# Patient Record
Sex: FEMALE | Race: WHITE | NOT HISPANIC OR LATINO | Employment: OTHER | URBAN - METROPOLITAN AREA
[De-identification: names, ages, dates, MRNs, and addresses within clinical notes are randomized per-mention and may not be internally consistent; named-entity substitution may affect disease eponyms.]

---

## 2017-01-23 ENCOUNTER — HOSPITAL ENCOUNTER (EMERGENCY)
Facility: HOSPITAL | Age: 67
Discharge: HOME | End: 2017-01-23
Attending: EMERGENCY MEDICINE
Payer: MEDICARE

## 2017-01-23 VITALS
RESPIRATION RATE: 18 BRPM | TEMPERATURE: 99 F | OXYGEN SATURATION: 96 % | SYSTOLIC BLOOD PRESSURE: 156 MMHG | WEIGHT: 134 LBS | HEIGHT: 62 IN | DIASTOLIC BLOOD PRESSURE: 74 MMHG | BODY MASS INDEX: 24.66 KG/M2 | HEART RATE: 71 BPM

## 2017-01-23 DIAGNOSIS — R52 PAIN: ICD-10-CM

## 2017-01-23 DIAGNOSIS — M79.644 THUMB PAIN, RIGHT: Primary | ICD-10-CM

## 2017-01-23 DIAGNOSIS — M19.90 OSTEOARTHRITIS, UNSPECIFIED OSTEOARTHRITIS TYPE, UNSPECIFIED SITE: ICD-10-CM

## 2017-01-23 PROCEDURE — 25000003 PHARM REV CODE 250: Performed by: EMERGENCY MEDICINE

## 2017-01-23 PROCEDURE — 99283 EMERGENCY DEPT VISIT LOW MDM: CPT

## 2017-01-23 RX ORDER — ACETAMINOPHEN 325 MG/1
975 TABLET ORAL
Status: COMPLETED | OUTPATIENT
Start: 2017-01-23 | End: 2017-01-23

## 2017-01-23 RX ORDER — ASPIRIN 81 MG/1
81 TABLET ORAL DAILY
COMMUNITY

## 2017-01-23 RX ORDER — TRAMADOL HYDROCHLORIDE 50 MG/1
50 TABLET ORAL EVERY 6 HOURS PRN
Qty: 11 TABLET | Refills: 0 | Status: SHIPPED | OUTPATIENT
Start: 2017-01-23 | End: 2017-02-02

## 2017-01-23 RX ORDER — ALBUTEROL SULFATE 90 UG/1
2 AEROSOL, METERED RESPIRATORY (INHALATION)
COMMUNITY
Start: 2016-11-28 | End: 2020-08-07 | Stop reason: SDUPTHER

## 2017-01-23 RX ORDER — ESOMEPRAZOLE MAGNESIUM 40 MG/1
40 CAPSULE, DELAYED RELEASE ORAL
COMMUNITY
Start: 2016-11-28 | End: 2019-06-04 | Stop reason: ALTCHOICE

## 2017-01-23 RX ADMIN — ACETAMINOPHEN 975 MG: 325 TABLET ORAL at 01:01

## 2017-01-23 NOTE — ED AVS SNAPSHOT
[facility]  [address] [address] [address]               Salud Echevarria   2017 12:40 PM   ED    Description:  Female : 1950   Department:  [facility]           Your Care was Coordinated By:     Provider Role From To    Rex Butt MD Attending Provider 17 1242 --      Reason for Visit     Hand Injury           Diagnoses this Visit        Comments    Thumb pain, right    -  Primary     Pain         Osteoarthritis, unspecified osteoarthritis type, unspecified site           ED Disposition     None           To Do List           Follow-up Information     Follow up with Huey Gudino MD In 3 days.    Specialty:  Pediatrics    Contact information:    [address] [facility] [address] [phone]          Follow up with [facility].    Specialty:  Emergency Medicine    Why:  If symptoms worsen, Or worsening condition or any other major concern    Contact information:    [address] [address] [address] [phone]       These Medications        Disp Refills Start End    tramadol (ULTRAM) 50 mg tablet 11 tablet 0 2017    Take 1 tablet (50 mg total) by mouth every 6 (six) hours as needed. - Oral      [facility] -  Assistance  Registered nurses in the [facility] provide clinical advisement, health education, appointment booking, and other advisory services.  Call for this free service at [phone].             Medications           Message regarding Medications     Verify the changes and/or additions to your medication regime listed below are the same as discussed with your clinician today.  If any of these changes or additions are incorrect, please notify your healthcare provider.        START taking these NEW medications        Refills    tramadol (ULTRAM) 50 mg tablet 0    Sig: Take 1 tablet (50 mg  "total) by mouth every 6 (six) hours as needed.    Class: Print    Route: Oral      These medications were administered today        Dose Freq    acetaminophen tablet 975 mg 975 mg ED 1 Time    Sig: Take 3 tablets (975 mg total) by mouth ED 1 Time.    Class: Normal    Route: Oral      STOP taking these medications     oxycodone-acetaminophen 5-325 mg (PERCOCET) 5-325 mg per tablet Take 1 tablet by mouth every 4 (four) hours as needed for Pain.           Verify that the below list of medications is an accurate representation of the medications you are currently taking.  If none reported, the list may be blank. If incorrect, please contact your healthcare provider. Carry this list with you in case of emergency.           Current Medications     albuterol 90 mcg/actuation inhaler Inhale 2 puffs into the lungs.    alprazolam (XANAX XR) 1 MG Tb24 Take 0.5 mg by mouth as needed.    aspirin (ECOTRIN) 81 MG EC tablet Take 81 mg by mouth once daily.    esomeprazole (NEXIUM) 40 MG capsule Take 40 mg by mouth.    pravastatin (PRAVACHOL) 40 MG tablet Take 40 mg by mouth once daily.    tramadol (ULTRAM) 50 mg tablet Take 1 tablet (50 mg total) by mouth every 6 (six) hours as needed.           Clinical Reference Information           Your Vitals Were     BP Pulse Temp Resp Height Weight    165/74 (BP Location: Left arm, Patient Position: Sitting) 66 98.5 °F (36.9 °C) (Oral) 18 5' 2" (1.575 m) 60.8 kg (134 lb)    SpO2 BMI             97% 24.51 kg/m2         Allergies as of 1/23/2017        Reactions    Pcn [Penicillins] Shortness Of Breath    Iodine And Iodide Containing Products     Naproxen     Statins-hmg-coa Reductase Inhibitors     Leg cramps    Diazepam Anxiety    Made pt overly anxious instead of relaxing      Immunizations Administered on Date of Encounter - 1/23/2017     None      ED Micro, Lab, POCT     None      ED Imaging Orders     Start Ordered       Status Ordering Provider    01/23/17 1223 01/23/17 1223  X-Ray " Finger 2 or More Views  1 time imaging      Final result         Discharge Instructions         Osteoarthritis  Osteoarthritis (also called degenerative joint disease) happens when the cartilage in a joint becomes damaged and worn. This may be due to age, wear and tear, overuse of the joint, or other problems. Osteoarthritis can affect any joint. But it is most common in hands, knees, spine, hips, and feet. Symptoms include joint stiffness, pain, and swelling.  Home care  · When a joint is more sore than usual, rest it for a day or two.  · Heat can help relieve stiffness. Take a hot bath or apply a heating pad for up to 30 minutes at a time. If symptoms are worse in the morning, using heat just after awakening can help relax the muscle and soothe the joints.   · Ice helps relieve pain and swelling. It is often used after activity. Use a cold pack wrapped in a thin cloth on the joint for 10-15 minutes at a time.   · Alternating hot and cold can also help relieve pain. Try this for 20 minutes at a time, several times per day.  · Exercise helps prevent the muscles and ligaments around the joint from becoming weak. It also helps maintain function in the joint.  Be as active as you can. Talk to your doctor about what activity program is best for you.  · Excess weight puts a lot of extra strain on weight-bearing joints of the lower back, hips, knees, feet and ankles. If you are overweight, talk to your doctor about a safe and effective weight loss program.  · Use anti-inflammatory medications as prescribed for pain. This incudes acetaminophen or NSAIDs such as ibuprofen or naproxen. If needed, topical or injected medications may be recommended. Talk to your doctor if these options are not enough to manage your pain.  · Talk with your doctor about devices that might help improve your function and reduce pain.  Follow-up care  Follow up with your doctor as advised by our staff.  When to seek medical attention  Call your  doctor right away if any of the following occur:  · Redness or swelling of a painful joint  · Discharge or pus from a painful joint  · Fever of 100.4ºF (38ºC) or higher, or as directed by your healthcare provider  · Worsening joint pain  · Decreased ability to move the joint or bear weight on the joint  © 2000-2016 [employer]. [address] [place] [address]. All rights reserved. This information is not intended as a substitute for professional medical care. Always follow your healthcare professional's instructions.          Osteoarthritis Medicine    Pain from osteoarthritis can interfere with your life in many ways. It can make it hard to be active and take good care of yourself. Untreated pain may make sleep difficult. It may also contribute to depression and anxiety.  Controlling pain involves lifestyle changes like weight management and exercise. Natural and alternative treatments for pain relief include the use of hot and cold, massage, acupuncture, relaxation, and counseling. Other medicines are available to help relieve pain.  Over-the-counter medicines  Some arthritis medicines can be bought without a prescription:  · Acetaminophen is effective for moderate pain and does not cause stomach upset. It doesnt relieve swelling, though. You must talk with your healthcare provider before taking it if you have liver or kidney problems.   · Nonsteroidal anti-inflammatory medicines (NSAIDs), such as aspirin, ibuprofen, or naproxen, help relieve pain and swelling. Use of NSAIDs can cause stomach and kidney problems and raise blood pressure. Note: Do not take NSAIDs if you take medicines that thin your blood, such as warfarin. Talk to your healthcare provider first if you have kidney or liver disease.   Prescription medicines  Some arthritis medicines need a prescription:  · Prescription NSAIDs are stronger than over-the-counter NSAIDs. They reduce pain and swelling. Use of NSAIDs may cause  serious stomach problems and easy bruising. In rare cases they may lead to kidney or liver problems. These include nonselective NSAIDs, such as naproxen, diclofenac, and indomethacin. Also, selective NSAIDs, such as meloxicam and celecoxib. Selective NSAIDs are thought to have less of a risk of gastrointestinal side effects when compared to nonselective NSAIDs.   · Other medicines, such as duloxetine, tramadol, and narcotic pain relievers, may be prescribed for certain patients based on specific clinical factors.   Topical medicines  Topical medicines are those applied directly to the skin over the affected joint. They may be lotions, cream, sprays, ointments, or gels. They can be used along with some oral medicines:  · NSAID creams may reduce swelling and relieve pain.  · Capsaicin (cream) is made from an ingredient found in chili peppers. It works by stopping production of a substance that helps send pain signals to the brain. It may cause a burning or stinging feeling when you first use it.  · Other topical medicines provide pain relief by numbing the area to which they are applied.  Intra-articular medicines  Some patients benefit from joint injections with:  · Corticosteroids used for most joints  · Hyaluronic acid preparation used for knees   If one medicine doesn't work for you, another may help. If you have any questions or concerns about your current medicines or other medicines choices, talk with your healthcare provider.  © 7379-6579 The [employer]. [address] [place] [address], [place] [address]. All rights reserved. This information is not intended as a substitute for professional medical care. Always follow your healthcare professional's instructions.            Understanding Osteoarthritis  There are about 100 different types of arthritis. In general, arthritis means problems with the joints. A joint is a place in the body where two bones meet. Arthritis may also affect other body tissue near the  joints including muscles, tendons, and ligaments. And, in some forms of arthritis, the whole body is involved.  Osteoarthritis (OA) is sometimes called degenerative joint disease or wear-and-tear arthritis. It's the most common type of arthritis. In OA, the cartilage wears away. Cartilage covers the ends of bones and acts as a cushion. If enough cartilage wears away, bone rubs against bone. The joint changes in OA cause pain, stiffness, and difficulty movement.    How joints work  The joint help the bones move easily. Cartilage is smooth tissue that cushions the ends of bones, letting them slide against each other. The synovial membrane surrounds the joint. It makes a fluid that lubricates the joint.          When a joint wears out  Through long use or injury, or because of a family tendency, the cartilage can become rough and damaged. It starts to wear unevenly. The ends of the bones then rub together, causing stiffness, pain, and sometimes swelling. Bony spurs may grow, enlarging the joint. The muscles around the joint may weaken  © 2736-6188 [employer]. [address] [place] [address]. All rights reserved. This information is not intended as a substitute for professional medical care. Always follow your healthcare professional's instructions.          [facility] Sign-Up     Activating your [facility] account is as easy as 1-2-3!     1) Visit [email or web address], select Sign Up Now, enter this activation code and your date of birth, then select Next.  [email or web address]  Expires: 3/9/2017  1:41 PM      2) Create a username and password to use when you visit [facility] in the future and select a security question in case you lose your password and select Next.    3) Enter your e-mail address and click Sign Up!    Additional Information  If you have questions, please e-mail [email or web address] or call [phone] to talk to our [facility] staff. Remember, [facility] is NOT to be used for urgent  needs. For medical emergencies, dial 911.         Smoking Cessation     If you would like to quit smoking:   You may be eligible for free services if you are a [place] resident and started smoking cigarettes before September 1, 1988.  Call the Smoking Cessation Trust (SCT) toll free at (557) 221-9719 or (599) 368-5756.   Call 1-800-QUIT-NOW if you do not meet the above criteria.             [facility] complies with applicable Federal civil rights laws and does not discriminate on the basis of race, color, national origin, age, disability, or sex.        Language Assistance Services     ATTENTION: Language assistance services are available, free of charge. Please call [phone].      ATENCIÓN: Si habla kekeañol, tiene a woody disposición servicios gratuitos de asistencia lingüística. Llame al [phone].     CHÚ Ý: N?u b?n nói Ti?ng Vi?t, có các d?ch v? h? tr? ngôn ng? mi?n phí dành cho b?n. G?i s? [phone].

## 2017-01-23 NOTE — DISCHARGE INSTRUCTIONS
Osteoarthritis  Osteoarthritis (also called degenerative joint disease) happens when the cartilage in a joint becomes damaged and worn. This may be due to age, wear and tear, overuse of the joint, or other problems. Osteoarthritis can affect any joint. But it is most common in hands, knees, spine, hips, and feet. Symptoms include joint stiffness, pain, and swelling.  Home care  · When a joint is more sore than usual, rest it for a day or two.  · Heat can help relieve stiffness. Take a hot bath or apply a heating pad for up to 30 minutes at a time. If symptoms are worse in the morning, using heat just after awakening can help relax the muscle and soothe the joints.   · Ice helps relieve pain and swelling. It is often used after activity. Use a cold pack wrapped in a thin cloth on the joint for 10-15 minutes at a time.   · Alternating hot and cold can also help relieve pain. Try this for 20 minutes at a time, several times per day.  · Exercise helps prevent the muscles and ligaments around the joint from becoming weak. It also helps maintain function in the joint.  Be as active as you can. Talk to your doctor about what activity program is best for you.  · Excess weight puts a lot of extra strain on weight-bearing joints of the lower back, hips, knees, feet and ankles. If you are overweight, talk to your doctor about a safe and effective weight loss program.  · Use anti-inflammatory medications as prescribed for pain. This incudes acetaminophen or NSAIDs such as ibuprofen or naproxen. If needed, topical or injected medications may be recommended. Talk to your doctor if these options are not enough to manage your pain.  · Talk with your doctor about devices that might help improve your function and reduce pain.  Follow-up care  Follow up with your doctor as advised by our staff.  When to seek medical attention  Call your doctor right away if any of the following occur:  · Redness or swelling of a painful  joint  · Discharge or pus from a painful joint  · Fever of 100.4ºF (38ºC) or higher, or as directed by your healthcare provider  · Worsening joint pain  · Decreased ability to move the joint or bear weight on the joint  © 9466-7150 [employer]. [address] [place] [address]. All rights reserved. This information is not intended as a substitute for professional medical care. Always follow your healthcare professional's instructions.          Osteoarthritis Medicine    Pain from osteoarthritis can interfere with your life in many ways. It can make it hard to be active and take good care of yourself. Untreated pain may make sleep difficult. It may also contribute to depression and anxiety.  Controlling pain involves lifestyle changes like weight management and exercise. Natural and alternative treatments for pain relief include the use of hot and cold, massage, acupuncture, relaxation, and counseling. Other medicines are available to help relieve pain.  Over-the-counter medicines  Some arthritis medicines can be bought without a prescription:  · Acetaminophen is effective for moderate pain and does not cause stomach upset. It doesnt relieve swelling, though. You must talk with your healthcare provider before taking it if you have liver or kidney problems.   · Nonsteroidal anti-inflammatory medicines (NSAIDs), such as aspirin, ibuprofen, or naproxen, help relieve pain and swelling. Use of NSAIDs can cause stomach and kidney problems and raise blood pressure. Note: Do not take NSAIDs if you take medicines that thin your blood, such as warfarin. Talk to your healthcare provider first if you have kidney or liver disease.   Prescription medicines  Some arthritis medicines need a prescription:  · Prescription NSAIDs are stronger than over-the-counter NSAIDs. They reduce pain and swelling. Use of NSAIDs may cause serious stomach problems and easy bruising. In rare cases they may lead to kidney or  liver problems. These include nonselective NSAIDs, such as naproxen, diclofenac, and indomethacin. Also, selective NSAIDs, such as meloxicam and celecoxib. Selective NSAIDs are thought to have less of a risk of gastrointestinal side effects when compared to nonselective NSAIDs.   · Other medicines, such as duloxetine, tramadol, and narcotic pain relievers, may be prescribed for certain patients based on specific clinical factors.   Topical medicines  Topical medicines are those applied directly to the skin over the affected joint. They may be lotions, cream, sprays, ointments, or gels. They can be used along with some oral medicines:  · NSAID creams may reduce swelling and relieve pain.  · Capsaicin (cream) is made from an ingredient found in chili peppers. It works by stopping production of a substance that helps send pain signals to the brain. It may cause a burning or stinging feeling when you first use it.  · Other topical medicines provide pain relief by numbing the area to which they are applied.  Intra-articular medicines  Some patients benefit from joint injections with:  · Corticosteroids used for most joints  · Hyaluronic acid preparation used for knees   If one medicine doesn't work for you, another may help. If you have any questions or concerns about your current medicines or other medicines choices, talk with your healthcare provider.  © 6408-9561 [employer]. [address] [place] [address], [place] [address]. All rights reserved. This information is not intended as a substitute for professional medical care. Always follow your healthcare professional's instructions.            Understanding Osteoarthritis  There are about 100 different types of arthritis. In general, arthritis means problems with the joints. A joint is a place in the body where two bones meet. Arthritis may also affect other body tissue near the joints including muscles, tendons, and ligaments. And, in some forms of arthritis,  the whole body is involved.  Osteoarthritis (OA) is sometimes called degenerative joint disease or wear-and-tear arthritis. It's the most common type of arthritis. In OA, the cartilage wears away. Cartilage covers the ends of bones and acts as a cushion. If enough cartilage wears away, bone rubs against bone. The joint changes in OA cause pain, stiffness, and difficulty movement.    How joints work  The joint help the bones move easily. Cartilage is smooth tissue that cushions the ends of bones, letting them slide against each other. The synovial membrane surrounds the joint. It makes a fluid that lubricates the joint.          When a joint wears out  Through long use or injury, or because of a family tendency, the cartilage can become rough and damaged. It starts to wear unevenly. The ends of the bones then rub together, causing stiffness, pain, and sometimes swelling. Bony spurs may grow, enlarging the joint. The muscles around the joint may weaken  © 2184-8290 [employer]. [address] [place] [address], [place] [address]. All rights reserved. This information is not intended as a substitute for professional medical care. Always follow your healthcare professional's instructions.

## 2017-01-23 NOTE — ED PROVIDER NOTES
Encounter Date: 1/23/2017       History     Chief Complaint   Patient presents with    Hand Injury     right thumb. sent from Urgent care bc Xray is down. pt states does not know how injury happened      1/23/2017, 1:31 PM.    History Provided by: patient       Salud Echevarria is a 67 y.o. female presenting to the ED for complaints of right thumb pain.  The patient states that yesterday her right thumb started hurting.  The patient denies any obvious trauma and does not remember overtly hurting her thumb.  The patient noted some swelling and deformity when compared to the left thumb and rates the pain as moderate. The patient denies any fever chills nausea or vomiting.  There are no other major concerns or complaints noted at this time.        PCP: Huey Gudino MD         Medical History:   Past Medical History   Diagnosis Date    Hyperlipidemia     Spinal stenosis        Surgical History:   Past Surgical History   Procedure Laterality Date    Back surgery      Tonsillectomy      Hysterectomy      Lymph node biopsy         Family History:   Family History   Problem Relation Age of Onset    Heart disease Mother        Social History:   Social History     Social History Main Topics    Smoking status: Current Every Day Smoker     Packs/day: 0.50     Types: Cigarettes    Smokeless tobacco: Not on file    Alcohol use No    Drug use: No    Sexual activity: Not on file       Review of patient's allergies indicates:   Allergen Reactions    Pcn [penicillins] Shortness Of Breath    Iodine and iodide containing products     Naproxen     Statins-hmg-coa reductase inhibitors      Leg cramps    Diazepam Anxiety     Made pt overly anxious instead of relaxing     HPI  Past Medical History   Diagnosis Date    Hyperlipidemia     Spinal stenosis      No past medical history pertinent negatives.  Past Surgical History   Procedure Laterality Date    Back surgery      Tonsillectomy      Hysterectomy       Lymph node biopsy       Family History   Problem Relation Age of Onset    Heart disease Mother      Social History   Substance Use Topics    Smoking status: Current Every Day Smoker     Packs/day: 0.50     Types: Cigarettes    Smokeless tobacco: None    Alcohol use No     Review of Systems   Constitutional: Negative for fever.   HENT: Negative for sore throat.    Respiratory: Negative for shortness of breath.    Cardiovascular: Negative for chest pain.   Gastrointestinal: Negative for nausea.   Genitourinary: Negative for dysuria.   Musculoskeletal: Negative for back pain.        Right thumb pain   Skin: Negative for rash.   Neurological: Negative for weakness.   Hematological: Does not bruise/bleed easily.   All other systems reviewed and are negative.      Physical Exam   Initial Vitals   BP Pulse Resp Temp SpO2   01/23/17 1215 01/23/17 1215 01/23/17 1215 01/23/17 1215 01/23/17 1215   165/74 66 18 98.5 °F (36.9 °C) 97 %     Physical Exam  Nursing Notes and Vital Signs Reviewed.  Constitutional:  Well developed, well nourished.  She is awake & alert.  She is in no acute distress  Head:  Atraumatic.  Normocephalic.    Eyes:  PERRL.  EOMI.  Conjunctivae are not pale. No scleral icterus.  ENT:  Mucous membranes are moist and intact.  Oropharynx is clear and symmetric.    Neck:  Supple. Full ROM.  No lymphadenopathy.  Cardiovascular:  Regular rate.  Regular rhythm. S1 and S2 heart sounds present.  No murmurs, rubs, or gallops.  Distal pulses are 2+ and symmetric.  Pulmonary/Chest:  No evidence of respiratory distress.  Clear to auscultation bilaterally.  No wheezing, rales or rhonchi.  Abdominal:  Soft and non-distended.  There is no tenderness.  No rebound, guarding, or rigidity.  Good bowel sounds.  No organomegaly.    Genitourinary: No CVA tenderness.  Musculoskeletal:  Moves all four extremities. No obvious deformities.  No edema. No calf tenderness.  Noted tenderness to palpation to the right thumb IP joint  "with some slight swelling and decreased range of motion secondary to pain  Skin:  Skin is warm and dry. No rashes.      Neurological:  Alert, awake, and appropriate.  Normal speech.  No acute focal neurological deficits are appreciated.  Psychiatric:  Good eye contact.  Appropriate in content/context. Normal affect.    ED Course   Procedures  Labs Reviewed - No data to display     ED ONGOING VITALS:  Vitals:    01/23/17 1215 01/23/17 1355   BP: (!) 165/74 (!) 156/74   Pulse: 66 71   Resp: 18 18   Temp: 98.5 °F (36.9 °C) 98.5 °F (36.9 °C)   TempSrc: Oral Oral   SpO2: 97% 96%   Weight: 60.8 kg (134 lb)    Height: 5' 2" (1.575 m)          ABNORMAL LAB VALUES:  Labs Reviewed - No data to display      ALL LAB VALUES:  No labs drawn at this time.      RADIOLOGY STUDIES:  Imaging Results         X-Ray Finger 2 or More Views (Final result) Result time:  01/23/17 13:16:12    Final result by ELLEN Rodgers Sr., MD (01/23/17 13:16:12)    Impression:      1.  There are minimal osteoarthritic changes in the interphalangeal joint of the right thumb.  2.  There is no acute fracture visualized.      Electronically signed by: ELLEN RODGERS MD  Date:     01/23/17  Time:    13:16     Narrative:    3 view x-ray of the right thumb    Clinical History:     Pain, unspecified    Findings:     There is no acute fracture visualized. There is no dislocation.  There are minimal osteoarthritic changes in the interphalangeal joint of the right thumb.                The above vital signs and test results have been reviewed by the emergency provider.       ED EVENTS:  1:36 PM - Re-evaluation: The patient is resting comfortably and is in no acute distress. She states that her symptoms have improved after treatment within ER. Discussed test results, shared treatment plan, specific conditions for return, and importance of follow up with patient and family.  She understands and agrees with the plan as discussed. Answered  her questions at this time. " She has remained hemodynamically stable throughout the ED course and is appropriate for discharge home.  The patient be discharged home with tramadol and advised to follow-up with her primary doctor in 2-3 days or return to the ED for any worsening symptoms or any other major concern.          Pre-hypertension/Hypertension: The pt has been informed that they may have pre-hypertension or hypertension based on a blood pressure reading in the ED. I recommend that the pt call the PCP listed on their discharge instructions or a physician of their choice this week to arrange f/u for further evaluation of possible pre-hypertension or hypertension.        Medical Decision Making:   Clinical Tests:   Radiological Study: Ordered and Reviewed                   ED Course     Clinical Impression:       ICD-10-CM ICD-9-CM   1. Thumb pain, right M79.644 729.5   2. Pain R52 780.96   3. Osteoarthritis, unspecified osteoarthritis type, unspecified site M19.90 715.90         Disposition:   Disposition: Discharged  Condition: Stable       Rex Butt MD  01/24/17 0635

## 2018-07-24 LAB
CHOLESTEROL, TOTAL: 162
HDL/CHOLESTEROL RATIO: 1.9
HDLC SERPL-MCNC: 78 MG/DL
HDLC SERPL-MCNC: 84 MG/DL
TRIGL SERPL-MCNC: 70 MG/DL

## 2019-06-03 ENCOUNTER — TELEPHONE (OUTPATIENT)
Dept: INTERNAL MEDICINE | Facility: CLINIC | Age: 69
End: 2019-06-03

## 2019-06-03 NOTE — TELEPHONE ENCOUNTER
Patient had call to see it he will be able to review her her records from previous doctor. Patient stated she was told he would be able to see them. Patient also verify her appointment time

## 2019-06-03 NOTE — TELEPHONE ENCOUNTER
----- Message from Mariposa Lopez sent at 6/3/2019  8:42 AM CDT -----  Contact: pt  States she has an appt tomorrow at 10:20 with Dr Brownlee and she just wants to make sure he can see her Medical Records from Dr Gudino's Office. Please call pt at [phone]. Thank you

## 2019-06-04 ENCOUNTER — OFFICE VISIT (OUTPATIENT)
Dept: INTERNAL MEDICINE | Facility: CLINIC | Age: 69
End: 2019-06-04
Payer: MEDICARE

## 2019-06-04 ENCOUNTER — LAB VISIT (OUTPATIENT)
Dept: LAB | Facility: HOSPITAL | Age: 69
End: 2019-06-04
Attending: FAMILY MEDICINE
Payer: MEDICARE

## 2019-06-04 ENCOUNTER — PATIENT OUTREACH (OUTPATIENT)
Dept: ADMINISTRATIVE | Facility: HOSPITAL | Age: 69
End: 2019-06-04

## 2019-06-04 VITALS
WEIGHT: 140.19 LBS | DIASTOLIC BLOOD PRESSURE: 70 MMHG | BODY MASS INDEX: 25.8 KG/M2 | TEMPERATURE: 97 F | HEIGHT: 62 IN | HEART RATE: 68 BPM | RESPIRATION RATE: 12 BRPM | OXYGEN SATURATION: 95 % | SYSTOLIC BLOOD PRESSURE: 132 MMHG

## 2019-06-04 DIAGNOSIS — E78.5 HYPERLIPIDEMIA, UNSPECIFIED HYPERLIPIDEMIA TYPE: ICD-10-CM

## 2019-06-04 DIAGNOSIS — Z12.11 COLON CANCER SCREENING: ICD-10-CM

## 2019-06-04 DIAGNOSIS — I70.0 AORTIC ATHEROSCLEROSIS: Primary | ICD-10-CM

## 2019-06-04 DIAGNOSIS — F17.200 TOBACCO DEPENDENCE: ICD-10-CM

## 2019-06-04 DIAGNOSIS — Z11.59 ENCOUNTER FOR HEPATITIS C SCREENING TEST FOR LOW RISK PATIENT: ICD-10-CM

## 2019-06-04 DIAGNOSIS — R31.9 HEMATURIA, UNSPECIFIED TYPE: ICD-10-CM

## 2019-06-04 PROBLEM — I25.10 CORONARY ARTERY DISEASE INVOLVING NATIVE CORONARY ARTERY OF NATIVE HEART WITHOUT ANGINA PECTORIS: Status: ACTIVE | Noted: 2017-03-14

## 2019-06-04 PROBLEM — M48.00 SPINAL STENOSIS: Status: ACTIVE | Noted: 2019-06-04

## 2019-06-04 PROBLEM — K21.00 GASTROESOPHAGEAL REFLUX DISEASE WITH ESOPHAGITIS: Status: ACTIVE | Noted: 2018-01-29

## 2019-06-04 LAB
ALBUMIN SERPL BCP-MCNC: 4 G/DL (ref 3.5–5.2)
ALP SERPL-CCNC: 63 U/L (ref 55–135)
ALT SERPL W/O P-5'-P-CCNC: 13 U/L (ref 10–44)
ANION GAP SERPL CALC-SCNC: 8 MMOL/L (ref 8–16)
AST SERPL-CCNC: 14 U/L (ref 10–40)
BACTERIA #/AREA URNS AUTO: ABNORMAL /HPF
BASOPHILS # BLD AUTO: 0.08 K/UL (ref 0–0.2)
BASOPHILS NFR BLD: 1 % (ref 0–1.9)
BILIRUB SERPL-MCNC: 0.6 MG/DL (ref 0.1–1)
BILIRUB UR QL STRIP: NEGATIVE
BUN SERPL-MCNC: 9 MG/DL (ref 8–23)
CALCIUM SERPL-MCNC: 9.4 MG/DL (ref 8.7–10.5)
CHLORIDE SERPL-SCNC: 103 MMOL/L (ref 95–110)
CLARITY UR REFRACT.AUTO: CLEAR
CO2 SERPL-SCNC: 29 MMOL/L (ref 23–29)
COLOR UR AUTO: YELLOW
CREAT SERPL-MCNC: 0.7 MG/DL (ref 0.5–1.4)
DIFFERENTIAL METHOD: NORMAL
EOSINOPHIL # BLD AUTO: 0.2 K/UL (ref 0–0.5)
EOSINOPHIL NFR BLD: 3 % (ref 0–8)
ERYTHROCYTE [DISTWIDTH] IN BLOOD BY AUTOMATED COUNT: 14.4 % (ref 11.5–14.5)
EST. GFR  (AFRICAN AMERICAN): >60 ML/MIN/1.73 M^2
EST. GFR  (NON AFRICAN AMERICAN): >60 ML/MIN/1.73 M^2
GLUCOSE SERPL-MCNC: 96 MG/DL (ref 70–110)
GLUCOSE UR QL STRIP: NEGATIVE
HCT VFR BLD AUTO: 41.9 % (ref 37–48.5)
HGB BLD-MCNC: 13.9 G/DL (ref 12–16)
HGB UR QL STRIP: ABNORMAL
KETONES UR QL STRIP: NEGATIVE
LEUKOCYTE ESTERASE UR QL STRIP: NEGATIVE
LYMPHOCYTES # BLD AUTO: 2.3 K/UL (ref 1–4.8)
LYMPHOCYTES NFR BLD: 28.7 % (ref 18–48)
MCH RBC QN AUTO: 30 PG (ref 27–31)
MCHC RBC AUTO-ENTMCNC: 33.2 G/DL (ref 32–36)
MCV RBC AUTO: 91 FL (ref 82–98)
MICROSCOPIC COMMENT: ABNORMAL
MONOCYTES # BLD AUTO: 0.7 K/UL (ref 0.3–1)
MONOCYTES NFR BLD: 8.3 % (ref 4–15)
NEUTROPHILS # BLD AUTO: 4.7 K/UL (ref 1.8–7.7)
NEUTROPHILS NFR BLD: 58.9 % (ref 38–73)
NITRITE UR QL STRIP: NEGATIVE
PH UR STRIP: 6 [PH] (ref 5–8)
PLATELET # BLD AUTO: 265 K/UL (ref 150–350)
PMV BLD AUTO: 9.5 FL (ref 9.2–12.9)
POTASSIUM SERPL-SCNC: 4.2 MMOL/L (ref 3.5–5.1)
PROT SERPL-MCNC: 6.8 G/DL (ref 6–8.4)
PROT UR QL STRIP: NEGATIVE
RBC # BLD AUTO: 4.63 M/UL (ref 4–5.4)
RBC #/AREA URNS AUTO: 5 /HPF (ref 0–4)
SODIUM SERPL-SCNC: 140 MMOL/L (ref 136–145)
SP GR UR STRIP: 1.01 (ref 1–1.03)
SQUAMOUS #/AREA URNS AUTO: 1 /HPF
URN SPEC COLLECT METH UR: ABNORMAL
UROBILINOGEN UR STRIP-ACNC: NEGATIVE EU/DL
WBC # BLD AUTO: 8.04 K/UL (ref 3.9–12.7)

## 2019-06-04 PROCEDURE — 85025 COMPLETE CBC W/AUTO DIFF WBC: CPT | Mod: PO

## 2019-06-04 PROCEDURE — 81000 URINALYSIS NONAUTO W/SCOPE: CPT | Mod: PO

## 2019-06-04 PROCEDURE — 80061 LIPID PANEL: CPT

## 2019-06-04 PROCEDURE — 99499 RISK ADDL DX/OHS AUDIT: ICD-10-PCS | Mod: S$GLB,,, | Performed by: FAMILY MEDICINE

## 2019-06-04 PROCEDURE — 99499 UNLISTED E&M SERVICE: CPT | Mod: S$GLB,,, | Performed by: FAMILY MEDICINE

## 2019-06-04 PROCEDURE — 1101F PR PT FALLS ASSESS DOC 0-1 FALLS W/OUT INJ PAST YR: ICD-10-PCS | Mod: CPTII,S$GLB,, | Performed by: FAMILY MEDICINE

## 2019-06-04 PROCEDURE — 99204 PR OFFICE/OUTPT VISIT, NEW, LEVL IV, 45-59 MIN: ICD-10-PCS | Mod: S$GLB,,, | Performed by: FAMILY MEDICINE

## 2019-06-04 PROCEDURE — 1101F PT FALLS ASSESS-DOCD LE1/YR: CPT | Mod: CPTII,S$GLB,, | Performed by: FAMILY MEDICINE

## 2019-06-04 PROCEDURE — 36415 COLL VENOUS BLD VENIPUNCTURE: CPT | Mod: PO

## 2019-06-04 PROCEDURE — 80053 COMPREHEN METABOLIC PANEL: CPT | Mod: PO

## 2019-06-04 PROCEDURE — 86803 HEPATITIS C AB TEST: CPT

## 2019-06-04 PROCEDURE — 99999 PR PBB SHADOW E&M-EST. PATIENT-LVL III: ICD-10-PCS | Mod: PBBFAC,,, | Performed by: FAMILY MEDICINE

## 2019-06-04 PROCEDURE — 99999 PR PBB SHADOW E&M-EST. PATIENT-LVL III: CPT | Mod: PBBFAC,,, | Performed by: FAMILY MEDICINE

## 2019-06-04 PROCEDURE — 99204 OFFICE O/P NEW MOD 45 MIN: CPT | Mod: S$GLB,,, | Performed by: FAMILY MEDICINE

## 2019-06-04 RX ORDER — DIPHENHYDRAMINE HCL 25 MG
4 CAPSULE ORAL
Qty: 100 EACH | Refills: 1 | Status: SHIPPED | OUTPATIENT
Start: 2019-06-04 | End: 2020-04-27

## 2019-06-04 RX ORDER — OXYCODONE AND ACETAMINOPHEN 10; 325 MG/1; MG/1
1 TABLET ORAL 2 TIMES DAILY PRN
COMMUNITY
Start: 2019-01-07 | End: 2019-08-13

## 2019-06-04 RX ORDER — ALPRAZOLAM 1 MG/1
TABLET ORAL
COMMUNITY
Start: 2018-11-12 | End: 2019-06-04

## 2019-06-04 NOTE — PROGRESS NOTES
Subjective:       Patient ID: Salud Echevarria is a 69 y.o. female.    Chief Complaint: Establish Care and Rash    HPI    Here today to establish care.  She has a past medical history of coronary artery disease, dyslipidemia, tobacco use, aortic atherosclerosis, osteopenia, spinal stenosis, and GERD.    No significant complaints at this time.  She is due for colon cancer screening as well as routine lab work.  She is interested in smoking cessation and has tried different things in the past that did not work well.  She is willing to try using nicotine gum.  She smokes about half a pack per day and has done so for many years.    Family History   Problem Relation Age of Onset    Heart disease Mother        Current Outpatient Medications:     aspirin (ECOTRIN) 81 MG EC tablet, Take 81 mg by mouth once daily., Disp: , Rfl:     oxyCODONE-acetaminophen (PERCOCET)  mg per tablet, Take 1 tablet by mouth 2 (two) times daily as needed., Disp: , Rfl:     pravastatin (PRAVACHOL) 40 MG tablet, Take 40 mg by mouth once daily., Disp: , Rfl:     albuterol 90 mcg/actuation inhaler, Inhale 2 puffs into the lungs., Disp: , Rfl:     nicotine polacrilex (NICORETTE) 4 MG Gum, Take 1 each (4 mg total) by mouth as needed., Disp: 100 each, Rfl: 1    Review of Systems   Constitutional: Negative for chills and fever.   HENT: Negative for congestion and sore throat.    Eyes: Negative for visual disturbance.   Respiratory: Negative for cough and shortness of breath.    Cardiovascular: Negative for chest pain.   Gastrointestinal: Negative for abdominal pain, constipation and diarrhea.   Endocrine: Negative for polydipsia and polyuria.   Genitourinary: Negative for difficulty urinating and menstrual problem.   Musculoskeletal: Positive for back pain.   Skin: Negative for rash.   Neurological: Negative for dizziness.   Hematological: Does not bruise/bleed easily.       Objective:   /70 (BP Location: Left arm, Patient Position:  "Sitting, BP Method: Medium (Automatic))   Pulse 68   Temp 97.3 °F (36.3 °C) (Tympanic)   Resp 12   Ht 5' 2" (1.575 m)   Wt 63.6 kg (140 lb 3.4 oz)   SpO2 95%   BMI 25.65 kg/m²      Physical Exam   Constitutional: She is oriented to person, place, and time. She appears well-developed and well-nourished. No distress.   HENT:   Head: Normocephalic and atraumatic.   Nose: Nose normal.   Eyes: Pupils are equal, round, and reactive to light. Conjunctivae and EOM are normal. Right eye exhibits no discharge. Left eye exhibits no discharge.   Neck: No thyromegaly present.   Cardiovascular: Normal rate and regular rhythm.   No murmur heard.  Pulmonary/Chest: Effort normal and breath sounds normal. No respiratory distress.   Abdominal: Soft. She exhibits no distension.   Musculoskeletal: She exhibits no edema.   Neurological: She is alert and oriented to person, place, and time.   Skin: No rash noted. She is not diaphoretic.   Psychiatric: She has a normal mood and affect. Her behavior is normal.       Assessment & Plan     Problem List Items Addressed This Visit        Cardiac/Vascular    Aortic atherosclerosis - Primary    Current Assessment & Plan       Focus on blood pressure control which is good now.  Also continue on statin medication.         Hyperlipidemia    Relevant Orders    CBC auto differential (Completed)    Comprehensive metabolic panel (Completed)    Lipid panel (Completed)       Renal/    Hematuria    Current Assessment & Plan      Reports chronic history of hematuria that has defied workup as far as a reason.  Getting urine screen again today.         Relevant Orders    URINALYSIS (Completed)       ID    Encounter for hepatitis C screening test for low risk patient    Current Assessment & Plan       Due for hepatitis C screening.         Relevant Orders    Hepatitis C antibody (Completed)       GI    Colon cancer screening    Current Assessment & Plan      No history of colon cancer personally nor " in her family.  She would rather do fit kit         Relevant Orders    Fecal Immunochemical Test (iFOBT)       Other    Tobacco dependence    Current Assessment & Plan       Counseled for 3 min on the importance of cessation and we developed a strategy for quitting.  She wants to try on nicotine gum initially as a method to this quit.  She has been smoking half pack of cigarettes per day and is motivated to quit                 Follow up in about 6 months (around 12/4/2019).    Disclaimer:  This note may have been prepared using voice recognition software, it may have not been extensively proofed, as such there could be errors within the text such as sound alike errors.

## 2019-06-05 ENCOUNTER — TELEPHONE (OUTPATIENT)
Dept: INTERNAL MEDICINE | Facility: CLINIC | Age: 69
End: 2019-06-05

## 2019-06-05 LAB
CHOLEST SERPL-MCNC: 155 MG/DL (ref 120–199)
CHOLEST/HDLC SERPL: 1.8 {RATIO} (ref 2–5)
HCV AB SERPL QL IA: NEGATIVE
HDLC SERPL-MCNC: 87 MG/DL (ref 40–75)
HDLC SERPL: 56.1 % (ref 20–50)
LDLC SERPL CALC-MCNC: 51.4 MG/DL (ref 63–159)
NONHDLC SERPL-MCNC: 68 MG/DL
TRIGL SERPL-MCNC: 83 MG/DL (ref 30–150)

## 2019-06-05 NOTE — TELEPHONE ENCOUNTER
----- Message from Mariposa Lopez sent at 6/5/2019 11:51 AM CDT -----  Contact: pt  Type:  Patient Returning Call    Who Called:pt  Who Left Message for Patient:?  Does the patient know what this is regarding?:test results  Would the patient rather a call back or a response via [facility]? Call back  Best Call Back Number:[phone]  Additional Information: .    Thank you

## 2019-06-05 NOTE — TELEPHONE ENCOUNTER
----- Message from Carmen Fischer sent at 6/5/2019 10:36 AM CDT -----  Contact: pt  Type:  Test Results    Who Called: Patient  Name of Test (Lab/Mammo/Etc): Lab Results  Date of Test: 6/4  Ordering Provider:Dr Brownlee  Where the test was performed: [facility]  Would the patient rather a call back or a response via [facility]?call back  Best Call Back Number:[phone]  Additional Information:  na

## 2019-06-05 NOTE — TELEPHONE ENCOUNTER
Patient call for result, informing hep c was being process. Labs has not been reviewed, once they are reviewed will call her with result.  Please review

## 2019-06-10 PROBLEM — Z11.59 ENCOUNTER FOR HEPATITIS C SCREENING TEST FOR LOW RISK PATIENT: Status: ACTIVE | Noted: 2019-06-10

## 2019-06-10 PROBLEM — Z12.11 COLON CANCER SCREENING: Status: ACTIVE | Noted: 2019-06-10

## 2019-06-10 PROBLEM — R31.9 HEMATURIA: Status: ACTIVE | Noted: 2019-06-10

## 2019-06-10 NOTE — ASSESSMENT & PLAN NOTE
Reports chronic history of hematuria that has defied workup as far as a reason.  Getting urine screen again today.

## 2019-06-10 NOTE — ASSESSMENT & PLAN NOTE
Counseled for 3 min on the importance of cessation and we developed a strategy for quitting.  She wants to try on nicotine gum initially as a method to this quit.  She has been smoking half pack of cigarettes per day and is motivated to quit

## 2019-06-11 ENCOUNTER — APPOINTMENT (OUTPATIENT)
Dept: LAB | Facility: HOSPITAL | Age: 69
End: 2019-06-11
Attending: FAMILY MEDICINE
Payer: MEDICARE

## 2019-06-13 ENCOUNTER — TELEPHONE (OUTPATIENT)
Dept: INTERNAL MEDICINE | Facility: CLINIC | Age: 69
End: 2019-06-13

## 2019-06-13 NOTE — TELEPHONE ENCOUNTER
----- Message from Jeanna Michaels sent at 6/13/2019 10:46 AM CDT -----  Contact: Patient  Type:  Test Results    Who Called: Patient  Name of Test (Lab/Mammo/Etc): lab  Date of Test: 6/11/19  Ordering Provider: Dr Brownlee  [facility]  Would the patient rather a call back or a response via [facility]? call  Best Call Back Number: [phone]  Additional Information:

## 2019-06-14 ENCOUNTER — PATIENT OUTREACH (OUTPATIENT)
Dept: ADMINISTRATIVE | Facility: HOSPITAL | Age: 69
End: 2019-06-14

## 2019-07-12 ENCOUNTER — PATIENT OUTREACH (OUTPATIENT)
Dept: ADMINISTRATIVE | Facility: HOSPITAL | Age: 69
End: 2019-07-12

## 2019-07-12 DIAGNOSIS — Z12.31 ENCOUNTER FOR SCREENING MAMMOGRAM FOR MALIGNANT NEOPLASM OF BREAST: Primary | ICD-10-CM

## 2019-07-29 RX ORDER — PRAVASTATIN SODIUM 40 MG/1
40 TABLET ORAL DAILY
Qty: 90 TABLET | Refills: 0 | OUTPATIENT
Start: 2019-07-29

## 2019-07-29 NOTE — TELEPHONE ENCOUNTER
Pt states she was taking Lovastatin which caused leg pain so she was switched to Pravastatin which causes some leg pain but not as bad.

## 2019-07-29 NOTE — TELEPHONE ENCOUNTER
----- Message from Amando Lin MD sent at 7/29/2019  3:39 PM CDT -----  Says she has a statin allergy. Is this correct.

## 2019-07-29 NOTE — TELEPHONE ENCOUNTER
----- Message from Jeanna Michaels sent at 7/29/2019  3:16 PM CDT -----  Contact: Patient  Type:  RX Refill Request    Who Called: Patient  Refill or New Rx:refill  RX Name and Strength:Pravastatin,40mg  How is the patient currently taking it? (ex. 1XDay):once daily  Is this a 30 day or 90 day RX:90  Preferred Pharmacy with phone number:[facility]  Local or Mail Order:Local  Ordering Provider:Dr Brownlee  Would the patient rather a call back or a response via [facility]? call  Best Call Back Number:[phone]  Additional Information: Patient states she has taken the last one.    .

## 2019-07-30 RX ORDER — PRAVASTATIN SODIUM 40 MG/1
40 TABLET ORAL DAILY
Qty: 30 TABLET | Refills: 0 | Status: SHIPPED | OUTPATIENT
Start: 2019-07-30 | End: 2019-08-13 | Stop reason: SDUPTHER

## 2019-08-07 ENCOUNTER — HOSPITAL ENCOUNTER (OUTPATIENT)
Dept: RADIOLOGY | Facility: HOSPITAL | Age: 69
Discharge: HOME | End: 2019-08-07
Attending: FAMILY MEDICINE
Payer: MEDICARE

## 2019-08-07 ENCOUNTER — OFFICE VISIT (OUTPATIENT)
Dept: INTERNAL MEDICINE | Facility: CLINIC | Age: 69
End: 2019-08-07
Payer: MEDICARE

## 2019-08-07 VITALS
TEMPERATURE: 97 F | WEIGHT: 140.88 LBS | HEIGHT: 62 IN | RESPIRATION RATE: 18 BRPM | HEART RATE: 63 BPM | OXYGEN SATURATION: 98 % | DIASTOLIC BLOOD PRESSURE: 63 MMHG | SYSTOLIC BLOOD PRESSURE: 132 MMHG | BODY MASS INDEX: 25.92 KG/M2

## 2019-08-07 DIAGNOSIS — M54.32 SCIATICA OF LEFT SIDE: ICD-10-CM

## 2019-08-07 DIAGNOSIS — M25.552 LEFT HIP PAIN: Primary | ICD-10-CM

## 2019-08-07 DIAGNOSIS — H81.10 BENIGN PAROXYSMAL POSITIONAL VERTIGO, UNSPECIFIED LATERALITY: ICD-10-CM

## 2019-08-07 DIAGNOSIS — M25.552 LEFT HIP PAIN: ICD-10-CM

## 2019-08-07 PROCEDURE — 1101F PT FALLS ASSESS-DOCD LE1/YR: CPT | Mod: CPTII,S$GLB,, | Performed by: FAMILY MEDICINE

## 2019-08-07 PROCEDURE — 99999 PR PBB SHADOW E&M-EST. PATIENT-LVL III: ICD-10-PCS | Mod: PBBFAC,,, | Performed by: FAMILY MEDICINE

## 2019-08-07 PROCEDURE — 96372 THER/PROPH/DIAG INJ SC/IM: CPT | Mod: S$GLB,,, | Performed by: FAMILY MEDICINE

## 2019-08-07 PROCEDURE — 99999 PR PBB SHADOW E&M-EST. PATIENT-LVL III: CPT | Mod: PBBFAC,,, | Performed by: FAMILY MEDICINE

## 2019-08-07 PROCEDURE — 99214 OFFICE O/P EST MOD 30 MIN: CPT | Mod: 25,S$GLB,, | Performed by: FAMILY MEDICINE

## 2019-08-07 PROCEDURE — 96372 PR INJECTION,THERAP/PROPH/DIAG2ST, IM OR SUBCUT: ICD-10-PCS | Mod: S$GLB,,, | Performed by: FAMILY MEDICINE

## 2019-08-07 PROCEDURE — 1101F PR PT FALLS ASSESS DOC 0-1 FALLS W/OUT INJ PAST YR: ICD-10-PCS | Mod: CPTII,S$GLB,, | Performed by: FAMILY MEDICINE

## 2019-08-07 PROCEDURE — 99214 PR OFFICE/OUTPT VISIT, EST, LEVL IV, 30-39 MIN: ICD-10-PCS | Mod: 25,S$GLB,, | Performed by: FAMILY MEDICINE

## 2019-08-07 RX ORDER — BETAMETHASONE SODIUM PHOSPHATE AND BETAMETHASONE ACETATE 3; 3 MG/ML; MG/ML
6 INJECTION, SUSPENSION INTRA-ARTICULAR; INTRALESIONAL; INTRAMUSCULAR; SOFT TISSUE
Status: COMPLETED | OUTPATIENT
Start: 2019-08-07 | End: 2019-08-07

## 2019-08-07 RX ORDER — MECLIZINE HYDROCHLORIDE 25 MG/1
25 TABLET ORAL 3 TIMES DAILY PRN
Qty: 30 TABLET | Refills: 0 | Status: SHIPPED | OUTPATIENT
Start: 2019-08-07 | End: 2020-04-13 | Stop reason: SDUPTHER

## 2019-08-07 RX ADMIN — BETAMETHASONE SODIUM PHOSPHATE AND BETAMETHASONE ACETATE 6 MG: 3; 3 INJECTION, SUSPENSION INTRA-ARTICULAR; INTRALESIONAL; INTRAMUSCULAR; SOFT TISSUE at 11:08

## 2019-08-07 NOTE — PROGRESS NOTES
"Subjective:       Patient ID: Salud Echevarria is a 69 y.o. female.    Chief Complaint: Hip Pain and Foot Swelling    Also has some "swimming" in her head.    Hip Pain    The incident occurred more than 1 week ago. There was no injury mechanism. Pain location: left hip. The quality of the pain is described as aching and shooting. The pain is severe. Associated symptoms include numbness and tingling. Pertinent negatives include no loss of motion, loss of sensation or muscle weakness. She reports no foreign bodies present. The symptoms are aggravated by movement. Treatments tried: ibuprofen.     Review of Systems   Respiratory: Negative for shortness of breath.    Cardiovascular: Negative for chest pain.   Gastrointestinal: Negative for abdominal pain.   Musculoskeletal: Positive for arthralgias.   Neurological: Positive for tingling and numbness.       Objective:      Physical Exam   Constitutional: She appears well-developed and well-nourished. No distress.   HENT:   Head: Normocephalic and atraumatic.   Pulmonary/Chest: Effort normal and breath sounds normal. No respiratory distress. She has no wheezes.   Musculoskeletal: Normal range of motion. She exhibits edema. She exhibits no tenderness or deformity.   SLR + to LLE   Skin: Skin is warm and dry. No rash noted. She is not diaphoretic. No erythema.   Nursing note and vitals reviewed.      Assessment:       1. Left hip pain    2. Sciatica of left side    3. Benign paroxysmal positional vertigo, unspecified laterality        Plan:     Problem List Items Addressed This Visit        ENT    BPPV (benign paroxysmal positional vertigo)    Relevant Medications    meclizine (ANTIVERT) 25 mg tablet       Orthopedic    Left hip pain - Primary    Relevant Medications    betamethasone acetate-betamethasone sodium phosphate injection 6 mg (Start on 8/7/2019 11:45 AM)    Other Relevant Orders    X-Ray Hip 2 View Left    Sciatica of left side    Relevant Medications    " betamethasone acetate-betamethasone sodium phosphate injection 6 mg (Start on 8/7/2019 11:45 AM)

## 2019-08-13 ENCOUNTER — HOSPITAL ENCOUNTER (OUTPATIENT)
Dept: RADIOLOGY | Facility: HOSPITAL | Age: 69
Discharge: HOME | End: 2019-08-13
Attending: FAMILY MEDICINE
Payer: MEDICARE

## 2019-08-13 ENCOUNTER — OFFICE VISIT (OUTPATIENT)
Dept: INTERNAL MEDICINE | Facility: CLINIC | Age: 69
End: 2019-08-13
Payer: MEDICARE

## 2019-08-13 VITALS
WEIGHT: 138.69 LBS | SYSTOLIC BLOOD PRESSURE: 111 MMHG | DIASTOLIC BLOOD PRESSURE: 56 MMHG | OXYGEN SATURATION: 97 % | HEIGHT: 62 IN | TEMPERATURE: 98 F | RESPIRATION RATE: 16 BRPM | BODY MASS INDEX: 25.52 KG/M2 | HEART RATE: 71 BPM

## 2019-08-13 DIAGNOSIS — M48.07 SPINAL STENOSIS OF LUMBOSACRAL REGION: Primary | ICD-10-CM

## 2019-08-13 DIAGNOSIS — R53.83 FATIGUE, UNSPECIFIED TYPE: ICD-10-CM

## 2019-08-13 DIAGNOSIS — M25.552 LEFT HIP PAIN: ICD-10-CM

## 2019-08-13 PROCEDURE — 99999 PR PBB SHADOW E&M-EST. PATIENT-LVL III: CPT | Mod: PBBFAC,,, | Performed by: FAMILY MEDICINE

## 2019-08-13 PROCEDURE — 99999 PR PBB SHADOW E&M-EST. PATIENT-LVL III: ICD-10-PCS | Mod: PBBFAC,,, | Performed by: FAMILY MEDICINE

## 2019-08-13 PROCEDURE — 99214 PR OFFICE/OUTPT VISIT, EST, LEVL IV, 30-39 MIN: ICD-10-PCS | Mod: S$GLB,,, | Performed by: FAMILY MEDICINE

## 2019-08-13 PROCEDURE — 73502 X-RAY EXAM HIP UNI 2-3 VIEWS: CPT | Mod: TC,PO,LT

## 2019-08-13 PROCEDURE — 73502 X-RAY EXAM HIP UNI 2-3 VIEWS: CPT | Mod: 26,LT,, | Performed by: RADIOLOGY

## 2019-08-13 PROCEDURE — 99214 OFFICE O/P EST MOD 30 MIN: CPT | Mod: S$GLB,,, | Performed by: FAMILY MEDICINE

## 2019-08-13 PROCEDURE — 1101F PT FALLS ASSESS-DOCD LE1/YR: CPT | Mod: CPTII,S$GLB,, | Performed by: FAMILY MEDICINE

## 2019-08-13 PROCEDURE — 1101F PR PT FALLS ASSESS DOC 0-1 FALLS W/OUT INJ PAST YR: ICD-10-PCS | Mod: CPTII,S$GLB,, | Performed by: FAMILY MEDICINE

## 2019-08-13 PROCEDURE — 73502 XR HIP 2 VIEW LEFT: ICD-10-PCS | Mod: 26,LT,, | Performed by: RADIOLOGY

## 2019-08-13 RX ORDER — PRAVASTATIN SODIUM 40 MG/1
40 TABLET ORAL DAILY
Qty: 90 TABLET | Refills: 1 | Status: SHIPPED | OUTPATIENT
Start: 2019-08-13 | End: 2020-03-16

## 2019-08-13 RX ORDER — OXYCODONE AND ACETAMINOPHEN 10; 325 MG/1; MG/1
1 TABLET ORAL 2 TIMES DAILY PRN
Qty: 10 TABLET | Refills: 0 | Status: SHIPPED | OUTPATIENT
Start: 2019-08-13 | End: 2020-04-27

## 2019-08-13 NOTE — PROGRESS NOTES
Subjective:       Patient ID: Salud Echevarria is a 69 y.o. female.    Chief Complaint: Follow-up (1 wk xray done today)    HPI  Came in today to follow-up on left hip pain and lower back pain that she has been having for the past couple weeks.  Also has been feeling very fatigued and says that she has had intermittent ankle swelling although is not having much today.  She has not been started on any new medications.  No symptoms of chest pain or shortness of breath.  Does not feel depressed.  Nothing else is new.  Reviewed the x-ray with her and there were no acute findings and no significant arthritis noted.    Family History   Problem Relation Age of Onset    Heart disease Mother        Current Outpatient Medications:     aspirin (ECOTRIN) 81 MG EC tablet, Take 81 mg by mouth once daily., Disp: , Rfl:     meclizine (ANTIVERT) 25 mg tablet, Take 1 tablet (25 mg total) by mouth 3 (three) times daily as needed., Disp: 30 tablet, Rfl: 0    pravastatin (PRAVACHOL) 40 MG tablet, Take 1 tablet (40 mg total) by mouth once daily., Disp: 90 tablet, Rfl: 1    albuterol 90 mcg/actuation inhaler, Inhale 2 puffs into the lungs., Disp: , Rfl:     nicotine polacrilex (NICORETTE) 4 MG Gum, Take 1 each (4 mg total) by mouth as needed., Disp: 100 each, Rfl: 1    oxyCODONE-acetaminophen (PERCOCET)  mg per tablet, Take 1 tablet by mouth 2 (two) times daily as needed. For acute pain, Disp: 10 tablet, Rfl: 0    Review of Systems   Constitutional: Positive for fatigue. Negative for chills and fever.   Respiratory: Negative for cough and shortness of breath.    Cardiovascular: Positive for leg swelling (with pain at night). Negative for chest pain.   Gastrointestinal: Negative for abdominal pain.   Musculoskeletal: Positive for back pain.   Skin: Negative for rash.   Neurological: Negative for dizziness.       Objective:   BP (!) 111/56 (BP Location: Left arm, Patient Position: Sitting, BP Method: Medium (Automatic))   Pulse  "71   Temp 97.5 °F (36.4 °C) (Tympanic)   Resp 16   Ht 5' 2" (1.575 m)   Wt 62.9 kg (138 lb 10.7 oz)   SpO2 97%   BMI 25.36 kg/m²      Physical Exam   Constitutional: She is oriented to person, place, and time. She appears well-developed and well-nourished.   HENT:   Head: Normocephalic and atraumatic.   Eyes: Conjunctivae are normal.   Cardiovascular: Normal rate.   Pulmonary/Chest: Effort normal. No respiratory distress.   Musculoskeletal: She exhibits no edema.   Neurological: She is alert and oriented to person, place, and time. Coordination normal.   Skin: Skin is warm and dry. No rash noted.   Psychiatric: She has a normal mood and affect. Her behavior is normal.   Vitals reviewed.      Assessment & Plan     Problem List Items Addressed This Visit        Neuro    Spinal stenosis - Primary    Current Assessment & Plan       Known history of spinal stenosis which place her at risk for issues with lower back pain.         Relevant Medications    oxyCODONE-acetaminophen (PERCOCET)  mg per tablet       Orthopedic    Left hip pain    Current Assessment & Plan       No concerning findings on x-ray.  Prescribed 10 pills of Percocet which she will only use for severe pain.    Patient requested this medication. She has  Taken in the past.  Has been unable to tolerate nonsteroidal anti-inflammatory medications and does not feel relief with Tylenol.  I reviewed the  and she has not been getting pain medication from anywhere else.    Most recent prescription was earlier this year. Is not on any benzodiazepines.  Low risk for dependence.            Other    Fatigue    Current Assessment & Plan     Fatigue associated with some leg swelling and overall nonspecific findings in the context of recent lab studies that were all normal.  Recommended conservative care with continued monitoring of symptoms.  If she is not having any improvement over the next couple weeks she should arrange for follow up for further " testing.                 No follow-ups on file.    Disclaimer:  This note may have been prepared using voice recognition software, it may have not been extensively proofed, as such there could be errors within the text such as sound alike errors.

## 2019-08-13 NOTE — ASSESSMENT & PLAN NOTE
Fatigue associated with some leg swelling and overall nonspecific findings in the context of recent lab studies that were all normal.  Recommended conservative care with continued monitoring of symptoms.  If she is not having any improvement over the next couple weeks she should arrange for follow up for further testing.

## 2019-08-13 NOTE — ASSESSMENT & PLAN NOTE
No concerning findings on x-ray.  Prescribed 10 pills of Percocet which she will only use for severe pain.    Patient requested this medication. She has  Taken in the past.  Has been unable to tolerate nonsteroidal anti-inflammatory medications and does not feel relief with Tylenol.  I reviewed the  and she has not been getting pain medication from anywhere else.    Most recent prescription was earlier this year. Is not on any benzodiazepines.  Low risk for dependence.

## 2019-08-16 NOTE — PROGRESS NOTES
Please call pt with abnormal results. Pt does not need appt at this time, unless they have questions or wish to further discuss.  Pt has degenerative changes,  Can refer to pain management for injections if they would like. pls send me order to sign if so

## 2019-08-20 ENCOUNTER — TELEPHONE (OUTPATIENT)
Dept: INTERNAL MEDICINE | Facility: CLINIC | Age: 69
End: 2019-08-20

## 2019-08-20 DIAGNOSIS — M48.07 SPINAL STENOSIS OF LUMBOSACRAL REGION: Primary | ICD-10-CM

## 2019-08-20 NOTE — TELEPHONE ENCOUNTER
Spoke with pt about results. pt voiced understanding.  Pt stated that she will go to pain management.

## 2019-08-20 NOTE — TELEPHONE ENCOUNTER
----- Message from Destiny Johnson sent at 8/20/2019 12:44 PM CDT -----  Contact: Patient   Type:  Patient Returning Call    Who Called: Salud  Who Left Message for Patient: Not sure   Does the patient know what this is regarding?: X-ray results  Would the patient rather a call back or a response via [facility]? Call back   Best Call Back Number: Please call her at [phone]  Additional Information: n/a

## 2019-09-11 ENCOUNTER — TELEPHONE (OUTPATIENT)
Dept: PAIN MEDICINE | Facility: CLINIC | Age: 69
End: 2019-09-11

## 2019-09-11 NOTE — TELEPHONE ENCOUNTER
Called patient to confirm appointment with Dr. Wang on 09/12/2019. Patient did not answer. Left message.

## 2019-10-23 ENCOUNTER — OFFICE VISIT (OUTPATIENT)
Dept: INTERNAL MEDICINE | Facility: CLINIC | Age: 69
End: 2019-10-23
Payer: MEDICARE

## 2019-10-23 VITALS
HEIGHT: 62 IN | TEMPERATURE: 96 F | BODY MASS INDEX: 26.17 KG/M2 | DIASTOLIC BLOOD PRESSURE: 56 MMHG | HEART RATE: 60 BPM | OXYGEN SATURATION: 95 % | WEIGHT: 142.19 LBS | SYSTOLIC BLOOD PRESSURE: 117 MMHG

## 2019-10-23 DIAGNOSIS — K45.8 OTHER SPECIFIED ABDOMINAL HERNIA WITHOUT OBSTRUCTION OR GANGRENE: ICD-10-CM

## 2019-10-23 DIAGNOSIS — M79.674 PAIN OF TOE OF RIGHT FOOT: Primary | ICD-10-CM

## 2019-10-23 DIAGNOSIS — Z12.31 ENCOUNTER FOR SCREENING MAMMOGRAM FOR MALIGNANT NEOPLASM OF BREAST: ICD-10-CM

## 2019-10-23 PROCEDURE — 90662 FLU VACCINE - HIGH DOSE (65+) PRESERVATIVE FREE IM: ICD-10-PCS | Mod: S$GLB,,, | Performed by: FAMILY MEDICINE

## 2019-10-23 PROCEDURE — 99214 PR OFFICE/OUTPT VISIT, EST, LEVL IV, 30-39 MIN: ICD-10-PCS | Mod: 25,S$GLB,, | Performed by: FAMILY MEDICINE

## 2019-10-23 PROCEDURE — 90662 IIV NO PRSV INCREASED AG IM: CPT | Mod: S$GLB,,, | Performed by: FAMILY MEDICINE

## 2019-10-23 PROCEDURE — 1101F PR PT FALLS ASSESS DOC 0-1 FALLS W/OUT INJ PAST YR: ICD-10-PCS | Mod: CPTII,S$GLB,, | Performed by: FAMILY MEDICINE

## 2019-10-23 PROCEDURE — 99999 PR PBB SHADOW E&M-EST. PATIENT-LVL IV: CPT | Mod: PBBFAC,,, | Performed by: FAMILY MEDICINE

## 2019-10-23 PROCEDURE — G0008 FLU VACCINE - HIGH DOSE (65+) PRESERVATIVE FREE IM: ICD-10-PCS | Mod: S$GLB,,, | Performed by: FAMILY MEDICINE

## 2019-10-23 PROCEDURE — 99214 OFFICE O/P EST MOD 30 MIN: CPT | Mod: 25,S$GLB,, | Performed by: FAMILY MEDICINE

## 2019-10-23 PROCEDURE — G0008 ADMIN INFLUENZA VIRUS VAC: HCPCS | Mod: S$GLB,,, | Performed by: FAMILY MEDICINE

## 2019-10-23 PROCEDURE — 99999 PR PBB SHADOW E&M-EST. PATIENT-LVL IV: ICD-10-PCS | Mod: PBBFAC,,, | Performed by: FAMILY MEDICINE

## 2019-10-23 PROCEDURE — 1101F PT FALLS ASSESS-DOCD LE1/YR: CPT | Mod: CPTII,S$GLB,, | Performed by: FAMILY MEDICINE

## 2019-10-23 RX ORDER — MULTIVITAMIN
1 TABLET ORAL DAILY
COMMUNITY
End: 2021-05-03

## 2019-10-23 NOTE — ASSESSMENT & PLAN NOTE
Due to her concern, I recommended that she have an ultrasound done to see details of the hernia and then consult with General surgery to discuss risks and benefits of watchful waiting versus intervention

## 2019-10-23 NOTE — PROGRESS NOTES
Subjective:       Patient ID: Salud Echevarria is a 69 y.o. female.    Chief Complaint: Hernia F/U and F/U Vaccine    HPI  Came in today to follow-up on abdominal hernia that she has been having  For a while.  She is able to self reduce it.  Occasional causes some discomfort but never has any pain associated with it.  No bowel issues.  Worried because her daughter had a hernia issue after having gallbladder surgery and had to have emergency surgery.  She wants to avoid something like this.      Ft pain:  She was  Recently seen at urgent care and diagnosed with gout of her left MTP.  She was put on indomethacin as well as prednisone and symptoms have nearly completely improved.  Has never had uric acid level.  Family History   Problem Relation Age of Onset    Heart disease Mother        Current Outpatient Medications:     aspirin (ECOTRIN) 81 MG EC tablet, Take 81 mg by mouth once daily., Disp: , Rfl:     multivitamin (ONE DAILY MULTIVITAMIN) per tablet, Take 1 tablet by mouth once daily., Disp: , Rfl:     pravastatin (PRAVACHOL) 40 MG tablet, Take 1 tablet (40 mg total) by mouth once daily., Disp: 90 tablet, Rfl: 1    albuterol 90 mcg/actuation inhaler, Inhale 2 puffs into the lungs., Disp: , Rfl:     meclizine (ANTIVERT) 25 mg tablet, Take 1 tablet (25 mg total) by mouth 3 (three) times daily as needed. (Patient not taking: Reported on 10/23/2019), Disp: 30 tablet, Rfl: 0    nicotine polacrilex (NICORETTE) 4 MG Gum, Take 1 each (4 mg total) by mouth as needed., Disp: 100 each, Rfl: 1    oxyCODONE-acetaminophen (PERCOCET)  mg per tablet, Take 1 tablet by mouth 2 (two) times daily as needed. For acute pain (Patient not taking: Reported on 10/23/2019), Disp: 10 tablet, Rfl: 0    Review of Systems   Constitutional: Negative for chills and fever.   Respiratory: Negative for cough and shortness of breath.    Cardiovascular: Negative for chest pain.   Gastrointestinal: Negative for abdominal pain.  "  Musculoskeletal: Positive for arthralgias.   Skin: Negative for rash.   Neurological: Negative for dizziness.       Objective:   BP (!) 117/56 (BP Location: Left arm, Patient Position: Sitting, BP Method: Large (Automatic))   Pulse 60   Temp 96.1 °F (35.6 °C) (Tympanic)   Ht 5' 2" (1.575 m)   Wt 64.5 kg (142 lb 3.2 oz)   SpO2 95%   BMI 26.01 kg/m²      Physical Exam   Constitutional: She is oriented to person, place, and time. She appears well-developed and well-nourished.   HENT:   Head: Normocephalic and atraumatic.   Eyes: Conjunctivae are normal.   Cardiovascular: Normal rate.   Pulmonary/Chest: Effort normal. No respiratory distress.   Musculoskeletal: She exhibits no edema.        Feet:    Neurological: She is alert and oriented to person, place, and time. Coordination normal.   Skin: Skin is warm and dry. No rash noted.   Psychiatric: She has a normal mood and affect. Her behavior is normal.   Vitals reviewed.      Assessment & Plan     Problem List Items Addressed This Visit        GI    Other specified abdominal hernia without obstruction or gangrene    Current Assessment & Plan     Due to her concern, I recommended that she have an ultrasound done to see details of the hernia and then consult with General surgery to discuss risks and benefits of watchful waiting versus intervention         Relevant Orders    US Abdomen Limited_Hernia    Ambulatory Referral to General Surgery       Orthopedic    Pain of toe of right foot - Primary    Current Assessment & Plan       Symptoms not completely consistent with gout.  Will get uric acid level to help us clarify this and see if she would benefit from allopurinol therapy.         Relevant Orders    URIC ACID      Other Visit Diagnoses     Encounter for screening mammogram for malignant neoplasm of breast                No follow-ups on file.    Disclaimer:  This note may have been prepared using voice recognition software, it may have not been extensively " proofed, as such there could be errors within the text such as sound alike errors.

## 2019-10-23 NOTE — ASSESSMENT & PLAN NOTE
Symptoms not completely consistent with gout.  Will get uric acid level to help us clarify this and see if she would benefit from allopurinol therapy.

## 2019-10-28 ENCOUNTER — TELEPHONE (OUTPATIENT)
Dept: RADIOLOGY | Facility: HOSPITAL | Age: 69
End: 2019-10-28

## 2019-10-29 ENCOUNTER — HOSPITAL ENCOUNTER (OUTPATIENT)
Dept: RADIOLOGY | Facility: HOSPITAL | Age: 69
Discharge: HOME | End: 2019-10-29
Attending: FAMILY MEDICINE
Payer: MEDICARE

## 2019-10-29 VITALS — WEIGHT: 142.19 LBS | BODY MASS INDEX: 26.17 KG/M2 | HEIGHT: 62 IN

## 2019-10-29 DIAGNOSIS — Z12.31 ENCOUNTER FOR SCREENING MAMMOGRAM FOR MALIGNANT NEOPLASM OF BREAST: ICD-10-CM

## 2019-10-29 DIAGNOSIS — K45.8 OTHER SPECIFIED ABDOMINAL HERNIA WITHOUT OBSTRUCTION OR GANGRENE: ICD-10-CM

## 2019-10-29 PROCEDURE — 77063 MAMMO DIGITAL SCREENING BILAT WITH TOMOSYNTHESIS_CAD: ICD-10-PCS | Mod: 26,,, | Performed by: RADIOLOGY

## 2019-10-29 PROCEDURE — 77067 SCR MAMMO BI INCL CAD: CPT | Mod: TC,PO

## 2019-10-29 PROCEDURE — 77067 SCR MAMMO BI INCL CAD: CPT | Mod: 26,,, | Performed by: RADIOLOGY

## 2019-10-29 PROCEDURE — 77063 BREAST TOMOSYNTHESIS BI: CPT | Mod: 26,,, | Performed by: RADIOLOGY

## 2019-10-29 PROCEDURE — 76705 ECHO EXAM OF ABDOMEN: CPT | Mod: TC,PO

## 2019-10-29 PROCEDURE — 76705 US ABDOMEN LIMITED_HERNIA: ICD-10-PCS | Mod: 26,,, | Performed by: RADIOLOGY

## 2019-10-29 PROCEDURE — 76705 ECHO EXAM OF ABDOMEN: CPT | Mod: 26,,, | Performed by: RADIOLOGY

## 2019-10-29 PROCEDURE — 77067 MAMMO DIGITAL SCREENING BILAT WITH TOMOSYNTHESIS_CAD: ICD-10-PCS | Mod: 26,,, | Performed by: RADIOLOGY

## 2019-12-24 ENCOUNTER — OFFICE VISIT (OUTPATIENT)
Dept: SURGERY | Facility: CLINIC | Age: 69
End: 2019-12-24
Payer: MEDICARE

## 2019-12-24 ENCOUNTER — LAB VISIT (OUTPATIENT)
Dept: LAB | Facility: HOSPITAL | Age: 69
End: 2019-12-24
Attending: SURGERY
Payer: MEDICARE

## 2019-12-24 VITALS
HEART RATE: 62 BPM | DIASTOLIC BLOOD PRESSURE: 60 MMHG | WEIGHT: 145.06 LBS | HEIGHT: 62 IN | BODY MASS INDEX: 26.69 KG/M2 | TEMPERATURE: 97 F | SYSTOLIC BLOOD PRESSURE: 126 MMHG

## 2019-12-24 DIAGNOSIS — K43.9 VENTRAL HERNIA WITHOUT OBSTRUCTION OR GANGRENE: ICD-10-CM

## 2019-12-24 DIAGNOSIS — K43.9 VENTRAL HERNIA WITHOUT OBSTRUCTION OR GANGRENE: Primary | ICD-10-CM

## 2019-12-24 LAB
ANION GAP SERPL CALC-SCNC: 9 MMOL/L (ref 8–16)
BUN SERPL-MCNC: 10 MG/DL (ref 8–23)
CALCIUM SERPL-MCNC: 8.8 MG/DL (ref 8.7–10.5)
CHLORIDE SERPL-SCNC: 105 MMOL/L (ref 95–110)
CO2 SERPL-SCNC: 30 MMOL/L (ref 23–29)
CREAT SERPL-MCNC: 0.7 MG/DL (ref 0.5–1.4)
EST. GFR  (AFRICAN AMERICAN): >60 ML/MIN/1.73 M^2
EST. GFR  (NON AFRICAN AMERICAN): >60 ML/MIN/1.73 M^2
GLUCOSE SERPL-MCNC: 103 MG/DL (ref 70–110)
POTASSIUM SERPL-SCNC: 4 MMOL/L (ref 3.5–5.1)
SODIUM SERPL-SCNC: 144 MMOL/L (ref 136–145)

## 2019-12-24 PROCEDURE — 1159F MED LIST DOCD IN RCRD: CPT | Mod: S$GLB,,, | Performed by: SURGERY

## 2019-12-24 PROCEDURE — 80048 BASIC METABOLIC PNL TOTAL CA: CPT | Mod: PO

## 2019-12-24 PROCEDURE — 36415 COLL VENOUS BLD VENIPUNCTURE: CPT | Mod: PO

## 2019-12-24 PROCEDURE — 1126F PR PAIN SEVERITY QUANTIFIED, NO PAIN PRESENT: ICD-10-PCS | Mod: S$GLB,,, | Performed by: SURGERY

## 2019-12-24 PROCEDURE — 1101F PT FALLS ASSESS-DOCD LE1/YR: CPT | Mod: CPTII,S$GLB,, | Performed by: SURGERY

## 2019-12-24 PROCEDURE — 99999 PR PBB SHADOW E&M-EST. PATIENT-LVL III: ICD-10-PCS | Mod: PBBFAC,,, | Performed by: SURGERY

## 2019-12-24 PROCEDURE — 1126F AMNT PAIN NOTED NONE PRSNT: CPT | Mod: S$GLB,,, | Performed by: SURGERY

## 2019-12-24 PROCEDURE — 1159F PR MEDICATION LIST DOCUMENTED IN MEDICAL RECORD: ICD-10-PCS | Mod: S$GLB,,, | Performed by: SURGERY

## 2019-12-24 PROCEDURE — 99202 PR OFFICE/OUTPT VISIT, NEW, LEVL II, 15-29 MIN: ICD-10-PCS | Mod: S$GLB,,, | Performed by: SURGERY

## 2019-12-24 PROCEDURE — 99999 PR PBB SHADOW E&M-EST. PATIENT-LVL III: CPT | Mod: PBBFAC,,, | Performed by: SURGERY

## 2019-12-24 PROCEDURE — 1101F PR PT FALLS ASSESS DOC 0-1 FALLS W/OUT INJ PAST YR: ICD-10-PCS | Mod: CPTII,S$GLB,, | Performed by: SURGERY

## 2019-12-24 PROCEDURE — 99202 OFFICE O/P NEW SF 15 MIN: CPT | Mod: S$GLB,,, | Performed by: SURGERY

## 2019-12-24 NOTE — LETTER
December 24, 2019      Flaco Brownlee DO  [address]  [place] [address]           [facility]  [address]  [place] [address]  Phone: [phone]          Patient: Salud Echevarria   MR Number: [id number]   YOB: 1950   Date of Visit: 12/24/2019       Dear Dr. Flaco Brownlee:    Thank you for referring Salud Echevarria to me for evaluation. Attached you will find relevant portions of my assessment and plan of care.    If you have questions, please do not hesitate to call me. I look forward to following Salud Echevarria along with you.    Sincerely,    Brandyn Panchal MD    Enclosure  CC:  No Recipients    If you would like to receive this communication electronically, please contact [email or web address] or [phone] to request more information on [employer] access.    For providers and/or their staff who would like to refer a patient to [facility], please contact us through our one-stop-shop provider referral line, [facility], at [phone].    If you feel you have received this communication in error or would no longer like to receive these types of communications, please e-mail [email or web address]

## 2019-12-24 NOTE — PROGRESS NOTES
Salud is 69-year-old white female patient who is being seen at the request of her primary care provider for the evaluation of ventral hernia.  Clinical examination confirms that the patient has a small ventral hernia near the epigastric area slightly to the left.  The fascial defect is small with palpable protrusion.  The ultrasound was confirmed the presence of ventral hernia.  Will proceed with a full evaluation with CT of the abdomen with oral contrast.  Upon confirmation and delineation of other hernias that may be present, the patient and I will discuss the surgical options.  Patient is a chronic heavy smoker, and is recommended to try for smoking cessation school.  Without smoking cessation, she will not be a surgical candidate unless the patient has an emergency situation.  Total time approximately 20 minute was spent with this patient, and more than 50% of that was spent for treatment planning and counseling.    Brandyn Panchal

## 2019-12-31 ENCOUNTER — HOSPITAL ENCOUNTER (OUTPATIENT)
Dept: RADIOLOGY | Facility: HOSPITAL | Age: 69
Discharge: HOME | End: 2019-12-31
Attending: SURGERY
Payer: MEDICARE

## 2019-12-31 DIAGNOSIS — K43.9 VENTRAL HERNIA WITHOUT OBSTRUCTION OR GANGRENE: ICD-10-CM

## 2019-12-31 PROCEDURE — 74176 CT ABD & PELVIS W/O CONTRAST: CPT | Mod: 26,,, | Performed by: RADIOLOGY

## 2019-12-31 PROCEDURE — 25500020 PHARM REV CODE 255: Mod: PO | Performed by: SURGERY

## 2019-12-31 PROCEDURE — 74176 CT ABD & PELVIS W/O CONTRAST: CPT | Mod: TC,PO

## 2019-12-31 PROCEDURE — 74176 CT ABDOMEN PELVIS WITHOUT CONTRAST: ICD-10-PCS | Mod: 26,,, | Performed by: RADIOLOGY

## 2019-12-31 RX ADMIN — DIATRIZOATE MEGLUMINE AND DIATRIZOATE SODIUM 120 ML: 660; 100 LIQUID ORAL; RECTAL at 10:12

## 2020-01-14 ENCOUNTER — OFFICE VISIT (OUTPATIENT)
Dept: SURGERY | Facility: CLINIC | Age: 70
End: 2020-01-14
Payer: MEDICARE

## 2020-01-14 VITALS
TEMPERATURE: 99 F | BODY MASS INDEX: 26.41 KG/M2 | DIASTOLIC BLOOD PRESSURE: 63 MMHG | WEIGHT: 144.38 LBS | HEART RATE: 63 BPM | SYSTOLIC BLOOD PRESSURE: 129 MMHG

## 2020-01-14 DIAGNOSIS — M48.07 SPINAL STENOSIS OF LUMBOSACRAL REGION: ICD-10-CM

## 2020-01-14 DIAGNOSIS — K43.9 VENTRAL HERNIA WITHOUT OBSTRUCTION OR GANGRENE: Primary | ICD-10-CM

## 2020-01-14 PROCEDURE — 99213 PR OFFICE/OUTPT VISIT, EST, LEVL III, 20-29 MIN: ICD-10-PCS | Mod: S$GLB,,, | Performed by: SURGERY

## 2020-01-14 PROCEDURE — 99999 PR PBB SHADOW E&M-EST. PATIENT-LVL III: CPT | Mod: PBBFAC,,, | Performed by: SURGERY

## 2020-01-14 PROCEDURE — 99999 PR PBB SHADOW E&M-EST. PATIENT-LVL III: ICD-10-PCS | Mod: PBBFAC,,, | Performed by: SURGERY

## 2020-01-14 PROCEDURE — 99213 OFFICE O/P EST LOW 20 MIN: CPT | Mod: PBBFAC,PO | Performed by: SURGERY

## 2020-01-14 PROCEDURE — 99213 OFFICE O/P EST LOW 20 MIN: CPT | Mod: S$GLB,,, | Performed by: SURGERY

## 2020-01-14 RX ORDER — ALPRAZOLAM 1 MG/1
0.5 TABLET, EXTENDED RELEASE ORAL
OUTPATIENT
Start: 2020-01-14

## 2020-01-14 RX ORDER — OXYCODONE AND ACETAMINOPHEN 10; 325 MG/1; MG/1
1 TABLET ORAL 2 TIMES DAILY PRN
Qty: 10 TABLET | Refills: 0 | OUTPATIENT
Start: 2020-01-14

## 2020-01-14 NOTE — TELEPHONE ENCOUNTER
Telephone to pt and informed Dr. Brownlee declined RF request for Xanax and Percocet. Was about to offer pt an office visit for refill, however she already hung up the phone.

## 2020-01-14 NOTE — PROGRESS NOTES
Josefina is 70-year-old white female patient who is seen previously for the evaluation of ventral hernia.  She did have a CT scan of the abdomen and pelvis to complete the evaluation.  The CT scan confirmed presence of ventral hernia above the umbilicus with 2 cm fascial defect.  Patient has return to my office today to discuss surgical repair.  Due to her work issues, she would like to postpone the surgical repair until the crawfish season is over.  I have recommended the patient to wear a binder during this time period and to avoid heavy lifting as much as possible.  She will see me in for staying of August of 2020.  Total time approximately 15 minute was spent with this patient, and more than 50% of that was spent for treatment planning and counseling.    Brandyn Panchal

## 2020-03-02 ENCOUNTER — HOSPITAL ENCOUNTER (OUTPATIENT)
Dept: RADIOLOGY | Facility: HOSPITAL | Age: 70
Discharge: HOME | End: 2020-03-02
Attending: FAMILY MEDICINE
Payer: MEDICARE

## 2020-03-02 ENCOUNTER — OFFICE VISIT (OUTPATIENT)
Dept: INTERNAL MEDICINE | Facility: CLINIC | Age: 70
End: 2020-03-02
Payer: MEDICARE

## 2020-03-02 VITALS
BODY MASS INDEX: 27.06 KG/M2 | SYSTOLIC BLOOD PRESSURE: 129 MMHG | TEMPERATURE: 97 F | HEART RATE: 64 BPM | OXYGEN SATURATION: 98 % | DIASTOLIC BLOOD PRESSURE: 58 MMHG | WEIGHT: 147.06 LBS | HEIGHT: 62 IN

## 2020-03-02 DIAGNOSIS — S09.90XA TRAUMATIC INJURY OF HEAD, INITIAL ENCOUNTER: ICD-10-CM

## 2020-03-02 DIAGNOSIS — M48.07 SPINAL STENOSIS OF LUMBOSACRAL REGION: ICD-10-CM

## 2020-03-02 DIAGNOSIS — S09.90XA TRAUMATIC INJURY OF HEAD, INITIAL ENCOUNTER: Primary | ICD-10-CM

## 2020-03-02 DIAGNOSIS — I70.0 AORTIC ATHEROSCLEROSIS: ICD-10-CM

## 2020-03-02 PROCEDURE — 1101F PT FALLS ASSESS-DOCD LE1/YR: CPT | Mod: CPTII,S$GLB,, | Performed by: FAMILY MEDICINE

## 2020-03-02 PROCEDURE — 99214 PR OFFICE/OUTPT VISIT, EST, LEVL IV, 30-39 MIN: ICD-10-PCS | Mod: S$GLB,,, | Performed by: FAMILY MEDICINE

## 2020-03-02 PROCEDURE — 70450 CT HEAD/BRAIN W/O DYE: CPT | Mod: TC,PO

## 2020-03-02 PROCEDURE — 99214 OFFICE O/P EST MOD 30 MIN: CPT | Mod: S$GLB,,, | Performed by: FAMILY MEDICINE

## 2020-03-02 PROCEDURE — 70450 CT HEAD WITHOUT CONTRAST: ICD-10-PCS | Mod: 26,,, | Performed by: RADIOLOGY

## 2020-03-02 PROCEDURE — 99499 UNLISTED E&M SERVICE: CPT | Mod: S$GLB,,, | Performed by: FAMILY MEDICINE

## 2020-03-02 PROCEDURE — 1159F PR MEDICATION LIST DOCUMENTED IN MEDICAL RECORD: ICD-10-PCS | Mod: S$GLB,,, | Performed by: FAMILY MEDICINE

## 2020-03-02 PROCEDURE — 99999 PR PBB SHADOW E&M-EST. PATIENT-LVL IV: ICD-10-PCS | Mod: PBBFAC,,, | Performed by: FAMILY MEDICINE

## 2020-03-02 PROCEDURE — 99999 PR PBB SHADOW E&M-EST. PATIENT-LVL IV: CPT | Mod: PBBFAC,,, | Performed by: FAMILY MEDICINE

## 2020-03-02 PROCEDURE — 1125F PR PAIN SEVERITY QUANTIFIED, PAIN PRESENT: ICD-10-PCS | Mod: S$GLB,,, | Performed by: FAMILY MEDICINE

## 2020-03-02 PROCEDURE — 1101F PR PT FALLS ASSESS DOC 0-1 FALLS W/OUT INJ PAST YR: ICD-10-PCS | Mod: CPTII,S$GLB,, | Performed by: FAMILY MEDICINE

## 2020-03-02 PROCEDURE — 70450 CT HEAD/BRAIN W/O DYE: CPT | Mod: 26,,, | Performed by: RADIOLOGY

## 2020-03-02 PROCEDURE — 1125F AMNT PAIN NOTED PAIN PRSNT: CPT | Mod: S$GLB,,, | Performed by: FAMILY MEDICINE

## 2020-03-02 PROCEDURE — 1159F MED LIST DOCD IN RCRD: CPT | Mod: S$GLB,,, | Performed by: FAMILY MEDICINE

## 2020-03-02 PROCEDURE — 99499 RISK ADDL DX/OHS AUDIT: ICD-10-PCS | Mod: S$GLB,,, | Performed by: FAMILY MEDICINE

## 2020-03-02 RX ORDER — OXYCODONE AND ACETAMINOPHEN 10; 325 MG/1; MG/1
1 TABLET ORAL 2 TIMES DAILY PRN
Qty: 10 TABLET | Refills: 0 | Status: CANCELLED | OUTPATIENT
Start: 2020-03-02

## 2020-03-02 RX ORDER — CYCLOBENZAPRINE HCL 10 MG
10 TABLET ORAL 2 TIMES DAILY PRN
Qty: 20 TABLET | Refills: 0 | Status: SHIPPED | OUTPATIENT
Start: 2020-03-02 | End: 2020-03-12

## 2020-03-02 RX ORDER — OXYCODONE AND ACETAMINOPHEN 10; 325 MG/1; MG/1
1 TABLET ORAL 2 TIMES DAILY PRN
Qty: 10 TABLET | Refills: 0 | OUTPATIENT
Start: 2020-03-02

## 2020-03-02 NOTE — PROGRESS NOTES
Patient ID: Salud Echevarria is a 70 y.o. female.    Chief Complaint: head,neck and shoulder pain (3 1/2 weeks ago )    HPI patient is 70-year-old female with complaints of head and neck pain that began 3 and half weeks ago after car door hit the side of her head.  She denies any loss of consciousness after sustained trauma but notes that she nearly fell to her knees with impact. No laceration to head. Notes right lower head and neck pain that radiates to the top of her head described as shooting and tightening.  Rates pain 10/10 in severity. Unlike any other prior headache. Treatment modalities tried at home include hot shower, analgesic ointment and daily ibuprofen since incident however only minimal relief.  Aggravating factors include laying down and turning head.      Family History   Problem Relation Age of Onset    Heart disease Mother        Current Outpatient Medications:     aspirin (ECOTRIN) 81 MG EC tablet, Take 81 mg by mouth once daily., Disp: , Rfl:     meclizine (ANTIVERT) 25 mg tablet, Take 1 tablet (25 mg total) by mouth 3 (three) times daily as needed., Disp: 30 tablet, Rfl: 0    multivitamin (ONE DAILY MULTIVITAMIN) per tablet, Take 1 tablet by mouth once daily., Disp: , Rfl:     nicotine polacrilex (NICORETTE) 4 MG Gum, Take 1 each (4 mg total) by mouth as needed., Disp: 100 each, Rfl: 1    oxyCODONE-acetaminophen (PERCOCET)  mg per tablet, Take 1 tablet by mouth 2 (two) times daily as needed. For acute pain, Disp: 10 tablet, Rfl: 0    pravastatin (PRAVACHOL) 40 MG tablet, Take 1 tablet (40 mg total) by mouth once daily., Disp: 90 tablet, Rfl: 1    albuterol 90 mcg/actuation inhaler, Inhale 2 puffs into the lungs., Disp: , Rfl:     cyclobenzaprine (FLEXERIL) 10 MG tablet, Take 1 tablet (10 mg total) by mouth 2 (two) times daily as needed for Muscle spasms., Disp: 20 tablet, Rfl: 0    Review of Systems   Constitutional: Negative for chills and fever.   Musculoskeletal: Positive for  "neck pain. Negative for back pain and gait problem.   Neurological: Positive for headaches. Negative for dizziness, seizures, weakness, light-headedness and numbness.       Objective:   BP (!) 129/58 (BP Location: Left arm, Patient Position: Sitting, BP Method: X-Large (Automatic))   Pulse 64   Temp 96.6 °F (35.9 °C) (Tympanic)   Ht 5' 2" (1.575 m)   Wt 66.7 kg (147 lb 0.8 oz)   SpO2 98%   BMI 26.90 kg/m²      Physical Exam   Constitutional: She is oriented to person, place, and time.   Musculoskeletal: She exhibits tenderness (right capitis ttp, no midline cervical spine ttp or deformity). She exhibits no edema.   Mild pain with right lateral neck flexion   Neurological: She is alert and oriented to person, place, and time. No cranial nerve deficit or sensory deficit. She exhibits normal muscle tone. Coordination normal.   No CN's deficit       Assessment & Plan     Problem List Items Addressed This Visit        Neuro    Spinal stenosis    Current Assessment & Plan     -current sx different            Cardiac/Vascular    Aortic atherosclerosis    Overview     -on asa and statin           Other Visit Diagnoses     Traumatic injury of head, initial encounter    -  Primary    -neck muscule strain maybe contributing factor however pt reports daily headache unlike prior since impact of car door despite home conservative measures.       Relevant Medications    cyclobenzaprine (FLEXERIL) 10 MG tablet    Other Relevant Orders    CT Head Without Contrast (Completed)            Disclaimer:  This note may have been prepared using voice recognition software, without extensive proofreading. As such, there could be errors within the text such as sound alike errors.  "

## 2020-03-16 RX ORDER — PRAVASTATIN SODIUM 40 MG/1
TABLET ORAL
Qty: 90 TABLET | Refills: 4 | Status: SHIPPED | OUTPATIENT
Start: 2020-03-16 | End: 2021-04-12

## 2020-04-01 NOTE — TELEPHONE ENCOUNTER
Pt call returned, pt informed labs has not been resulted and will be contacted when completed. Pt voiced understanding   Clear bilaterally, pupils equal, round and reactive to light.

## 2020-04-13 DIAGNOSIS — H81.10 BENIGN PAROXYSMAL POSITIONAL VERTIGO, UNSPECIFIED LATERALITY: ICD-10-CM

## 2020-04-13 RX ORDER — MECLIZINE HYDROCHLORIDE 25 MG/1
25 TABLET ORAL 3 TIMES DAILY PRN
Qty: 30 TABLET | Refills: 0 | Status: SHIPPED | OUTPATIENT
Start: 2020-04-13 | End: 2020-04-23 | Stop reason: SDUPTHER

## 2020-04-13 NOTE — TELEPHONE ENCOUNTER
----- Message from Destiny Johnson sent at 4/13/2020  9:16 AM CDT -----  Contact: Patient   Type:  Needs Medical Advice    Who Called: Salud  Symptoms (please be specific): Vertigo  How long has patient had these symptoms: Yesterday morning 4/12/2020  Pharmacy name and phone #:   [facility] - [place], [place] - [address]  [address]  [place] [address]  Phone: [phone] Fax: [phone]  Would the patient rather a call back or a response via [facility]? Call back  Best Call Back Number: Please call her at [phone]  Additional Information: n/a

## 2020-04-15 ENCOUNTER — TELEPHONE (OUTPATIENT)
Dept: INTERNAL MEDICINE | Facility: CLINIC | Age: 70
End: 2020-04-15

## 2020-04-15 NOTE — TELEPHONE ENCOUNTER
Unable to schedule pt. Pt has to be active on [employer] to do phone visit as well. Pt code sent and will reach out to pt to schedule appt.

## 2020-04-15 NOTE — TELEPHONE ENCOUNTER
----- Message from James Briceno sent at 4/15/2020  4:43 PM CDT -----  Contact: pt  Pt is calling to confirm that she correctly set up her [employer] account. Please advice nurse tata [phone] (home)

## 2020-04-15 NOTE — TELEPHONE ENCOUNTER
----- Message from Jeanna Michaels sent at 4/15/2020 10:02 AM CDT -----  Contact: Patient  Patient is returning a call, please call them back at [phone]. Thank you

## 2020-04-15 NOTE — TELEPHONE ENCOUNTER
Call and spoke with patient who stated the medicine is not working. Patient states medicine is making her just sleeping.    Please advise

## 2020-04-15 NOTE — TELEPHONE ENCOUNTER
Pt states she does not know how to do video visit and would prefer phone call. Is this something you can do for her?

## 2020-04-15 NOTE — TELEPHONE ENCOUNTER
----- Message from Beltran White sent at 4/15/2020  8:13 AM CDT -----  Contact: pt   Pt still having an issue with vertigo and medication pt given is not helping. pls return call.     [phone]          .     [facility]/pharmacy #[id number] - [place] - [address]  [place] [address]  Phone: [phone] Fax: [phone]

## 2020-04-17 ENCOUNTER — TELEPHONE (OUTPATIENT)
Dept: INTERNAL MEDICINE | Facility: CLINIC | Age: 70
End: 2020-04-17

## 2020-04-17 NOTE — TELEPHONE ENCOUNTER
So she needs to continue repeating instructions for the left side. Should continue to improve. If worsening needs to be seen in person

## 2020-04-17 NOTE — TELEPHONE ENCOUNTER
----- Message from Cierra De La Garza sent at 4/17/2020  4:14 PM CDT -----  Contact: pt   .Type:  Patient Returning Call    Who Called: pt  Who Left Message for Patient:   Does the patient know what this is regarding?:   Would the patient rather a call back or a response via [facility]?  Call  Back   Best Call Back Number: [phone]  Additional Information:

## 2020-04-17 NOTE — TELEPHONE ENCOUNTER
"Informed patient per Dr. Welsh's "So she needs to continue repeating instructions for the left side. Should continue to improve. If worsening needs to be seen in person". Patient verbalized understanding. FC  "

## 2020-04-17 NOTE — TELEPHONE ENCOUNTER
----- Message from Destiny Johnson sent at 4/17/2020  3:40 PM CDT -----  Contact: Patient   Salud would like a call back at [phone], Regards to her recent visit.    Thanks  Td

## 2020-04-17 NOTE — TELEPHONE ENCOUNTER
Patient called to inform Dr. Brownlee changes from yesterday.  Patient stated that her symptoms are not as bad as yesterday.  You completed exercised at directed. Pt stated that she got dizzy when doing left side. Pt took bp today and it is 102/51 pulse 82. Pt is also still getting a headches

## 2020-04-23 ENCOUNTER — TELEPHONE (OUTPATIENT)
Dept: INTERNAL MEDICINE | Facility: CLINIC | Age: 70
End: 2020-04-23

## 2020-04-23 DIAGNOSIS — H81.10 BENIGN PAROXYSMAL POSITIONAL VERTIGO, UNSPECIFIED LATERALITY: ICD-10-CM

## 2020-04-23 RX ORDER — MECLIZINE HYDROCHLORIDE 25 MG/1
25 TABLET ORAL 3 TIMES DAILY PRN
Qty: 30 TABLET | Refills: 0 | Status: SHIPPED | OUTPATIENT
Start: 2020-04-23 | End: 2020-07-29 | Stop reason: SDUPTHER

## 2020-04-23 NOTE — TELEPHONE ENCOUNTER
"Patient states has been doing exercises and taking medication and felt great/normal x 2 days "did yard work, did exercises"    Today she is feeling really dizzy again with movement, no falls, no other symptoms. Concerned and out of medication today. Please advise.   "

## 2020-04-23 NOTE — TELEPHONE ENCOUNTER
Refill sent. If she continues to have symptoms she will have to come to clinic for in person visit

## 2020-04-23 NOTE — TELEPHONE ENCOUNTER
----- Message from Shital Miller sent at 4/23/2020  8:32 AM CDT -----  Contact: pt   Would like to consult with nurse regarding something personal, will not give any additional information. Please give a call back at [phone].              Thanks,  Shital SHAVER

## 2020-04-27 ENCOUNTER — OFFICE VISIT (OUTPATIENT)
Dept: INTERNAL MEDICINE | Facility: CLINIC | Age: 70
End: 2020-04-27
Payer: MEDICARE

## 2020-04-27 VITALS
TEMPERATURE: 97 F | BODY MASS INDEX: 27.92 KG/M2 | HEIGHT: 62 IN | DIASTOLIC BLOOD PRESSURE: 70 MMHG | HEART RATE: 62 BPM | RESPIRATION RATE: 20 BRPM | OXYGEN SATURATION: 97 % | SYSTOLIC BLOOD PRESSURE: 144 MMHG | WEIGHT: 151.69 LBS

## 2020-04-27 DIAGNOSIS — H81.12 BENIGN PAROXYSMAL POSITIONAL VERTIGO OF LEFT EAR: ICD-10-CM

## 2020-04-27 PROCEDURE — 1101F PR PT FALLS ASSESS DOC 0-1 FALLS W/OUT INJ PAST YR: ICD-10-PCS | Mod: CPTII,S$GLB,, | Performed by: FAMILY MEDICINE

## 2020-04-27 PROCEDURE — 99999 PR PBB SHADOW E&M-EST. PATIENT-LVL IV: ICD-10-PCS | Mod: PBBFAC,,, | Performed by: FAMILY MEDICINE

## 2020-04-27 PROCEDURE — 1159F PR MEDICATION LIST DOCUMENTED IN MEDICAL RECORD: ICD-10-PCS | Mod: S$GLB,,, | Performed by: FAMILY MEDICINE

## 2020-04-27 PROCEDURE — 1126F AMNT PAIN NOTED NONE PRSNT: CPT | Mod: S$GLB,,, | Performed by: FAMILY MEDICINE

## 2020-04-27 PROCEDURE — 1101F PT FALLS ASSESS-DOCD LE1/YR: CPT | Mod: CPTII,S$GLB,, | Performed by: FAMILY MEDICINE

## 2020-04-27 PROCEDURE — 97140 PR MANUAL THER TECH,1+REGIONS,EA 15 MIN: ICD-10-PCS | Mod: ,,, | Performed by: FAMILY MEDICINE

## 2020-04-27 PROCEDURE — 1126F PR PAIN SEVERITY QUANTIFIED, NO PAIN PRESENT: ICD-10-PCS | Mod: S$GLB,,, | Performed by: FAMILY MEDICINE

## 2020-04-27 PROCEDURE — 99213 PR OFFICE/OUTPT VISIT, EST, LEVL III, 20-29 MIN: ICD-10-PCS | Mod: S$GLB,,, | Performed by: FAMILY MEDICINE

## 2020-04-27 PROCEDURE — 99999 PR PBB SHADOW E&M-EST. PATIENT-LVL IV: CPT | Mod: PBBFAC,,, | Performed by: FAMILY MEDICINE

## 2020-04-27 PROCEDURE — 99213 OFFICE O/P EST LOW 20 MIN: CPT | Mod: S$GLB,,, | Performed by: FAMILY MEDICINE

## 2020-04-27 PROCEDURE — 97140 MANUAL THERAPY 1/> REGIONS: CPT | Mod: ,,, | Performed by: FAMILY MEDICINE

## 2020-04-27 PROCEDURE — 1159F MED LIST DOCD IN RCRD: CPT | Mod: S$GLB,,, | Performed by: FAMILY MEDICINE

## 2020-04-27 RX ORDER — METHYLPREDNISOLONE 4 MG/1
TABLET ORAL
Qty: 1 PACKAGE | Refills: 0 | Status: SHIPPED | OUTPATIENT
Start: 2020-04-27 | End: 2020-07-29 | Stop reason: CLARIF

## 2020-04-27 NOTE — PATIENT INSTRUCTIONS
Benign Paroxysmal Positional Vertigo     Your health care provider may move your head in certain ways to treat your BPPV.     Benign paroxysmal positional vertigo (BPPV) is a problem with the inner ear. The inner ear contains the vestibular system. This system is what helps you keep your balance. BPPV causes a feeling of spinning. It is a common problem of the vestibular system.  Understanding the vestibular system  The vestibular system of the ear is made up of very tiny parts. They include the utricle, saccule, and semicircular canals. The utricle is a tiny organ that contains calcium crystals. In some people, the crystals can move into the semicircular canals. When this happens, the system no longer works as it should. This causes BPPV. Benign means it is not life-threatening. Paroxysmal means it happens suddenly. Positional means that it happens when you move your head. Vertigo is a feeling of spinning.  What causes BPPV?  Causes include injury to your head or neck. Other problems with the vestibular system may cause BPPV. In many people, the cause of BPPV is not known.  Symptoms of BPPV  You many have repeated feelings of spinning (vertigo). The vertigo usually lasts less than 1 minute. Some movements, suchas rolling over in bed, can bring on vertigo.  Diagnosing BPPV  Your primary health care provider may diagnose and treat your BPPV. Or you may see an ear, nose, and throat doctor (otolaryngologist). In some cases, you may see a nervous system doctor (neurologist).  The health care provider will ask about your symptoms and your medical history. He or she will examine you. You may have hearing and balance tests. As part of the exam, your health care provider may have you move your head and body in certain ways. If you have BPPV, the movements can bring on vertigo. Your provider will also look for abnormal movements of your eyes. You may have other tests to check your vestibular or nervous systems.  Treatment  for BPPV  Your health care provider may try to move the calcium crystals. This is done by having you move your head and neck in certain ways. This treatment is safe and often works well. You may also be told to do these movements at home. You may still have vertigo for a few weeks. Your health care provider will recheck your symptoms, usually in about a month. Special physical therapy may also be part of treatment. In rare cases surgery may be needed for BPPV that does not go away.     When to call the health care provider  Call your health care provider right away if you have any of these:  · Symptoms that do not go away with treatment  · Symptoms that get worse  · New symptoms   Date Last Reviewed: 3/19/2015  © 7853-7192 [email or web address]. [address] [place] [address], [place] [address]. All rights reserved. This information is not intended as a substitute for professional medical care. Always follow your healthcare professional's instructions.

## 2020-04-27 NOTE — ASSESSMENT & PLAN NOTE
Symptoms have been persistent.  Performed Epley maneuver twice a day perhaps mild improvement her symptoms after the 2nd time.  I instructed her to keep doing this at home and also doing a trial Medrol Dosepak.  Ear lavage also performed today as there was some wax in her left ear canal.

## 2020-04-27 NOTE — PROGRESS NOTES
"Subjective:       Patient ID: Salud Echevarria is a 70 y.o. female.    Chief Complaint: Dizziness    HPI  Patient came to the clinic today in-person due to persistent issues with vertigo like symptoms.  Has noticed whenever she lays on her back it is worse him seems to bother her more in the morning time.  She has still been trying to take meclizine but has not found that do helpful.  In the past she has had relief with meclizine.  Reports that last week she had a couple days where she felt pretty normal but then her symptoms returned.  I had emailed her instructions on doing the home Epley maneuver but she feels that it has not made much of a difference.    Family History   Problem Relation Age of Onset    Heart disease Mother        Current Outpatient Medications:     aspirin (ECOTRIN) 81 MG EC tablet, Take 81 mg by mouth once daily., Disp: , Rfl:     meclizine (ANTIVERT) 25 mg tablet, Take 1 tablet (25 mg total) by mouth 3 (three) times daily as needed., Disp: 30 tablet, Rfl: 0    pravastatin (PRAVACHOL) 40 MG tablet, Take 1 tablet by mouth once daily, Disp: 90 tablet, Rfl: 4    albuterol 90 mcg/actuation inhaler, Inhale 2 puffs into the lungs., Disp: , Rfl:     methylPREDNISolone (MEDROL DOSEPACK) 4 mg tablet, use as directed, Disp: 1 Package, Rfl: 0    multivitamin (ONE DAILY MULTIVITAMIN) per tablet, Take 1 tablet by mouth once daily., Disp: , Rfl:     Review of Systems   Constitutional: Negative for chills and fever.   Respiratory: Negative for cough and shortness of breath.    Cardiovascular: Negative for chest pain.   Gastrointestinal: Negative for abdominal pain.   Skin: Negative for rash.   Neurological: Positive for dizziness.       Objective:   BP (!) 144/70 (BP Location: Right arm, Patient Position: Sitting, BP Method: Large (Automatic))   Pulse 62   Temp 97 °F (36.1 °C) (Tympanic)   Resp 20   Ht 5' 2" (1.575 m)   Wt 68.8 kg (151 lb 10.8 oz)   SpO2 97%   BMI 27.74 kg/m²      Physical Exam "   Constitutional: She is oriented to person, place, and time. She appears well-developed and well-nourished. No distress.   HENT:   Head: Normocephalic and atraumatic.   With the Cruzito-Hallpike maneuver she was symptomatic when turn her head to the left.   Eyes: Conjunctivae are normal.   Cardiovascular: Normal rate.   Pulmonary/Chest: Effort normal. No respiratory distress.   Musculoskeletal: She exhibits no edema.   Neurological: She is alert and oriented to person, place, and time. Coordination normal.   Skin: Skin is warm and dry. No rash noted.   Psychiatric: She has a normal mood and affect. Her behavior is normal.   Vitals reviewed.      Procedure:  Epley maneuver performed to help improve her vertigo affecting the left side.  She tolerated the procedure well and had mild improvement in symptoms after 2 rounds treatment.    Assessment & Plan     Problem List Items Addressed This Visit        ENT    BPPV (benign paroxysmal positional vertigo)    Current Assessment & Plan     Symptoms have been persistent.  Performed Epley maneuver twice over 10 minutes with perhaps mild improvement her symptoms after the 2nd time.  I instructed her to keep doing this at home and also doing a trial Medrol Dosepak.  Ear lavage also performed today as there was some wax in her left ear canal.                 Follow up if symptoms worsen or fail to improve.    Disclaimer:  This note may have been prepared using voice recognition software, it may have not been extensively proofed, as such there could be errors within the text such as sound alike errors.

## 2020-05-04 ENCOUNTER — TELEPHONE (OUTPATIENT)
Dept: INTERNAL MEDICINE | Facility: CLINIC | Age: 70
End: 2020-05-04

## 2020-05-04 ENCOUNTER — OFFICE VISIT (OUTPATIENT)
Dept: INTERNAL MEDICINE | Facility: CLINIC | Age: 70
End: 2020-05-04
Payer: MEDICARE

## 2020-05-04 DIAGNOSIS — H81.10 BENIGN PAROXYSMAL POSITIONAL VERTIGO, UNSPECIFIED LATERALITY: Primary | ICD-10-CM

## 2020-05-04 DIAGNOSIS — R09.82 POST-NASAL DRIP: ICD-10-CM

## 2020-05-04 DIAGNOSIS — F17.200 TOBACCO DEPENDENCE: ICD-10-CM

## 2020-05-04 PROCEDURE — 1101F PR PT FALLS ASSESS DOC 0-1 FALLS W/OUT INJ PAST YR: ICD-10-PCS | Mod: CPTII,95,, | Performed by: FAMILY MEDICINE

## 2020-05-04 PROCEDURE — 99213 PR OFFICE/OUTPT VISIT, EST, LEVL III, 20-29 MIN: ICD-10-PCS | Mod: 95,,, | Performed by: FAMILY MEDICINE

## 2020-05-04 PROCEDURE — 1101F PT FALLS ASSESS-DOCD LE1/YR: CPT | Mod: CPTII,95,, | Performed by: FAMILY MEDICINE

## 2020-05-04 PROCEDURE — 99213 OFFICE O/P EST LOW 20 MIN: CPT | Mod: 95,,, | Performed by: FAMILY MEDICINE

## 2020-05-04 PROCEDURE — 1159F PR MEDICATION LIST DOCUMENTED IN MEDICAL RECORD: ICD-10-PCS | Mod: 95,,, | Performed by: FAMILY MEDICINE

## 2020-05-04 PROCEDURE — 1159F MED LIST DOCD IN RCRD: CPT | Mod: 95,,, | Performed by: FAMILY MEDICINE

## 2020-05-04 NOTE — TELEPHONE ENCOUNTER
Patient call returned, she was informed of her new appointment time and verbally understood the information.

## 2020-05-04 NOTE — TELEPHONE ENCOUNTER
Patient call returned. She states that she wanted to be see for her dizziness, headache and fever. Patient was scheduled for an audio visit with Dr. Beatty on today and verbally understood the appt information given.

## 2020-05-04 NOTE — ASSESSMENT & PLAN NOTE
-recommended that she continue to sit on side of bed prior to getting up abruptly as she has had improvement in her sx. Because patient's bp runs lower end of normal at baseline, getting up abruptly could decrease bp even lower and be contributing factor to her dizzines

## 2020-05-04 NOTE — TELEPHONE ENCOUNTER
----- Message from Janene Eden sent at 5/4/2020  8:53 AM CDT -----  Contact: pt   Will like to be seen today for a follow up for dizziness, headaches and fever, she can be reached at [phone] Thanks

## 2020-05-04 NOTE — PROGRESS NOTES
Patient ID: Salud Echevarria is a 70 y.o. female.    Chief Complaint: Dizziness    The patient location is: [place]  The chief complaint leading to consultation is: Dizziness  Visit type: audio only  Total time spent with patient: 10 minutes  Each patient to whom he or she provides medical services by telemedicine is:  (1) informed of the relationship between the physician and patient and the respective role of any other health care provider with respect to management of the patient; and (2) notified that he or she may decline to receive medical services by telemedicine and may withdraw from such care at any time.    HPI Patient is 70-year-old female evaluated via audio for follow-up of dizziness.  Reports dizziness mainly in the morning when 1st awakening.  If she sits on the side of the bed before getting up abruptly she feels a lot better.  She is able to carry on her daily tasks.  Vertigo was suspected during prior eval as patient has had vertigo in the past. Says recently prescribed  meclizine, prednisone and Epley maneuvers provided only minimal relief.  Only new complaint is intermittent headache(frontal region and neck) relieved with ibuprofen. No assoc vaisual disturbance. Does feel some dripping in her throat when she 1st awakens in the morning relieved with clearing her throat a couple of times. She denies nasal congestion and mucus.  Had temp of reports temperature 99.7 last night and 2 days ago at night and noticed that she was sweating a little more at night that usual but says she feels fine overall.  Denies any loss of taste or smell.  Shortness of breath is at baseline as she does have history of tobacco use and started smoking again recently, 1 pack last 2 days.  Denies any cough. No changes in her BP from baseline (BP normally runs on lower end of normal).    Family History   Problem Relation Age of Onset    Heart disease Mother        Current Outpatient Medications:     albuterol 90  mcg/actuation inhaler, Inhale 2 puffs into the lungs., Disp: , Rfl:     aspirin (ECOTRIN) 81 MG EC tablet, Take 81 mg by mouth once daily., Disp: , Rfl:     meclizine (ANTIVERT) 25 mg tablet, Take 1 tablet (25 mg total) by mouth 3 (three) times daily as needed., Disp: 30 tablet, Rfl: 0    methylPREDNISolone (MEDROL DOSEPACK) 4 mg tablet, use as directed, Disp: 1 Package, Rfl: 0    multivitamin (ONE DAILY MULTIVITAMIN) per tablet, Take 1 tablet by mouth once daily., Disp: , Rfl:     pravastatin (PRAVACHOL) 40 MG tablet, Take 1 tablet by mouth once daily, Disp: 90 tablet, Rfl: 4    Review of Systems   Constitutional: Negative for chills and fever.   HENT: Positive for sinus pressure. Negative for congestion and rhinorrhea.    Eyes: Negative for visual disturbance.   Respiratory: Negative for cough.    Cardiovascular: Negative for chest pain.   Gastrointestinal: Negative for abdominal pain.   Neurological: Negative for weakness and numbness.       Objective:   There were no vitals taken for this visit.     Physical Exam   Constitutional: She is oriented to person, place, and time.   Pulmonary/Chest:   Speaking in full sentences   Neurological: She is alert and oriented to person, place, and time.   Psychiatric: She has a normal mood and affect. Her behavior is normal. Judgment and thought content normal.       Assessment & Plan     Problem List Items Addressed This Visit        ENT    BPPV (benign paroxysmal positional vertigo) - Primary    Current Assessment & Plan     -recommended that she continue to sit on side of bed prior to getting up abruptly as she has had improvement in her sx. Because patient's bp runs lower end of normal at baseline, getting up abruptly could decrease bp even lower and be contributing factor to her dizzines         Post-nasal drip    Current Assessment & Plan     -discussed nasal spray however she has used in the past and did not like            Other    Tobacco dependence    Current  Assessment & Plan     -started smoking again recently as stress coping mechanism  -says she will quit when ready             Did discuss covid testing due to patient's increase in temp at night. However she declined for now. Told her to let us know if she has any other future concerns      Follow up if symptoms worsen or fail to improve.      Disclaimer:  This note may have been prepared using voice recognition software, without extensive proofreading. As such, there could be errors within the text such as sound alike errors.

## 2020-07-29 ENCOUNTER — OFFICE VISIT (OUTPATIENT)
Dept: INTERNAL MEDICINE | Facility: CLINIC | Age: 70
End: 2020-07-29
Payer: MEDICARE

## 2020-07-29 ENCOUNTER — HOSPITAL ENCOUNTER (EMERGENCY)
Facility: HOSPITAL | Age: 70
Discharge: HOME | End: 2020-07-29
Attending: EMERGENCY MEDICINE
Payer: MEDICARE

## 2020-07-29 VITALS
HEART RATE: 70 BPM | OXYGEN SATURATION: 98 % | BODY MASS INDEX: 27.67 KG/M2 | DIASTOLIC BLOOD PRESSURE: 82 MMHG | WEIGHT: 150.38 LBS | SYSTOLIC BLOOD PRESSURE: 120 MMHG | TEMPERATURE: 98 F | HEIGHT: 62 IN

## 2020-07-29 VITALS
RESPIRATION RATE: 20 BRPM | OXYGEN SATURATION: 100 % | WEIGHT: 150 LBS | TEMPERATURE: 98 F | HEART RATE: 60 BPM | DIASTOLIC BLOOD PRESSURE: 61 MMHG | SYSTOLIC BLOOD PRESSURE: 117 MMHG | BODY MASS INDEX: 27.44 KG/M2

## 2020-07-29 DIAGNOSIS — R42 DIZZINESS: Primary | ICD-10-CM

## 2020-07-29 DIAGNOSIS — H81.10 BENIGN PAROXYSMAL POSITIONAL VERTIGO, UNSPECIFIED LATERALITY: ICD-10-CM

## 2020-07-29 LAB
ALBUMIN SERPL BCP-MCNC: 3.9 G/DL (ref 3.5–5.2)
ALP SERPL-CCNC: 47 U/L (ref 55–135)
ALT SERPL W/O P-5'-P-CCNC: 12 U/L (ref 10–44)
ANION GAP SERPL CALC-SCNC: 9 MMOL/L (ref 8–16)
AST SERPL-CCNC: 13 U/L (ref 10–40)
BASOPHILS # BLD AUTO: 0.09 K/UL (ref 0–0.2)
BASOPHILS NFR BLD: 1.1 % (ref 0–1.9)
BILIRUB SERPL-MCNC: 0.4 MG/DL (ref 0.1–1)
BUN SERPL-MCNC: 7 MG/DL (ref 8–23)
CALCIUM SERPL-MCNC: 8.9 MG/DL (ref 8.7–10.5)
CHLORIDE SERPL-SCNC: 103 MMOL/L (ref 95–110)
CO2 SERPL-SCNC: 29 MMOL/L (ref 23–29)
CREAT SERPL-MCNC: 0.7 MG/DL (ref 0.5–1.4)
DIFFERENTIAL METHOD: NORMAL
EOSINOPHIL # BLD AUTO: 0.3 K/UL (ref 0–0.5)
EOSINOPHIL NFR BLD: 3.3 % (ref 0–8)
ERYTHROCYTE [DISTWIDTH] IN BLOOD BY AUTOMATED COUNT: 13.2 % (ref 11.5–14.5)
EST. GFR  (AFRICAN AMERICAN): >60 ML/MIN/1.73 M^2
EST. GFR  (NON AFRICAN AMERICAN): >60 ML/MIN/1.73 M^2
GLUCOSE SERPL-MCNC: 99 MG/DL (ref 70–110)
HCT VFR BLD AUTO: 40 % (ref 37–48.5)
HGB BLD-MCNC: 13.3 G/DL (ref 12–16)
IMM GRANULOCYTES # BLD AUTO: 0.02 K/UL (ref 0–0.04)
IMM GRANULOCYTES NFR BLD AUTO: 0.2 % (ref 0–0.5)
LYMPHOCYTES # BLD AUTO: 3.3 K/UL (ref 1–4.8)
LYMPHOCYTES NFR BLD: 40.1 % (ref 18–48)
MCH RBC QN AUTO: 30.6 PG (ref 27–31)
MCHC RBC AUTO-ENTMCNC: 33.3 G/DL (ref 32–36)
MCV RBC AUTO: 92 FL (ref 82–98)
MONOCYTES # BLD AUTO: 0.7 K/UL (ref 0.3–1)
MONOCYTES NFR BLD: 8.1 % (ref 4–15)
NEUTROPHILS # BLD AUTO: 3.8 K/UL (ref 1.8–7.7)
NEUTROPHILS NFR BLD: 47.2 % (ref 38–73)
NRBC BLD-RTO: 0 /100 WBC
PLATELET # BLD AUTO: 246 K/UL (ref 150–350)
PMV BLD AUTO: 9.4 FL (ref 9.2–12.9)
POTASSIUM SERPL-SCNC: 3.7 MMOL/L (ref 3.5–5.1)
PROT SERPL-MCNC: 6.5 G/DL (ref 6–8.4)
RBC # BLD AUTO: 4.34 M/UL (ref 4–5.4)
SODIUM SERPL-SCNC: 141 MMOL/L (ref 136–145)
TROPONIN I SERPL DL<=0.01 NG/ML-MCNC: 0.01 NG/ML (ref 0–0.03)
WBC # BLD AUTO: 8.1 K/UL (ref 3.9–12.7)

## 2020-07-29 PROCEDURE — 25000003 PHARM REV CODE 250: Mod: ER | Performed by: EMERGENCY MEDICINE

## 2020-07-29 PROCEDURE — 3008F PR BODY MASS INDEX (BMI) DOCUMENTED: ICD-10-PCS | Mod: CPTII,S$GLB,, | Performed by: FAMILY MEDICINE

## 2020-07-29 PROCEDURE — 99999 PR PBB SHADOW E&M-EST. PATIENT-LVL III: CPT | Mod: PBBFAC,,, | Performed by: FAMILY MEDICINE

## 2020-07-29 PROCEDURE — 3008F BODY MASS INDEX DOCD: CPT | Mod: CPTII,S$GLB,, | Performed by: FAMILY MEDICINE

## 2020-07-29 PROCEDURE — 1101F PR PT FALLS ASSESS DOC 0-1 FALLS W/OUT INJ PAST YR: ICD-10-PCS | Mod: CPTII,S$GLB,, | Performed by: FAMILY MEDICINE

## 2020-07-29 PROCEDURE — 1159F PR MEDICATION LIST DOCUMENTED IN MEDICAL RECORD: ICD-10-PCS | Mod: S$GLB,,, | Performed by: FAMILY MEDICINE

## 2020-07-29 PROCEDURE — 99284 EMERGENCY DEPT VISIT MOD MDM: CPT | Mod: 25,ER

## 2020-07-29 PROCEDURE — 1159F MED LIST DOCD IN RCRD: CPT | Mod: S$GLB,,, | Performed by: FAMILY MEDICINE

## 2020-07-29 PROCEDURE — 99214 OFFICE O/P EST MOD 30 MIN: CPT | Mod: S$GLB,,, | Performed by: FAMILY MEDICINE

## 2020-07-29 PROCEDURE — 80053 COMPREHEN METABOLIC PANEL: CPT | Mod: ER

## 2020-07-29 PROCEDURE — 84484 ASSAY OF TROPONIN QUANT: CPT | Mod: ER

## 2020-07-29 PROCEDURE — 1126F PR PAIN SEVERITY QUANTIFIED, NO PAIN PRESENT: ICD-10-PCS | Mod: S$GLB,,, | Performed by: FAMILY MEDICINE

## 2020-07-29 PROCEDURE — 99999 PR PBB SHADOW E&M-EST. PATIENT-LVL III: ICD-10-PCS | Mod: PBBFAC,,, | Performed by: FAMILY MEDICINE

## 2020-07-29 PROCEDURE — 1126F AMNT PAIN NOTED NONE PRSNT: CPT | Mod: S$GLB,,, | Performed by: FAMILY MEDICINE

## 2020-07-29 PROCEDURE — 86803 HEPATITIS C AB TEST: CPT

## 2020-07-29 PROCEDURE — 1101F PT FALLS ASSESS-DOCD LE1/YR: CPT | Mod: CPTII,S$GLB,, | Performed by: FAMILY MEDICINE

## 2020-07-29 PROCEDURE — 85025 COMPLETE CBC W/AUTO DIFF WBC: CPT | Mod: ER

## 2020-07-29 PROCEDURE — 99214 PR OFFICE/OUTPT VISIT, EST, LEVL IV, 30-39 MIN: ICD-10-PCS | Mod: S$GLB,,, | Performed by: FAMILY MEDICINE

## 2020-07-29 RX ORDER — MECLIZINE HYDROCHLORIDE 25 MG/1
25 TABLET ORAL
Status: COMPLETED | OUTPATIENT
Start: 2020-07-29 | End: 2020-07-29

## 2020-07-29 RX ORDER — MECLIZINE HYDROCHLORIDE 25 MG/1
25 TABLET ORAL 3 TIMES DAILY PRN
Qty: 30 TABLET | Refills: 0 | Status: SHIPPED | OUTPATIENT
Start: 2020-07-29 | End: 2021-02-03 | Stop reason: SDUPTHER

## 2020-07-29 RX ADMIN — MECLIZINE HYDROCHLORIDE 25 MG: 25 TABLET ORAL at 05:07

## 2020-07-29 NOTE — ED PROVIDER NOTES
Encounter Date: 2020       History     Chief Complaint   Patient presents with    Dizziness     dizziness onset 1230, denies pain      Chief complaint:  Dizziness    History of present illness:  70-year-old female states that she was doing very well until right at 12:30 p.m. when she developed sudden onset of dizziness.  She has had a history of vertigo in the past and was prescribed meclizine.  She therefore took meclizine and received no relief.  She said she spoke t her son who said he thought her speech may have been a little bit slurred but  patient states she does not feel it and it is not appreciated at my triage exam.  She denies any weakness of extremities, facial drooping, tinnitus, earache or ear pain.  Denies any visual difficulty.  No complaints of fever chills.  No chest pain.  States that she is able to ambulate without difficulty.  Her dizziness persists at this time.  Severity is moderate.  No complaints of headache.  Patient smokes.        Review of patient's allergies indicates:   Allergen Reactions    Penicillins Shortness Of Breath     Shortness of breath^severe skin rash    Cephalexin     Iodine and iodide containing products     Naproxen     Statins-hmg-coa reductase inhibitors      Leg cramps    Diazepam Anxiety     Made pt overly anxious instead of relaxing    Fig Rash     Past Medical History:   Diagnosis Date    Hyperlipidemia     Spinal stenosis      Past Surgical History:   Procedure Laterality Date    BACK SURGERY      BREAST BIOPSY      HYSTERECTOMY      LYMPH NODE BIOPSY      TONSILLECTOMY       Family History   Problem Relation Age of Onset    Heart disease Mother      Social History     Tobacco Use    Smoking status: Former Smoker     Packs/day: 0.50     Years: 30.00     Pack years: 15.00     Types: Cigarettes     Quit date: 2019     Years since quittin.5    Smokeless tobacco: Former User   Substance Use Topics    Alcohol use: Yes    Drug use: No      Review of Systems   Constitutional: Negative.  Negative for activity change, fatigue and fever.   HENT: Negative for congestion, ear discharge, ear pain, facial swelling, hearing loss, rhinorrhea and voice change.    Eyes: Negative for pain and visual disturbance.   Respiratory: Negative.  Negative for shortness of breath.    Cardiovascular: Negative.  Negative for palpitations.   Gastrointestinal: Negative for nausea and vomiting.   Genitourinary: Negative for difficulty urinating, dysuria and flank pain.   Musculoskeletal: Negative for myalgias, neck pain and neck stiffness.   Skin: Negative.    Neurological: Positive for dizziness. Negative for tremors, seizures, syncope, facial asymmetry, speech difficulty, weakness, light-headedness, numbness and headaches.   Hematological: Negative.    Psychiatric/Behavioral: Negative.    All other systems reviewed and are negative.      Physical Exam     Initial Vitals [07/29/20 1651]   BP Pulse Resp Temp SpO2   (!) 149/69 65 18 97.9 °F (36.6 °C) 98 %      MAP       --         Physical Exam    Nursing note and vitals reviewed.  Constitutional: She appears well-developed and well-nourished. No distress.   HENT:   Head: Normocephalic and atraumatic.   Right Ear: External ear normal.   Left Ear: External ear normal.   Nose: Nose normal.   Mouth/Throat: Oropharynx is clear and moist. No oropharyngeal exudate.   Eyes: Conjunctivae and EOM are normal. Pupils are equal, round, and reactive to light. No scleral icterus.   No vertical or horizontal nystagmus   Neck: Normal range of motion. Neck supple.   Cardiovascular: Normal rate, regular rhythm and intact distal pulses.   Pulmonary/Chest: No respiratory distress.   Abdominal: Soft. Bowel sounds are normal. She exhibits no distension. There is no abdominal tenderness.   Musculoskeletal: Normal range of motion.   Neurological: She is alert and oriented to person, place, and time. GCS score is 15. GCS eye subscore is 4. GCS verbal  subscore is 5. GCS motor subscore is 6.   Skin: Skin is warm and dry. Capillary refill takes less than 2 seconds.   Psychiatric: She has a normal mood and affect. Her behavior is normal. Judgment and thought content normal.         ED Course   Procedures  Labs Reviewed   COMPREHENSIVE METABOLIC PANEL - Abnormal; Notable for the following components:       Result Value    BUN, Bld 7 (*)     Alkaline Phosphatase 47 (*)     All other components within normal limits   CBC W/ AUTO DIFFERENTIAL   TROPONIN I   HEPATITIS C ANTIBODY     EKG Readings: (Independently Interpreted)   RATE OF 64 BEATS PER MINUTE.  NORMAL SINUS RHYTHM.  NORMAL AXIS.  P.R., QRS AND QTC WITHIN NORMAL LIMITS.  NO STEMI.       Imaging Results          CT Head Without Contrast (Final result)  Result time 07/29/20 17:43:01    Final result by Buster Raymond Jr., MD (07/29/20 17:43:01)                 Impression:      1. No acute intracranial findings.  All CT scans at this facility are performed  using dose modulation techniques as appropriate to performed exam including the following:  automated exposure control; adjustment of mA and/or kV according to the patients size (this includes techniques or standardized protocols for targeted exams where dose is matched to indication/reason for exam: i.e. extremities or head);  iterative reconstruction technique.      Electronically signed by: Buster Raymond Jr., MD  Date:    07/29/2020  Time:    17:43             Narrative:    EXAMINATION:  CT HEAD WITHOUT CONTRAST    CLINICAL HISTORY:  Dizziness, persistent/recurrent, cardiac or vascular cause suspected;    TECHNIQUE:  CT scan was obtained of the head without administration of contrast.    COMPARISON:  None    FINDINGS:  Ventricles and basal cisterns are normal.  No hemorrhage, mass effect or midline shift.  No cerebral or cerebellar parenchymal abnormality.  Paranasal sinuses are clear.  Mastoid air cells are clear.  Calvarium is intact.                                  Medical Decision Making:   Initial Assessment:   No response to Meclizine when in past it worked instantly.  Clinical Tests:   Lab Tests: Ordered  Radiological Study: Ordered  ED Management:  Care transitioned to Dr. Arriaza at 1800 for test results and gauge response to 2nd dose today of Meclizine.                   ED Course as of Jul 29 1832 Wed Jul 29, 2020 1827 Patient is feeling better. She understands that todays episode could represent a stroke, or other etiology not seen on a CT scan. She would not like to go for an MRI tonight, and would prefer to be seen in the AM. Her symptoms are improved at this time, and she is able to ambulate to the bathroom without assistance, and is able to tolerate PO without any issue. Her NIH score is 0. Her ABCD2 score is 4. She is not a TPA candidate today. She understands that she is at moderate risk for another stroke, and accepts this risks. She is of capacity to make decisions, and would like to follow up in the AM. She is very scarred of MRI machines, and does not want an MRI tonight after through discussion. Patient hemodynamically stable for discharge, and will return if she has any worsening symptoms.     [BA]   1831 6:31 PM Reassessment: I reassessed the pt.  The pt is resting comfortably and is NAD.  Pt states their sx have improved. I Discussed test results, shared treatment plan, specific conditions for return, and the need for f/u. I  Answered their questions at this time.  Pt understands and agrees to the plan.  The pt has remained hemodynamically stable through ED course and is stable for discharge.       [BA]      ED Course User Index  [BA] Flaco Arriaza MD                Clinical Impression:       ICD-10-CM ICD-9-CM   1. Dizziness  R42 780.4   2. Benign paroxysmal positional vertigo, unspecified laterality  H81.10 386.11             ED Disposition Condition    Discharge Stable        ED Prescriptions     Medication Sig Dispense Start Date End  Date Auth. Provider    meclizine (ANTIVERT) 25 mg tablet Take 1 tablet (25 mg total) by mouth 3 (three) times daily as needed for Dizziness. 30 tablet 7/29/2020 8/8/2020 Flaco Arriaza MD        Follow-up Information     Follow up With Specialties Details Why Contact Info    Flaco Brownlee,  Family Medicine Schedule an appointment as soon as possible for a visit in 1 day For re-evaluation and further treatment [id number] [address]  [place] [address]  [phone]      [facility] Emergency Medicine Go today If symptoms worsen, For re-evaluation and further treatment, As needed [address] [facility] [facility]  [place] [id number] [phone]                                     Flaco Arriaza MD  07/29/20 7156

## 2020-07-29 NOTE — ASSESSMENT & PLAN NOTE
-pt with abrupt onset of dizziness, double vision, loss of balance. family concerned about change in speech. has cva risk factors such as 50 yr tobacco use, hld. sent to ed for further eval. ddx include cva, vertigo etc

## 2020-07-29 NOTE — PROGRESS NOTES
" Patient ID: Salud Echevarria is a 70 y.o. female.    Chief Complaint: Dizziness    HPI Patient is 70-year-old female who presents with acute onset of dizziness and feeling "drunk" which began a few hrs ago.  Says she was standing up cooking and suddenly she had difficulty focusing and felt off balance.  Also developed double vision.  She called her son on the phone after onset of symptoms and he did not recognize her. He told her that her speech was slurred speech .  Had issues with vertigo months ago so she took a meclizine to see if this would help and symptoms were unchanged.  Patient has 50 year smoking history and also has hyperlipidemia.  Denies chest pain, SOB.     Family History   Problem Relation Age of Onset    Heart disease Mother        Current Outpatient Medications:     aspirin (ECOTRIN) 81 MG EC tablet, Take 81 mg by mouth once daily., Disp: , Rfl:     multivitamin (ONE DAILY MULTIVITAMIN) per tablet, Take 1 tablet by mouth once daily., Disp: , Rfl:     pravastatin (PRAVACHOL) 40 MG tablet, Take 1 tablet by mouth once daily, Disp: 90 tablet, Rfl: 4    albuterol 90 mcg/actuation inhaler, Inhale 2 puffs into the lungs., Disp: , Rfl:     meclizine (ANTIVERT) 25 mg tablet, Take 1 tablet (25 mg total) by mouth 3 (three) times daily as needed. (Patient not taking: Reported on 7/29/2020), Disp: 30 tablet, Rfl: 0    methylPREDNISolone (MEDROL DOSEPACK) 4 mg tablet, use as directed (Patient not taking: Reported on 7/29/2020), Disp: 1 Package, Rfl: 0    Review of Systems   Constitutional: Negative for activity change, appetite change, chills and fever.   HENT: Negative for congestion and sore throat.    Eyes: Positive for visual disturbance.   Respiratory: Negative for cough, shortness of breath and wheezing.    Cardiovascular: Negative for chest pain.   Gastrointestinal: Negative for abdominal pain, diarrhea, nausea and vomiting.   Endocrine: Negative for polydipsia and polyuria.   Musculoskeletal: " "Negative for arthralgias.   Skin: Negative for rash.   Neurological: Positive for dizziness, weakness and headaches.   Psychiatric/Behavioral: Negative for sleep disturbance. The patient is not nervous/anxious.        Objective:   /82 (BP Location: Left arm, Patient Position: Sitting, BP Method: Large (Manual))   Pulse 70   Temp 97.6 °F (36.4 °C)   Ht 5' 2" (1.575 m)   Wt 68.2 kg (150 lb 5.7 oz)   SpO2 98%   BMI 27.50 kg/m²      Physical Exam  Constitutional:       Appearance: She is well-developed.   HENT:      Head: Normocephalic and atraumatic.   Eyes:      Conjunctiva/sclera: Conjunctivae normal.   Neck:      Musculoskeletal: Normal range of motion.   Cardiovascular:      Rate and Rhythm: Normal rate and regular rhythm.      Heart sounds: Normal heart sounds.   Pulmonary:      Effort: Pulmonary effort is normal.      Breath sounds: Normal breath sounds.   Abdominal:      Palpations: Abdomen is soft.   Musculoskeletal: Normal range of motion.      Comments: 4/5 bilateral lower extrem strength   Skin:     General: Skin is warm.   Neurological:      Mental Status: She is alert and oriented to person, place, and time.      Comments: When asked to walk across exam room, holding onto objects to keep her balance. No noticeable facial droop or cranial nerve deficit.    Psychiatric:         Behavior: Behavior normal.         Assessment & Plan     Problem List Items Addressed This Visit        Other    Dizziness - Primary    Current Assessment & Plan     -pt with abrupt onset of dizziness, double vision, loss of balance. family concerned about change in speech. has cva risk factors such as 50 yr tobacco use, hld. sent to ed for further eval. ddx include cva, vertigo etc                   Disclaimer:  This note may have been prepared using voice recognition software, without extensive proofreading. As such, there could be errors within the text such as sound alike errors.    "

## 2020-07-30 ENCOUNTER — TELEPHONE (OUTPATIENT)
Dept: INTERNAL MEDICINE | Facility: CLINIC | Age: 70
End: 2020-07-30

## 2020-07-30 DIAGNOSIS — R42 DIZZINESS AND GIDDINESS: ICD-10-CM

## 2020-07-30 LAB — HCV AB SERPL QL IA: NEGATIVE

## 2020-07-30 RX ORDER — ALPRAZOLAM 0.25 MG/1
0.25 TABLET ORAL ONCE
Qty: 1 TABLET | Refills: 0 | Status: SHIPPED | OUTPATIENT
Start: 2020-07-30 | End: 2021-02-03

## 2020-07-30 NOTE — TELEPHONE ENCOUNTER
Ok will you be able to prescribe anything for her anxiety of the mri machine? I know thatll be the first thing she will ask.

## 2020-07-30 NOTE — TELEPHONE ENCOUNTER
Call to patient and scheduled her MRI [facility] location 7/31/20 at 11 am, advised to arrive 30 mins prior test and (1) xanax has been sent to pharmacy for her to take 10-20 mins before test. Advised her she has to have a  she cannot drive while taking xanax. She verbalized understanding. Fc

## 2020-07-30 NOTE — TELEPHONE ENCOUNTER
----- Message from Shavon Reece sent at 7/30/2020  9:09 AM CDT -----  Pt would like to follow-up with nurse regarding ER visit. Please call back at [phone].   Oriana Payne

## 2020-07-30 NOTE — TELEPHONE ENCOUNTER
"Discussed with pt that she went to the ER yesterday her ct scan and labs were normal. She states they wanted to send her to [facility] for an MRI scan however she did not want to go to [place] and have to stay over night. Patient requesting MD reviews ER summary and see if Dr. Beatty can order an MRI for patient to do here in [place]. If so she states she will need a prescription for xanax since she is terrified to get into the MRI machine.     She states she took the Meclizine 25 mg she feels drowsy from it and still has a "drunk" feeling of wooziness. However she is walking a lot better today.     Will discuss with Doctor. Fc  "

## 2020-07-31 ENCOUNTER — HOSPITAL ENCOUNTER (OUTPATIENT)
Dept: RADIOLOGY | Facility: HOSPITAL | Age: 70
Discharge: HOME | End: 2020-07-31
Attending: FAMILY MEDICINE
Payer: MEDICARE

## 2020-07-31 ENCOUNTER — TELEPHONE (OUTPATIENT)
Dept: INTERNAL MEDICINE | Facility: CLINIC | Age: 70
End: 2020-07-31

## 2020-07-31 DIAGNOSIS — R42 DIZZINESS AND GIDDINESS: ICD-10-CM

## 2020-07-31 PROCEDURE — 70553 MRI BRAIN W WO CONTRAST: ICD-10-PCS | Mod: 26,,, | Performed by: RADIOLOGY

## 2020-07-31 PROCEDURE — 70553 MRI BRAIN STEM W/O & W/DYE: CPT | Mod: 26,,, | Performed by: RADIOLOGY

## 2020-07-31 PROCEDURE — A9585 GADOBUTROL INJECTION: HCPCS | Mod: PO | Performed by: FAMILY MEDICINE

## 2020-07-31 PROCEDURE — 70553 MRI BRAIN STEM W/O & W/DYE: CPT | Mod: TC,PO

## 2020-07-31 PROCEDURE — 25500020 PHARM REV CODE 255: Mod: PO | Performed by: FAMILY MEDICINE

## 2020-07-31 RX ORDER — GADOBUTROL 604.72 MG/ML
6 INJECTION INTRAVENOUS
Status: COMPLETED | OUTPATIENT
Start: 2020-07-31 | End: 2020-07-31

## 2020-07-31 RX ADMIN — GADOBUTROL 6 ML: 604.72 INJECTION INTRAVENOUS at 11:07

## 2020-07-31 NOTE — TELEPHONE ENCOUNTER
----- Message from Татьяна Feliz sent at 7/31/2020  4:59 PM CDT -----  Regarding: test results  Type:  Test Results    Who Called: Patient  Name of Test (Lab/Mammo/Etc): Mri  Date of Test: 7/31  Ordering Provider: above provider  Where the test was performed:[facility] MRI  Would the patient rather a call back or a response via [facility]? callback  Best Call Back Number: [phone]  Additional Information:

## 2020-07-31 NOTE — TELEPHONE ENCOUNTER
Patient informed of her results (MRI) she states that she wants to know what can be done regarding her results: chronic microvascular ischemic disease.

## 2020-08-07 ENCOUNTER — OFFICE VISIT (OUTPATIENT)
Dept: INTERNAL MEDICINE | Facility: CLINIC | Age: 70
End: 2020-08-07
Payer: MEDICARE

## 2020-08-07 ENCOUNTER — LAB VISIT (OUTPATIENT)
Dept: LAB | Facility: HOSPITAL | Age: 70
End: 2020-08-07
Attending: FAMILY MEDICINE
Payer: MEDICARE

## 2020-08-07 VITALS
DIASTOLIC BLOOD PRESSURE: 74 MMHG | SYSTOLIC BLOOD PRESSURE: 112 MMHG | HEIGHT: 62 IN | OXYGEN SATURATION: 97 % | WEIGHT: 147.06 LBS | HEART RATE: 65 BPM | TEMPERATURE: 99 F | BODY MASS INDEX: 27.06 KG/M2

## 2020-08-07 DIAGNOSIS — E78.5 HYPERLIPIDEMIA, UNSPECIFIED HYPERLIPIDEMIA TYPE: ICD-10-CM

## 2020-08-07 DIAGNOSIS — J45.20 MILD INTERMITTENT ASTHMA WITHOUT COMPLICATION: ICD-10-CM

## 2020-08-07 DIAGNOSIS — R39.15 URINARY URGENCY: ICD-10-CM

## 2020-08-07 DIAGNOSIS — R31.9 HEMATURIA, UNSPECIFIED TYPE: Primary | ICD-10-CM

## 2020-08-07 DIAGNOSIS — F17.200 TOBACCO DEPENDENCE: Primary | ICD-10-CM

## 2020-08-07 LAB
BACTERIA #/AREA URNS AUTO: NORMAL /HPF
BILIRUB SERPL-MCNC: ABNORMAL MG/DL
BILIRUB UR QL STRIP: NEGATIVE
BLOOD URINE, POC: 250
CLARITY UR REFRACT.AUTO: CLEAR
COLOR UR AUTO: YELLOW
COLOR, POC UA: YELLOW
GLUCOSE UR QL STRIP: NEGATIVE
GLUCOSE UR QL STRIP: NORMAL
HGB UR QL STRIP: ABNORMAL
KETONES UR QL STRIP: ABNORMAL
KETONES UR QL STRIP: NEGATIVE
LEUKOCYTE ESTERASE UR QL STRIP: NEGATIVE
LEUKOCYTE ESTERASE URINE, POC: ABNORMAL
MICROSCOPIC COMMENT: NORMAL
NITRITE UR QL STRIP: NEGATIVE
NITRITE, POC UA: ABNORMAL
PH UR STRIP: 6 [PH] (ref 5–8)
PH, POC UA: 5
PROT UR QL STRIP: NEGATIVE
PROTEIN, POC: ABNORMAL
RBC #/AREA URNS AUTO: 3 /HPF (ref 0–4)
SP GR UR STRIP: 1.02 (ref 1–1.03)
SPECIFIC GRAVITY, POC UA: 1.02
SQUAMOUS #/AREA URNS AUTO: 1 /HPF
URN SPEC COLLECT METH UR: ABNORMAL
UROBILINOGEN UR STRIP-ACNC: NEGATIVE EU/DL
UROBILINOGEN, POC UA: NORMAL
WBC #/AREA URNS AUTO: 0 /HPF (ref 0–5)

## 2020-08-07 PROCEDURE — 1159F MED LIST DOCD IN RCRD: CPT | Mod: S$GLB,,, | Performed by: FAMILY MEDICINE

## 2020-08-07 PROCEDURE — 99214 PR OFFICE/OUTPT VISIT, EST, LEVL IV, 30-39 MIN: ICD-10-PCS | Mod: 25,S$GLB,, | Performed by: FAMILY MEDICINE

## 2020-08-07 PROCEDURE — 99214 OFFICE O/P EST MOD 30 MIN: CPT | Mod: 25,S$GLB,, | Performed by: FAMILY MEDICINE

## 2020-08-07 PROCEDURE — 1101F PT FALLS ASSESS-DOCD LE1/YR: CPT | Mod: CPTII,S$GLB,, | Performed by: FAMILY MEDICINE

## 2020-08-07 PROCEDURE — 81000 URINALYSIS NONAUTO W/SCOPE: CPT | Mod: PO

## 2020-08-07 PROCEDURE — 99999 PR PBB SHADOW E&M-EST. PATIENT-LVL IV: CPT | Mod: PBBFAC,,, | Performed by: FAMILY MEDICINE

## 2020-08-07 PROCEDURE — 81001 URINALYSIS AUTO W/SCOPE: CPT | Mod: S$GLB,,, | Performed by: FAMILY MEDICINE

## 2020-08-07 PROCEDURE — 3008F BODY MASS INDEX DOCD: CPT | Mod: CPTII,S$GLB,, | Performed by: FAMILY MEDICINE

## 2020-08-07 PROCEDURE — 1126F PR PAIN SEVERITY QUANTIFIED, NO PAIN PRESENT: ICD-10-PCS | Mod: S$GLB,,, | Performed by: FAMILY MEDICINE

## 2020-08-07 PROCEDURE — 1126F AMNT PAIN NOTED NONE PRSNT: CPT | Mod: S$GLB,,, | Performed by: FAMILY MEDICINE

## 2020-08-07 PROCEDURE — 36415 COLL VENOUS BLD VENIPUNCTURE: CPT | Mod: PO

## 2020-08-07 PROCEDURE — 81001 POCT URINALYSIS, DIPSTICK OR TABLET REAGENT, AUTOMATED, WITH MICROSCOP: ICD-10-PCS | Mod: S$GLB,,, | Performed by: FAMILY MEDICINE

## 2020-08-07 PROCEDURE — 99999 PR PBB SHADOW E&M-EST. PATIENT-LVL IV: ICD-10-PCS | Mod: PBBFAC,,, | Performed by: FAMILY MEDICINE

## 2020-08-07 PROCEDURE — 1159F PR MEDICATION LIST DOCUMENTED IN MEDICAL RECORD: ICD-10-PCS | Mod: S$GLB,,, | Performed by: FAMILY MEDICINE

## 2020-08-07 PROCEDURE — 1101F PR PT FALLS ASSESS DOC 0-1 FALLS W/OUT INJ PAST YR: ICD-10-PCS | Mod: CPTII,S$GLB,, | Performed by: FAMILY MEDICINE

## 2020-08-07 PROCEDURE — 80061 LIPID PANEL: CPT

## 2020-08-07 PROCEDURE — 3008F PR BODY MASS INDEX (BMI) DOCUMENTED: ICD-10-PCS | Mod: CPTII,S$GLB,, | Performed by: FAMILY MEDICINE

## 2020-08-07 RX ORDER — ALBUTEROL SULFATE 90 UG/1
2 AEROSOL, METERED RESPIRATORY (INHALATION) EVERY 4 HOURS PRN
Qty: 18 G | Refills: 0 | Status: SHIPPED | OUTPATIENT
Start: 2020-08-07 | End: 2022-01-18

## 2020-08-07 RX ORDER — ALBUTEROL SULFATE 90 UG/1
2 AEROSOL, METERED RESPIRATORY (INHALATION)
Status: CANCELLED | OUTPATIENT
Start: 2020-08-07 | End: 2024-04-16

## 2020-08-07 NOTE — PROGRESS NOTES
Patient ID: Salud Echevarria is a 70 y.o. female.    Chief Complaint: Discuss MRI    HPI Patient is a 70-year-old female who presents to follow-up MRI brain results.  MRI showed no acute intracranial findings, did show findings suggestive of chronic microvascular ischemic disease.  Patient has approximately 50 pack year smoking history.  Cardiologist started her on statin years ago because of her increased risk of CAD secondary to tobacco use.  Does not have high blood pressure or diabetes.    Would like to quit smoking.  Says she quit on her own about 4 months ago using lozenges and gum however she began smoking again which COVID pandemic began.    Also reports that she is urinating more often especially at night for last 2-3 weeks.  No dysuria. Does report urgency and admits to drinking fluids prior to bedtime. Per chart check I did note persistent hematuria on U/A over the last few years.  Patient says she has been seen by urologist. Per care everywhere urology notefrom 2015 stated patient had cystoscope which was normal.        Family History   Problem Relation Age of Onset    Heart disease Mother        Current Outpatient Medications:     albuterol (PROVENTIL/VENTOLIN HFA) 90 mcg/actuation inhaler, Inhale 2 puffs into the lungs every 4 (four) hours as needed for Wheezing., Disp: 18 g, Rfl: 0    aspirin (ECOTRIN) 81 MG EC tablet, Take 81 mg by mouth once daily., Disp: , Rfl:     multivitamin (ONE DAILY MULTIVITAMIN) per tablet, Take 1 tablet by mouth once daily., Disp: , Rfl:     pravastatin (PRAVACHOL) 40 MG tablet, Take 1 tablet by mouth once daily, Disp: 90 tablet, Rfl: 4    ALPRAZolam (XANAX) 0.25 MG tablet, Take 1 tablet (0.25 mg total) by mouth once. Take 10-20 minutes prior to imaging for 1 dose, Disp: 1 tablet, Rfl: 0    meclizine (ANTIVERT) 25 mg tablet, Take 1 tablet (25 mg total) by mouth 3 (three) times daily as needed for Dizziness., Disp: 30 tablet, Rfl: 0    Review of Systems  "  Constitutional: Negative for chills and fever.   HENT: Negative for congestion and sore throat.    Eyes: Negative for visual disturbance.   Respiratory: Negative for cough, shortness of breath and wheezing.    Cardiovascular: Negative for chest pain.   Gastrointestinal: Negative for abdominal pain, diarrhea and nausea.   Endocrine: Negative for polydipsia and polyuria.   Genitourinary: Positive for urgency. Negative for dysuria and frequency.   Musculoskeletal: Negative for arthralgias.   Skin: Negative for rash.   Neurological: Negative for dizziness and headaches.   Psychiatric/Behavioral: Negative for sleep disturbance. The patient is not nervous/anxious.        Objective:   /74 (BP Location: Left arm, Patient Position: Sitting, BP Method: Large (Manual))   Pulse 65   Temp 98.6 °F (37 °C)   Ht 5' 2" (1.575 m)   Wt 66.7 kg (147 lb 0.8 oz)   SpO2 97%   BMI 26.90 kg/m²      Physical Exam  Constitutional:       Appearance: She is well-developed.   HENT:      Head: Normocephalic and atraumatic.   Eyes:      Conjunctiva/sclera: Conjunctivae normal.   Neck:      Musculoskeletal: Normal range of motion.   Cardiovascular:      Rate and Rhythm: Normal rate and regular rhythm.      Heart sounds: Normal heart sounds.   Pulmonary:      Effort: Pulmonary effort is normal.      Breath sounds: Normal breath sounds.   Abdominal:      Palpations: Abdomen is soft.   Musculoskeletal: Normal range of motion.   Skin:     General: Skin is warm.   Neurological:      Mental Status: She is alert and oriented to person, place, and time.   Psychiatric:         Behavior: Behavior normal.         Assessment & Plan     Problem List Items Addressed This Visit        Pulmonary    Mild intermittent asthma without complication    Current Assessment & Plan     -infrequent use of inhaler, mainly with weather changes  -never required maintenance inhaler         Relevant Medications    albuterol (PROVENTIL/VENTOLIN HFA) 90 mcg/actuation " inhaler       Cardiac/Vascular    Hyperlipidemia    Current Assessment & Plan     -continue statin         Relevant Orders    Lipid Panel       Other    Tobacco dependence - Primary    Current Assessment & Plan     -discussed smoking cessation to decrease stroke and MI risks         Relevant Orders    Ambulatory referral/consult to Smoking Cessation Program      Other Visit Diagnoses     Urinary urgency        Relevant Orders    POCT URINE DIPSTICK WITH MICROSCOPE, AUTOMATED (Completed)    Urinalysis, Reflex to Urine Culture Urine, Clean Catch            Follow up if symptoms worsen or fail to improve.      Disclaimer:  This note may have been prepared using voice recognition software, without extensive proofreading. As such, there could be errors within the text such as sound alike errors.

## 2020-08-07 NOTE — PATIENT INSTRUCTIONS
Lifestyle Changes to Control Cholesterol  You can control your cholesterol through diet, exercise, weight management, quitting smoking, stress management, and taking your medicines right. These things can also lower your risk for cardiovascular disease.    Eating healthy  Your healthcare provider will give you information on diet changes you may need to make. Your provider may recommend that you see a registered dietitian for help with diet changes. Changes may include:  · Cutting back on the amount of fat and cholesterol in your meals  · Eating less salt (sodium). This is especially important if you have high blood pressure.  · Eating more fresh vegetables and fruits  · Eating lean proteins such as fish, poultry, beans, and peas  · Eating less red meat and processed meats  · Using low-fat dairy products  · Using vegetable and nut oils in limited amounts  · Limiting how many sweets and processed foods like chips, cookies, and baked goods that you eat   · Limiting how many sugar-sweetened beverages you drink  · Limiting how often you eat out  Getting exercise  Regular exercise is a good way to help your body control cholesterol. Regular exercise can help in many ways. It can:  · Raise your good cholesterol  · Help lower your bad cholesterol  · Let blood flow better through your body  · Give more oxygen to your muscles and tissues  · Help you manage your weight  · Help your heart pump better  · Lower your blood pressure  Your healthcare provider may recommend that you get more physical activity if you haven't been active. Your provider may recommend that you get moderate to vigorous physical activity for at least 40 minutes each day. You should do this for at least 3 to 4 days each week. A few examples of moderate to vigorous activity are:  · Walking at a brisk pace. This is about 3 to 4 miles per hour.  · Jogging or running  · Swimming or water aerobics  · Hiking  · Dancing  · Martial arts  · Tennis  · Riding a  bicycle or stationary bike  · Dancing  Managing your weight  If you are overweight or obese, your healthcare provider will work with you to help you lose weight and lower your BMI (body mass index). Making diet changes and getting more physical activity can help. Changing your diet will help you lose weight more easily than adding exercise.  Quitting smoking  Smoking and other tobacco use can raise cholesterol and make it harder to control. Quitting is tough. But millions of people have given up tobacco for good. You can quit, too! Think about some of the reasons below to quit smoking. Do any of them make you think twice about your smoking habit?  Stop smoking because it:  · Keeps your cholesterol high, even if you make all the other changes youre supposed to  · Damages your body. It especially harms your heart, lungs, skin, and blood vessels.  · Makes you more likely to have a heart attack (acute myocardial infarction), stroke, or cancer  · Stains your teeth  · Makes your skin, clothes, and breath smell bad  · Costs a lot of money  Controlling stress   Learn ways to control stress. This will help you deal with stress in your home and work life. Controlling stress can greatly lower your risk of getting cardiovascular disease.  Making the most of medicines  Healthy eating and exercise are a good start to keeping your cholesterol down. But you may need some extra help from medicine. If your doctor prescribes medicine, be sure to take it exactly as directed. Remember:  · Tell your healthcare provider about all other medicines you take. This includes vitamins and herbs.  · Tell your healthcare provider if you have any side effects after starting to take a medicine. Examples of side effects to watch for include muscle aches, weakness, blurred vision, rust-colored urine, yellowing of eyes or skin (jaundice), and headache.  · Dont skip a dose or stop taking your medicine because you feel better or because  your cholesterol numbers go down. Never stop taking your medicine unless your healthcare provider has told you its OK.  · Ask your healthcare provider if you have any questions about your medicines.  High risk groups  Some people may need to take medicines called statins to control their cholesterol. This is in addition to eating a healthy diet and getting regular exercise.  Statins can help you stay healthy. They can also help prevent a heart attack or stroke. You may need to take a statin if you are in one of these groups:  · Adults who have had a heart attack or stroke. Or adults who have had peripheral vascular disease, a ministroke (transient ischemic attack), or stable or unstable angina. This group also includes people who have had a procedure to restore blood flow through a blocked artery. These procedures include percutaneous coronary intervention, angioplasty, stent, and open-heart bypass surgery.  · Adults who have diabetes. Or adults who are at higher risk of having a heart attack or stroke and have an LDL cholesterol level of 70 to 189 mg/dL  · Adults who are 21 years old or older and have an LDL cholesterol level of 190 mg/dL or higher.  If you are in a high-risk group, talk with your healthcare provider about your treatment goals. Make sure you understand why these goals are important, based on your own health history and your family history of heart disease or high cholesterol.  Make a plan to have regular cholesterol checks. You may need to fast before getting this test. Also ask your provider about any side effects your medicines may cause. Let your provider know about any side effects you have. You may need to take more than one medicine to reach the cholesterol goals that you and your provider decide on.  Date Last Reviewed: 10/1/2016  © 4869-7231 [employer]. [address] [place] [address], [place] [address]. All rights reserved. This information is not intended as a substitute for  professional medical care. Always follow your healthcare professional's instructions.

## 2020-08-08 LAB
CHOLEST SERPL-MCNC: 169 MG/DL (ref 120–199)
CHOLEST/HDLC SERPL: 2.3 {RATIO} (ref 2–5)
HDLC SERPL-MCNC: 75 MG/DL (ref 40–75)
HDLC SERPL: 44.4 % (ref 20–50)
LDLC SERPL CALC-MCNC: 80.2 MG/DL (ref 63–159)
NONHDLC SERPL-MCNC: 94 MG/DL
TRIGL SERPL-MCNC: 69 MG/DL (ref 30–150)

## 2020-08-14 ENCOUNTER — OFFICE VISIT (OUTPATIENT)
Dept: SURGERY | Facility: CLINIC | Age: 70
End: 2020-08-14
Payer: MEDICARE

## 2020-08-14 ENCOUNTER — PATIENT OUTREACH (OUTPATIENT)
Dept: ADMINISTRATIVE | Facility: OTHER | Age: 70
End: 2020-08-14

## 2020-08-14 VITALS
BODY MASS INDEX: 26.86 KG/M2 | SYSTOLIC BLOOD PRESSURE: 133 MMHG | WEIGHT: 145.94 LBS | DIASTOLIC BLOOD PRESSURE: 73 MMHG | TEMPERATURE: 99 F | HEART RATE: 67 BPM | HEIGHT: 62 IN

## 2020-08-14 DIAGNOSIS — K43.9 VENTRAL HERNIA WITHOUT OBSTRUCTION OR GANGRENE: Primary | ICD-10-CM

## 2020-08-14 DIAGNOSIS — Z12.11 ENCOUNTER FOR FIT (FECAL IMMUNOCHEMICAL TEST) SCREENING: Primary | ICD-10-CM

## 2020-08-14 PROCEDURE — 3008F BODY MASS INDEX DOCD: CPT | Mod: CPTII,S$GLB,, | Performed by: SURGERY

## 2020-08-14 PROCEDURE — 99999 PR PBB SHADOW E&M-EST. PATIENT-LVL V: CPT | Mod: PBBFAC,,, | Performed by: SURGERY

## 2020-08-14 PROCEDURE — 99213 PR OFFICE/OUTPT VISIT, EST, LEVL III, 20-29 MIN: ICD-10-PCS | Mod: S$GLB,,, | Performed by: SURGERY

## 2020-08-14 PROCEDURE — 1126F PR PAIN SEVERITY QUANTIFIED, NO PAIN PRESENT: ICD-10-PCS | Mod: S$GLB,,, | Performed by: SURGERY

## 2020-08-14 PROCEDURE — 99999 PR PBB SHADOW E&M-EST. PATIENT-LVL V: ICD-10-PCS | Mod: PBBFAC,,, | Performed by: SURGERY

## 2020-08-14 PROCEDURE — 1126F AMNT PAIN NOTED NONE PRSNT: CPT | Mod: S$GLB,,, | Performed by: SURGERY

## 2020-08-14 PROCEDURE — 1101F PR PT FALLS ASSESS DOC 0-1 FALLS W/OUT INJ PAST YR: ICD-10-PCS | Mod: CPTII,S$GLB,, | Performed by: SURGERY

## 2020-08-14 PROCEDURE — 1101F PT FALLS ASSESS-DOCD LE1/YR: CPT | Mod: CPTII,S$GLB,, | Performed by: SURGERY

## 2020-08-14 PROCEDURE — 1159F MED LIST DOCD IN RCRD: CPT | Mod: S$GLB,,, | Performed by: SURGERY

## 2020-08-14 PROCEDURE — 3008F PR BODY MASS INDEX (BMI) DOCUMENTED: ICD-10-PCS | Mod: CPTII,S$GLB,, | Performed by: SURGERY

## 2020-08-14 PROCEDURE — 1159F PR MEDICATION LIST DOCUMENTED IN MEDICAL RECORD: ICD-10-PCS | Mod: S$GLB,,, | Performed by: SURGERY

## 2020-08-14 PROCEDURE — 99213 OFFICE O/P EST LOW 20 MIN: CPT | Mod: S$GLB,,, | Performed by: SURGERY

## 2020-08-14 RX ORDER — SODIUM CHLORIDE 9 MG/ML
INJECTION, SOLUTION INTRAVENOUS CONTINUOUS
Status: CANCELLED | OUTPATIENT
Start: 2020-08-14

## 2020-08-14 NOTE — PROGRESS NOTES
Salud  is 70-year-old white female patient who is known to me from previous encounter for the evaluation of known ventral hernia.  She had developed protrusion without any significant history above the belly button.  She had evaluation which included CT scan of the abdomen and pelvis and showed ventral hernia.  This was 6 months ago.  She had some work related issues, and wanted to postpone the surgical repair.  She has return to my office today to discuss the surgery.  The evaluation confirms presence of ventral hernia.  Because she recently had a g baby, she wants to travel to go to [place] to see her grandchild and wants to have surgery in October.  We have scheduled the surgery for October 12, 2020 at 7:00 a.m..  [facility].  She will also follow up with me for preoperative physical exam on October 9, 2020. Total time approximately half an hour was spent with this patient, and more than 50% of that was spent for treatment planning and counseling.    Brandyn Panchal

## 2020-09-15 ENCOUNTER — CLINICAL SUPPORT (OUTPATIENT)
Dept: SMOKING CESSATION | Facility: CLINIC | Age: 70
End: 2020-09-15
Payer: COMMERCIAL

## 2020-09-15 DIAGNOSIS — F17.200 NICOTINE DEPENDENCE: Primary | ICD-10-CM

## 2020-09-15 PROCEDURE — 99404 PREV MED CNSL INDIV APPRX 60: CPT | Mod: S$GLB,,,

## 2020-09-15 PROCEDURE — 99404 PR PREVENT COUNSEL,INDIV,60 MIN: ICD-10-PCS | Mod: S$GLB,,,

## 2020-09-15 RX ORDER — DM/P-EPHED/ACETAMINOPH/DOXYLAM 30-7.5/3
2 LIQUID (ML) ORAL
Qty: 189 LOZENGE | Refills: 0 | Status: SHIPPED | OUTPATIENT
Start: 2020-09-15 | End: 2021-02-03

## 2020-09-15 RX ORDER — IBUPROFEN 200 MG
1 TABLET ORAL DAILY
Qty: 14 PATCH | Refills: 0 | Status: SHIPPED | OUTPATIENT
Start: 2020-09-15 | End: 2020-09-30 | Stop reason: DRUGHIGH

## 2020-09-15 NOTE — Clinical Note
Patient intake for Quit 1 episode. Patient smoking ½ pack of cigarettes per day. She stated that she was quit 7 weeks before COVID pandemic, by going cold turkey. Patient will be participating in bi-weekly tobacco cessation meetings and will begin the prescribed tobacco cessation medication regimen of 21 mg Patch QD and 2 mg lozenges as needed.  FTND score of 1 indicates a low level of nicotine dependence. MÓNICA- D score of 14 is perceived as no degree of mental distress / possible depression at this time.

## 2020-09-18 ENCOUNTER — OFFICE VISIT (OUTPATIENT)
Dept: UROLOGY | Facility: CLINIC | Age: 70
End: 2020-09-18
Payer: MEDICARE

## 2020-09-18 ENCOUNTER — HOSPITAL ENCOUNTER (OUTPATIENT)
Dept: RADIOLOGY | Facility: HOSPITAL | Age: 70
Discharge: HOME | End: 2020-09-18
Attending: UROLOGY
Payer: MEDICARE

## 2020-09-18 VITALS
WEIGHT: 144.5 LBS | TEMPERATURE: 98 F | DIASTOLIC BLOOD PRESSURE: 64 MMHG | SYSTOLIC BLOOD PRESSURE: 134 MMHG | BODY MASS INDEX: 26.43 KG/M2

## 2020-09-18 DIAGNOSIS — R31.21 ASYMPTOMATIC MICROSCOPIC HEMATURIA: ICD-10-CM

## 2020-09-18 DIAGNOSIS — N39.41 URGE INCONTINENCE: ICD-10-CM

## 2020-09-18 DIAGNOSIS — R31.21 ASYMPTOMATIC MICROSCOPIC HEMATURIA: Primary | ICD-10-CM

## 2020-09-18 LAB
BILIRUB SERPL-MCNC: ABNORMAL MG/DL
BLOOD URINE, POC: 50
CLARITY, POC UA: CLEAR
COLOR, POC UA: YELLOW
GLUCOSE UR QL STRIP: ABNORMAL
KETONES UR QL STRIP: ABNORMAL
LEUKOCYTE ESTERASE URINE, POC: ABNORMAL
NITRITE, POC UA: ABNORMAL
PH, POC UA: 6
PROTEIN, POC: ABNORMAL
SPECIFIC GRAVITY, POC UA: 1.01
UROBILINOGEN, POC UA: ABNORMAL

## 2020-09-18 PROCEDURE — 3008F BODY MASS INDEX DOCD: CPT | Mod: CPTII,S$GLB,, | Performed by: UROLOGY

## 2020-09-18 PROCEDURE — 76770 US EXAM ABDO BACK WALL COMP: CPT | Mod: 26,,, | Performed by: RADIOLOGY

## 2020-09-18 PROCEDURE — 1159F PR MEDICATION LIST DOCUMENTED IN MEDICAL RECORD: ICD-10-PCS | Mod: S$GLB,,, | Performed by: UROLOGY

## 2020-09-18 PROCEDURE — 76770 US RETROPERITONEAL COMPLETE: ICD-10-PCS | Mod: 26,,, | Performed by: RADIOLOGY

## 2020-09-18 PROCEDURE — 81002 URINALYSIS NONAUTO W/O SCOPE: CPT | Mod: S$GLB,,, | Performed by: UROLOGY

## 2020-09-18 PROCEDURE — 99999 PR PBB SHADOW E&M-EST. PATIENT-LVL IV: ICD-10-PCS | Mod: PBBFAC,,, | Performed by: UROLOGY

## 2020-09-18 PROCEDURE — 99203 PR OFFICE/OUTPT VISIT, NEW, LEVL III, 30-44 MIN: ICD-10-PCS | Mod: 25,S$GLB,, | Performed by: UROLOGY

## 2020-09-18 PROCEDURE — 1126F AMNT PAIN NOTED NONE PRSNT: CPT | Mod: S$GLB,,, | Performed by: UROLOGY

## 2020-09-18 PROCEDURE — 3008F PR BODY MASS INDEX (BMI) DOCUMENTED: ICD-10-PCS | Mod: CPTII,S$GLB,, | Performed by: UROLOGY

## 2020-09-18 PROCEDURE — 1126F PR PAIN SEVERITY QUANTIFIED, NO PAIN PRESENT: ICD-10-PCS | Mod: S$GLB,,, | Performed by: UROLOGY

## 2020-09-18 PROCEDURE — 99999 PR PBB SHADOW E&M-EST. PATIENT-LVL IV: CPT | Mod: PBBFAC,,, | Performed by: UROLOGY

## 2020-09-18 PROCEDURE — 76770 US EXAM ABDO BACK WALL COMP: CPT | Mod: TC,PO

## 2020-09-18 PROCEDURE — 99203 OFFICE O/P NEW LOW 30 MIN: CPT | Mod: 25,S$GLB,, | Performed by: UROLOGY

## 2020-09-18 PROCEDURE — 1101F PR PT FALLS ASSESS DOC 0-1 FALLS W/OUT INJ PAST YR: ICD-10-PCS | Mod: CPTII,S$GLB,, | Performed by: UROLOGY

## 2020-09-18 PROCEDURE — 81002 POCT URINE DIPSTICK WITHOUT MICROSCOPE: ICD-10-PCS | Mod: S$GLB,,, | Performed by: UROLOGY

## 2020-09-18 PROCEDURE — 1101F PT FALLS ASSESS-DOCD LE1/YR: CPT | Mod: CPTII,S$GLB,, | Performed by: UROLOGY

## 2020-09-18 PROCEDURE — 1159F MED LIST DOCD IN RCRD: CPT | Mod: S$GLB,,, | Performed by: UROLOGY

## 2020-09-18 NOTE — PROGRESS NOTES
Chief Complaint:   Encounter Diagnoses   Name Primary?    Asymptomatic microscopic hematuria Yes    Urge incontinence        HPI:  70-year-old female who comes in with microscopic hematuria.  She states that her urine can be dark, but no gross hematuria.  She has definite microscopic hematuria today, she is still an active smoker, but she is trying to quit.  About 5 years ago an outside urologist did a workup on her, and found no evidence of abnormalities which could be creating hematuria.  She does have occasional suprapubic pain, no dysuria, no evidence UTIs.  She has passed 1 stone years ago, no other urological history, no gynecological history, she has had a total abdominal hysterectomy.  No constipation, she does have occasional urge incontinence which she wears safety pads for.  She goes about every 2 hr during the evening, with increased frequency and urgency during the daytime.  She has tried no medications for this she had, nor does she want to.  Her mother had stones, no other family history of urological cancers or stones.    Allergies:  Penicillins, Cephalexin, Iodine and iodide containing products, Naproxen, Statins-hmg-coa reductase inhibitors, and Diazepam    Medications:  has a current medication list which includes the following prescription(s): albuterol, aspirin, multivitamin, nicotine, nicotine polacrilex, pravastatin, alprazolam, and meclizine.    Review of Systems:  General: No fever, chills, fatigability, or weight loss.  Skin: No rashes, itching, or changes in color or texture of skin.  Chest: Denies HU, cyanosis, wheezing, cough, and sputum production.  Abdomen: Appetite fine. No weight loss. Denies diarrhea, abdominal pain, hematemesis, or blood in stool.  Musculoskeletal: No joint stiffness or swelling. Denies back pain.  : As above.  All other review of systems negative.    PMH:   has a past medical history of Hyperlipidemia and Spinal stenosis.    PSH:   has a past surgical history  that includes Back surgery; Tonsillectomy; Hysterectomy; Lymph node biopsy; and Breast biopsy.    FamHx: family history includes Heart disease in her mother.    SocHx:  reports that she has been smoking cigarettes. She has a 15.00 pack-year smoking history. She has quit using smokeless tobacco. She reports current alcohol use. She reports that she does not use drugs.      Physical Exam:  Vitals:    09/18/20 0848   BP: 134/64   Temp: 97.9 °F (36.6 °C)     General: A&Ox3, no apparent distress, no deformities  Neck: No masses, normal ROM  Lungs: normal inspiration, no use of accessory muscles  Heart: normal pulse, no arrhythmias  Abdomen: Soft, NT, ND, no masses, no hernias, no hepatosplenomegaly  Skin: The skin is warm and dry. No jaundice.  Ext: No c/c/e.  : No pelvic floor prolapse.  Normal introitus, no urethral abnomralities. No Perineal abnormalities.    Labs/Studies:   Urinalysis 50 blood 9/20  CT noncontrasted no stones 12/19    Impression/Plan:     1.  Microscopic hematuria- despite the fact that she has had a negative workup in the past, this was years ago.  Therefore will repeat, CT noncontrast was negative in December, she is allergic to contrast.  I will have the patient obtain a renal ultrasound to rule out her upper tracts, bring her back for cystoscopy and cytology.    2.  Urgency- at this point the patient wants to continue without medical therapy, continue to monitor.

## 2020-09-18 NOTE — LETTER
September 18, 2020      Shelley Beatty MD  [id number] [address] [address]           [facility]  [address] [place] [id number]  Phone: [phone]  Fax: [phone]          Patient: Salud Echevarria   MR Number: [id number]   YOB: 1950   Date of Visit: 9/18/2020       Dear Dr. Shelley Beatty:    Thank you for referring Salud Echevarria to me for evaluation. Attached you will find relevant portions of my assessment and plan of care.    If you have questions, please do not hesitate to call me. I look forward to following Salud Echevarria along with you.    Sincerely,    Flaco Greer MD    Enclosure  CC:  No Recipients    If you would like to receive this communication electronically, please contact [email or web address] or [phone] to request more information on [employer] access.    For providers and/or their staff who would like to refer a patient to [facility], please contact us through our one-stop-shop provider referral line, [facility] , at [phone].    If you feel you have received this communication in error or would no longer like to receive these types of communications, please e-mail [email or web address]

## 2020-09-30 ENCOUNTER — CLINICAL SUPPORT (OUTPATIENT)
Dept: SMOKING CESSATION | Facility: CLINIC | Age: 70
End: 2020-09-30
Payer: COMMERCIAL

## 2020-09-30 DIAGNOSIS — F17.200 NICOTINE DEPENDENCE: Primary | ICD-10-CM

## 2020-09-30 PROCEDURE — 99402 PR PREVENT COUNSEL,INDIV,30 MIN: ICD-10-PCS | Mod: S$GLB,,,

## 2020-09-30 PROCEDURE — 99402 PREV MED CNSL INDIV APPRX 30: CPT | Mod: S$GLB,,,

## 2020-09-30 PROCEDURE — 99999 PR PBB SHADOW E&M-EST. PATIENT-LVL I: ICD-10-PCS | Mod: PBBFAC,,,

## 2020-09-30 PROCEDURE — 99999 PR PBB SHADOW E&M-EST. PATIENT-LVL I: CPT | Mod: PBBFAC,,,

## 2020-09-30 RX ORDER — IBUPROFEN 200 MG
1 TABLET ORAL DAILY
Qty: 14 PATCH | Refills: 0 | Status: SHIPPED | OUTPATIENT
Start: 2020-09-30 | End: 2021-02-03

## 2020-09-30 NOTE — Clinical Note
Review of TCRS (Tobacco Cessation Rating Scale) reviewed strategies, cues, and triggers. Introduced the negative impact of tobacco on health, the health advantages of discontinuing the use of tobacco, timeline improved health changes after a quit, withdrawal issues to expect from nicotine and habit, and ways to achieve the goal of a quit.  She is smoking less than ½ pack of cigarettes per day. Discussed Nicotine /habit, review additional techniques to help eliminate. She will continue with rate reduction. The patient remains on the prescribed tobacco cessation medication regimen 2 mg lozenges without any negative side effects at this time. Will decrease to 14mg Nicotine patch she feels the 21 mg are to strong.  The patient will continue with therapy sessions and medication monitoring by CTTS. Prescribed medication management will be by physician. The patient denies any abnormal behavioral or mental changes at this time.

## 2020-09-30 NOTE — PROGRESS NOTES
Individual Follow-Up Form    9/30/2020    Quit Date: TBD    Clinical Status of Patient: Outpatient    Length of Service: 30 minutes    Continuing Medication: yes  Patches    Other Medications: lozenges     Target Symptoms: Withdrawal and medication side effects. The following were  rated moderate (3) to severe (4) on TCRS:  · Moderate (3): none  · Severe (4): none    Comments: Review of TCRS (Tobacco Cessation Rating Scale) reviewed strategies, cues, and triggers. Introduced the negative impact of tobacco on health, the health advantages of discontinuing the use of tobacco, timeline improved health changes after a quit, withdrawal issues to expect from nicotine and habit, and ways to achieve the goal of a quit.  She is smoking less than ½ pack of cigarettes per day. Discussed Nicotine /habit, review additional techniques to help eliminate. She will continue with rate reduction. The patient remains on the prescribed tobacco cessation medication regimen 2 mg lozenges without any negative side effects at this time. Will decrease to 14mg Nicotine patch she feels the 21 mg are to strong. The patient will continue with therapy sessions and medication monitoring by CTTS. Prescribed medication management will be by physician. The patient denies any abnormal behavioral or mental changes at this time.     Diagnosis: F17.200    Next Visit: 2 weeks

## 2020-10-01 ENCOUNTER — IMMUNIZATION (OUTPATIENT)
Dept: INTERNAL MEDICINE | Facility: CLINIC | Age: 70
End: 2020-10-01
Payer: MEDICARE

## 2020-10-01 ENCOUNTER — HOSPITAL ENCOUNTER (OUTPATIENT)
Dept: RADIOLOGY | Facility: HOSPITAL | Age: 70
Discharge: HOME | End: 2020-10-01
Attending: SURGERY
Payer: MEDICARE

## 2020-10-01 ENCOUNTER — HOSPITAL ENCOUNTER (OUTPATIENT)
Dept: CARDIOLOGY | Facility: HOSPITAL | Age: 70
Discharge: HOME | End: 2020-10-01
Attending: SURGERY
Payer: MEDICARE

## 2020-10-01 DIAGNOSIS — K43.9 VENTRAL HERNIA WITHOUT OBSTRUCTION OR GANGRENE: ICD-10-CM

## 2020-10-01 PROCEDURE — 71046 X-RAY EXAM CHEST 2 VIEWS: CPT | Mod: 26,,, | Performed by: RADIOLOGY

## 2020-10-01 PROCEDURE — G0008 FLU VACCINE - QUADRIVALENT - ADJUVANTED: ICD-10-PCS | Mod: S$GLB,,, | Performed by: FAMILY MEDICINE

## 2020-10-01 PROCEDURE — 93010 ELECTROCARDIOGRAM REPORT: CPT | Mod: ,,, | Performed by: INTERNAL MEDICINE

## 2020-10-01 PROCEDURE — 99999 PR PBB SHADOW E&M-EST. PATIENT-LVL II: ICD-10-PCS | Mod: PBBFAC,,,

## 2020-10-01 PROCEDURE — 90694 VACC AIIV4 NO PRSRV 0.5ML IM: CPT | Mod: S$GLB,,, | Performed by: FAMILY MEDICINE

## 2020-10-01 PROCEDURE — 93005 ELECTROCARDIOGRAM TRACING: CPT | Mod: PO

## 2020-10-01 PROCEDURE — 93010 EKG 12-LEAD: ICD-10-PCS | Mod: ,,, | Performed by: INTERNAL MEDICINE

## 2020-10-01 PROCEDURE — G0008 ADMIN INFLUENZA VIRUS VAC: HCPCS | Mod: S$GLB,,, | Performed by: FAMILY MEDICINE

## 2020-10-01 PROCEDURE — 99999 PR PBB SHADOW E&M-EST. PATIENT-LVL II: CPT | Mod: PBBFAC,,,

## 2020-10-01 PROCEDURE — 71046 X-RAY EXAM CHEST 2 VIEWS: CPT | Mod: TC,PO

## 2020-10-01 PROCEDURE — 71046 XR CHEST PA AND LATERAL: ICD-10-PCS | Mod: 26,,, | Performed by: RADIOLOGY

## 2020-10-01 PROCEDURE — 90694 FLU VACCINE - QUADRIVALENT - ADJUVANTED: ICD-10-PCS | Mod: S$GLB,,, | Performed by: FAMILY MEDICINE

## 2020-10-05 ENCOUNTER — PATIENT MESSAGE (OUTPATIENT)
Dept: ADMINISTRATIVE | Facility: HOSPITAL | Age: 70
End: 2020-10-05

## 2020-10-06 ENCOUNTER — OFFICE VISIT (OUTPATIENT)
Dept: UROLOGY | Facility: CLINIC | Age: 70
End: 2020-10-06
Payer: MEDICARE

## 2020-10-06 VITALS — HEIGHT: 62 IN | WEIGHT: 144.38 LBS | BODY MASS INDEX: 26.57 KG/M2

## 2020-10-06 DIAGNOSIS — N39.41 URGE INCONTINENCE: ICD-10-CM

## 2020-10-06 DIAGNOSIS — R31.21 ASYMPTOMATIC MICROSCOPIC HEMATURIA: Primary | ICD-10-CM

## 2020-10-06 PROCEDURE — 52000 CYSTOURETHROSCOPY: CPT | Mod: S$GLB,,, | Performed by: UROLOGY

## 2020-10-06 PROCEDURE — 52000 PR CYSTOURETHROSCOPY: ICD-10-PCS | Mod: S$GLB,,, | Performed by: UROLOGY

## 2020-10-06 PROCEDURE — 99499 NO LOS: ICD-10-PCS | Mod: S$GLB,,, | Performed by: UROLOGY

## 2020-10-06 PROCEDURE — 88112 CYTOPATH CELL ENHANCE TECH: CPT | Performed by: PATHOLOGY

## 2020-10-06 PROCEDURE — 99499 UNLISTED E&M SERVICE: CPT | Mod: S$GLB,,, | Performed by: UROLOGY

## 2020-10-06 PROCEDURE — 88112 CYTOPATH CELL ENHANCE TECH: CPT | Mod: 26,,, | Performed by: PATHOLOGY

## 2020-10-06 PROCEDURE — 88112 PR  CYTOPATH, CELL ENHANCE TECH: ICD-10-PCS | Mod: 26,,, | Performed by: PATHOLOGY

## 2020-10-06 PROCEDURE — 99999 PR PBB SHADOW E&M-EST. PATIENT-LVL III: ICD-10-PCS | Mod: PBBFAC,,, | Performed by: UROLOGY

## 2020-10-06 PROCEDURE — 99999 PR PBB SHADOW E&M-EST. PATIENT-LVL III: CPT | Mod: PBBFAC,,, | Performed by: UROLOGY

## 2020-10-06 NOTE — PROGRESS NOTES
Chief Complaint:   Encounter Diagnoses   Name Primary?    Asymptomatic microscopic hematuria Yes    Urge incontinence        HPI:    10/06/2020- patient is here today for cystoscopy.  70-year-old female who comes in with microscopic hematuria.  She states that her urine can be dark, but no gross hematuria.  She has definite microscopic hematuria today, she is still an active smoker, but she is trying to quit.  About 5 years ago an outside urologist did a workup on her, and found no evidence of abnormalities which could be creating hematuria.  She does have occasional suprapubic pain, no dysuria, no evidence UTIs.  She has passed 1 stone years ago, no other urological history, no gynecological history, she has had a total abdominal hysterectomy.  No constipation, she does have occasional urge incontinence which she wears safety pads for.  She goes about every 2 hr during the evening, with increased frequency and urgency during the daytime.  She has tried no medications for this she had, nor does she want to.  Her mother had stones, no other family history of urological cancers or stones.    Allergies:  Penicillins, Cephalexin, Iodine and iodide containing products, Naproxen, Statins-hmg-coa reductase inhibitors, and Diazepam    Medications:  has a current medication list which includes the following prescription(s): albuterol, alprazolam, aspirin, meclizine, multivitamin, nicotine, nicotine polacrilex, and pravastatin.    Review of Systems:  General: No fever, chills, fatigability, or weight loss.  Skin: No rashes, itching, or changes in color or texture of skin.  Chest: Denies HU, cyanosis, wheezing, cough, and sputum production.  Abdomen: Appetite fine. No weight loss. Denies diarrhea, abdominal pain, hematemesis, or blood in stool.  Musculoskeletal: No joint stiffness or swelling. Denies back pain.  : As above.  All other review of systems negative.    PMH:   has a past medical history of Hyperlipidemia and  Spinal stenosis.    PSH:   has a past surgical history that includes Back surgery; Tonsillectomy; Hysterectomy; Lymph node biopsy; and Breast biopsy.    FamHx: family history includes Heart disease in her mother.    SocHx:  reports that she has been smoking cigarettes. She has a 15.00 pack-year smoking history. She has quit using smokeless tobacco. She reports current alcohol use. She reports that she does not use drugs.      Physical Exam:  There were no vitals filed for this visit.  General: A&Ox3, no apparent distress, no deformities  Neck: No masses, normal ROM  Lungs: normal inspiration, no use of accessory muscles  Heart: normal pulse, no arrhythmias  Abdomen: Soft, NT, ND, no masses, no hernias, no hepatosplenomegaly  Skin: The skin is warm and dry. No jaundice.  Ext: No c/c/e.  : 10/20- No pelvic floor prolapse.  Normal introitus, no urethral abnomralities. No Perineal abnormalities.    Labs/Studies:   Urinalysis 50 blood 9/20  Cystoscopy negative 10/20  CT noncontrasted no stones 12/19  Renal ultrasound negative 9/20    Procedure: Diagnostic Cystoscopy    Procedure in Detail: After proper consents were obtained, the patient was prepped and draped in normal sterile fashion for diagnostic cystoscopy. The flexible cystoscope was then introduced into the urethra, and advanced into the bladder under direct vision. The urethral mucosa appeared normal, and no strictures were noted. The sphincter appeared to be normal.  The bladder neck was normal. Inspection of the interior of the bladder was then carried out. The trigone was unremarkable, with no mucosal lesions. The ureteral orifices were normal in position and configuration. Systematic inspection of the mucosa of the bladder was then carried out, rotating the cystoscope so that all areas of the left and right lateral walls, the dome of the bladder, and the posterior wall were all visualized. The cystoscope was then advanced further into the bladder, and  maximum deflection of the scope was performed so that the bladder neck could be inspected. No mucosal lesions were noted there. The cystoscope was then removed, and the procedure terminated.     Findings: normal cystoscopy      Impression/Plan:     1.  Microscopic hematuria- CT noncontrast renal ultrasound are normal.  Cystoscopy today is unremarkable.  If the cytology returns as normal, structurally she is clear.  Will see her back in 6 months with a cytology as per surveillance protocol.    2.  Urgency- at this point the patient wants to continue without medical therapy, continue to monitor.

## 2020-10-08 LAB — FINAL PATHOLOGIC DIAGNOSIS: NORMAL

## 2020-10-09 ENCOUNTER — OFFICE VISIT (OUTPATIENT)
Dept: SURGERY | Facility: CLINIC | Age: 70
End: 2020-10-09
Payer: MEDICARE

## 2020-10-09 VITALS
TEMPERATURE: 98 F | WEIGHT: 145.31 LBS | BODY MASS INDEX: 26.74 KG/M2 | DIASTOLIC BLOOD PRESSURE: 67 MMHG | HEART RATE: 66 BPM | SYSTOLIC BLOOD PRESSURE: 119 MMHG | HEIGHT: 62 IN

## 2020-10-09 VITALS
HEART RATE: 66 BPM | SYSTOLIC BLOOD PRESSURE: 119 MMHG | DIASTOLIC BLOOD PRESSURE: 67 MMHG | TEMPERATURE: 98 F | BODY MASS INDEX: 26.74 KG/M2 | WEIGHT: 145.31 LBS | HEIGHT: 62 IN

## 2020-10-09 DIAGNOSIS — K43.9 VENTRAL HERNIA WITHOUT OBSTRUCTION OR GANGRENE: Primary | ICD-10-CM

## 2020-10-09 DIAGNOSIS — Z12.11 COLON CANCER SCREENING: Primary | ICD-10-CM

## 2020-10-09 PROCEDURE — 3008F BODY MASS INDEX DOCD: CPT | Mod: CPTII,S$GLB,, | Performed by: SURGERY

## 2020-10-09 PROCEDURE — 1126F PR PAIN SEVERITY QUANTIFIED, NO PAIN PRESENT: ICD-10-PCS | Mod: S$GLB,,, | Performed by: SURGERY

## 2020-10-09 PROCEDURE — 1159F PR MEDICATION LIST DOCUMENTED IN MEDICAL RECORD: ICD-10-PCS | Mod: S$GLB,,, | Performed by: SURGERY

## 2020-10-09 PROCEDURE — 1126F AMNT PAIN NOTED NONE PRSNT: CPT | Mod: S$GLB,,, | Performed by: SURGERY

## 2020-10-09 PROCEDURE — 99212 PR OFFICE/OUTPT VISIT, EST, LEVL II, 10-19 MIN: ICD-10-PCS | Mod: S$GLB,,, | Performed by: SURGERY

## 2020-10-09 PROCEDURE — 1101F PR PT FALLS ASSESS DOC 0-1 FALLS W/OUT INJ PAST YR: ICD-10-PCS | Mod: CPTII,S$GLB,, | Performed by: SURGERY

## 2020-10-09 PROCEDURE — 3008F PR BODY MASS INDEX (BMI) DOCUMENTED: ICD-10-PCS | Mod: CPTII,S$GLB,, | Performed by: SURGERY

## 2020-10-09 PROCEDURE — 99999 PR PBB SHADOW E&M-EST. PATIENT-LVL III: CPT | Mod: PBBFAC,,, | Performed by: SURGERY

## 2020-10-09 PROCEDURE — 1101F PT FALLS ASSESS-DOCD LE1/YR: CPT | Mod: CPTII,S$GLB,, | Performed by: SURGERY

## 2020-10-09 PROCEDURE — 99212 OFFICE O/P EST SF 10 MIN: CPT | Mod: S$GLB,,, | Performed by: SURGERY

## 2020-10-09 PROCEDURE — 99999 PR PBB SHADOW E&M-EST. PATIENT-LVL III: ICD-10-PCS | Mod: PBBFAC,,, | Performed by: SURGERY

## 2020-10-09 PROCEDURE — 1159F MED LIST DOCD IN RCRD: CPT | Mod: S$GLB,,, | Performed by: SURGERY

## 2020-10-09 NOTE — PROGRESS NOTES
Salud is 70-year-old white female patient who is known to me from previous encounters.  She was initially seen for the evaluation of known protrusion of her abdomen, consistent with ventral hernia.  She was initially seen in in 2019.  She presents today with this schedule surgery on Monday for preoperative physical exam.  The exam confirms that the patient has ventral hernia which will be repaired.  The EKG however shows some abnormal changes since the last 1 done in July of 2020.  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  We will have her see the for cardiology consultation.  She is status post cardiac catheterization years ago at the [facility] but had not had any stent put in.  She is currently not on any anticoagulation or anti platelet medication.  She just takes baby aspirin a day.  Until we have cardiac clearance, the surgery will be postponed.  The patient verbalized the understanding.Total time of 20 min was spent with this patient, and more than 50% of that was spent for treatment planning and counseling including the following:        Preparing to see the patient (eg, review of tests)   Obtaining and/or reviewing separately obtained history   Performing a medically appropriate examination and/or evaluation   Counseling and educating the patient/family/caregiver   Ordering medications, tests, or procedures   Referring and communicating with other health care professionals (when not separately reported)   Documenting clinical information in the electronic or other health record   Independently interpreting results (not separately reported) and communicating results to the patient/family/caregiver   Care coordination (not separately reported)    Brandyn Panchal

## 2020-10-13 ENCOUNTER — CLINICAL SUPPORT (OUTPATIENT)
Dept: SMOKING CESSATION | Facility: CLINIC | Age: 70
End: 2020-10-13
Payer: COMMERCIAL

## 2020-10-13 DIAGNOSIS — F17.200 NICOTINE DEPENDENCE: Primary | ICD-10-CM

## 2020-10-13 PROCEDURE — 99999 PR PBB SHADOW E&M-EST. PATIENT-LVL I: CPT | Mod: PBBFAC,,,

## 2020-10-13 PROCEDURE — 99402 PR PREVENT COUNSEL,INDIV,30 MIN: ICD-10-PCS | Mod: S$GLB,,,

## 2020-10-13 PROCEDURE — 99402 PREV MED CNSL INDIV APPRX 30: CPT | Mod: S$GLB,,,

## 2020-10-13 PROCEDURE — 99999 PR PBB SHADOW E&M-EST. PATIENT-LVL I: ICD-10-PCS | Mod: PBBFAC,,,

## 2020-10-13 NOTE — Clinical Note
Completion of TCRS (Tobacco Cessation Rating Scale) reviewed strategies, controlling environment,cues,triggers,new goals set.Introduced high risk situations with preparation interventions,caffeine similarities with withdrawal issues of habit and nicotine, Alcohol,Understanding urges,cravings,stress and relaxation. Open discussion with intervention discussion.Patient was suppose to have hernia surgery yesterday but it was cancelled due to irregular EKG. Patient is to see cardiologist tomorrow. Patient stated that she has not started patches, but using lozenges 5-6 times a day. She is smoking between 4-5 cigarettes per day. Suggested not to use patches until she see cardiologist and get results. Discussed habit modification techniques and impress upon the patient the importance of habit modification in order to get and stay tobacco free. The patient will continue with  therapy sessions and medication monitoring by CTTS. Prescribed medication management will be by physician.

## 2020-10-13 NOTE — PROGRESS NOTES
Individual Follow-Up Form    10/13/2020    Quit Date: TBD    Clinical Status of Patient: Outpatient    Length of Service: 30 minutes    Continuing Medication: yes  Nicotine Lozenges    Other Medications: Patches     Target Symptoms: Withdrawal and medication side effects. The following were  rated moderate (3) to severe (4) on TCRS:  · Moderate (3): none  · Severe (4): none    Comments: Spoke to patient in great length over the phone for session todaycompletion of TCRS (Tobacco Cessation Rating Scale) reviewed strategies, controlling environment, cues, triggers, new goals set. Introduced high risk situations with preparation interventions, caffeine similarities with withdrawal issues of habit and nicotine, Alcohol, Understanding urges, cravings, stress and relaxation. Open discussion with intervention discussion.Patient was suppose to have hernia surgery yesterday but it was cancelled due to irregular EKG. Patient is to see cardiologist tomorrow. Patient stated that she has not started patches, but using lozenges 5-6 times a day. She is smoking between 4-5 cigarettes per day. Suggested not to use patches until she see cardiologist and get results. Discussed habit modification techniques and impress upon the patient the importance of habit modification in order to get and stay tobacco free. The patient will continue with  therapy sessions and medication monitoring by CTTS. Prescribed medication management will be by physician. The patient denies any abnormal behavioral or mental changes at this time.       Diagnosis: F17.200    Next Visit: 2 weeks

## 2020-10-14 ENCOUNTER — OFFICE VISIT (OUTPATIENT)
Dept: CARDIOLOGY | Facility: CLINIC | Age: 70
End: 2020-10-14
Payer: MEDICARE

## 2020-10-14 ENCOUNTER — HOSPITAL ENCOUNTER (OUTPATIENT)
Dept: CARDIOLOGY | Facility: HOSPITAL | Age: 70
Discharge: HOME | End: 2020-10-14
Attending: INTERNAL MEDICINE
Payer: MEDICARE

## 2020-10-14 VITALS
BODY MASS INDEX: 26.25 KG/M2 | OXYGEN SATURATION: 97 % | WEIGHT: 143.5 LBS | HEART RATE: 68 BPM | SYSTOLIC BLOOD PRESSURE: 128 MMHG | DIASTOLIC BLOOD PRESSURE: 60 MMHG

## 2020-10-14 DIAGNOSIS — Z76.89 ESTABLISHING CARE WITH NEW DOCTOR, ENCOUNTER FOR: ICD-10-CM

## 2020-10-14 DIAGNOSIS — Z76.89 ESTABLISHING CARE WITH NEW DOCTOR, ENCOUNTER FOR: Primary | ICD-10-CM

## 2020-10-14 DIAGNOSIS — I10 HYPERTENSION, UNSPECIFIED TYPE: ICD-10-CM

## 2020-10-14 DIAGNOSIS — K43.9 VENTRAL HERNIA WITHOUT OBSTRUCTION OR GANGRENE: ICD-10-CM

## 2020-10-14 DIAGNOSIS — E78.5 HYPERLIPIDEMIA, UNSPECIFIED HYPERLIPIDEMIA TYPE: ICD-10-CM

## 2020-10-14 DIAGNOSIS — I25.10 CORONARY ARTERY DISEASE WITHOUT ANGINA PECTORIS, UNSPECIFIED VESSEL OR LESION TYPE, UNSPECIFIED WHETHER NATIVE OR TRANSPLANTED HEART: ICD-10-CM

## 2020-10-14 DIAGNOSIS — Z01.818 ENCOUNTER FOR PERIOPERATIVE CONSULTATION: Primary | ICD-10-CM

## 2020-10-14 DIAGNOSIS — R00.2 PALPITATIONS: ICD-10-CM

## 2020-10-14 PROCEDURE — 99999 PR PBB SHADOW E&M-EST. PATIENT-LVL III: ICD-10-PCS | Mod: PBBFAC,,, | Performed by: INTERNAL MEDICINE

## 2020-10-14 PROCEDURE — 99999 PR PBB SHADOW E&M-EST. PATIENT-LVL III: CPT | Mod: PBBFAC,,, | Performed by: INTERNAL MEDICINE

## 2020-10-14 PROCEDURE — 99205 PR OFFICE/OUTPT VISIT, NEW, LEVL V, 60-74 MIN: ICD-10-PCS | Mod: S$GLB,,, | Performed by: INTERNAL MEDICINE

## 2020-10-14 PROCEDURE — 1126F PR PAIN SEVERITY QUANTIFIED, NO PAIN PRESENT: ICD-10-PCS | Mod: S$GLB,,, | Performed by: INTERNAL MEDICINE

## 2020-10-14 PROCEDURE — 1159F MED LIST DOCD IN RCRD: CPT | Mod: S$GLB,,, | Performed by: INTERNAL MEDICINE

## 2020-10-14 PROCEDURE — 99205 OFFICE O/P NEW HI 60 MIN: CPT | Mod: S$GLB,,, | Performed by: INTERNAL MEDICINE

## 2020-10-14 PROCEDURE — 3008F BODY MASS INDEX DOCD: CPT | Mod: CPTII,S$GLB,, | Performed by: INTERNAL MEDICINE

## 2020-10-14 PROCEDURE — 3008F PR BODY MASS INDEX (BMI) DOCUMENTED: ICD-10-PCS | Mod: CPTII,S$GLB,, | Performed by: INTERNAL MEDICINE

## 2020-10-14 PROCEDURE — 93010 ELECTROCARDIOGRAM REPORT: CPT | Mod: ,,, | Performed by: INTERNAL MEDICINE

## 2020-10-14 PROCEDURE — 1126F AMNT PAIN NOTED NONE PRSNT: CPT | Mod: S$GLB,,, | Performed by: INTERNAL MEDICINE

## 2020-10-14 PROCEDURE — 1159F PR MEDICATION LIST DOCUMENTED IN MEDICAL RECORD: ICD-10-PCS | Mod: S$GLB,,, | Performed by: INTERNAL MEDICINE

## 2020-10-14 PROCEDURE — 93010 EKG 12-LEAD: ICD-10-PCS | Mod: ,,, | Performed by: INTERNAL MEDICINE

## 2020-10-14 PROCEDURE — 1101F PR PT FALLS ASSESS DOC 0-1 FALLS W/OUT INJ PAST YR: ICD-10-PCS | Mod: CPTII,S$GLB,, | Performed by: INTERNAL MEDICINE

## 2020-10-14 PROCEDURE — 1101F PT FALLS ASSESS-DOCD LE1/YR: CPT | Mod: CPTII,S$GLB,, | Performed by: INTERNAL MEDICINE

## 2020-10-14 PROCEDURE — 93005 ELECTROCARDIOGRAM TRACING: CPT | Mod: PO

## 2020-10-14 NOTE — PROGRESS NOTES
Subjective:   Patient ID:  Salud Echevarria is a 70 y.o. female who presents for evaluation of Heartburn, Palpitations, and Shortness of Breath      69 yo female with pmhx of asthma, HTN, hernia, and non obstructive CAD as per the patient. Patient of Dr Hannah in the past  She presents today for periop evaluation, prior to hernia surgery.   She states that she is fully active, goes up two flights of staris without any chest pain, walks fast without chest pain and denies any dyspnea on exertion.   She does have asthma since childhood and she wheezes at tme and this is resolved with the inhaler  She reports some 2 seconds episodes of palpitations with the inhaler , no syncope, no presyncope, no lightheadedness,     No ckd, no dm, no cva,no arrhythmias       Past Medical History:   Diagnosis Date    Asthma     Hyperlipidemia     Spinal stenosis        Past Surgical History:   Procedure Laterality Date    BACK SURGERY      BREAST BIOPSY      CARDIAC CATHETERIZATION  2018    HYSTERECTOMY      LYMPH NODE BIOPSY      TONSILLECTOMY         Social History     Tobacco Use    Smoking status: Current Every Day Smoker     Packs/day: 0.50     Years: 30.00     Pack years: 15.00     Types: Cigarettes    Smokeless tobacco: Former User    Tobacco comment: none past MN before surgery   Substance Use Topics    Alcohol use: Yes     Comment: none 72 hrs prior to surgery    Drug use: No       Family History   Problem Relation Age of Onset    Heart disease Mother        Review of Systems   Constitution: Negative for fever and malaise/fatigue.   HENT: Negative for sore throat.    Eyes: Negative for blurred vision.   Cardiovascular: Negative for chest pain, claudication, cyanosis, dyspnea on exertion, irregular heartbeat, leg swelling, near-syncope, orthopnea, palpitations, paroxysmal nocturnal dyspnea and syncope.   Respiratory: Positive for shortness of breath and wheezing. Negative for cough and hemoptysis.     Hematologic/Lymphatic: Negative for bleeding problem.   Skin: Negative for poor wound healing and rash.   Musculoskeletal: Negative for back pain and falls.   Gastrointestinal: Negative for abdominal pain.   Genitourinary: Negative for nocturia.   Neurological: Negative for dizziness, focal weakness, headaches, light-headedness and loss of balance.   Psychiatric/Behavioral: Negative for altered mental status and substance abuse.       Current Outpatient Medications on File Prior to Visit   Medication Sig    albuterol (PROVENTIL/VENTOLIN HFA) 90 mcg/actuation inhaler Inhale 2 puffs into the lungs every 4 (four) hours as needed for Wheezing.    aspirin (ECOTRIN) 81 MG EC tablet Take 81 mg by mouth once daily.    multivitamin (ONE DAILY MULTIVITAMIN) per tablet Take 1 tablet by mouth once daily.    pravastatin (PRAVACHOL) 40 MG tablet Take 1 tablet by mouth once daily    ALPRAZolam (XANAX) 0.25 MG tablet Take 1 tablet (0.25 mg total) by mouth once. Take 10-20 minutes prior to imaging for 1 dose    meclizine (ANTIVERT) 25 mg tablet Take 1 tablet (25 mg total) by mouth 3 (three) times daily as needed for Dizziness.    nicotine (NICODERM CQ) 14 mg/24 hr Place 1 patch onto the skin once daily. See pharmacy note at bottom (Patient not taking: Reported on 10/9/2020)    nicotine polacrilex 2 MG Lozg Take 1 lozenge (2 mg total) by mouth as needed. See note (Patient not taking: Reported on 10/14/2020)     No current facility-administered medications on file prior to visit.        Objective:   Objective:  Wt Readings from Last 3 Encounters:   10/14/20 65.1 kg (143 lb 8.3 oz)   10/09/20 65.9 kg (145 lb 4.5 oz)   10/09/20 65.9 kg (145 lb 4.5 oz)     Temp Readings from Last 3 Encounters:   10/09/20 98.4 °F (36.9 °C) (Oral)   10/09/20 98.4 °F (36.9 °C) (Oral)   09/18/20 97.9 °F (36.6 °C) (Temporal)     BP Readings from Last 3 Encounters:   10/14/20 128/60   10/09/20 119/67   10/09/20 119/67     Pulse Readings from Last 3  Encounters:   10/14/20 68   10/09/20 66   10/09/20 66       Physical Exam   Constitutional: She is oriented to person, place, and time. She appears well-developed and well-nourished.   HENT:   Head: Normocephalic and atraumatic.   Eyes: Conjunctivae are normal. No scleral icterus.   Neck: Normal range of motion. Neck supple.   Cardiovascular: Normal rate, regular rhythm, normal heart sounds and intact distal pulses.   No murmur heard.  Pulmonary/Chest: No respiratory distress. She has no wheezes. She has no rales. She exhibits no tenderness.   Abdominal: Soft. Bowel sounds are normal. She exhibits no distension. There is no guarding.   Musculoskeletal: Normal range of motion.   Neurological: She is alert and oriented to person, place, and time.   Skin: Skin is warm.   Psychiatric: She has a normal mood and affect.   Vitals reviewed.      Lab Results   Component Value Date    CHOL 169 08/07/2020    CHOL 155 06/04/2019     Lab Results   Component Value Date    HDL 75 08/07/2020    HDL 87 (H) 06/04/2019    HDL 84 07/24/2018    HDL 78 07/24/2018     Lab Results   Component Value Date    LDLCALC 80.2 08/07/2020    LDLCALC 51.4 (L) 06/04/2019     Lab Results   Component Value Date    TRIG 69 08/07/2020    TRIG 83 06/04/2019    TRIG 70 07/24/2018     Lab Results   Component Value Date    CHOLHDL 44.4 08/07/2020    CHOLHDL 56.1 (H) 06/04/2019    CHOLHDL 1.9 07/24/2018       Chemistry        Component Value Date/Time     10/01/2020 0958    K 3.6 10/01/2020 0958     10/01/2020 0958    CO2 29 10/01/2020 0958    BUN 13 10/01/2020 0958    CREATININE 0.7 10/01/2020 0958    GLU 90 10/01/2020 0958        Component Value Date/Time    CALCIUM 8.9 10/01/2020 0958    ALKPHOS 55 10/01/2020 0958    AST 15 10/01/2020 0958    ALT 16 10/01/2020 0958    BILITOT 0.3 10/01/2020 0958    ESTGFRAFRICA >60.0 10/01/2020 0958    EGFRNONAA >60.0 10/01/2020 0958          No results found for: TSH  No results found for: INR, PROTIME  Lab  Results   Component Value Date    WBC 7.72 10/01/2020    HGB 14.1 10/01/2020    HCT 42.5 10/01/2020    MCV 93 10/01/2020     10/01/2020     BNP  @LABRCNTIP(BNP,BNPTRIAGEBLO)@  CrCl cannot be calculated (Patient's most recent lab result is older than the maximum 7 days allowed.).     Imaging:  ======  No results found for this or any previous visit.  No results found for this or any previous visit.  No results found for this or any previous visit.  Results for orders placed during the hospital encounter of 10/01/20   X-Ray Chest PA And Lateral    Narrative EXAMINATION:  XR CHEST PA AND LATERAL    CLINICAL HISTORY:  preop; Ventral hernia without obstruction or gangrene    TECHNIQUE:  PA and lateral views of the chest were performed.    COMPARISON:  None    FINDINGS:  Cardiac silhouette and mediastinal contours are normal.  Lungs are clear.  Osseous structures demonstrate degenerative findings of the shoulders and spine.      Impression No acute cardiopulmonary process.      Electronically signed by: Cayden Baxter MD  Date:    10/01/2020  Time:    10:05     No results found for this or any previous visit.  No procedure found.    Diagnostic Results:  ECG: Reviewed    The 10-year ASCVD risk score (Missy DC Jr., et al., 2013) is: 15.1%    Values used to calculate the score:      Age: 70 years      Sex: Female      Is Non- : No      Diabetic: No      Tobacco smoker: Yes      Systolic Blood Pressure: 128 mmHg      Is BP treated: No      HDL Cholesterol: 75 mg/dL      Total Cholesterol: 169 mg/dL    Assessment and Plan:   Encounter for perioperative consultation    Ventral hernia without obstruction or gangrene  -     Ambulatory referral/consult to Cardiology    Hypertension, unspecified type    Hyperlipidemia, unspecified hyperlipidemia type    Coronary artery disease without angina pectoris, unspecified vessel or lesion type, unspecified whether native or transplanted  heart  Comments:  states she had an angiogram with Dr Hannah based off abnl ekg only, asymptomatic, was told she had non significant blockage 2016 Nuclear stress test in 2018  Will request records from Dr Hannah office  Palpitations  Comments:  when she uses her inhalor  Orders:  -     TSH; Future; Expected date: 10/14/2020      METS>7   Never had chest pain  She has a bike at home and she bikes daily    Walks more than 2 blocks without any chest pain with a fast pace    Patient is at low cardiac risk for an intermediate risk surgery (hernia repair)      Follow up in 6 months

## 2020-10-14 NOTE — LETTER
October 14, 2020      Brandyn Panchal MD  [address] [facility] [place] [address]           [facility]  [address]  [facility] [place] [address]  Phone: [phone]          Patient: Salud Echevarria   MR Number: [id number]   YOB: 1950   Date of Visit: 10/14/2020       Dear Dr. Brandyn Panchal:    Thank you for referring Salud Echevarria to me for evaluation. Attached you will find relevant portions of my assessment and plan of care.    If you have questions, please do not hesitate to call me. I look forward to following Salud Echevarria along with you.    Sincerely,    Ernestina Alicea MD    Enclosure  CC:  No Recipients    If you would like to receive this communication electronically, please contact [email or web address] or [phone] to request more information on [employer] Link access.    For providers and/or their staff who would like to refer a patient to [facility], please contact us through our one-stop-shop provider referral line, Rebeca Mas, at [phone].    If you feel you have received this communication in error or would no longer like to receive these types of communications, please e-mail [email or web address]

## 2020-10-30 ENCOUNTER — PATIENT OUTREACH (OUTPATIENT)
Dept: ADMINISTRATIVE | Facility: HOSPITAL | Age: 70
End: 2020-10-30

## 2020-11-06 ENCOUNTER — TELEPHONE (OUTPATIENT)
Dept: SURGERY | Facility: CLINIC | Age: 70
End: 2020-11-06

## 2020-11-06 ENCOUNTER — LAB VISIT (OUTPATIENT)
Dept: OTOLARYNGOLOGY | Facility: CLINIC | Age: 70
End: 2020-11-06
Payer: MEDICARE

## 2020-11-06 DIAGNOSIS — K43.9 VENTRAL HERNIA WITHOUT OBSTRUCTION OR GANGRENE: ICD-10-CM

## 2020-11-06 PROCEDURE — U0003 INFECTIOUS AGENT DETECTION BY NUCLEIC ACID (DNA OR RNA); SEVERE ACUTE RESPIRATORY SYNDROME CORONAVIRUS 2 (SARS-COV-2) (CORONAVIRUS DISEASE [COVID-19]), AMPLIFIED PROBE TECHNIQUE, MAKING USE OF HIGH THROUGHPUT TECHNOLOGIES AS DESCRIBED BY CMS-2020-01-R: HCPCS

## 2020-11-06 NOTE — TELEPHONE ENCOUNTER
----- Message from Shelley Ta sent at 11/6/2020  2:11 PM CST -----  Contact: Salud  Pt is scheduled for surgery on Monday. It needs to be rescheduled due to nuclear test not being clear. Cardiologist wants to speak with Shirin & Dr. Panchal. Please call Dr. Hannah or his nurse Cullen at [phone]. Thanks jh

## 2020-11-06 NOTE — TELEPHONE ENCOUNTER
Returned call pt states,she is scheduled for a heart cath on 11/9/2020 and will call back to arslan reynoso. Spoke with Lorena/ROMARIO-OR informed her to place case in depo per Dr Panchal. Encouraged pt to contact our office with any questions/concerns. Patient voiced an understanding

## 2020-11-06 NOTE — TELEPHONE ENCOUNTER
----- Message from Ricardo Mackay sent at 11/6/2020 12:36 PM CST -----  .Type:  Needs Medical Advice    Who Called: Dr Hannah nurse cosha   Symptoms (please be specific)  How long has patient had these symptoms  Pharmacy name and phone #:   Would the patient rather a call back or a response via [facility]? Call    Best Call Back Number: [phone]  Additional Information: Caller is requesting a call back from the nurse in regards to the pt having chest pains and Dr Hannah needing Dr ontiveros to move her surgery date back so that he can do a heart cath on her on 11/09/2020 please

## 2020-11-06 NOTE — TELEPHONE ENCOUNTER
----- Message from Robyn Alvarado sent at 11/6/2020  2:12 PM CST -----  Regarding:  Cardiology- Cosha  Would like a call from nurse. Please call back at [phone].       Thank You,   Robyn Alvarado

## 2020-11-06 NOTE — TELEPHONE ENCOUNTER
----- Message from Eliza Padron MA sent at 11/6/2020  1:18 PM CST -----    ----- Message -----  From: Ricardo Mackay  Sent: 11/6/2020  12:36 PM CST  To: [facility] Staff    .Type:  Needs Medical Advice    Who Called: Dr Hannah nurse cosha   Symptoms (please be specific)  How long has patient had these symptoms  Pharmacy name and phone #:   Would the patient rather a call back or a response via [facility]? Call    Best Call Back Number: [phone]  Additional Information: Caller is requesting a call back from the nurse in regards to the pt having chest pains and Dr Hannah needing Dr ontiveros to move her surgery date back so that he can do a heart cath on her on 11/09/2020 please

## 2020-11-07 LAB — SARS-COV-2 RNA RESP QL NAA+PROBE: NOT DETECTED

## 2020-11-13 ENCOUNTER — TELEPHONE (OUTPATIENT)
Dept: SURGERY | Facility: CLINIC | Age: 70
End: 2020-11-13

## 2020-11-13 NOTE — TELEPHONE ENCOUNTER
----- Message from Denise Barr sent at 11/13/2020  8:39 AM CST -----  Regarding: sx  Good morning,      Pt would like to know if her sx has been scheduled and can be reached at [phone]      Thanks,  Denise Barr

## 2020-11-13 NOTE — TELEPHONE ENCOUNTER
Returned call patient is ready to arslan sx. Informed pt Dr Panchal is not in clinic today and a message will be sent to him. Pt  Voiced an understanding

## 2020-11-16 ENCOUNTER — TELEPHONE (OUTPATIENT)
Dept: SURGERY | Facility: CLINIC | Age: 70
End: 2020-11-16

## 2020-11-16 NOTE — TELEPHONE ENCOUNTER
----- Message from Mariposa Lopez sent at 11/16/2020 12:00 PM CST -----  States she needs to speak to Shirin regarding scheduling her surgery date. Please call pt [phone]. Thank you     
Returned call pt states, she is ready to arslan sxinformed pt Dr Panchal is not in clinic today will be back on 11/17/20 and upon his return this matter will be address. Pt verbalized an understanding  
CONSTITUTIONAL: WD,WN. NAD.    SKIN: Normal color and turgor. No rash.    HEAD: NC/AT.  EYES: Corrected VA: right 20/20, left 20/15.  Conjunctiva clear. EOMI. PERRL.    ENT: Airway patent, OP without erythema, tonsillar swelling or exudate; uvula midline without swelling. Nasal mucosa clear, no rhinorrhea.   RESPIRATORY:  Breathing non-labored. No retractions or accessory muscle use.  Lungs CTA bilat.  CARDIOVASCULAR:  RRR, S1S2. No M/R/G.    No cervical bruits.  Strong, symmetric radial pulses bilaterally.   GI:  Abdomen soft, nontender.    MSK: Neck supple with painless ROM.  No lower extremity edema or calf tenderness.  No joint swelling or ROM limitation.  NEURO: Alert and oriented; CN II-XII intact. Speech clear. 5/5 strength in all extremities.  Normal balance and gait. F-N normal.

## 2020-11-17 DIAGNOSIS — K43.2 INCISIONAL HERNIA WITHOUT OBSTRUCTION OR GANGRENE: ICD-10-CM

## 2020-11-17 DIAGNOSIS — K43.2 INCISIONAL HERNIA OF ANTERIOR ABDOMINAL WALL WITHOUT OBSTRUCTION OR GANGRENE: Primary | ICD-10-CM

## 2020-11-23 ENCOUNTER — LAB VISIT (OUTPATIENT)
Dept: LAB | Facility: HOSPITAL | Age: 70
End: 2020-11-23
Attending: SURGERY
Payer: MEDICARE

## 2020-11-23 DIAGNOSIS — K43.2 INCISIONAL HERNIA OF ANTERIOR ABDOMINAL WALL WITHOUT OBSTRUCTION OR GANGRENE: ICD-10-CM

## 2020-11-23 LAB
ALBUMIN SERPL BCP-MCNC: 3.7 G/DL (ref 3.5–5.2)
ALP SERPL-CCNC: 50 U/L (ref 55–135)
ALT SERPL W/O P-5'-P-CCNC: 13 U/L (ref 10–44)
ANION GAP SERPL CALC-SCNC: 9 MMOL/L (ref 8–16)
AST SERPL-CCNC: 15 U/L (ref 10–40)
BASOPHILS # BLD AUTO: 0.12 K/UL (ref 0–0.2)
BASOPHILS NFR BLD: 1.6 % (ref 0–1.9)
BILIRUB SERPL-MCNC: 0.5 MG/DL (ref 0.1–1)
BUN SERPL-MCNC: 10 MG/DL (ref 8–23)
CALCIUM SERPL-MCNC: 8.7 MG/DL (ref 8.7–10.5)
CHLORIDE SERPL-SCNC: 106 MMOL/L (ref 95–110)
CO2 SERPL-SCNC: 27 MMOL/L (ref 23–29)
CREAT SERPL-MCNC: 0.7 MG/DL (ref 0.5–1.4)
DIFFERENTIAL METHOD: NORMAL
EOSINOPHIL # BLD AUTO: 0.3 K/UL (ref 0–0.5)
EOSINOPHIL NFR BLD: 4.2 % (ref 0–8)
ERYTHROCYTE [DISTWIDTH] IN BLOOD BY AUTOMATED COUNT: 13.6 % (ref 11.5–14.5)
EST. GFR  (AFRICAN AMERICAN): >60 ML/MIN/1.73 M^2
EST. GFR  (NON AFRICAN AMERICAN): >60 ML/MIN/1.73 M^2
GLUCOSE SERPL-MCNC: 87 MG/DL (ref 70–110)
HCT VFR BLD AUTO: 39.8 % (ref 37–48.5)
HGB BLD-MCNC: 13.2 G/DL (ref 12–16)
IMM GRANULOCYTES # BLD AUTO: 0.02 K/UL (ref 0–0.04)
IMM GRANULOCYTES NFR BLD AUTO: 0.3 % (ref 0–0.5)
LYMPHOCYTES # BLD AUTO: 2.6 K/UL (ref 1–4.8)
LYMPHOCYTES NFR BLD: 34.8 % (ref 18–48)
MCH RBC QN AUTO: 30.8 PG (ref 27–31)
MCHC RBC AUTO-ENTMCNC: 33.2 G/DL (ref 32–36)
MCV RBC AUTO: 93 FL (ref 82–98)
MONOCYTES # BLD AUTO: 0.8 K/UL (ref 0.3–1)
MONOCYTES NFR BLD: 10.3 % (ref 4–15)
NEUTROPHILS # BLD AUTO: 3.6 K/UL (ref 1.8–7.7)
NEUTROPHILS NFR BLD: 48.8 % (ref 38–73)
NRBC BLD-RTO: 0 /100 WBC
PLATELET # BLD AUTO: 254 K/UL (ref 150–350)
PMV BLD AUTO: 9.4 FL (ref 9.2–12.9)
POTASSIUM SERPL-SCNC: 4.1 MMOL/L (ref 3.5–5.1)
PROT SERPL-MCNC: 6.5 G/DL (ref 6–8.4)
RBC # BLD AUTO: 4.29 M/UL (ref 4–5.4)
SODIUM SERPL-SCNC: 142 MMOL/L (ref 136–145)
WBC # BLD AUTO: 7.35 K/UL (ref 3.9–12.7)

## 2020-11-23 PROCEDURE — 80053 COMPREHEN METABOLIC PANEL: CPT | Mod: PO

## 2020-11-23 PROCEDURE — 36415 COLL VENOUS BLD VENIPUNCTURE: CPT | Mod: PO

## 2020-11-23 PROCEDURE — 85025 COMPLETE CBC W/AUTO DIFF WBC: CPT | Mod: PO

## 2020-12-04 ENCOUNTER — OFFICE VISIT (OUTPATIENT)
Dept: SURGERY | Facility: CLINIC | Age: 70
End: 2020-12-04
Payer: MEDICARE

## 2020-12-04 VITALS
BODY MASS INDEX: 26.9 KG/M2 | HEART RATE: 60 BPM | DIASTOLIC BLOOD PRESSURE: 67 MMHG | TEMPERATURE: 98 F | WEIGHT: 147.06 LBS | SYSTOLIC BLOOD PRESSURE: 129 MMHG

## 2020-12-04 DIAGNOSIS — K43.9 VENTRAL HERNIA WITHOUT OBSTRUCTION OR GANGRENE: Primary | ICD-10-CM

## 2020-12-04 PROCEDURE — 3008F BODY MASS INDEX DOCD: CPT | Mod: CPTII,S$GLB,, | Performed by: SURGERY

## 2020-12-04 PROCEDURE — 1101F PT FALLS ASSESS-DOCD LE1/YR: CPT | Mod: CPTII,S$GLB,, | Performed by: SURGERY

## 2020-12-04 PROCEDURE — 99999 PR PBB SHADOW E&M-EST. PATIENT-LVL III: CPT | Mod: PBBFAC,,, | Performed by: SURGERY

## 2020-12-04 PROCEDURE — 99999 PR PBB SHADOW E&M-EST. PATIENT-LVL III: ICD-10-PCS | Mod: PBBFAC,,, | Performed by: SURGERY

## 2020-12-04 PROCEDURE — 1126F PR PAIN SEVERITY QUANTIFIED, NO PAIN PRESENT: ICD-10-PCS | Mod: S$GLB,,, | Performed by: SURGERY

## 2020-12-04 PROCEDURE — 99212 OFFICE O/P EST SF 10 MIN: CPT | Mod: S$GLB,,, | Performed by: SURGERY

## 2020-12-04 PROCEDURE — 1126F AMNT PAIN NOTED NONE PRSNT: CPT | Mod: S$GLB,,, | Performed by: SURGERY

## 2020-12-04 PROCEDURE — 1159F MED LIST DOCD IN RCRD: CPT | Mod: S$GLB,,, | Performed by: SURGERY

## 2020-12-04 PROCEDURE — 1101F PR PT FALLS ASSESS DOC 0-1 FALLS W/OUT INJ PAST YR: ICD-10-PCS | Mod: CPTII,S$GLB,, | Performed by: SURGERY

## 2020-12-04 PROCEDURE — 3288F FALL RISK ASSESSMENT DOCD: CPT | Mod: CPTII,S$GLB,, | Performed by: SURGERY

## 2020-12-04 PROCEDURE — 99212 PR OFFICE/OUTPT VISIT, EST, LEVL II, 10-19 MIN: ICD-10-PCS | Mod: S$GLB,,, | Performed by: SURGERY

## 2020-12-04 PROCEDURE — 3008F PR BODY MASS INDEX (BMI) DOCUMENTED: ICD-10-PCS | Mod: CPTII,S$GLB,, | Performed by: SURGERY

## 2020-12-04 PROCEDURE — 1159F PR MEDICATION LIST DOCUMENTED IN MEDICAL RECORD: ICD-10-PCS | Mod: S$GLB,,, | Performed by: SURGERY

## 2020-12-04 PROCEDURE — 3288F PR FALLS RISK ASSESSMENT DOCUMENTED: ICD-10-PCS | Mod: CPTII,S$GLB,, | Performed by: SURGERY

## 2020-12-04 NOTE — PROGRESS NOTES
Salud is 70-year-old white female patient who is well known to me from previous encounter.  She came in for preop check of as well as to signed consent today for the schedule surgery next week.  She will undergo robotic assisted laparoscopic ventral hernia repair.  All her questions were addressed.  Her cardiology clearance was also reviewed.  She did not have any stent put in.  Total time of 10 min was spent with this patient, and more than 50% of that was spent for treatment planning and counseling including the following:        Preparing to see the patient (eg, review of tests)   Obtaining and/or reviewing separately obtained history   Performing a medically appropriate examination and/or evaluation   Counseling and educating the patient/family/caregiver   Ordering medications, tests, or procedures   Referring and communicating with other health care professionals (when not separately reported)   Documenting clinical information in the electronic or other health record   Independently interpreting results (not separately reported) and communicating results to the patient/family/caregiver   Care coordination (not separately reported)    Brandyn Panchal

## 2020-12-07 ENCOUNTER — LAB VISIT (OUTPATIENT)
Dept: OTOLARYNGOLOGY | Facility: CLINIC | Age: 70
End: 2020-12-07
Payer: MEDICARE

## 2020-12-07 DIAGNOSIS — K43.2 INCISIONAL HERNIA WITHOUT OBSTRUCTION OR GANGRENE: ICD-10-CM

## 2020-12-07 PROCEDURE — U0003 INFECTIOUS AGENT DETECTION BY NUCLEIC ACID (DNA OR RNA); SEVERE ACUTE RESPIRATORY SYNDROME CORONAVIRUS 2 (SARS-COV-2) (CORONAVIRUS DISEASE [COVID-19]), AMPLIFIED PROBE TECHNIQUE, MAKING USE OF HIGH THROUGHPUT TECHNOLOGIES AS DESCRIBED BY CMS-2020-01-R: HCPCS

## 2020-12-08 LAB — SARS-COV-2 RNA RESP QL NAA+PROBE: NOT DETECTED

## 2020-12-10 ENCOUNTER — HOSPITAL ENCOUNTER (OUTPATIENT)
Facility: HOSPITAL | Age: 70
Discharge: HOME | End: 2020-12-10
Attending: SURGERY | Admitting: SURGERY
Payer: MEDICARE

## 2020-12-10 ENCOUNTER — ANESTHESIA (OUTPATIENT)
Dept: SURGERY | Facility: HOSPITAL | Age: 70
End: 2020-12-10
Payer: MEDICARE

## 2020-12-10 ENCOUNTER — ANESTHESIA EVENT (OUTPATIENT)
Dept: SURGERY | Facility: HOSPITAL | Age: 70
End: 2020-12-10
Payer: MEDICARE

## 2020-12-10 DIAGNOSIS — K43.9 VENTRAL HERNIA WITHOUT OBSTRUCTION OR GANGRENE: ICD-10-CM

## 2020-12-10 PROCEDURE — C1781 MESH (IMPLANTABLE): HCPCS | Performed by: SURGERY

## 2020-12-10 PROCEDURE — 63600175 PHARM REV CODE 636 W HCPCS: Performed by: ANESTHESIOLOGY

## 2020-12-10 PROCEDURE — 27201423 OPTIME MED/SURG SUP & DEVICES STERILE SUPPLY: Performed by: SURGERY

## 2020-12-10 PROCEDURE — 71000039 HC RECOVERY, EACH ADD'L HOUR: Performed by: SURGERY

## 2020-12-10 PROCEDURE — 25000003 PHARM REV CODE 250: Performed by: NURSE ANESTHETIST, CERTIFIED REGISTERED

## 2020-12-10 PROCEDURE — 36000710: Performed by: SURGERY

## 2020-12-10 PROCEDURE — 25000003 PHARM REV CODE 250: Performed by: SURGERY

## 2020-12-10 PROCEDURE — 71000015 HC POSTOP RECOV 1ST HR: Performed by: SURGERY

## 2020-12-10 PROCEDURE — 25000003 PHARM REV CODE 250: Performed by: ANESTHESIOLOGY

## 2020-12-10 PROCEDURE — 37000008 HC ANESTHESIA 1ST 15 MINUTES: Performed by: SURGERY

## 2020-12-10 PROCEDURE — 63600175 PHARM REV CODE 636 W HCPCS: Performed by: NURSE ANESTHETIST, CERTIFIED REGISTERED

## 2020-12-10 PROCEDURE — 49652 PR LAP VENTRAL/UMBILICAL HERNIA; REDUCIBLE: ICD-10-PCS | Mod: ,,, | Performed by: SURGERY

## 2020-12-10 PROCEDURE — 71000033 HC RECOVERY, INTIAL HOUR: Performed by: SURGERY

## 2020-12-10 PROCEDURE — 37000009 HC ANESTHESIA EA ADD 15 MINS: Performed by: SURGERY

## 2020-12-10 PROCEDURE — 49652 PR LAP VENTRAL/UMBILICAL HERNIA; REDUCIBLE: CPT | Mod: ,,, | Performed by: SURGERY

## 2020-12-10 PROCEDURE — 36000711: Performed by: SURGERY

## 2020-12-10 DEVICE — MESH HERNIA ST 4.5IN CIRCULAR: Type: IMPLANTABLE DEVICE | Site: ABDOMEN | Status: FUNCTIONAL

## 2020-12-10 RX ORDER — PHENYLEPHRINE HYDROCHLORIDE 10 MG/ML
INJECTION INTRAVENOUS
Status: DISCONTINUED | OUTPATIENT
Start: 2020-12-10 | End: 2020-12-10

## 2020-12-10 RX ORDER — OXYCODONE AND ACETAMINOPHEN 5; 325 MG/1; MG/1
1 TABLET ORAL EVERY 8 HOURS PRN
Qty: 20 TABLET | Refills: 0 | Status: SHIPPED | OUTPATIENT
Start: 2020-12-10 | End: 2020-12-14 | Stop reason: SDUPTHER

## 2020-12-10 RX ORDER — OXYCODONE HYDROCHLORIDE 5 MG/1
5 TABLET ORAL ONCE AS NEEDED
Status: COMPLETED | OUTPATIENT
Start: 2020-12-10 | End: 2020-12-10

## 2020-12-10 RX ORDER — NEOSTIGMINE METHYLSULFATE 1 MG/ML
INJECTION, SOLUTION INTRAVENOUS
Status: DISCONTINUED | OUTPATIENT
Start: 2020-12-10 | End: 2020-12-10

## 2020-12-10 RX ORDER — SODIUM CHLORIDE 9 MG/ML
INJECTION, SOLUTION INTRAVENOUS CONTINUOUS
Status: DISCONTINUED | OUTPATIENT
Start: 2020-12-10 | End: 2020-12-11 | Stop reason: HOSPADM

## 2020-12-10 RX ORDER — DEXAMETHASONE SODIUM PHOSPHATE 4 MG/ML
INJECTION, SOLUTION INTRA-ARTICULAR; INTRALESIONAL; INTRAMUSCULAR; INTRAVENOUS; SOFT TISSUE
Status: DISCONTINUED | OUTPATIENT
Start: 2020-12-10 | End: 2020-12-10

## 2020-12-10 RX ORDER — SUCCINYLCHOLINE CHLORIDE 20 MG/ML
INJECTION INTRAMUSCULAR; INTRAVENOUS
Status: DISCONTINUED | OUTPATIENT
Start: 2020-12-10 | End: 2020-12-10

## 2020-12-10 RX ORDER — LIDOCAINE HYDROCHLORIDE 20 MG/ML
INJECTION INTRAVENOUS
Status: DISCONTINUED | OUTPATIENT
Start: 2020-12-10 | End: 2020-12-10

## 2020-12-10 RX ORDER — SODIUM CHLORIDE, SODIUM LACTATE, POTASSIUM CHLORIDE, CALCIUM CHLORIDE 600; 310; 30; 20 MG/100ML; MG/100ML; MG/100ML; MG/100ML
INJECTION, SOLUTION INTRAVENOUS CONTINUOUS PRN
Status: DISCONTINUED | OUTPATIENT
Start: 2020-12-10 | End: 2020-12-10

## 2020-12-10 RX ORDER — HYDROMORPHONE HYDROCHLORIDE 2 MG/ML
0.2 INJECTION, SOLUTION INTRAMUSCULAR; INTRAVENOUS; SUBCUTANEOUS EVERY 5 MIN PRN
Status: DISCONTINUED | OUTPATIENT
Start: 2020-12-10 | End: 2020-12-11 | Stop reason: HOSPADM

## 2020-12-10 RX ORDER — FENTANYL CITRATE 50 UG/ML
INJECTION, SOLUTION INTRAMUSCULAR; INTRAVENOUS
Status: DISCONTINUED | OUTPATIENT
Start: 2020-12-10 | End: 2020-12-10

## 2020-12-10 RX ORDER — PROPOFOL 10 MG/ML
VIAL (ML) INTRAVENOUS
Status: DISCONTINUED | OUTPATIENT
Start: 2020-12-10 | End: 2020-12-10

## 2020-12-10 RX ORDER — CLINDAMYCIN PHOSPHATE 900 MG/50ML
900 INJECTION, SOLUTION INTRAVENOUS
Status: COMPLETED | OUTPATIENT
Start: 2020-12-10 | End: 2020-12-10

## 2020-12-10 RX ORDER — ROCURONIUM BROMIDE 10 MG/ML
INJECTION, SOLUTION INTRAVENOUS
Status: DISCONTINUED | OUTPATIENT
Start: 2020-12-10 | End: 2020-12-10

## 2020-12-10 RX ORDER — SODIUM CHLORIDE 0.9 % (FLUSH) 0.9 %
3 SYRINGE (ML) INJECTION EVERY 8 HOURS
Status: DISCONTINUED | OUTPATIENT
Start: 2020-12-10 | End: 2020-12-11 | Stop reason: HOSPADM

## 2020-12-10 RX ORDER — ONDANSETRON 2 MG/ML
INJECTION INTRAMUSCULAR; INTRAVENOUS
Status: DISCONTINUED | OUTPATIENT
Start: 2020-12-10 | End: 2020-12-10

## 2020-12-10 RX ORDER — MEPERIDINE HYDROCHLORIDE 25 MG/ML
12.5 INJECTION INTRAMUSCULAR; INTRAVENOUS; SUBCUTANEOUS ONCE AS NEEDED
Status: DISCONTINUED | OUTPATIENT
Start: 2020-12-10 | End: 2020-12-11 | Stop reason: HOSPADM

## 2020-12-10 RX ORDER — DIPHENHYDRAMINE HYDROCHLORIDE 50 MG/ML
25 INJECTION INTRAMUSCULAR; INTRAVENOUS EVERY 6 HOURS PRN
Status: DISCONTINUED | OUTPATIENT
Start: 2020-12-10 | End: 2020-12-11 | Stop reason: HOSPADM

## 2020-12-10 RX ORDER — METOCLOPRAMIDE HYDROCHLORIDE 5 MG/ML
10 INJECTION INTRAMUSCULAR; INTRAVENOUS EVERY 10 MIN PRN
Status: COMPLETED | OUTPATIENT
Start: 2020-12-10 | End: 2020-12-10

## 2020-12-10 RX ORDER — BUPIVACAINE HYDROCHLORIDE 2.5 MG/ML
INJECTION, SOLUTION EPIDURAL; INFILTRATION; INTRACAUDAL
Status: DISCONTINUED | OUTPATIENT
Start: 2020-12-10 | End: 2020-12-10 | Stop reason: HOSPADM

## 2020-12-10 RX ORDER — ACETAMINOPHEN 10 MG/ML
1000 INJECTION, SOLUTION INTRAVENOUS ONCE
Status: COMPLETED | OUTPATIENT
Start: 2020-12-10 | End: 2020-12-10

## 2020-12-10 RX ADMIN — PHENYLEPHRINE HYDROCHLORIDE 200 MCG: 10 INJECTION INTRAVENOUS at 11:12

## 2020-12-10 RX ADMIN — FENTANYL CITRATE 100 MCG: 50 INJECTION, SOLUTION INTRAMUSCULAR; INTRAVENOUS at 11:12

## 2020-12-10 RX ADMIN — ONDANSETRON 4 MG: 2 INJECTION, SOLUTION INTRAMUSCULAR; INTRAVENOUS at 12:12

## 2020-12-10 RX ADMIN — ROCURONIUM BROMIDE 10 MG: 10 INJECTION, SOLUTION INTRAVENOUS at 12:12

## 2020-12-10 RX ADMIN — HYDROMORPHONE HYDROCHLORIDE 0.2 MG: 2 INJECTION INTRAMUSCULAR; INTRAVENOUS; SUBCUTANEOUS at 01:12

## 2020-12-10 RX ADMIN — HYDROMORPHONE HYDROCHLORIDE 0.2 MG: 2 INJECTION INTRAMUSCULAR; INTRAVENOUS; SUBCUTANEOUS at 02:12

## 2020-12-10 RX ADMIN — ACETAMINOPHEN 1000 MG: 10 INJECTION, SOLUTION INTRAVENOUS at 01:12

## 2020-12-10 RX ADMIN — DEXAMETHASONE SODIUM PHOSPHATE 8 MG: 4 INJECTION, SOLUTION INTRAMUSCULAR; INTRAVENOUS at 12:12

## 2020-12-10 RX ADMIN — LIDOCAINE HYDROCHLORIDE 100 MG: 20 INJECTION, SOLUTION INTRAVENOUS at 11:12

## 2020-12-10 RX ADMIN — SODIUM CHLORIDE, SODIUM LACTATE, POTASSIUM CHLORIDE, AND CALCIUM CHLORIDE: .6; .31; .03; .02 INJECTION, SOLUTION INTRAVENOUS at 11:12

## 2020-12-10 RX ADMIN — ROCURONIUM BROMIDE 5 MG: 10 INJECTION, SOLUTION INTRAVENOUS at 11:12

## 2020-12-10 RX ADMIN — CLINDAMYCIN PHOSPHATE 900 MG: 18 INJECTION, SOLUTION INTRAVENOUS at 11:12

## 2020-12-10 RX ADMIN — OXYCODONE HYDROCHLORIDE 5 MG: 5 TABLET ORAL at 01:12

## 2020-12-10 RX ADMIN — SODIUM CHLORIDE, SODIUM LACTATE, POTASSIUM CHLORIDE, AND CALCIUM CHLORIDE: .6; .31; .03; .02 INJECTION, SOLUTION INTRAVENOUS at 10:12

## 2020-12-10 RX ADMIN — PHENYLEPHRINE HYDROCHLORIDE 200 MCG: 10 INJECTION INTRAVENOUS at 12:12

## 2020-12-10 RX ADMIN — GLYCOPYRROLATE 0.8 MG: 0.2 INJECTION, SOLUTION INTRAMUSCULAR; INTRAVENOUS at 12:12

## 2020-12-10 RX ADMIN — SUCCINYLCHOLINE CHLORIDE 140 MG: 20 INJECTION, SOLUTION INTRAMUSCULAR; INTRAVENOUS at 11:12

## 2020-12-10 RX ADMIN — ROCURONIUM BROMIDE 35 MG: 10 INJECTION, SOLUTION INTRAVENOUS at 11:12

## 2020-12-10 RX ADMIN — METOCLOPRAMIDE 10 MG: 5 INJECTION, SOLUTION INTRAMUSCULAR; INTRAVENOUS at 02:12

## 2020-12-10 RX ADMIN — PROPOFOL 100 MG: 10 INJECTION, EMULSION INTRAVENOUS at 11:12

## 2020-12-10 RX ADMIN — NEOSTIGMINE METHYLSULFATE 5 MG: 1 INJECTION INTRAVENOUS at 12:12

## 2020-12-10 NOTE — TRANSFER OF CARE
"Anesthesia Transfer of Care Note    Patient: Salud Echevarria    Procedure(s) Performed: Procedure(s) (LRB):  XI ROBOTIC REPAIR, HERNIA, VENTRAL (N/A)    Patient location: PACU    Anesthesia Type: general    Transport from OR: Transported from OR on room air with adequate spontaneous ventilation    Post pain: adequate analgesia    Post assessment: no apparent anesthetic complications and tolerated procedure well    Post vital signs: stable    Level of consciousness: awake    Nausea/Vomiting: no nausea/vomiting    Complications: none    Transfer of care protocol was followed      Last vitals:   Visit Vitals  /66   Pulse 73   Temp 36.7 °C (98.1 °F) (Temporal)   Resp 18   Ht 5' 2" (1.575 m)   Wt 65.2 kg (143 lb 11.8 oz)   SpO2 98%   Breastfeeding No   BMI 26.29 kg/m²     "

## 2020-12-10 NOTE — ANESTHESIA POSTPROCEDURE EVALUATION
Anesthesia Post Evaluation    Patient: Salud Echevarria    Procedure(s) Performed: Procedure(s) (LRB):  XI ROBOTIC REPAIR, HERNIA, VENTRAL (N/A)    Final Anesthesia Type: general    Patient location during evaluation: PACU  Patient participation: Yes- Able to Participate  Level of consciousness: awake  Post-procedure vital signs: reviewed and stable  Pain management: adequate  Airway patency: patent    PONV status at discharge: No PONV  Anesthetic complications: no      Cardiovascular status: blood pressure returned to baseline and hemodynamically stable  Respiratory status: unassisted and spontaneous ventilation  Hydration status: euvolemic  Follow-up not needed.          Vitals Value Taken Time   /66 12/10/20 1018   Temp 36.7 °C (98.1 °F) 12/10/20 1016   Pulse 73 12/10/20 1016   Resp 18 12/10/20 1016   SpO2 98 % 12/10/20 1016         No case tracking events are documented in the log.      Pain/Inocencio Score: No data recorded

## 2020-12-10 NOTE — ANESTHESIA PREPROCEDURE EVALUATION
12/10/2020  Salud Echevarria is a 70 y.o., female.  Patient Active Problem List   Diagnosis    Aortic atherosclerosis    Back pain    Coronary artery disease involving native coronary artery of native heart without angina pectoris    Gastroesophageal reflux disease with esophagitis    Hyperlipidemia    Osteopenia    Pulmonary nodule, right    Spinal stenosis    Tobacco dependence    Colon cancer screening    Hematuria    Encounter for hepatitis C screening test for low risk patient    Left hip pain    Sciatica of left side    BPPV (benign paroxysmal positional vertigo)    Fatigue    Pain of toe of right foot    Other specified abdominal hernia without obstruction or gangrene    Post-nasal drip    Dizziness    Mild intermittent asthma without complication    Urge incontinence     No current facility-administered medications on file prior to encounter.      Current Outpatient Medications on File Prior to Encounter   Medication Sig Dispense Refill    albuterol (PROVENTIL/VENTOLIN HFA) 90 mcg/actuation inhaler Inhale 2 puffs into the lungs every 4 (four) hours as needed for Wheezing. 18 g 0    aspirin (ECOTRIN) 81 MG EC tablet Take 81 mg by mouth once daily.      pravastatin (PRAVACHOL) 40 MG tablet Take 1 tablet by mouth once daily 90 tablet 4    ALPRAZolam (XANAX) 0.25 MG tablet Take 1 tablet (0.25 mg total) by mouth once. Take 10-20 minutes prior to imaging for 1 dose 1 tablet 0    meclizine (ANTIVERT) 25 mg tablet Take 1 tablet (25 mg total) by mouth 3 (three) times daily as needed for Dizziness. 30 tablet 0    multivitamin (ONE DAILY MULTIVITAMIN) per tablet Take 1 tablet by mouth once daily.       Past Surgical History:   Procedure Laterality Date    BACK SURGERY      BREAST BIOPSY      CARDIAC CATHETERIZATION  2018, 11/2020    HYSTERECTOMY      LYMPH NODE BIOPSY       TONSILLECTOMY         Anesthesia Evaluation    I have reviewed the Patient Summary Reports.    I have reviewed the Nursing Notes.    I have reviewed the Medications.     Review of Systems  Anesthesia Hx:  No problems with previous Anesthesia  History of prior surgery of interest to airway management or planning: Previous anesthesia: General  Denies Personal Hx of Anesthesia complications.   Social:  Smoker    Hematology/Oncology:  Hematology Normal   Oncology Normal     EENT/Dental:EENT/Dental Normal   Cardiovascular:   Hypertension CAD   ECG has been reviewed.    Pulmonary:   Asthma    Renal/:  Renal/ Normal     Hepatic/GI:  Hepatic/GI Normal    Musculoskeletal:  Musculoskeletal Normal    Neurological:  Neurology Normal    Endocrine:  Endocrine Normal    Dermatological:  Skin Normal    Psych:  Psychiatric Normal           Physical Exam  General:  Well nourished    Airway/Jaw/Neck:  Airway Findings: Mouth Opening: Normal Tongue: Normal  Mallampati: III      Dental:  Dental Findings: (At Top) In tact, Edentulous, Upper Dentures   Chest/Lungs:  Chest/Lungs Clear    Heart/Vascular:  Heart Findings: Normal Heart murmur: negative       Mental Status:  Mental Status Findings:  Cooperative, Alert and Oriented         Chemistry        Component Value Date/Time     11/23/2020 1000    K 4.1 11/23/2020 1000     11/23/2020 1000    CO2 27 11/23/2020 1000    BUN 10 11/23/2020 1000    CREATININE 0.7 11/23/2020 1000    GLU 87 11/23/2020 1000        Component Value Date/Time    CALCIUM 8.7 11/23/2020 1000    ALKPHOS 50 (L) 11/23/2020 1000    AST 15 11/23/2020 1000    ALT 13 11/23/2020 1000    BILITOT 0.5 11/23/2020 1000    ESTGFRAFRICA >60.0 11/23/2020 1000    EGFRNONAA >60.0 11/23/2020 1000        Lab Results   Component Value Date    WBC 7.35 11/23/2020    HGB 13.2 11/23/2020    HCT 39.8 11/23/2020    MCV 93 11/23/2020     11/23/2020           Anesthesia Plan  Type of Anesthesia, risks & benefits  discussed:  Anesthesia Type:  general  Patient's Preference:   Intra-op Monitoring Plan: standard ASA monitors  Intra-op Monitoring Plan Comments:   Post Op Pain Control Plan: multimodal analgesia  Post Op Pain Control Plan Comments:   Induction:   IV  Beta Blocker:  Patient is not currently on a Beta-Blocker (No further documentation required).       Informed Consent: Patient understands risks and agrees with Anesthesia plan.  Questions answered. Anesthesia consent signed with patient.  ASA Score: 2     Day of Surgery Review of History & Physical: I have interviewed and examined the patient. I have reviewed the patient's H&P dated:    H&P update referred to the surgeon.         Ready For Surgery From Anesthesia Perspective.

## 2020-12-10 NOTE — ANESTHESIA PROCEDURE NOTES
Intubation  Performed by: Matty Motley CRNA  Authorized by: Chelita Gabriel MD     Intubation:     Induction:  Intravenous    Intubated:  Postinduction    Mask Ventilation:  Easy with oral airway    Attempts:  1    Attempted By:  CRNA    Method of Intubation:  Direct    Blade:  Beatty 2    Laryngeal View Grade: Grade I - full view of chords      Difficult Airway Encountered?: No      Complications:  None    Airway Device:  Oral endotracheal tube    Airway Device Size:  7.5    Style/Cuff Inflation:  Cuffed    Inflation Amount (mL):  5    Tube secured:  21    Secured at:  The lips    Placement Verified By:  Capnometry    Complicating Factors:  None    Findings Post-Intubation:  BS equal bilateral and atraumatic/condition of teeth unchanged

## 2020-12-12 NOTE — PROGRESS NOTES
Patient called regarding postop constipation. Recommended stool softener. Other questions answered.

## 2020-12-14 ENCOUNTER — TELEPHONE (OUTPATIENT)
Dept: SURGERY | Facility: CLINIC | Age: 70
End: 2020-12-14

## 2020-12-14 ENCOUNTER — HOSPITAL ENCOUNTER (EMERGENCY)
Facility: HOSPITAL | Age: 70
Discharge: HOME | End: 2020-12-14
Attending: EMERGENCY MEDICINE
Payer: MEDICARE

## 2020-12-14 VITALS
OXYGEN SATURATION: 97 % | WEIGHT: 144.5 LBS | HEART RATE: 105 BPM | SYSTOLIC BLOOD PRESSURE: 123 MMHG | DIASTOLIC BLOOD PRESSURE: 90 MMHG | BODY MASS INDEX: 26.59 KG/M2 | RESPIRATION RATE: 20 BRPM | TEMPERATURE: 98 F | HEIGHT: 62 IN

## 2020-12-14 DIAGNOSIS — G89.18 POST-OP PAIN: Primary | ICD-10-CM

## 2020-12-14 DIAGNOSIS — R10.9 ABDOMINAL PAIN: ICD-10-CM

## 2020-12-14 LAB
ALBUMIN SERPL BCP-MCNC: 3.5 G/DL (ref 3.5–5.2)
ALP SERPL-CCNC: 59 U/L (ref 55–135)
ALT SERPL W/O P-5'-P-CCNC: 12 U/L (ref 10–44)
ANION GAP SERPL CALC-SCNC: 12 MMOL/L (ref 8–16)
AST SERPL-CCNC: 12 U/L (ref 10–40)
BACTERIA #/AREA URNS AUTO: ABNORMAL /HPF
BASOPHILS # BLD AUTO: 0.04 K/UL (ref 0–0.2)
BASOPHILS NFR BLD: 0.4 % (ref 0–1.9)
BILIRUB SERPL-MCNC: 0.4 MG/DL (ref 0.1–1)
BILIRUB UR QL STRIP: ABNORMAL
BUN SERPL-MCNC: 7 MG/DL (ref 8–23)
CALCIUM SERPL-MCNC: 9.3 MG/DL (ref 8.7–10.5)
CHLORIDE SERPL-SCNC: 97 MMOL/L (ref 95–110)
CLARITY UR REFRACT.AUTO: CLEAR
CO2 SERPL-SCNC: 28 MMOL/L (ref 23–29)
COLOR UR AUTO: YELLOW
CREAT SERPL-MCNC: 0.7 MG/DL (ref 0.5–1.4)
DIFFERENTIAL METHOD: ABNORMAL
EOSINOPHIL # BLD AUTO: 0.1 K/UL (ref 0–0.5)
EOSINOPHIL NFR BLD: 1.4 % (ref 0–8)
ERYTHROCYTE [DISTWIDTH] IN BLOOD BY AUTOMATED COUNT: 13.2 % (ref 11.5–14.5)
EST. GFR  (AFRICAN AMERICAN): >60 ML/MIN/1.73 M^2
EST. GFR  (NON AFRICAN AMERICAN): >60 ML/MIN/1.73 M^2
GLUCOSE SERPL-MCNC: 114 MG/DL (ref 70–110)
GLUCOSE UR QL STRIP: NEGATIVE
HCT VFR BLD AUTO: 40.1 % (ref 37–48.5)
HGB BLD-MCNC: 13.4 G/DL (ref 12–16)
HGB UR QL STRIP: ABNORMAL
IMM GRANULOCYTES # BLD AUTO: 0.03 K/UL (ref 0–0.04)
IMM GRANULOCYTES NFR BLD AUTO: 0.3 % (ref 0–0.5)
KETONES UR QL STRIP: ABNORMAL
LEUKOCYTE ESTERASE UR QL STRIP: NEGATIVE
LIPASE SERPL-CCNC: 6 U/L (ref 4–60)
LYMPHOCYTES # BLD AUTO: 1.6 K/UL (ref 1–4.8)
LYMPHOCYTES NFR BLD: 17.1 % (ref 18–48)
MCH RBC QN AUTO: 30.7 PG (ref 27–31)
MCHC RBC AUTO-ENTMCNC: 33.4 G/DL (ref 32–36)
MCV RBC AUTO: 92 FL (ref 82–98)
MICROSCOPIC COMMENT: ABNORMAL
MONOCYTES # BLD AUTO: 0.9 K/UL (ref 0.3–1)
MONOCYTES NFR BLD: 9.6 % (ref 4–15)
NEUTROPHILS # BLD AUTO: 6.6 K/UL (ref 1.8–7.7)
NEUTROPHILS NFR BLD: 71.2 % (ref 38–73)
NITRITE UR QL STRIP: NEGATIVE
NRBC BLD-RTO: 0 /100 WBC
PH UR STRIP: 6 [PH] (ref 5–8)
PLATELET # BLD AUTO: 284 K/UL (ref 150–350)
PMV BLD AUTO: 9.9 FL (ref 9.2–12.9)
POTASSIUM SERPL-SCNC: 3.3 MMOL/L (ref 3.5–5.1)
PROT SERPL-MCNC: 7 G/DL (ref 6–8.4)
PROT UR QL STRIP: NEGATIVE
RBC # BLD AUTO: 4.37 M/UL (ref 4–5.4)
RBC #/AREA URNS AUTO: 6 /HPF (ref 0–4)
SODIUM SERPL-SCNC: 137 MMOL/L (ref 136–145)
SP GR UR STRIP: 1.02 (ref 1–1.03)
SQUAMOUS #/AREA URNS AUTO: 4 /HPF
URN SPEC COLLECT METH UR: ABNORMAL
UROBILINOGEN UR STRIP-ACNC: NEGATIVE EU/DL
WBC # BLD AUTO: 9.23 K/UL (ref 3.9–12.7)
WBC #/AREA URNS AUTO: 2 /HPF (ref 0–5)

## 2020-12-14 PROCEDURE — 80053 COMPREHEN METABOLIC PANEL: CPT | Mod: ER

## 2020-12-14 PROCEDURE — 96375 TX/PRO/DX INJ NEW DRUG ADDON: CPT | Mod: ER

## 2020-12-14 PROCEDURE — 81000 URINALYSIS NONAUTO W/SCOPE: CPT | Mod: ER

## 2020-12-14 PROCEDURE — 63600175 PHARM REV CODE 636 W HCPCS: Mod: ER | Performed by: EMERGENCY MEDICINE

## 2020-12-14 PROCEDURE — 25000003 PHARM REV CODE 250: Mod: ER | Performed by: EMERGENCY MEDICINE

## 2020-12-14 PROCEDURE — 83690 ASSAY OF LIPASE: CPT | Mod: ER

## 2020-12-14 PROCEDURE — 96361 HYDRATE IV INFUSION ADD-ON: CPT | Mod: ER

## 2020-12-14 PROCEDURE — 85025 COMPLETE CBC W/AUTO DIFF WBC: CPT | Mod: ER

## 2020-12-14 PROCEDURE — 99291 CRITICAL CARE FIRST HOUR: CPT | Mod: 25,ER

## 2020-12-14 PROCEDURE — 96374 THER/PROPH/DIAG INJ IV PUSH: CPT | Mod: ER

## 2020-12-14 RX ORDER — MORPHINE SULFATE 4 MG/ML
2 INJECTION, SOLUTION INTRAMUSCULAR; INTRAVENOUS
Status: COMPLETED | OUTPATIENT
Start: 2020-12-14 | End: 2020-12-14

## 2020-12-14 RX ORDER — ONDANSETRON 4 MG/1
4 TABLET, ORALLY DISINTEGRATING ORAL EVERY 6 HOURS PRN
Qty: 20 TABLET | Refills: 0 | Status: SHIPPED | OUTPATIENT
Start: 2020-12-14 | End: 2022-01-18

## 2020-12-14 RX ORDER — ONDANSETRON 2 MG/ML
4 INJECTION INTRAMUSCULAR; INTRAVENOUS
Status: COMPLETED | OUTPATIENT
Start: 2020-12-14 | End: 2020-12-14

## 2020-12-14 RX ORDER — OXYCODONE AND ACETAMINOPHEN 5; 325 MG/1; MG/1
1 TABLET ORAL EVERY 8 HOURS PRN
Qty: 20 TABLET | Refills: 0 | Status: SHIPPED | OUTPATIENT
Start: 2020-12-14 | End: 2021-01-15 | Stop reason: SDUPTHER

## 2020-12-14 RX ADMIN — ONDANSETRON 4 MG: 2 INJECTION INTRAMUSCULAR; INTRAVENOUS at 06:12

## 2020-12-14 RX ADMIN — SODIUM CHLORIDE 1000 ML: 0.9 INJECTION, SOLUTION INTRAVENOUS at 06:12

## 2020-12-14 RX ADMIN — POTASSIUM BICARBONATE 25 MEQ: 977.5 TABLET, EFFERVESCENT ORAL at 08:12

## 2020-12-14 RX ADMIN — MORPHINE SULFATE 2 MG: 4 INJECTION INTRAVENOUS at 06:12

## 2020-12-14 NOTE — TELEPHONE ENCOUNTER
Spoke with pt via phone informed pt it is okay for her to go to the ED in [place] per Dr Panchal. Patient verbalized an understanding

## 2020-12-14 NOTE — TELEPHONE ENCOUNTER
----- Message from Salud Kasper sent at 12/14/2020  1:02 PM CST -----  Contact: Salud  Type:  RX Refill Request    Who Called: Salud  Refill or New Rx: refill  RX Name and Strength:oxyCODONE-acetaminophen (PERCOCET) 5-325 mg per tablet  How is the patient currently taking it? (ex. 1XDay): 1 every 8 hrs  Is this a 30 day or 90 day RX: 30  Preferred Pharmacy with phone number:   [facility] - [place] - [address]  [address] [address] [address]  Phone: [phone] Fax: [phone]  Local or Mail Order:Local  Ordering Provider: Dr. Panchal  Would the patient rather a call back or a response via [facility]? Call back  Best Call Back Number: [phone] (home)   Additional Information: Patient is also requesting something for nausea. She haven't been able to eat anything since surgery.      Thanks,  Salud Kasper

## 2020-12-14 NOTE — TELEPHONE ENCOUNTER
----- Message from Ryan Morse sent at 12/14/2020  9:45 AM CST -----  Patient asked if someone would give her a call back asap. She stated she is having really bad cramps and can't use the restroom. .[phone]

## 2020-12-14 NOTE — TELEPHONE ENCOUNTER
Returned call pt states she calling to request  something for nausea and pain and She haven't been able to eat anything since . On pain scale 6/10. Informed pt Dr Panchal is not in clinic today and a message will be sent to him. Pt voiced an understanding  12/14/2020 4:10Pm spoke with pt via phone informed pt she needs to go to the ED now so she can be checked out/labs and x-ray per Dr Panchal. During conversation pt would like to know if it is okay to go to the ED in [place] and she her temp is 99.0. informed pt a message will be sent to Dr Panchal in regards to this matter. Patient verbalized an understanding

## 2020-12-14 NOTE — TELEPHONE ENCOUNTER
----- Message from Ryan Morse sent at 12/14/2020  3:42 PM CST -----  .Type:  Patient Returning Call    Who Called:GENET JEWELL [[id number]]  Who Left Message for Patient:Nurse  Does the patient know what this is regarding?:  Would the patient rather a call back or a response via [facility]? Call   Best Call Back Number:.[phone] (home)    Additional Information:

## 2020-12-14 NOTE — ED PROVIDER NOTES
Encounter Date: 12/14/2020       History     Chief Complaint   Patient presents with    Nausea     incisional pain , nausea and temp99. constipated but took stool softener and bm today. umb hernia repair on thurs.     Chief complaint:  Abdominal pain    History of present illness:  70-year-old female had hernia repair done 4 days ago robotically.  She states that since the surgery she has been having abdominal pain with nausea and with a diminished appetite.  She states she has been drinking water.  She denies abdominal distension.  Localization of pain is in the upper abdomen and left upper quadrant.  She denies any radiation of the pain.  Severity of the pain is 10/10.  She took a Zofran given to her by her daughter for nausea.  She denies any fever and chills stating her temperature was 99.0° prior to arrival.  Patient does not complain of vomiting.  No complaints of diarrhea.  No generalized achiness.  No shortness of breath chest pain.        Review of patient's allergies indicates:   Allergen Reactions    Dye Anaphylaxis     Contrast Dye    Iodine and iodide containing products Rash    Penicillins Shortness Of Breath     Shortness of breath^severe skin rash    Statins-hmg-coa reductase inhibitors      Leg cramps:     Cephalexin Nausea And Vomiting    Diazepam Anxiety     Made pt overly anxious instead of relaxing    Naproxen Nausea And Vomiting     Past Medical History:   Diagnosis Date    Asthma     childhood - seasonal as an adult    Hyperlipidemia     Hypertension     Spinal stenosis      Past Surgical History:   Procedure Laterality Date    BACK SURGERY      BREAST BIOPSY      CARDIAC CATHETERIZATION  2018, 11/2020    HYSTERECTOMY      LYMPH NODE BIOPSY      ROBOT-ASSISTED REPAIR OF VENTRAL HERNIA USING DA JIM XI N/A 12/10/2020    Procedure: XI ROBOTIC REPAIR, HERNIA, VENTRAL;  Surgeon: Brandyn Panchal MD;  Location: [facility];  Service: General;  Laterality: N/A;    TONSILLECTOMY        Family History   Problem Relation Age of Onset    Heart disease Mother      Social History     Tobacco Use    Smoking status: Current Every Day Smoker     Packs/day: 0.50     Years: 30.00     Pack years: 15.00     Types: Cigarettes    Smokeless tobacco: Former User    Tobacco comment: none past MN before surgery   Substance Use Topics    Alcohol use: Yes     Comment: none 72 hrs prior to surgery    Drug use: No     Review of Systems   Constitutional: Positive for appetite change (Diminished). Negative for activity change, chills and fever.   HENT: Negative.    Eyes: Negative.    Respiratory: Negative.  Negative for shortness of breath.    Cardiovascular: Negative for chest pain and palpitations.   Gastrointestinal: Positive for abdominal pain and nausea. Negative for abdominal distention, constipation, diarrhea and vomiting.   Genitourinary: Negative for decreased urine volume, difficulty urinating, dysuria, enuresis, pelvic pain and urgency.   Musculoskeletal: Negative.    Neurological: Negative.    Psychiatric/Behavioral: Negative.    All other systems reviewed and are negative.      Physical Exam     Initial Vitals [12/14/20 1706]   BP Pulse Resp Temp SpO2   118/65 102 16 98 °F (36.7 °C) 95 %      MAP       --         Physical Exam    Nursing note and vitals reviewed.  Constitutional: She appears well-developed and well-nourished. No distress.   HENT:   Head: Normocephalic and atraumatic.   Right Ear: External ear normal.   Left Ear: External ear normal.   Eyes: Conjunctivae and EOM are normal.   Neck: Normal range of motion. Neck supple.   Cardiovascular: Regular rhythm and normal pulses. Tachycardia present.    Pulmonary/Chest: Breath sounds normal. No respiratory distress.   Abdominal: Soft. She exhibits no distension and no mass. There is abdominal tenderness (Predominantly for umbilical and left upper quadrant). There is guarding. There is no rebound.   Musculoskeletal: Normal range of motion.    Neurological: She is alert and oriented to person, place, and time. She has normal strength. GCS score is 15. GCS eye subscore is 4. GCS verbal subscore is 5. GCS motor subscore is 6.   Skin: Skin is warm and dry.   No signs of infection to the incisional sites in the abdominal wall   Psychiatric: She has a normal mood and affect. Her behavior is normal. Judgment and thought content normal.         ED Course   Critical Care    Date/Time: 12/14/2020 8:34 PM  Performed by: Jin Lopez Jr., MD  Authorized by: Jin Lopez Jr., MD   Direct patient critical care time: 10 minutes  Additional history critical care time: 10 minutes  Ordering / reviewing critical care time: 10 minutes  Documentation critical care time: 10 minutes  Consulting other physicians critical care time: 10 minutes  Consult with family critical care time: 10 minutes  Other critical care time: 15 minutes  Total critical care time (exclusive of procedural time) : 75 minutes  Critical care time was exclusive of separately billable procedures and treating other patients and teaching time.  Critical care was necessary to treat or prevent imminent or life-threatening deterioration of the following conditions: renal failure and endocrine crisis.  Critical care was time spent personally by me on the following activities: development of treatment plan with patient or surrogate, blood draw for specimens, discussions with consultants, interpretation of cardiac output measurements, evaluation of patient's response to treatment, examination of patient, obtaining history from patient or surrogate, ordering and performing treatments and interventions, ordering and review of laboratory studies, ordering and review of radiographic studies, pulse oximetry, re-evaluation of patient's condition and review of old charts.        Labs Reviewed   CBC W/ AUTO DIFFERENTIAL - Abnormal; Notable for the following components:       Result Value    Lymph % 17.1 (*)     All  other components within normal limits   COMPREHENSIVE METABOLIC PANEL - Abnormal; Notable for the following components:    Potassium 3.3 (*)     Glucose 114 (*)     BUN 7 (*)     All other components within normal limits   URINALYSIS, REFLEX TO URINE CULTURE - Abnormal; Notable for the following components:    Ketones, UA 1+ (*)     Bilirubin (UA) 1+ (*)     Occult Blood UA 2+ (*)     All other components within normal limits    Narrative:     Specimen Source->Urine   URINALYSIS MICROSCOPIC - Abnormal; Notable for the following components:    RBC, UA 6 (*)     All other components within normal limits    Narrative:     Specimen Source->Urine   LIPASE          Imaging Results          X-Ray Abdomen Flat And Erect (Final result)  Result time 12/14/20 18:37:35    Final result by Kojo Kruse MD (12/14/20 18:37:35)                 Impression:      Nonobstructive small bowel gas pattern.    Subcutaneous emphysema along the left abdominal wall likely postprocedural.  Clinical correlation advised.      Electronically signed by: Kojo Kruse  Date:    12/14/2020  Time:    18:37             Narrative:    EXAMINATION:  XR ABDOMEN FLAT AND ERECT    CLINICAL HISTORY:  Unspecified abdominal pain    TECHNIQUE:  Flat and erect AP views of the abdomen were performed.    COMPARISON:  None    FINDINGS:  No air-fluid levels on upright radiograph.  No dilated loops of small bowel on supine radiograph.    Stool burden within the colon is within normal limits.    No calculi overlie the renal shadows.    Prior lumbar posterior instrumented fusion.  Osseous structures are grossly intact.  Lung bases are clear.    Left-sided free air overlying the probable area of surgical incision.  Clinical correlation advised.                                 Medical Decision Making:   ED Management:  Patient spoke with Dr. Panchal's answering service and was advised to come to the emergency department for blood work and x-rays period  Care  "transitioned to Dr. Lopez at 6:00 p.m. for follow-up of results and discussion with Dr. Panchal or the on-call surgeon in all likelihood if laboratory results/x-ray results indicate a need for consultation.  It is my opinion the patient does not have an acute abdomen at this time and may have some residual CO2 from her surgery intra-abdominal but she does not appear toxic.             Vitals:    12/14/20 1706 12/14/20 1812 12/14/20 1819 12/14/20 1948   BP: 118/65  (!) 94/56    Pulse: 102   74   Resp: 16 20 20   Temp: 98 °F (36.7 °C)      TempSrc: Oral      SpO2: 95%      Weight: 65.5 kg (144 lb 8.2 oz)      Height: 5' 2" (1.575 m)       12/14/20 2006 12/14/20 2010 12/14/20 2050 12/14/20 2100   BP: 128/61  (!) 123/90    Pulse:  92 105    Resp:    20   Temp:       TempSrc:       SpO2:  99% 97%    Weight:       Height:           Results for orders placed or performed during the hospital encounter of 12/14/20   CBC W/ AUTO DIFFERENTIAL   Result Value Ref Range    WBC 9.23 3.90 - 12.70 K/uL    RBC 4.37 4.00 - 5.40 M/uL    Hemoglobin 13.4 12.0 - 16.0 g/dL    Hematocrit 40.1 37.0 - 48.5 %    MCV 92 82 - 98 fL    MCH 30.7 27.0 - 31.0 pg    MCHC 33.4 32.0 - 36.0 g/dL    RDW 13.2 11.5 - 14.5 %    Platelets 284 150 - 350 K/uL    MPV 9.9 9.2 - 12.9 fL    Immature Granulocytes 0.3 0.0 - 0.5 %    Gran # (ANC) 6.6 1.8 - 7.7 K/uL    Immature Grans (Abs) 0.03 0.00 - 0.04 K/uL    Lymph # 1.6 1.0 - 4.8 K/uL    Mono # 0.9 0.3 - 1.0 K/uL    Eos # 0.1 0.0 - 0.5 K/uL    Baso # 0.04 0.00 - 0.20 K/uL    nRBC 0 0 /100 WBC    Gran % 71.2 38.0 - 73.0 %    Lymph % 17.1 (L) 18.0 - 48.0 %    Mono % 9.6 4.0 - 15.0 %    Eosinophil % 1.4 0.0 - 8.0 %    Basophil % 0.4 0.0 - 1.9 %    Differential Method Automated    Comp. Metabolic Panel   Result Value Ref Range    Sodium 137 136 - 145 mmol/L    Potassium 3.3 (L) 3.5 - 5.1 mmol/L    Chloride 97 95 - 110 mmol/L    CO2 28 23 - 29 mmol/L    Glucose 114 (H) 70 - 110 mg/dL    BUN 7 (L) 8 - 23 mg/dL    " Creatinine 0.7 0.5 - 1.4 mg/dL    Calcium 9.3 8.7 - 10.5 mg/dL    Total Protein 7.0 6.0 - 8.4 g/dL    Albumin 3.5 3.5 - 5.2 g/dL    Total Bilirubin 0.4 0.1 - 1.0 mg/dL    Alkaline Phosphatase 59 55 - 135 U/L    AST 12 10 - 40 U/L    ALT 12 10 - 44 U/L    Anion Gap 12 8 - 16 mmol/L    eGFR if African American >60.0 >60 mL/min/1.73 m^2    eGFR if non African American >60.0 >60 mL/min/1.73 m^2   Lipase   Result Value Ref Range    Lipase 6 4 - 60 U/L   Urinalysis, Reflex to Urine Culture Urine, Clean Catch    Specimen: Urine   Result Value Ref Range    Specimen UA Urine, Clean Catch     Color, UA Yellow Yellow, Straw, Laila    Appearance, UA Clear Clear    pH, UA 6.0 5.0 - 8.0    Specific Gravity, UA 1.020 1.005 - 1.030    Protein, UA Negative Negative    Glucose, UA Negative Negative    Ketones, UA 1+ (A) Negative    Bilirubin (UA) 1+ (A) Negative    Occult Blood UA 2+ (A) Negative    Nitrite, UA Negative Negative    Urobilinogen, UA Negative <2.0 EU/dL    Leukocytes, UA Negative Negative   Urinalysis Microscopic   Result Value Ref Range    RBC, UA 6 (H) 0 - 4 /hpf    WBC, UA 2 0 - 5 /hpf    Bacteria Rare None-Occ /hpf    Squam Epithel, UA 4 /hpf    Microscopic Comment SEE COMMENT          Imaging Results          X-Ray Abdomen Flat And Erect (Final result)  Result time 12/14/20 18:37:35    Final result by Kojo Kruse MD (12/14/20 18:37:35)                 Impression:      Nonobstructive small bowel gas pattern.    Subcutaneous emphysema along the left abdominal wall likely postprocedural.  Clinical correlation advised.      Electronically signed by: Kojo Kruse  Date:    12/14/2020  Time:    18:37             Narrative:    EXAMINATION:  XR ABDOMEN FLAT AND ERECT    CLINICAL HISTORY:  Unspecified abdominal pain    TECHNIQUE:  Flat and erect AP views of the abdomen were performed.    COMPARISON:  None    FINDINGS:  No air-fluid levels on upright radiograph.  No dilated loops of small bowel on supine  radiograph.    Stool burden within the colon is within normal limits.    No calculi overlie the renal shadows.    Prior lumbar posterior instrumented fusion.  Osseous structures are grossly intact.  Lung bases are clear.    Left-sided free air overlying the probable area of surgical incision.  Clinical correlation advised.                                Medications   ondansetron injection 4 mg (4 mg Intravenous Given 12/14/20 1812)   morphine injection 2 mg (2 mg Intravenous Given 12/14/20 1812)   sodium chloride 0.9% bolus 1,000 mL (0 mLs Intravenous Stopped 12/14/20 2023)   potassium bicarbonate disintegrating tablet 25 mEq (25 mEq Oral Given 12/14/20 2046)         6:03 PM  - Re-evaluation:  The patient is resting comfortably and is in no acute distress.  Agree with Dr. Muhammad' assessment. Pt still c/o generalized abd pain, + BS, no rebound or guarding. No emesis while here in ER. Pt just received morphine, which is improving pain. Pt states her last BM was today and has been passing flatus. Also, she states she has not progressed her liquid diet.  Discussed test results and notified of pending labs. Answered questions at this time. Will continue to monitor closely.     7:40 PM Re-evaluation. Dr. Lopez reassessed the pt. The pt is resting comfortably and is in no acute distress.  Pain has improved. Discussed available test results and notified pt of pending labs. Answered any questions at this time.     8:40 PM Consult: Dr. Lopez discussed the case with Dr. Panchal (gen surg). Agrees with current management. Recommends since no emergent surgical intervention with normal labs and no obstruction or free air on abd xr, ok to refill pain medication and f/u as scheduled.     8:40 PM - Re-evaluation: The patient is resting comfortably and is in no acute distress. She states that her symptoms have improved after treatment within ER. Discussed test results, shared treatment plan, specific conditions for return, and  importance of follow up with patient and family.  Advised close f/u c Dr. Panchal per apt scheduled in 8 days and to return if symptoms persist or worsen.   She understands and agrees with the plan as discussed. Answered  her questions at this time. She has remained hemodynamically stable throughout the ED course and is appropriate for discharge home.     Regarding ABDOMINAL PAIN, I recommended that the patient: Sip water or other clear fluids; avoid solid food for the first few hours after vomiting or diarrhea; if vomiting, wait 6 hours, and then eat small amounts of mild foods such as rice, applesauce, or crackers; avoid dairy products; avoid citrus, high-fat foods, fried or greasy foods, tomato products, caffeine, alcohol, and carbonated beverages;  avoid aspirin, ibuprofen or other anti-inflammatory medications, and narcotic pain medications unless prescribed.  In regards to prevention, I encouraged patient to:  Avoid fatty or greasy foods; drink plenty of water each day; eat small meals more frequently; exercise regularly; limit foods that produce gas; make sure meals are well-balanced and high in fiber and include plenty of fruits and vegetables.    Driving or other activities under influence of medications - Patient and/or family/caretaker was given a prescription for, or instructed to use a medicine that may impair ability to drive, operate machinery, or participate in other potentially dangerous activities.  Patient was instructed not to participate in these activities while under the influence of these medications.    Pre-hypertension/Hypertension: The pt has been informed that they may have pre-hypertension or hypertension based on a blood pressure reading in the ED. I recommend that the pt call the PCP listed on their discharge instructions or a physician of their choice this week to arrange f/u for further evaluation of possible pre-hypertension or hypertension.     Salud Echevarria was given a handout which  discussed their disease process, precautions, and instructions for follow-up and therapy.    Follow-up Information     Flaco Brownlee DO. Schedule an appointment as soon as possible for a visit in 1 week.    Specialty: Family Medicine  Contact information:  [address] [address] [phone]             Schedule an appointment as soon as possible for a visit  with Brandyn Panchal MD.    Specialties: General Surgery, Bariatrics  Why: per apt already scheduled  Contact information:  [address]  [place] [address] [phone]             [facility].    Specialty: Emergency Medicine  Why: As needed, If symptoms worsen  Contact information:  [address] [address] [address] [phone]                    Medication List      START taking these medications    ondansetron 4 MG Tbdl  Commonly known as: ZOFRAN-ODT  Take 1 tablet (4 mg total) by mouth every 6 (six) hours as needed (NAUSEA/VOMITING).        CONTINUE taking these medications    oxyCODONE-acetaminophen 5-325 mg per tablet  Commonly known as: PERCOCET  Take 1 tablet by mouth every 8 (eight) hours as needed for Pain.        ASK your doctor about these medications    albuterol 90 mcg/actuation inhaler  Commonly known as: PROVENTIL/VENTOLIN HFA  Inhale 2 puffs into the lungs every 4 (four) hours as needed for Wheezing.     ALPRAZolam 0.25 MG tablet  Commonly known as: XANAX  Take 1 tablet (0.25 mg total) by mouth once. Take 10-20 minutes prior to imaging for 1 dose     aspirin 81 MG EC tablet  Commonly known as: ECOTRIN     meclizine 25 mg tablet  Commonly known as: ANTIVERT  Take 1 tablet (25 mg total) by mouth 3 (three) times daily as needed for Dizziness.     nicotine 14 mg/24 hr  Commonly known as: NICODERM CQ  Place 1 patch onto the skin once daily. See pharmacy note at bottom     nicotine polacrilex 2 MG Lozg  Take 1 lozenge (2 mg total) by mouth as needed. See note     ONE DAILY MULTIVITAMIN per  tablet  Generic drug: multivitamin     pravastatin 40 MG tablet  Commonly known as: PRAVACHOL  Take 1 tablet by mouth once daily           Where to Get Your Medications      You can get these medications from any pharmacy    Bring a paper prescription for each of these medications  · ondansetron 4 MG Tbdl  · oxyCODONE-acetaminophen 5-325 mg per tablet        Current Discharge Medication List            ED Diagnosis  1. Post-op pain    2. Abdominal pain                           Clinical Impression:     ICD-10-CM ICD-9-CM   1. Post-op pain  G89.18 338.18   2. Abdominal pain  R10.9 789.00                      Disposition:   Disposition: Discharged  Condition: Stable     ED Disposition Condition    Discharge Stable        ED Prescriptions     Medication Sig Dispense Start Date End Date Auth. Provider    oxyCODONE-acetaminophen (PERCOCET) 5-325 mg per tablet Take 1 tablet by mouth every 8 (eight) hours as needed for Pain. 20 tablet 12/14/2020  Jin Lopez Jr., MD    ondansetron (ZOFRAN-ODT) 4 MG TbDL Take 1 tablet (4 mg total) by mouth every 6 (six) hours as needed (NAUSEA/VOMITING). 20 tablet 12/14/2020  Jin Lopez Jr., MD        Follow-up Information     Follow up With Specialties Details Why Contact Info    Flaco Brownlee,  Family Medicine Schedule an appointment as soon as possible for a visit in 1 week  [address] [address] [address] [phone]      Brandyn Panchal MD General Surgery, Bariatrics Schedule an appointment as soon as possible for a visit  per apt already scheduled [address]  [place] [address] [phone]      [facility]  As needed, If symptoms worsen [address] [address] [address] [phone]                                       Jin Lopez Jr., MD  12/14/20 0868

## 2020-12-15 ENCOUNTER — PES CALL (OUTPATIENT)
Dept: ADMINISTRATIVE | Facility: CLINIC | Age: 70
End: 2020-12-15

## 2020-12-15 NOTE — DISCHARGE INSTRUCTIONS
Regarding ABDOMINAL PAIN, I recommended that the patient: Sip water or other clear fluids; avoid solid food for the first few hours after vomiting or diarrhea; if vomiting, wait 6 hours, and then eat small amounts of mild foods such as rice, applesauce, or crackers; avoid dairy products; avoid citrus, high-fat foods, fried or greasy foods, tomato products, caffeine, alcohol, and carbonated beverages;  avoid aspirin, ibuprofen or other anti-inflammatory medications, and narcotic pain medications unless prescribed.  In regards to prevention, I encouraged patient to:  Avoid fatty or greasy foods; drink plenty of water each day; eat small meals more frequently; exercise regularly; limit foods that produce gas; make sure meals are well-balanced and high in fiber and include plenty of fruits and vegetables.    Driving or other activities under influence of medications - Patient and/or family/caretaker was given a prescription for, or instructed to use a medicine that may impair ability to drive, operate machinery, or participate in other potentially dangerous activities.  Patient was instructed not to participate in these activities while under the influence of these medications.

## 2020-12-18 VITALS
SYSTOLIC BLOOD PRESSURE: 126 MMHG | WEIGHT: 143.75 LBS | HEART RATE: 91 BPM | BODY MASS INDEX: 26.45 KG/M2 | TEMPERATURE: 99 F | HEIGHT: 62 IN | OXYGEN SATURATION: 91 % | DIASTOLIC BLOOD PRESSURE: 60 MMHG | RESPIRATION RATE: 15 BRPM

## 2020-12-22 ENCOUNTER — OFFICE VISIT (OUTPATIENT)
Dept: SURGERY | Facility: CLINIC | Age: 70
End: 2020-12-22
Payer: MEDICARE

## 2020-12-22 VITALS
WEIGHT: 145.75 LBS | SYSTOLIC BLOOD PRESSURE: 140 MMHG | DIASTOLIC BLOOD PRESSURE: 74 MMHG | HEART RATE: 64 BPM | TEMPERATURE: 97 F | HEIGHT: 62 IN | BODY MASS INDEX: 26.82 KG/M2

## 2020-12-22 DIAGNOSIS — Z87.19 S/P LAPAROSCOPIC HERNIA REPAIR: Primary | ICD-10-CM

## 2020-12-22 DIAGNOSIS — Z98.890 S/P LAPAROSCOPIC HERNIA REPAIR: Primary | ICD-10-CM

## 2020-12-22 PROCEDURE — 99999 PR PBB SHADOW E&M-EST. PATIENT-LVL III: ICD-10-PCS | Mod: PBBFAC,,, | Performed by: SURGERY

## 2020-12-22 PROCEDURE — 99999 PR PBB SHADOW E&M-EST. PATIENT-LVL III: CPT | Mod: PBBFAC,,, | Performed by: SURGERY

## 2020-12-22 PROCEDURE — 3008F BODY MASS INDEX DOCD: CPT | Mod: CPTII,S$GLB,, | Performed by: SURGERY

## 2020-12-22 PROCEDURE — 3008F PR BODY MASS INDEX (BMI) DOCUMENTED: ICD-10-PCS | Mod: CPTII,S$GLB,, | Performed by: SURGERY

## 2020-12-22 PROCEDURE — 1101F PT FALLS ASSESS-DOCD LE1/YR: CPT | Mod: CPTII,S$GLB,, | Performed by: SURGERY

## 2020-12-22 PROCEDURE — 1101F PR PT FALLS ASSESS DOC 0-1 FALLS W/OUT INJ PAST YR: ICD-10-PCS | Mod: CPTII,S$GLB,, | Performed by: SURGERY

## 2020-12-22 PROCEDURE — 1126F AMNT PAIN NOTED NONE PRSNT: CPT | Mod: S$GLB,,, | Performed by: SURGERY

## 2020-12-22 PROCEDURE — 1126F PR PAIN SEVERITY QUANTIFIED, NO PAIN PRESENT: ICD-10-PCS | Mod: S$GLB,,, | Performed by: SURGERY

## 2020-12-22 PROCEDURE — 3288F FALL RISK ASSESSMENT DOCD: CPT | Mod: CPTII,S$GLB,, | Performed by: SURGERY

## 2020-12-22 PROCEDURE — 3288F PR FALLS RISK ASSESSMENT DOCUMENTED: ICD-10-PCS | Mod: CPTII,S$GLB,, | Performed by: SURGERY

## 2020-12-22 PROCEDURE — 99024 PR POST-OP FOLLOW-UP VISIT: ICD-10-PCS | Mod: S$GLB,,, | Performed by: SURGERY

## 2020-12-22 PROCEDURE — 99024 POSTOP FOLLOW-UP VISIT: CPT | Mod: S$GLB,,, | Performed by: SURGERY

## 2020-12-22 NOTE — PROGRESS NOTES
Salud Echevarria 70 y.o.female  This patient was seen for postoperative visit. Salud Echevarria has no specific complaints and denies of any issues relevant to the surgery. The wound has healed without any complications.  I have discussed the pathology result with the patient. Salud Echevarria will follow up as needed.     Brandyn Panchal

## 2020-12-28 ENCOUNTER — TELEPHONE (OUTPATIENT)
Dept: SURGERY | Facility: CLINIC | Age: 70
End: 2020-12-28

## 2020-12-28 NOTE — TELEPHONE ENCOUNTER
Called patient informed no appointment scheduled with Dr Panchal on 12/29/2020 patient voiced understanding.    ----- Message from Krystina Chavez sent at 12/28/2020  2:27 PM CST -----  Type:  Needs Medical Advice    Who Called: Pt  Symptoms (please be specific):    How long has patient had these symptoms:    Pharmacy name and phone #:    Would the patient rather a call back or a response via [facility]? Call   Best Call Back Number: [phone] (home)   Additional Information:   Please call pt back asap. States that she has a appt on tomorrow and does not have the time.please call to confirm (appt not showing on this end)

## 2020-12-31 ENCOUNTER — TELEPHONE (OUTPATIENT)
Dept: SURGERY | Facility: CLINIC | Age: 70
End: 2020-12-31

## 2020-12-31 NOTE — TELEPHONE ENCOUNTER
----- Message from Destiny Johnson sent at 12/31/2020  8:27 AM CST -----  Contact: Salud Brannon would like a call back at [phone], Regards to if she can wear the body wrap during the day.    Thanks  Td

## 2020-12-31 NOTE — TELEPHONE ENCOUNTER
Returned call pt states, can not wear the abd binder all day and night, unable to sleep at night because the binder cause pain and would like to know if it is okay to take if off at times. Informed pt Dr Panchal is not in clinic today and a message will be sent to him. Pt voiced an understanding.

## 2021-01-15 ENCOUNTER — TELEPHONE (OUTPATIENT)
Dept: SURGERY | Facility: CLINIC | Age: 71
End: 2021-01-15

## 2021-01-15 RX ORDER — OXYCODONE AND ACETAMINOPHEN 5; 325 MG/1; MG/1
1 TABLET ORAL EVERY 6 HOURS PRN
Qty: 20 TABLET | Refills: 0 | Status: SHIPPED | OUTPATIENT
Start: 2021-01-15 | End: 2021-02-03

## 2021-01-19 ENCOUNTER — OFFICE VISIT (OUTPATIENT)
Dept: SURGERY | Facility: CLINIC | Age: 71
End: 2021-01-19
Payer: MEDICARE

## 2021-01-19 VITALS
WEIGHT: 148.38 LBS | HEART RATE: 63 BPM | BODY MASS INDEX: 27.14 KG/M2 | SYSTOLIC BLOOD PRESSURE: 136 MMHG | DIASTOLIC BLOOD PRESSURE: 73 MMHG

## 2021-01-19 DIAGNOSIS — Z87.19 S/P REPAIR OF VENTRAL HERNIA: Primary | ICD-10-CM

## 2021-01-19 DIAGNOSIS — Z98.890 S/P REPAIR OF VENTRAL HERNIA: Primary | ICD-10-CM

## 2021-01-19 PROCEDURE — 99024 PR POST-OP FOLLOW-UP VISIT: ICD-10-PCS | Mod: S$GLB,,, | Performed by: SURGERY

## 2021-01-19 PROCEDURE — 99999 PR PBB SHADOW E&M-EST. PATIENT-LVL III: CPT | Mod: PBBFAC,,, | Performed by: SURGERY

## 2021-01-19 PROCEDURE — 99999 PR PBB SHADOW E&M-EST. PATIENT-LVL III: ICD-10-PCS | Mod: PBBFAC,,, | Performed by: SURGERY

## 2021-01-19 PROCEDURE — 3008F BODY MASS INDEX DOCD: CPT | Mod: CPTII,S$GLB,, | Performed by: SURGERY

## 2021-01-19 PROCEDURE — 3008F PR BODY MASS INDEX (BMI) DOCUMENTED: ICD-10-PCS | Mod: CPTII,S$GLB,, | Performed by: SURGERY

## 2021-01-19 PROCEDURE — 1126F PR PAIN SEVERITY QUANTIFIED, NO PAIN PRESENT: ICD-10-PCS | Mod: S$GLB,,, | Performed by: SURGERY

## 2021-01-19 PROCEDURE — 1126F AMNT PAIN NOTED NONE PRSNT: CPT | Mod: S$GLB,,, | Performed by: SURGERY

## 2021-01-19 PROCEDURE — 99024 POSTOP FOLLOW-UP VISIT: CPT | Mod: S$GLB,,, | Performed by: SURGERY

## 2021-01-28 ENCOUNTER — TELEPHONE (OUTPATIENT)
Dept: ADMINISTRATIVE | Facility: HOSPITAL | Age: 71
End: 2021-01-28

## 2021-01-28 DIAGNOSIS — Z12.39 ENCOUNTER FOR BREAST CANCER SCREENING OTHER THAN MAMMOGRAM: Primary | ICD-10-CM

## 2021-01-28 DIAGNOSIS — Z12.31 ENCOUNTER FOR SCREENING MAMMOGRAM FOR BREAST CANCER: ICD-10-CM

## 2021-02-03 ENCOUNTER — OFFICE VISIT (OUTPATIENT)
Dept: INTERNAL MEDICINE | Facility: CLINIC | Age: 71
End: 2021-02-03
Payer: MEDICARE

## 2021-02-03 ENCOUNTER — HOSPITAL ENCOUNTER (OUTPATIENT)
Dept: RADIOLOGY | Facility: HOSPITAL | Age: 71
Discharge: HOME | End: 2021-02-03
Attending: FAMILY MEDICINE
Payer: MEDICARE

## 2021-02-03 VITALS
SYSTOLIC BLOOD PRESSURE: 124 MMHG | DIASTOLIC BLOOD PRESSURE: 62 MMHG | OXYGEN SATURATION: 97 % | RESPIRATION RATE: 18 BRPM | BODY MASS INDEX: 27.83 KG/M2 | HEIGHT: 62 IN | WEIGHT: 151.25 LBS | HEART RATE: 85 BPM | TEMPERATURE: 98 F

## 2021-02-03 VITALS — HEIGHT: 62 IN | BODY MASS INDEX: 27.23 KG/M2 | WEIGHT: 148 LBS

## 2021-02-03 DIAGNOSIS — I70.0 AORTIC ATHEROSCLEROSIS: ICD-10-CM

## 2021-02-03 DIAGNOSIS — K21.00 GASTROESOPHAGEAL REFLUX DISEASE WITH ESOPHAGITIS, UNSPECIFIED WHETHER HEMORRHAGE: ICD-10-CM

## 2021-02-03 DIAGNOSIS — H81.10 BENIGN PAROXYSMAL POSITIONAL VERTIGO, UNSPECIFIED LATERALITY: ICD-10-CM

## 2021-02-03 DIAGNOSIS — Z12.31 ENCOUNTER FOR SCREENING MAMMOGRAM FOR BREAST CANCER: ICD-10-CM

## 2021-02-03 DIAGNOSIS — F41.9 ANXIETY: Primary | ICD-10-CM

## 2021-02-03 PROBLEM — R31.9 HEMATURIA: Status: RESOLVED | Noted: 2019-06-10 | Resolved: 2021-02-03

## 2021-02-03 PROCEDURE — 77067 MAMMO DIGITAL SCREENING BILAT WITH TOMO: ICD-10-PCS | Mod: 26,,, | Performed by: RADIOLOGY

## 2021-02-03 PROCEDURE — 3288F FALL RISK ASSESSMENT DOCD: CPT | Mod: CPTII,S$GLB,, | Performed by: FAMILY MEDICINE

## 2021-02-03 PROCEDURE — 3074F SYST BP LT 130 MM HG: CPT | Mod: CPTII,S$GLB,, | Performed by: FAMILY MEDICINE

## 2021-02-03 PROCEDURE — 1126F AMNT PAIN NOTED NONE PRSNT: CPT | Mod: S$GLB,,, | Performed by: FAMILY MEDICINE

## 2021-02-03 PROCEDURE — 99999 PR PBB SHADOW E&M-EST. PATIENT-LVL III: ICD-10-PCS | Mod: PBBFAC,,, | Performed by: FAMILY MEDICINE

## 2021-02-03 PROCEDURE — 3008F BODY MASS INDEX DOCD: CPT | Mod: CPTII,S$GLB,, | Performed by: FAMILY MEDICINE

## 2021-02-03 PROCEDURE — 77067 SCR MAMMO BI INCL CAD: CPT | Mod: TC,PO

## 2021-02-03 PROCEDURE — 3288F PR FALLS RISK ASSESSMENT DOCUMENTED: ICD-10-PCS | Mod: CPTII,S$GLB,, | Performed by: FAMILY MEDICINE

## 2021-02-03 PROCEDURE — 1126F PR PAIN SEVERITY QUANTIFIED, NO PAIN PRESENT: ICD-10-PCS | Mod: S$GLB,,, | Performed by: FAMILY MEDICINE

## 2021-02-03 PROCEDURE — 3008F PR BODY MASS INDEX (BMI) DOCUMENTED: ICD-10-PCS | Mod: CPTII,S$GLB,, | Performed by: FAMILY MEDICINE

## 2021-02-03 PROCEDURE — 3078F PR MOST RECENT DIASTOLIC BLOOD PRESSURE < 80 MM HG: ICD-10-PCS | Mod: CPTII,S$GLB,, | Performed by: FAMILY MEDICINE

## 2021-02-03 PROCEDURE — 1159F PR MEDICATION LIST DOCUMENTED IN MEDICAL RECORD: ICD-10-PCS | Mod: S$GLB,,, | Performed by: FAMILY MEDICINE

## 2021-02-03 PROCEDURE — 1101F PT FALLS ASSESS-DOCD LE1/YR: CPT | Mod: CPTII,S$GLB,, | Performed by: FAMILY MEDICINE

## 2021-02-03 PROCEDURE — 1101F PR PT FALLS ASSESS DOC 0-1 FALLS W/OUT INJ PAST YR: ICD-10-PCS | Mod: CPTII,S$GLB,, | Performed by: FAMILY MEDICINE

## 2021-02-03 PROCEDURE — 3074F PR MOST RECENT SYSTOLIC BLOOD PRESSURE < 130 MM HG: ICD-10-PCS | Mod: CPTII,S$GLB,, | Performed by: FAMILY MEDICINE

## 2021-02-03 PROCEDURE — 3078F DIAST BP <80 MM HG: CPT | Mod: CPTII,S$GLB,, | Performed by: FAMILY MEDICINE

## 2021-02-03 PROCEDURE — 77063 MAMMO DIGITAL SCREENING BILAT WITH TOMO: ICD-10-PCS | Mod: 26,,, | Performed by: RADIOLOGY

## 2021-02-03 PROCEDURE — 77063 BREAST TOMOSYNTHESIS BI: CPT | Mod: 26,,, | Performed by: RADIOLOGY

## 2021-02-03 PROCEDURE — 1159F MED LIST DOCD IN RCRD: CPT | Mod: S$GLB,,, | Performed by: FAMILY MEDICINE

## 2021-02-03 PROCEDURE — 99214 OFFICE O/P EST MOD 30 MIN: CPT | Mod: S$GLB,,, | Performed by: FAMILY MEDICINE

## 2021-02-03 PROCEDURE — 99999 PR PBB SHADOW E&M-EST. PATIENT-LVL III: CPT | Mod: PBBFAC,,, | Performed by: FAMILY MEDICINE

## 2021-02-03 PROCEDURE — 99214 PR OFFICE/OUTPT VISIT, EST, LEVL IV, 30-39 MIN: ICD-10-PCS | Mod: S$GLB,,, | Performed by: FAMILY MEDICINE

## 2021-02-03 PROCEDURE — 77067 SCR MAMMO BI INCL CAD: CPT | Mod: 26,,, | Performed by: RADIOLOGY

## 2021-02-03 RX ORDER — MECLIZINE HYDROCHLORIDE 25 MG/1
25 TABLET ORAL 3 TIMES DAILY PRN
Qty: 30 TABLET | Refills: 0 | Status: SHIPPED | OUTPATIENT
Start: 2021-02-03 | End: 2021-05-03

## 2021-02-03 RX ORDER — BUSPIRONE HYDROCHLORIDE 5 MG/1
5 TABLET ORAL 2 TIMES DAILY
Qty: 60 TABLET | Refills: 0 | Status: SHIPPED | OUTPATIENT
Start: 2021-02-03 | End: 2021-05-03

## 2021-02-14 ENCOUNTER — TELEPHONE (OUTPATIENT)
Dept: UROLOGY | Facility: CLINIC | Age: 71
End: 2021-02-14

## 2021-02-17 ENCOUNTER — TELEPHONE (OUTPATIENT)
Dept: UROLOGY | Facility: CLINIC | Age: 71
End: 2021-02-17

## 2021-02-22 ENCOUNTER — OFFICE VISIT (OUTPATIENT)
Dept: UROLOGY | Facility: CLINIC | Age: 71
End: 2021-02-22
Payer: MEDICARE

## 2021-02-22 VITALS
BODY MASS INDEX: 27.66 KG/M2 | WEIGHT: 151.25 LBS | SYSTOLIC BLOOD PRESSURE: 146 MMHG | TEMPERATURE: 98 F | DIASTOLIC BLOOD PRESSURE: 68 MMHG

## 2021-02-22 DIAGNOSIS — R31.21 ASYMPTOMATIC MICROSCOPIC HEMATURIA: Primary | ICD-10-CM

## 2021-02-22 DIAGNOSIS — N39.41 URGE INCONTINENCE: ICD-10-CM

## 2021-02-22 LAB
BILIRUB SERPL-MCNC: ABNORMAL MG/DL
BLOOD URINE, POC: 250
CLARITY, POC UA: CLEAR
COLOR, POC UA: YELLOW
GLUCOSE UR QL STRIP: ABNORMAL
KETONES UR QL STRIP: ABNORMAL
LEUKOCYTE ESTERASE URINE, POC: ABNORMAL
NITRITE, POC UA: ABNORMAL
PH, POC UA: 5
POC RESIDUAL URINE VOLUME: 3 ML (ref 0–100)
PROTEIN, POC: ABNORMAL
SPECIFIC GRAVITY, POC UA: 1.02
UROBILINOGEN, POC UA: ABNORMAL

## 2021-02-22 PROCEDURE — 3077F PR MOST RECENT SYSTOLIC BLOOD PRESSURE >= 140 MM HG: ICD-10-PCS | Mod: CPTII,S$GLB,, | Performed by: UROLOGY

## 2021-02-22 PROCEDURE — 3288F PR FALLS RISK ASSESSMENT DOCUMENTED: ICD-10-PCS | Mod: CPTII,S$GLB,, | Performed by: UROLOGY

## 2021-02-22 PROCEDURE — 99214 PR OFFICE/OUTPT VISIT, EST, LEVL IV, 30-39 MIN: ICD-10-PCS | Mod: 25,S$GLB,, | Performed by: UROLOGY

## 2021-02-22 PROCEDURE — 81002 URINALYSIS NONAUTO W/O SCOPE: CPT | Mod: S$GLB,,, | Performed by: UROLOGY

## 2021-02-22 PROCEDURE — 99214 OFFICE O/P EST MOD 30 MIN: CPT | Mod: 25,S$GLB,, | Performed by: UROLOGY

## 2021-02-22 PROCEDURE — 88112 CYTOPATH CELL ENHANCE TECH: CPT | Performed by: PATHOLOGY

## 2021-02-22 PROCEDURE — 99999 PR PBB SHADOW E&M-EST. PATIENT-LVL III: CPT | Mod: PBBFAC,,, | Performed by: UROLOGY

## 2021-02-22 PROCEDURE — 88112 PR  CYTOPATH, CELL ENHANCE TECH: ICD-10-PCS | Mod: 26,,, | Performed by: PATHOLOGY

## 2021-02-22 PROCEDURE — 1126F PR PAIN SEVERITY QUANTIFIED, NO PAIN PRESENT: ICD-10-PCS | Mod: S$GLB,,, | Performed by: UROLOGY

## 2021-02-22 PROCEDURE — 3288F FALL RISK ASSESSMENT DOCD: CPT | Mod: CPTII,S$GLB,, | Performed by: UROLOGY

## 2021-02-22 PROCEDURE — 99999 PR PBB SHADOW E&M-EST. PATIENT-LVL III: ICD-10-PCS | Mod: PBBFAC,,, | Performed by: UROLOGY

## 2021-02-22 PROCEDURE — 3077F SYST BP >= 140 MM HG: CPT | Mod: CPTII,S$GLB,, | Performed by: UROLOGY

## 2021-02-22 PROCEDURE — 1101F PT FALLS ASSESS-DOCD LE1/YR: CPT | Mod: CPTII,S$GLB,, | Performed by: UROLOGY

## 2021-02-22 PROCEDURE — 3078F PR MOST RECENT DIASTOLIC BLOOD PRESSURE < 80 MM HG: ICD-10-PCS | Mod: CPTII,S$GLB,, | Performed by: UROLOGY

## 2021-02-22 PROCEDURE — 3078F DIAST BP <80 MM HG: CPT | Mod: CPTII,S$GLB,, | Performed by: UROLOGY

## 2021-02-22 PROCEDURE — 3008F BODY MASS INDEX DOCD: CPT | Mod: CPTII,S$GLB,, | Performed by: UROLOGY

## 2021-02-22 PROCEDURE — 1101F PR PT FALLS ASSESS DOC 0-1 FALLS W/OUT INJ PAST YR: ICD-10-PCS | Mod: CPTII,S$GLB,, | Performed by: UROLOGY

## 2021-02-22 PROCEDURE — 51798 POCT BLADDER SCAN: ICD-10-PCS | Mod: S$GLB,,, | Performed by: UROLOGY

## 2021-02-22 PROCEDURE — 1126F AMNT PAIN NOTED NONE PRSNT: CPT | Mod: S$GLB,,, | Performed by: UROLOGY

## 2021-02-22 PROCEDURE — 51798 US URINE CAPACITY MEASURE: CPT | Mod: S$GLB,,, | Performed by: UROLOGY

## 2021-02-22 PROCEDURE — 1159F MED LIST DOCD IN RCRD: CPT | Mod: S$GLB,,, | Performed by: UROLOGY

## 2021-02-22 PROCEDURE — 3008F PR BODY MASS INDEX (BMI) DOCUMENTED: ICD-10-PCS | Mod: CPTII,S$GLB,, | Performed by: UROLOGY

## 2021-02-22 PROCEDURE — 1159F PR MEDICATION LIST DOCUMENTED IN MEDICAL RECORD: ICD-10-PCS | Mod: S$GLB,,, | Performed by: UROLOGY

## 2021-02-22 PROCEDURE — 88112 CYTOPATH CELL ENHANCE TECH: CPT | Mod: 26,,, | Performed by: PATHOLOGY

## 2021-02-22 PROCEDURE — 81002 POCT URINE DIPSTICK WITHOUT MICROSCOPE: ICD-10-PCS | Mod: S$GLB,,, | Performed by: UROLOGY

## 2021-02-22 RX ORDER — SOLIFENACIN SUCCINATE 10 MG/1
10 TABLET, FILM COATED ORAL DAILY
Qty: 30 TABLET | Refills: 11 | Status: SHIPPED | OUTPATIENT
Start: 2021-02-22 | End: 2021-03-01

## 2021-02-24 LAB — FINAL PATHOLOGIC DIAGNOSIS: NORMAL

## 2021-02-26 ENCOUNTER — TELEPHONE (OUTPATIENT)
Dept: INTERNAL MEDICINE | Facility: CLINIC | Age: 71
End: 2021-02-26

## 2021-03-01 ENCOUNTER — OFFICE VISIT (OUTPATIENT)
Dept: INTERNAL MEDICINE | Facility: CLINIC | Age: 71
End: 2021-03-01
Payer: MEDICARE

## 2021-03-01 ENCOUNTER — TELEPHONE (OUTPATIENT)
Dept: UROLOGY | Facility: CLINIC | Age: 71
End: 2021-03-01

## 2021-03-01 VITALS
WEIGHT: 153.88 LBS | BODY MASS INDEX: 28.32 KG/M2 | HEIGHT: 62 IN | HEART RATE: 68 BPM | TEMPERATURE: 98 F | DIASTOLIC BLOOD PRESSURE: 52 MMHG | RESPIRATION RATE: 18 BRPM | SYSTOLIC BLOOD PRESSURE: 100 MMHG | OXYGEN SATURATION: 97 %

## 2021-03-01 DIAGNOSIS — J44.9 CHRONIC OBSTRUCTIVE PULMONARY DISEASE, UNSPECIFIED COPD TYPE: ICD-10-CM

## 2021-03-01 DIAGNOSIS — M48.07 SPINAL STENOSIS OF LUMBOSACRAL REGION: ICD-10-CM

## 2021-03-01 DIAGNOSIS — M54.42 ACUTE LEFT-SIDED LOW BACK PAIN WITH LEFT-SIDED SCIATICA: Primary | ICD-10-CM

## 2021-03-01 DIAGNOSIS — F41.9 ANXIETY: ICD-10-CM

## 2021-03-01 PROBLEM — Z11.59 ENCOUNTER FOR HEPATITIS C SCREENING TEST FOR LOW RISK PATIENT: Status: RESOLVED | Noted: 2019-06-10 | Resolved: 2021-03-01

## 2021-03-01 PROBLEM — Z12.11 COLON CANCER SCREENING: Status: RESOLVED | Noted: 2019-06-10 | Resolved: 2021-03-01

## 2021-03-01 PROCEDURE — 3288F FALL RISK ASSESSMENT DOCD: CPT | Mod: CPTII,S$GLB,, | Performed by: NURSE PRACTITIONER

## 2021-03-01 PROCEDURE — 1101F PR PT FALLS ASSESS DOC 0-1 FALLS W/OUT INJ PAST YR: ICD-10-PCS | Mod: CPTII,S$GLB,, | Performed by: NURSE PRACTITIONER

## 2021-03-01 PROCEDURE — 3074F PR MOST RECENT SYSTOLIC BLOOD PRESSURE < 130 MM HG: ICD-10-PCS | Mod: CPTII,S$GLB,, | Performed by: NURSE PRACTITIONER

## 2021-03-01 PROCEDURE — 3074F SYST BP LT 130 MM HG: CPT | Mod: CPTII,S$GLB,, | Performed by: NURSE PRACTITIONER

## 2021-03-01 PROCEDURE — 1125F AMNT PAIN NOTED PAIN PRSNT: CPT | Mod: S$GLB,,, | Performed by: NURSE PRACTITIONER

## 2021-03-01 PROCEDURE — 99999 PR PBB SHADOW E&M-EST. PATIENT-LVL IV: CPT | Mod: PBBFAC,,, | Performed by: NURSE PRACTITIONER

## 2021-03-01 PROCEDURE — 96372 PR INJECTION,THERAP/PROPH/DIAG2ST, IM OR SUBCUT: ICD-10-PCS | Mod: S$GLB,,, | Performed by: NURSE PRACTITIONER

## 2021-03-01 PROCEDURE — 3008F PR BODY MASS INDEX (BMI) DOCUMENTED: ICD-10-PCS | Mod: CPTII,S$GLB,, | Performed by: NURSE PRACTITIONER

## 2021-03-01 PROCEDURE — 1159F PR MEDICATION LIST DOCUMENTED IN MEDICAL RECORD: ICD-10-PCS | Mod: S$GLB,,, | Performed by: NURSE PRACTITIONER

## 2021-03-01 PROCEDURE — 99214 OFFICE O/P EST MOD 30 MIN: CPT | Mod: 25,S$GLB,, | Performed by: NURSE PRACTITIONER

## 2021-03-01 PROCEDURE — 99214 PR OFFICE/OUTPT VISIT, EST, LEVL IV, 30-39 MIN: ICD-10-PCS | Mod: 25,S$GLB,, | Performed by: NURSE PRACTITIONER

## 2021-03-01 PROCEDURE — 3078F PR MOST RECENT DIASTOLIC BLOOD PRESSURE < 80 MM HG: ICD-10-PCS | Mod: CPTII,S$GLB,, | Performed by: NURSE PRACTITIONER

## 2021-03-01 PROCEDURE — 99499 RISK ADDL DX/OHS AUDIT: ICD-10-PCS | Mod: S$GLB,,, | Performed by: NURSE PRACTITIONER

## 2021-03-01 PROCEDURE — 1125F PR PAIN SEVERITY QUANTIFIED, PAIN PRESENT: ICD-10-PCS | Mod: S$GLB,,, | Performed by: NURSE PRACTITIONER

## 2021-03-01 PROCEDURE — 3078F DIAST BP <80 MM HG: CPT | Mod: CPTII,S$GLB,, | Performed by: NURSE PRACTITIONER

## 2021-03-01 PROCEDURE — 1159F MED LIST DOCD IN RCRD: CPT | Mod: S$GLB,,, | Performed by: NURSE PRACTITIONER

## 2021-03-01 PROCEDURE — 3008F BODY MASS INDEX DOCD: CPT | Mod: CPTII,S$GLB,, | Performed by: NURSE PRACTITIONER

## 2021-03-01 PROCEDURE — 99999 PR PBB SHADOW E&M-EST. PATIENT-LVL IV: ICD-10-PCS | Mod: PBBFAC,,, | Performed by: NURSE PRACTITIONER

## 2021-03-01 PROCEDURE — 96372 THER/PROPH/DIAG INJ SC/IM: CPT | Mod: S$GLB,,, | Performed by: NURSE PRACTITIONER

## 2021-03-01 PROCEDURE — 1101F PT FALLS ASSESS-DOCD LE1/YR: CPT | Mod: CPTII,S$GLB,, | Performed by: NURSE PRACTITIONER

## 2021-03-01 PROCEDURE — 99499 UNLISTED E&M SERVICE: CPT | Mod: S$GLB,,, | Performed by: NURSE PRACTITIONER

## 2021-03-01 PROCEDURE — 3288F PR FALLS RISK ASSESSMENT DOCUMENTED: ICD-10-PCS | Mod: CPTII,S$GLB,, | Performed by: NURSE PRACTITIONER

## 2021-03-01 RX ORDER — MELOXICAM 7.5 MG/1
7.5 TABLET ORAL DAILY
Qty: 30 TABLET | Refills: 0 | Status: SHIPPED | OUTPATIENT
Start: 2021-03-01 | End: 2021-05-03

## 2021-03-01 RX ORDER — CYCLOBENZAPRINE HCL 10 MG
10 TABLET ORAL NIGHTLY PRN
Qty: 30 TABLET | Refills: 0 | Status: SHIPPED | OUTPATIENT
Start: 2021-03-01 | End: 2021-03-11

## 2021-03-01 RX ORDER — BETAMETHASONE SODIUM PHOSPHATE AND BETAMETHASONE ACETATE 3; 3 MG/ML; MG/ML
6 INJECTION, SUSPENSION INTRA-ARTICULAR; INTRALESIONAL; INTRAMUSCULAR; SOFT TISSUE ONCE
Status: COMPLETED | OUTPATIENT
Start: 2021-03-01 | End: 2021-03-01

## 2021-03-01 RX ADMIN — BETAMETHASONE SODIUM PHOSPHATE AND BETAMETHASONE ACETATE 6 MG: 3; 3 INJECTION, SUSPENSION INTRA-ARTICULAR; INTRALESIONAL; INTRAMUSCULAR; SOFT TISSUE at 03:03

## 2021-03-08 ENCOUNTER — OFFICE VISIT (OUTPATIENT)
Dept: OPHTHALMOLOGY | Facility: CLINIC | Age: 71
End: 2021-03-08
Payer: MEDICARE

## 2021-03-08 DIAGNOSIS — H52.03 HYPEROPIA OF BOTH EYES: ICD-10-CM

## 2021-03-08 DIAGNOSIS — H25.13 AGE-RELATED NUCLEAR CATARACT OF BOTH EYES: Primary | ICD-10-CM

## 2021-03-08 DIAGNOSIS — H04.123 DRY EYES, BILATERAL: ICD-10-CM

## 2021-03-08 PROCEDURE — 99999 PR PBB SHADOW E&M-EST. PATIENT-LVL II: CPT | Mod: PBBFAC,,, | Performed by: OPTOMETRIST

## 2021-03-08 PROCEDURE — 92015 DETERMINE REFRACTIVE STATE: CPT | Mod: S$GLB,,, | Performed by: OPTOMETRIST

## 2021-03-08 PROCEDURE — 92004 PR EYE EXAM, NEW PATIENT,COMPREHESV: ICD-10-PCS | Mod: S$GLB,,, | Performed by: OPTOMETRIST

## 2021-03-08 PROCEDURE — 92015 PR REFRACTION: ICD-10-PCS | Mod: S$GLB,,, | Performed by: OPTOMETRIST

## 2021-03-08 PROCEDURE — 99999 PR PBB SHADOW E&M-EST. PATIENT-LVL II: ICD-10-PCS | Mod: PBBFAC,,, | Performed by: OPTOMETRIST

## 2021-03-08 PROCEDURE — 92004 COMPRE OPH EXAM NEW PT 1/>: CPT | Mod: S$GLB,,, | Performed by: OPTOMETRIST

## 2021-03-11 ENCOUNTER — TELEPHONE (OUTPATIENT)
Dept: SMOKING CESSATION | Facility: CLINIC | Age: 71
End: 2021-03-11

## 2021-03-11 ENCOUNTER — CLINICAL SUPPORT (OUTPATIENT)
Dept: SMOKING CESSATION | Facility: CLINIC | Age: 71
End: 2021-03-11
Payer: COMMERCIAL

## 2021-03-11 DIAGNOSIS — F17.200 NICOTINE DEPENDENCE: Primary | ICD-10-CM

## 2021-03-11 PROCEDURE — 99407 PR TOBACCO USE CESSATION INTENSIVE >10 MINUTES: ICD-10-PCS | Mod: S$GLB,,,

## 2021-03-11 PROCEDURE — 99407 BEHAV CHNG SMOKING > 10 MIN: CPT | Mod: S$GLB,,,

## 2021-03-15 PROBLEM — G89.18 POST-OP PAIN: Status: RESOLVED | Noted: 2020-12-14 | Resolved: 2021-03-15

## 2021-03-22 ENCOUNTER — OFFICE VISIT (OUTPATIENT)
Dept: OPHTHALMOLOGY | Facility: CLINIC | Age: 71
End: 2021-03-22
Payer: MEDICARE

## 2021-03-22 ENCOUNTER — OFFICE VISIT (OUTPATIENT)
Dept: UROLOGY | Facility: CLINIC | Age: 71
End: 2021-03-22
Payer: MEDICARE

## 2021-03-22 VITALS
TEMPERATURE: 98 F | BODY MASS INDEX: 28.15 KG/M2 | WEIGHT: 153.88 LBS | SYSTOLIC BLOOD PRESSURE: 136 MMHG | DIASTOLIC BLOOD PRESSURE: 60 MMHG

## 2021-03-22 DIAGNOSIS — R31.9 HEMATURIA, UNSPECIFIED TYPE: ICD-10-CM

## 2021-03-22 DIAGNOSIS — H25.13 AGE-RELATED NUCLEAR CATARACT OF BOTH EYES: ICD-10-CM

## 2021-03-22 DIAGNOSIS — H20.9 IRITIS OF LEFT EYE: Primary | ICD-10-CM

## 2021-03-22 DIAGNOSIS — N39.41 URGE INCONTINENCE: ICD-10-CM

## 2021-03-22 DIAGNOSIS — R31.21 ASYMPTOMATIC MICROSCOPIC HEMATURIA: Primary | ICD-10-CM

## 2021-03-22 LAB
BILIRUB SERPL-MCNC: ABNORMAL MG/DL
BLOOD URINE, POC: 50
CLARITY, POC UA: CLEAR
COLOR, POC UA: YELLOW
GLUCOSE UR QL STRIP: ABNORMAL
KETONES UR QL STRIP: ABNORMAL
LEUKOCYTE ESTERASE URINE, POC: ABNORMAL
NITRITE, POC UA: ABNORMAL
PH, POC UA: 5
PROTEIN, POC: ABNORMAL
SPECIFIC GRAVITY, POC UA: 1.02
UROBILINOGEN, POC UA: ABNORMAL

## 2021-03-22 PROCEDURE — 3288F PR FALLS RISK ASSESSMENT DOCUMENTED: ICD-10-PCS | Mod: CPTII,S$GLB,, | Performed by: UROLOGY

## 2021-03-22 PROCEDURE — 3008F BODY MASS INDEX DOCD: CPT | Mod: CPTII,S$GLB,, | Performed by: UROLOGY

## 2021-03-22 PROCEDURE — 3075F SYST BP GE 130 - 139MM HG: CPT | Mod: CPTII,S$GLB,, | Performed by: UROLOGY

## 2021-03-22 PROCEDURE — 99999 PR PBB SHADOW E&M-EST. PATIENT-LVL II: CPT | Mod: PBBFAC,,, | Performed by: OPTOMETRIST

## 2021-03-22 PROCEDURE — 3008F PR BODY MASS INDEX (BMI) DOCUMENTED: ICD-10-PCS | Mod: CPTII,S$GLB,, | Performed by: UROLOGY

## 2021-03-22 PROCEDURE — 81002 URINALYSIS NONAUTO W/O SCOPE: CPT | Mod: S$GLB,,, | Performed by: UROLOGY

## 2021-03-22 PROCEDURE — 1159F PR MEDICATION LIST DOCUMENTED IN MEDICAL RECORD: ICD-10-PCS | Mod: S$GLB,,, | Performed by: UROLOGY

## 2021-03-22 PROCEDURE — 3078F DIAST BP <80 MM HG: CPT | Mod: CPTII,S$GLB,, | Performed by: UROLOGY

## 2021-03-22 PROCEDURE — 99999 PR PBB SHADOW E&M-EST. PATIENT-LVL III: CPT | Mod: PBBFAC,,, | Performed by: UROLOGY

## 2021-03-22 PROCEDURE — 92014 PR EYE EXAM, EST PATIENT,COMPREHESV: ICD-10-PCS | Mod: S$GLB,,, | Performed by: OPTOMETRIST

## 2021-03-22 PROCEDURE — 3288F FALL RISK ASSESSMENT DOCD: CPT | Mod: CPTII,S$GLB,, | Performed by: UROLOGY

## 2021-03-22 PROCEDURE — 1159F MED LIST DOCD IN RCRD: CPT | Mod: S$GLB,,, | Performed by: UROLOGY

## 2021-03-22 PROCEDURE — 99213 PR OFFICE/OUTPT VISIT, EST, LEVL III, 20-29 MIN: ICD-10-PCS | Mod: 25,S$GLB,, | Performed by: UROLOGY

## 2021-03-22 PROCEDURE — 3075F PR MOST RECENT SYSTOLIC BLOOD PRESS GE 130-139MM HG: ICD-10-PCS | Mod: CPTII,S$GLB,, | Performed by: UROLOGY

## 2021-03-22 PROCEDURE — 1126F PR PAIN SEVERITY QUANTIFIED, NO PAIN PRESENT: ICD-10-PCS | Mod: S$GLB,,, | Performed by: UROLOGY

## 2021-03-22 PROCEDURE — 92014 COMPRE OPH EXAM EST PT 1/>: CPT | Mod: S$GLB,,, | Performed by: OPTOMETRIST

## 2021-03-22 PROCEDURE — 81002 POCT URINE DIPSTICK WITHOUT MICROSCOPE: ICD-10-PCS | Mod: S$GLB,,, | Performed by: UROLOGY

## 2021-03-22 PROCEDURE — 1126F AMNT PAIN NOTED NONE PRSNT: CPT | Mod: S$GLB,,, | Performed by: UROLOGY

## 2021-03-22 PROCEDURE — 1101F PR PT FALLS ASSESS DOC 0-1 FALLS W/OUT INJ PAST YR: ICD-10-PCS | Mod: CPTII,S$GLB,, | Performed by: UROLOGY

## 2021-03-22 PROCEDURE — 99999 PR PBB SHADOW E&M-EST. PATIENT-LVL III: ICD-10-PCS | Mod: PBBFAC,,, | Performed by: UROLOGY

## 2021-03-22 PROCEDURE — 1101F PT FALLS ASSESS-DOCD LE1/YR: CPT | Mod: CPTII,S$GLB,, | Performed by: UROLOGY

## 2021-03-22 PROCEDURE — 99213 OFFICE O/P EST LOW 20 MIN: CPT | Mod: 25,S$GLB,, | Performed by: UROLOGY

## 2021-03-22 PROCEDURE — 99999 PR PBB SHADOW E&M-EST. PATIENT-LVL II: ICD-10-PCS | Mod: PBBFAC,,, | Performed by: OPTOMETRIST

## 2021-03-22 PROCEDURE — 3078F PR MOST RECENT DIASTOLIC BLOOD PRESSURE < 80 MM HG: ICD-10-PCS | Mod: CPTII,S$GLB,, | Performed by: UROLOGY

## 2021-03-22 RX ORDER — PREDNISOLONE ACETATE 10 MG/ML
1 SUSPENSION/ DROPS OPHTHALMIC 4 TIMES DAILY
Qty: 1 BOTTLE | Refills: 0 | Status: SHIPPED | OUTPATIENT
Start: 2021-03-22 | End: 2021-04-12 | Stop reason: SDUPTHER

## 2021-04-12 ENCOUNTER — OFFICE VISIT (OUTPATIENT)
Dept: OPHTHALMOLOGY | Facility: CLINIC | Age: 71
End: 2021-04-12
Payer: MEDICARE

## 2021-04-12 DIAGNOSIS — H20.9 IRITIS OF LEFT EYE: ICD-10-CM

## 2021-04-12 DIAGNOSIS — H40.053 OCULAR HYPERTENSION, BILATERAL: Primary | ICD-10-CM

## 2021-04-12 DIAGNOSIS — H04.129 DRY EYE: ICD-10-CM

## 2021-04-12 PROCEDURE — 99999 PR PBB SHADOW E&M-EST. PATIENT-LVL II: CPT | Mod: PBBFAC,,, | Performed by: OPTOMETRIST

## 2021-04-12 PROCEDURE — 99999 PR PBB SHADOW E&M-EST. PATIENT-LVL II: ICD-10-PCS | Mod: PBBFAC,,, | Performed by: OPTOMETRIST

## 2021-04-12 PROCEDURE — 92012 PR EYE EXAM, EST PATIENT,INTERMED: ICD-10-PCS | Mod: S$GLB,,, | Performed by: OPTOMETRIST

## 2021-04-12 PROCEDURE — 92012 INTRM OPH EXAM EST PATIENT: CPT | Mod: S$GLB,,, | Performed by: OPTOMETRIST

## 2021-04-12 RX ORDER — PREDNISOLONE ACETATE 10 MG/ML
1 SUSPENSION/ DROPS OPHTHALMIC 4 TIMES DAILY
Qty: 1 BOTTLE | Refills: 0 | Status: SHIPPED | OUTPATIENT
Start: 2021-04-12 | End: 2021-05-04 | Stop reason: ALTCHOICE

## 2021-04-27 ENCOUNTER — OFFICE VISIT (OUTPATIENT)
Dept: OPHTHALMOLOGY | Facility: CLINIC | Age: 71
End: 2021-04-27
Payer: MEDICARE

## 2021-04-27 DIAGNOSIS — H40.053 OCULAR HYPERTENSION, BILATERAL: Primary | ICD-10-CM

## 2021-04-27 DIAGNOSIS — H40.042 STEROID RESPONDER, LEFT EYE: ICD-10-CM

## 2021-04-27 DIAGNOSIS — H15.102 EPISCLERITIS OF LEFT EYE: ICD-10-CM

## 2021-04-27 PROCEDURE — 92133 POSTERIOR SEGMENT OCT OPTIC NERVE(OCULAR COHERENCE TOMOGRAPHY) - OU - BOTH EYES: ICD-10-PCS | Mod: S$GLB,,, | Performed by: OPTOMETRIST

## 2021-04-27 PROCEDURE — 92133 CPTRZD OPH DX IMG PST SGM ON: CPT | Mod: S$GLB,,, | Performed by: OPTOMETRIST

## 2021-04-27 PROCEDURE — 99999 PR PBB SHADOW E&M-EST. PATIENT-LVL III: ICD-10-PCS | Mod: PBBFAC,,, | Performed by: OPTOMETRIST

## 2021-04-27 PROCEDURE — 92012 INTRM OPH EXAM EST PATIENT: CPT | Mod: S$GLB,,, | Performed by: OPTOMETRIST

## 2021-04-27 PROCEDURE — 99999 PR PBB SHADOW E&M-EST. PATIENT-LVL III: CPT | Mod: PBBFAC,,, | Performed by: OPTOMETRIST

## 2021-04-27 PROCEDURE — 92012 PR EYE EXAM, EST PATIENT,INTERMED: ICD-10-PCS | Mod: S$GLB,,, | Performed by: OPTOMETRIST

## 2021-04-28 ENCOUNTER — PATIENT MESSAGE (OUTPATIENT)
Dept: RESEARCH | Facility: HOSPITAL | Age: 71
End: 2021-04-28

## 2021-05-03 ENCOUNTER — OFFICE VISIT (OUTPATIENT)
Dept: INTERNAL MEDICINE | Facility: CLINIC | Age: 71
End: 2021-05-03
Payer: MEDICARE

## 2021-05-03 VITALS
HEIGHT: 62 IN | OXYGEN SATURATION: 98 % | HEART RATE: 85 BPM | BODY MASS INDEX: 27.14 KG/M2 | RESPIRATION RATE: 16 BRPM | WEIGHT: 147.5 LBS | DIASTOLIC BLOOD PRESSURE: 54 MMHG | SYSTOLIC BLOOD PRESSURE: 116 MMHG | TEMPERATURE: 97 F

## 2021-05-03 DIAGNOSIS — I70.0 AORTIC ATHEROSCLEROSIS: ICD-10-CM

## 2021-05-03 DIAGNOSIS — J30.1 SEASONAL ALLERGIC RHINITIS DUE TO POLLEN: Primary | ICD-10-CM

## 2021-05-03 PROCEDURE — 1126F AMNT PAIN NOTED NONE PRSNT: CPT | Mod: S$GLB,,, | Performed by: FAMILY MEDICINE

## 2021-05-03 PROCEDURE — 99213 OFFICE O/P EST LOW 20 MIN: CPT | Mod: S$GLB,,, | Performed by: FAMILY MEDICINE

## 2021-05-03 PROCEDURE — 3288F FALL RISK ASSESSMENT DOCD: CPT | Mod: CPTII,S$GLB,, | Performed by: FAMILY MEDICINE

## 2021-05-03 PROCEDURE — 3008F BODY MASS INDEX DOCD: CPT | Mod: CPTII,S$GLB,, | Performed by: FAMILY MEDICINE

## 2021-05-03 PROCEDURE — 99499 RISK ADDL DX/OHS AUDIT: ICD-10-PCS | Mod: S$GLB,,, | Performed by: FAMILY MEDICINE

## 2021-05-03 PROCEDURE — 1126F PR PAIN SEVERITY QUANTIFIED, NO PAIN PRESENT: ICD-10-PCS | Mod: S$GLB,,, | Performed by: FAMILY MEDICINE

## 2021-05-03 PROCEDURE — 99999 PR PBB SHADOW E&M-EST. PATIENT-LVL III: CPT | Mod: PBBFAC,,, | Performed by: FAMILY MEDICINE

## 2021-05-03 PROCEDURE — 3288F PR FALLS RISK ASSESSMENT DOCUMENTED: ICD-10-PCS | Mod: CPTII,S$GLB,, | Performed by: FAMILY MEDICINE

## 2021-05-03 PROCEDURE — 3008F PR BODY MASS INDEX (BMI) DOCUMENTED: ICD-10-PCS | Mod: CPTII,S$GLB,, | Performed by: FAMILY MEDICINE

## 2021-05-03 PROCEDURE — 1101F PR PT FALLS ASSESS DOC 0-1 FALLS W/OUT INJ PAST YR: ICD-10-PCS | Mod: CPTII,S$GLB,, | Performed by: FAMILY MEDICINE

## 2021-05-03 PROCEDURE — 1101F PT FALLS ASSESS-DOCD LE1/YR: CPT | Mod: CPTII,S$GLB,, | Performed by: FAMILY MEDICINE

## 2021-05-03 PROCEDURE — 99213 PR OFFICE/OUTPT VISIT, EST, LEVL III, 20-29 MIN: ICD-10-PCS | Mod: S$GLB,,, | Performed by: FAMILY MEDICINE

## 2021-05-03 PROCEDURE — 1159F PR MEDICATION LIST DOCUMENTED IN MEDICAL RECORD: ICD-10-PCS | Mod: S$GLB,,, | Performed by: FAMILY MEDICINE

## 2021-05-03 PROCEDURE — 99999 PR PBB SHADOW E&M-EST. PATIENT-LVL III: ICD-10-PCS | Mod: PBBFAC,,, | Performed by: FAMILY MEDICINE

## 2021-05-03 PROCEDURE — 1159F MED LIST DOCD IN RCRD: CPT | Mod: S$GLB,,, | Performed by: FAMILY MEDICINE

## 2021-05-03 PROCEDURE — 99499 UNLISTED E&M SERVICE: CPT | Mod: S$GLB,,, | Performed by: FAMILY MEDICINE

## 2021-05-04 ENCOUNTER — TELEPHONE (OUTPATIENT)
Dept: OPHTHALMOLOGY | Facility: CLINIC | Age: 71
End: 2021-05-04

## 2021-05-04 ENCOUNTER — OFFICE VISIT (OUTPATIENT)
Dept: OPHTHALMOLOGY | Facility: CLINIC | Age: 71
End: 2021-05-04
Payer: MEDICARE

## 2021-05-04 DIAGNOSIS — H40.042 STEROID RESPONDER, LEFT EYE: ICD-10-CM

## 2021-05-04 DIAGNOSIS — H15.102 EPISCLERITIS OF LEFT EYE: Primary | ICD-10-CM

## 2021-05-04 DIAGNOSIS — H20.9 IRITIS OF LEFT EYE: ICD-10-CM

## 2021-05-04 DIAGNOSIS — H40.053 OCULAR HYPERTENSION, BILATERAL: ICD-10-CM

## 2021-05-04 PROCEDURE — 99999 PR PBB SHADOW E&M-EST. PATIENT-LVL II: CPT | Mod: PBBFAC,,, | Performed by: OPTOMETRIST

## 2021-05-04 PROCEDURE — 99999 PR PBB SHADOW E&M-EST. PATIENT-LVL II: ICD-10-PCS | Mod: PBBFAC,,, | Performed by: OPTOMETRIST

## 2021-05-04 PROCEDURE — 92012 INTRM OPH EXAM EST PATIENT: CPT | Mod: S$GLB,,, | Performed by: OPTOMETRIST

## 2021-05-04 PROCEDURE — 92012 PR EYE EXAM, EST PATIENT,INTERMED: ICD-10-PCS | Mod: S$GLB,,, | Performed by: OPTOMETRIST

## 2021-05-04 RX ORDER — DUREZOL 0.5 MG/ML
1 EMULSION OPHTHALMIC 4 TIMES DAILY
Qty: 4 ML | Refills: 0 | Status: SHIPPED | OUTPATIENT
Start: 2021-05-04 | End: 2021-05-18

## 2021-06-16 NOTE — TELEPHONE ENCOUNTER
Returned call to patient in regards to message. Patient stated she  thought she had an appointment on tomorrow 12/29/2020 with Dr Panchal informed her there is no appointment. Patient voiced understanding.      ----- Message from Cierra De La Garza sent at 12/28/2020 11:26 AM CST -----  Regarding: request call back  .Type:  Needs Medical Advice    Who Called:  pt  Symptoms (please be specific):     How long has patient had these symptoms:  n/a  Pharmacy name and phone #:   n/a  Would the patient rather a call back or a response via [facility]? Call back   Best Call Back Number:  [phone] (home)     Additional Information:  pt needs time for wound recheck 12/29         My signature below certifies that the above stated patient is homebound and upon completion of the Face-To-Face encounter, has the need for intermittent skilled nursing, physical therapy and/or speech or occupational therapy services in their home for their current diagnosis as outlined in their initial plan of care. These services will continue to be monitored by myself or another physician.

## 2021-07-23 RX ORDER — PRAVASTATIN SODIUM 40 MG/1
TABLET ORAL
Qty: 90 TABLET | Refills: 4 | Status: SHIPPED | OUTPATIENT
Start: 2021-07-23 | End: 2022-08-22 | Stop reason: SDUPTHER

## 2021-09-26 ENCOUNTER — PATIENT OUTREACH (OUTPATIENT)
Dept: ADMINISTRATIVE | Facility: OTHER | Age: 71
End: 2021-09-26

## 2021-12-15 ENCOUNTER — TELEPHONE (OUTPATIENT)
Dept: SMOKING CESSATION | Facility: CLINIC | Age: 71
End: 2021-12-15
Payer: MEDICARE

## 2022-01-18 ENCOUNTER — TELEPHONE (OUTPATIENT)
Dept: INTERNAL MEDICINE | Facility: CLINIC | Age: 72
End: 2022-01-18
Payer: MEDICARE

## 2022-01-18 DIAGNOSIS — Z12.31 ENCOUNTER FOR SCREENING MAMMOGRAM FOR BREAST CANCER: Primary | ICD-10-CM

## 2022-01-18 DIAGNOSIS — E78.5 HYPERLIPIDEMIA, UNSPECIFIED HYPERLIPIDEMIA TYPE: ICD-10-CM

## 2022-01-18 NOTE — TELEPHONE ENCOUNTER
Has appt with you on 2/7. Wants to have labs done prior on 1/31. Please place orders if anything is needed.

## 2022-01-18 NOTE — TELEPHONE ENCOUNTER
----- Message from Carlyn Vernon sent at 1/18/2022 12:47 PM CST -----  Contact: GENET JEWELL [[id number]] @ [phone]  Doctor appointment and lab have been scheduled.  Please link lab orders to the lab appointment.  Date of doctor appointment:2/7  Date of lab appointment:  1/31  Physical or F/U: physical   Comments:

## 2022-01-31 ENCOUNTER — LAB VISIT (OUTPATIENT)
Dept: LAB | Facility: HOSPITAL | Age: 72
End: 2022-01-31
Attending: FAMILY MEDICINE
Payer: MEDICARE

## 2022-01-31 DIAGNOSIS — E78.5 HYPERLIPIDEMIA, UNSPECIFIED HYPERLIPIDEMIA TYPE: ICD-10-CM

## 2022-01-31 LAB
ALBUMIN SERPL BCP-MCNC: 3.7 G/DL (ref 3.5–5.2)
ALP SERPL-CCNC: 56 U/L (ref 55–135)
ALT SERPL W/O P-5'-P-CCNC: 12 U/L (ref 10–44)
ANION GAP SERPL CALC-SCNC: 10 MMOL/L (ref 8–16)
AST SERPL-CCNC: 14 U/L (ref 10–40)
BASOPHILS # BLD AUTO: 0.1 K/UL (ref 0–0.2)
BASOPHILS NFR BLD: 1.6 % (ref 0–1.9)
BILIRUB SERPL-MCNC: 0.5 MG/DL (ref 0.1–1)
BUN SERPL-MCNC: 10 MG/DL (ref 8–23)
CALCIUM SERPL-MCNC: 9 MG/DL (ref 8.7–10.5)
CHLORIDE SERPL-SCNC: 101 MMOL/L (ref 95–110)
CHOLEST SERPL-MCNC: 159 MG/DL (ref 120–199)
CHOLEST/HDLC SERPL: 2.1 {RATIO} (ref 2–5)
CO2 SERPL-SCNC: 29 MMOL/L (ref 23–29)
CREAT SERPL-MCNC: 0.7 MG/DL (ref 0.5–1.4)
DIFFERENTIAL METHOD: NORMAL
EOSINOPHIL # BLD AUTO: 0.3 K/UL (ref 0–0.5)
EOSINOPHIL NFR BLD: 5 % (ref 0–8)
ERYTHROCYTE [DISTWIDTH] IN BLOOD BY AUTOMATED COUNT: 13.8 % (ref 11.5–14.5)
EST. GFR  (AFRICAN AMERICAN): >60 ML/MIN/1.73 M^2
EST. GFR  (NON AFRICAN AMERICAN): >60 ML/MIN/1.73 M^2
GLUCOSE SERPL-MCNC: 106 MG/DL (ref 70–110)
HCT VFR BLD AUTO: 40.7 % (ref 37–48.5)
HDLC SERPL-MCNC: 77 MG/DL (ref 40–75)
HDLC SERPL: 48.4 % (ref 20–50)
HGB BLD-MCNC: 13.6 G/DL (ref 12–16)
IMM GRANULOCYTES # BLD AUTO: 0.02 K/UL (ref 0–0.04)
IMM GRANULOCYTES NFR BLD AUTO: 0.3 % (ref 0–0.5)
LDLC SERPL CALC-MCNC: 69.8 MG/DL (ref 63–159)
LYMPHOCYTES # BLD AUTO: 1.7 K/UL (ref 1–4.8)
LYMPHOCYTES NFR BLD: 25.9 % (ref 18–48)
MCH RBC QN AUTO: 30.5 PG (ref 27–31)
MCHC RBC AUTO-ENTMCNC: 33.4 G/DL (ref 32–36)
MCV RBC AUTO: 91 FL (ref 82–98)
MONOCYTES # BLD AUTO: 0.5 K/UL (ref 0.3–1)
MONOCYTES NFR BLD: 8.2 % (ref 4–15)
NEUTROPHILS # BLD AUTO: 3.8 K/UL (ref 1.8–7.7)
NEUTROPHILS NFR BLD: 59 % (ref 38–73)
NONHDLC SERPL-MCNC: 82 MG/DL
NRBC BLD-RTO: 0 /100 WBC
PLATELET # BLD AUTO: 243 K/UL (ref 150–450)
PMV BLD AUTO: 9.3 FL (ref 9.2–12.9)
POTASSIUM SERPL-SCNC: 4.3 MMOL/L (ref 3.5–5.1)
PROT SERPL-MCNC: 6.1 G/DL (ref 6–8.4)
RBC # BLD AUTO: 4.46 M/UL (ref 4–5.4)
SODIUM SERPL-SCNC: 140 MMOL/L (ref 136–145)
TRIGL SERPL-MCNC: 61 MG/DL (ref 30–150)
WBC # BLD AUTO: 6.45 K/UL (ref 3.9–12.7)

## 2022-01-31 PROCEDURE — 80053 COMPREHEN METABOLIC PANEL: CPT | Mod: PO | Performed by: FAMILY MEDICINE

## 2022-01-31 PROCEDURE — 85025 COMPLETE CBC W/AUTO DIFF WBC: CPT | Mod: PO | Performed by: FAMILY MEDICINE

## 2022-01-31 PROCEDURE — 80061 LIPID PANEL: CPT | Performed by: FAMILY MEDICINE

## 2022-01-31 PROCEDURE — 36415 COLL VENOUS BLD VENIPUNCTURE: CPT | Mod: PO | Performed by: FAMILY MEDICINE

## 2022-02-03 ENCOUNTER — TELEPHONE (OUTPATIENT)
Dept: INTERNAL MEDICINE | Facility: CLINIC | Age: 72
End: 2022-02-03
Payer: MEDICARE

## 2022-02-03 NOTE — TELEPHONE ENCOUNTER
----- Message from Sandee Alvarado sent at 2/3/2022  1:01 PM CST -----  Contact: [phone]  Patient is calling regarding appt next Monday. States she would like to reschedule for next tues. Please call back at [phone]. Thank/dj

## 2022-02-08 ENCOUNTER — OFFICE VISIT (OUTPATIENT)
Dept: INTERNAL MEDICINE | Facility: CLINIC | Age: 72
End: 2022-02-08
Payer: MEDICARE

## 2022-02-08 VITALS
SYSTOLIC BLOOD PRESSURE: 122 MMHG | HEART RATE: 65 BPM | TEMPERATURE: 99 F | WEIGHT: 140 LBS | RESPIRATION RATE: 17 BRPM | DIASTOLIC BLOOD PRESSURE: 60 MMHG | BODY MASS INDEX: 25.76 KG/M2 | HEIGHT: 62 IN | OXYGEN SATURATION: 96 %

## 2022-02-08 DIAGNOSIS — Z78.0 POSTMENOPAUSAL: ICD-10-CM

## 2022-02-08 DIAGNOSIS — J41.0 SIMPLE CHRONIC BRONCHITIS: ICD-10-CM

## 2022-02-08 DIAGNOSIS — U07.1 COVID-19: Primary | ICD-10-CM

## 2022-02-08 DIAGNOSIS — I73.9 PERIPHERAL VASCULAR DISEASE, UNSPECIFIED: ICD-10-CM

## 2022-02-08 DIAGNOSIS — Z12.31 ENCOUNTER FOR SCREENING MAMMOGRAM FOR BREAST CANCER: ICD-10-CM

## 2022-02-08 LAB
CTP QC/QA: YES
SARS-COV-2 RDRP RESP QL NAA+PROBE: POSITIVE

## 2022-02-08 PROCEDURE — 99214 OFFICE O/P EST MOD 30 MIN: CPT | Mod: S$GLB,,, | Performed by: FAMILY MEDICINE

## 2022-02-08 PROCEDURE — U0002: ICD-10-PCS | Mod: QW,S$GLB,, | Performed by: FAMILY MEDICINE

## 2022-02-08 PROCEDURE — 3288F PR FALLS RISK ASSESSMENT DOCUMENTED: ICD-10-PCS | Mod: CPTII,S$GLB,, | Performed by: FAMILY MEDICINE

## 2022-02-08 PROCEDURE — 3074F PR MOST RECENT SYSTOLIC BLOOD PRESSURE < 130 MM HG: ICD-10-PCS | Mod: CPTII,S$GLB,, | Performed by: FAMILY MEDICINE

## 2022-02-08 PROCEDURE — 99999 PR PBB SHADOW E&M-EST. PATIENT-LVL IV: CPT | Mod: PBBFAC,,, | Performed by: FAMILY MEDICINE

## 2022-02-08 PROCEDURE — 3288F FALL RISK ASSESSMENT DOCD: CPT | Mod: CPTII,S$GLB,, | Performed by: FAMILY MEDICINE

## 2022-02-08 PROCEDURE — 1159F MED LIST DOCD IN RCRD: CPT | Mod: CPTII,S$GLB,, | Performed by: FAMILY MEDICINE

## 2022-02-08 PROCEDURE — 1101F PT FALLS ASSESS-DOCD LE1/YR: CPT | Mod: CPTII,S$GLB,, | Performed by: FAMILY MEDICINE

## 2022-02-08 PROCEDURE — 3008F PR BODY MASS INDEX (BMI) DOCUMENTED: ICD-10-PCS | Mod: CPTII,S$GLB,, | Performed by: FAMILY MEDICINE

## 2022-02-08 PROCEDURE — 3078F PR MOST RECENT DIASTOLIC BLOOD PRESSURE < 80 MM HG: ICD-10-PCS | Mod: CPTII,S$GLB,, | Performed by: FAMILY MEDICINE

## 2022-02-08 PROCEDURE — 99499 UNLISTED E&M SERVICE: CPT | Mod: S$GLB,,, | Performed by: FAMILY MEDICINE

## 2022-02-08 PROCEDURE — 99499 RISK ADDL DX/OHS AUDIT: ICD-10-PCS | Mod: S$GLB,,, | Performed by: FAMILY MEDICINE

## 2022-02-08 PROCEDURE — 3008F BODY MASS INDEX DOCD: CPT | Mod: CPTII,S$GLB,, | Performed by: FAMILY MEDICINE

## 2022-02-08 PROCEDURE — 99214 PR OFFICE/OUTPT VISIT, EST, LEVL IV, 30-39 MIN: ICD-10-PCS | Mod: S$GLB,,, | Performed by: FAMILY MEDICINE

## 2022-02-08 PROCEDURE — 1125F AMNT PAIN NOTED PAIN PRSNT: CPT | Mod: CPTII,S$GLB,, | Performed by: FAMILY MEDICINE

## 2022-02-08 PROCEDURE — 1159F PR MEDICATION LIST DOCUMENTED IN MEDICAL RECORD: ICD-10-PCS | Mod: CPTII,S$GLB,, | Performed by: FAMILY MEDICINE

## 2022-02-08 PROCEDURE — 99999 PR PBB SHADOW E&M-EST. PATIENT-LVL IV: ICD-10-PCS | Mod: PBBFAC,,, | Performed by: FAMILY MEDICINE

## 2022-02-08 PROCEDURE — 1101F PR PT FALLS ASSESS DOC 0-1 FALLS W/OUT INJ PAST YR: ICD-10-PCS | Mod: CPTII,S$GLB,, | Performed by: FAMILY MEDICINE

## 2022-02-08 PROCEDURE — U0002 COVID-19 LAB TEST NON-CDC: HCPCS | Mod: QW,S$GLB,, | Performed by: FAMILY MEDICINE

## 2022-02-08 PROCEDURE — 3074F SYST BP LT 130 MM HG: CPT | Mod: CPTII,S$GLB,, | Performed by: FAMILY MEDICINE

## 2022-02-08 PROCEDURE — 3078F DIAST BP <80 MM HG: CPT | Mod: CPTII,S$GLB,, | Performed by: FAMILY MEDICINE

## 2022-02-08 PROCEDURE — 1125F PR PAIN SEVERITY QUANTIFIED, PAIN PRESENT: ICD-10-PCS | Mod: CPTII,S$GLB,, | Performed by: FAMILY MEDICINE

## 2022-02-08 RX ORDER — PROMETHAZINE HYDROCHLORIDE AND DEXTROMETHORPHAN HYDROBROMIDE 6.25; 15 MG/5ML; MG/5ML
5 SYRUP ORAL
Qty: 240 ML | Refills: 0 | Status: SHIPPED | OUTPATIENT
Start: 2022-02-08 | End: 2022-02-18

## 2022-02-08 RX ORDER — KETOROLAC TROMETHAMINE 10 MG/1
10 TABLET, FILM COATED ORAL EVERY 6 HOURS PRN
COMMUNITY
Start: 2022-02-02 | End: 2022-02-08

## 2022-02-08 NOTE — PROGRESS NOTES
"Subjective:       Patient ID: Salud Echevarria is a 72 y.o. female.    Chief Complaint: Annual Exam    HPI   Started last Tuesday with HA along with pressure in face and ears. Was seen in Urgent care and given toradol and script for ketorolac oral to take at home and she has had no improvement.   Started with cough since then. Had temp of 100.4, 4 days ago for a short time.   H/o similar in past which resolved with steroid.   Has been taking theraflu. Also had been taking zyrtec.     Family History   Problem Relation Age of Onset    Heart disease Mother        Current Outpatient Medications:     aspirin (ECOTRIN) 81 MG EC tablet, Take 81 mg by mouth once daily., Disp: , Rfl:     pravastatin (PRAVACHOL) 40 MG tablet, Take 1 tablet by mouth once daily, Disp: 90 tablet, Rfl: 4    prednisoLONE acetate (PRED FORTE) 1 % DrpS, INSTILL 1 DROP INTO LEFT EYE 4 TIMES DAILY, Disp: 5 mL, Rfl: 0    promethazine-dextromethorphan (PROMETHAZINE-DM) 6.25-15 mg/5 mL Syrp, Take 5 mLs by mouth every 4 to 6 hours as needed (cough)., Disp: 240 mL, Rfl: 0    pulse oximeter (PULSE OXIMETER) device, by Apply Externally route 2 (two) times a day. Use twice daily at 8 AM and 3 PM and record the value in MyChart as directed., Disp: 1 each, Rfl: 0    Review of Systems   Constitutional: Negative for chills and fever.   HENT: Positive for congestion and sinus pressure.    Respiratory: Negative for cough and shortness of breath.    Cardiovascular: Negative for chest pain.   Gastrointestinal: Negative for abdominal pain.   Skin: Negative for rash.   Neurological: Positive for headaches. Negative for dizziness.       Objective:   /60 (BP Location: Left arm, Patient Position: Sitting, BP Method: Medium (Manual))   Pulse 65   Temp 99 °F (37.2 °C) (Temporal)   Resp 17   Ht 5' 2" (1.575 m)   Wt 63.5 kg (139 lb 15.9 oz)   SpO2 96%   BMI 25.60 kg/m²      Physical Exam  Vitals reviewed.   Constitutional:       Appearance: She is " well-developed and well-nourished.   HENT:      Head: Normocephalic and atraumatic.   Eyes:      Conjunctiva/sclera: Conjunctivae normal.   Cardiovascular:      Rate and Rhythm: Normal rate.   Pulmonary:      Effort: Pulmonary effort is normal. No respiratory distress.      Breath sounds: No wheezing.   Musculoskeletal:         General: No edema.   Skin:     General: Skin is warm and dry.      Findings: No rash.   Neurological:      Mental Status: She is alert and oriented to person, place, and time.      Coordination: Coordination normal.   Psychiatric:         Mood and Affect: Mood and affect normal.         Behavior: Behavior normal.         Assessment & Plan     Problem List Items Addressed This Visit        Pulmonary    Chronic obstructive pulmonary disease    Current Assessment & Plan     Stable. No recent flares            Cardiac/Vascular    Peripheral vascular disease, unspecified    Current Assessment & Plan     Asymptomatic. Continue on statin            Other    COVID-19 - Primary    Current Assessment & Plan     Risk score of 4. Minimal symptoms at this time. Discussed remdesivir however she declined at this time since she is having minimal symptoms.            Relevant Orders    POCT COVID-19 Rapid Screening (Completed)      Other Visit Diagnoses     Encounter for screening mammogram for breast cancer        Relevant Orders    Mammo Digital Screening Bilat    Postmenopausal        Relevant Orders    DXA Bone Density Spine And Hip            Immunizations Administered on Date of Encounter - 2/8/2022     Name Date Dose VIS Date Route Exp Date    Influenza - Quadrivalent - PF *Preferred* (6 months and older)  Deferred (Patient Ill Today) -- -- -- --    Comment: covid positive            No follow-ups on file.    Disclaimer:  This note may have been prepared using voice recognition software, it may have not been extensively proofed, as such there could be errors within the text such as sound alike  errors.

## 2022-02-08 NOTE — ASSESSMENT & PLAN NOTE
Risk score of 4. Minimal symptoms at this time. Discussed remdesivir however she declined at this time since she is having minimal symptoms.

## 2022-02-09 ENCOUNTER — NURSE TRIAGE (OUTPATIENT)
Dept: ADMINISTRATIVE | Facility: CLINIC | Age: 72
End: 2022-02-09
Payer: MEDICARE

## 2022-02-09 NOTE — TELEPHONE ENCOUNTER
1st enrollment call attempted with contact made. Pt states someone will  pick pulse ox for her today, Pt requesting call back tomorrow for enrollment. Denies further needs at this time.    Reason for Disposition   Information only question and nurse able to answer    Protocols used: INFORMATION ONLY CALL - NO TRIAGE-A-[place]

## 2022-02-10 ENCOUNTER — PATIENT MESSAGE (OUTPATIENT)
Dept: ADMINISTRATIVE | Facility: OTHER | Age: 72
End: 2022-02-10
Payer: MEDICARE

## 2022-02-10 ENCOUNTER — NURSE TRIAGE (OUTPATIENT)
Dept: ADMINISTRATIVE | Facility: CLINIC | Age: 72
End: 2022-02-10
Payer: MEDICARE

## 2022-02-10 NOTE — TELEPHONE ENCOUNTER
2nd enrollment call attempted with no contact made. VM left, and previous [employer] message sent. Tasks will remain in place. Will enroll into [facility] program    Reason for Disposition   Message left on identified voicemail    Protocols used: NO CONTACT OR DUPLICATE CONTACT CALL-A-[place]

## 2022-02-11 ENCOUNTER — PATIENT MESSAGE (OUTPATIENT)
Dept: ADMINISTRATIVE | Facility: OTHER | Age: 72
End: 2022-02-11
Payer: MEDICARE

## 2022-02-12 ENCOUNTER — PATIENT MESSAGE (OUTPATIENT)
Dept: ADMINISTRATIVE | Facility: OTHER | Age: 72
End: 2022-02-12
Payer: MEDICARE

## 2022-02-13 ENCOUNTER — PATIENT MESSAGE (OUTPATIENT)
Dept: ADMINISTRATIVE | Facility: OTHER | Age: 72
End: 2022-02-13
Payer: MEDICARE

## 2022-02-14 ENCOUNTER — PATIENT MESSAGE (OUTPATIENT)
Dept: ADMINISTRATIVE | Facility: OTHER | Age: 72
End: 2022-02-14
Payer: MEDICARE

## 2022-02-15 ENCOUNTER — PATIENT MESSAGE (OUTPATIENT)
Dept: ADMINISTRATIVE | Facility: OTHER | Age: 72
End: 2022-02-15
Payer: MEDICARE

## 2022-02-16 ENCOUNTER — PATIENT MESSAGE (OUTPATIENT)
Dept: ADMINISTRATIVE | Facility: OTHER | Age: 72
End: 2022-02-16
Payer: MEDICARE

## 2022-02-17 ENCOUNTER — PATIENT MESSAGE (OUTPATIENT)
Dept: ADMINISTRATIVE | Facility: OTHER | Age: 72
End: 2022-02-17
Payer: MEDICARE

## 2022-02-18 ENCOUNTER — PATIENT MESSAGE (OUTPATIENT)
Dept: ADMINISTRATIVE | Facility: OTHER | Age: 72
End: 2022-02-18
Payer: MEDICARE

## 2022-02-19 ENCOUNTER — PATIENT MESSAGE (OUTPATIENT)
Dept: ADMINISTRATIVE | Facility: OTHER | Age: 72
End: 2022-02-19
Payer: MEDICARE

## 2022-02-19 ENCOUNTER — PATIENT MESSAGE (OUTPATIENT)
Dept: ADMINISTRATIVE | Facility: CLINIC | Age: 72
End: 2022-02-19
Payer: MEDICARE

## 2022-02-19 ENCOUNTER — NURSE TRIAGE (OUTPATIENT)
Dept: ADMINISTRATIVE | Facility: CLINIC | Age: 72
End: 2022-02-19
Payer: MEDICARE

## 2022-02-19 NOTE — TELEPHONE ENCOUNTER
Pt contacted for Surveillance escalation - PO2=93 on retake PO2=98. Denies any fever or SOB, pt able to speak in full sentences without noted SOB or cough.Info only  protocol used and pt advised on home care. Pt instructed to call OOC for worsening of symptoms or health questions.    Reason for Disposition   Information only question and nurse able to answer    Protocols used: INFORMATION ONLY CALL - NO TRIAGE-A-[place]

## 2022-02-19 NOTE — TELEPHONE ENCOUNTER
Pt called for surveillance no response escalation. Pt states she forgot to submit but is doing fine. States she is not home presently but will submit later. Pt has no additional concerns or questions at this time. Advised to call back with concerns or worsening symptoms. Verbalized understanding.      Reason for Disposition   [1] Follow-up call to recent contact AND [2] information only call, no triage required    Additional Information   Negative: RN needs further essential information from caller in order to complete triage   Negative: Requesting regular office appointment   Negative: [1] Caller requesting NON-URGENT health information AND [2] PCP's office is the best resource    Protocols used: INFORMATION ONLY CALL - NO TRIAGE-A-

## 2022-02-20 ENCOUNTER — PATIENT MESSAGE (OUTPATIENT)
Dept: ADMINISTRATIVE | Facility: OTHER | Age: 72
End: 2022-02-20
Payer: MEDICARE

## 2022-02-20 ENCOUNTER — PATIENT MESSAGE (OUTPATIENT)
Dept: ADMINISTRATIVE | Facility: CLINIC | Age: 72
End: 2022-02-20
Payer: MEDICARE

## 2022-02-21 ENCOUNTER — PATIENT MESSAGE (OUTPATIENT)
Dept: ADMINISTRATIVE | Facility: OTHER | Age: 72
End: 2022-02-21
Payer: MEDICARE

## 2022-02-22 ENCOUNTER — PATIENT MESSAGE (OUTPATIENT)
Dept: ADMINISTRATIVE | Facility: OTHER | Age: 72
End: 2022-02-22
Payer: MEDICARE

## 2022-04-14 ENCOUNTER — PES CALL (OUTPATIENT)
Dept: ADMINISTRATIVE | Facility: CLINIC | Age: 72
End: 2022-04-14
Payer: MEDICARE

## 2022-04-26 ENCOUNTER — PATIENT MESSAGE (OUTPATIENT)
Dept: ADMINISTRATIVE | Facility: HOSPITAL | Age: 72
End: 2022-04-26
Payer: MEDICARE

## 2022-05-13 ENCOUNTER — TELEPHONE (OUTPATIENT)
Dept: INTERNAL MEDICINE | Facility: CLINIC | Age: 72
End: 2022-05-13
Payer: MEDICARE

## 2022-05-13 ENCOUNTER — HOSPITAL ENCOUNTER (EMERGENCY)
Facility: HOSPITAL | Age: 72
Discharge: HOME | End: 2022-05-13
Attending: EMERGENCY MEDICINE
Payer: MEDICARE

## 2022-05-13 VITALS
RESPIRATION RATE: 26 BRPM | SYSTOLIC BLOOD PRESSURE: 106 MMHG | BODY MASS INDEX: 25 KG/M2 | TEMPERATURE: 98 F | OXYGEN SATURATION: 97 % | WEIGHT: 136.69 LBS | HEART RATE: 62 BPM | DIASTOLIC BLOOD PRESSURE: 55 MMHG

## 2022-05-13 DIAGNOSIS — K57.90 DIVERTICULOSIS: ICD-10-CM

## 2022-05-13 DIAGNOSIS — R53.83 FATIGUE, UNSPECIFIED TYPE: ICD-10-CM

## 2022-05-13 DIAGNOSIS — R10.9 ABDOMINAL PAIN: ICD-10-CM

## 2022-05-13 DIAGNOSIS — K59.00 CONSTIPATION, UNSPECIFIED CONSTIPATION TYPE: Primary | ICD-10-CM

## 2022-05-13 DIAGNOSIS — R31.9 HEMATURIA, UNSPECIFIED TYPE: ICD-10-CM

## 2022-05-13 DIAGNOSIS — Z76.0 ENCOUNTER FOR MEDICATION REFILL: ICD-10-CM

## 2022-05-13 DIAGNOSIS — R05.9 COUGH: ICD-10-CM

## 2022-05-13 LAB
ALBUMIN SERPL BCP-MCNC: 3.6 G/DL (ref 3.5–5.2)
ALP SERPL-CCNC: 78 U/L (ref 55–135)
ALT SERPL W/O P-5'-P-CCNC: 14 U/L (ref 10–44)
ANION GAP SERPL CALC-SCNC: 10 MMOL/L (ref 8–16)
AST SERPL-CCNC: 12 U/L (ref 10–40)
BACTERIA #/AREA URNS AUTO: NORMAL /HPF
BASOPHILS # BLD AUTO: 0.11 K/UL (ref 0–0.2)
BASOPHILS NFR BLD: 1.1 % (ref 0–1.9)
BILIRUB SERPL-MCNC: 0.5 MG/DL (ref 0.1–1)
BILIRUB UR QL STRIP: NEGATIVE
BNP SERPL-MCNC: 17 PG/ML (ref 0–99)
BUN SERPL-MCNC: 9 MG/DL (ref 8–23)
CALCIUM SERPL-MCNC: 9.1 MG/DL (ref 8.7–10.5)
CHLORIDE SERPL-SCNC: 103 MMOL/L (ref 95–110)
CLARITY UR REFRACT.AUTO: CLEAR
CO2 SERPL-SCNC: 27 MMOL/L (ref 23–29)
COLOR UR AUTO: YELLOW
CREAT SERPL-MCNC: 0.7 MG/DL (ref 0.5–1.4)
CTP QC/QA: YES
CTP QC/QA: YES
DIFFERENTIAL METHOD: NORMAL
EOSINOPHIL # BLD AUTO: 0.2 K/UL (ref 0–0.5)
EOSINOPHIL NFR BLD: 2.1 % (ref 0–8)
ERYTHROCYTE [DISTWIDTH] IN BLOOD BY AUTOMATED COUNT: 13.6 % (ref 11.5–14.5)
EST. GFR  (AFRICAN AMERICAN): >60 ML/MIN/1.73 M^2
EST. GFR  (NON AFRICAN AMERICAN): >60 ML/MIN/1.73 M^2
GLUCOSE SERPL-MCNC: 79 MG/DL (ref 70–110)
GLUCOSE UR QL STRIP: NEGATIVE
HCT VFR BLD AUTO: 40.3 % (ref 37–48.5)
HCV AB SERPL QL IA: NEGATIVE
HEP C VIRUS HOLD SPECIMEN: NORMAL
HGB BLD-MCNC: 13.3 G/DL (ref 12–16)
HGB UR QL STRIP: ABNORMAL
IMM GRANULOCYTES # BLD AUTO: 0.04 K/UL (ref 0–0.04)
IMM GRANULOCYTES NFR BLD AUTO: 0.4 % (ref 0–0.5)
KETONES UR QL STRIP: NEGATIVE
LEUKOCYTE ESTERASE UR QL STRIP: NEGATIVE
LIPASE SERPL-CCNC: 30 U/L (ref 4–60)
LYMPHOCYTES # BLD AUTO: 2 K/UL (ref 1–4.8)
LYMPHOCYTES NFR BLD: 19.5 % (ref 18–48)
MCH RBC QN AUTO: 30.4 PG (ref 27–31)
MCHC RBC AUTO-ENTMCNC: 33 G/DL (ref 32–36)
MCV RBC AUTO: 92 FL (ref 82–98)
MICROSCOPIC COMMENT: NORMAL
MONOCYTES # BLD AUTO: 0.9 K/UL (ref 0.3–1)
MONOCYTES NFR BLD: 8.8 % (ref 4–15)
NEUTROPHILS # BLD AUTO: 7 K/UL (ref 1.8–7.7)
NEUTROPHILS NFR BLD: 68.1 % (ref 38–73)
NITRITE UR QL STRIP: NEGATIVE
NRBC BLD-RTO: 0 /100 WBC
PH UR STRIP: 6 [PH] (ref 5–8)
PLATELET # BLD AUTO: 251 K/UL (ref 150–450)
PMV BLD AUTO: 9.2 FL (ref 9.2–12.9)
POC MOLECULAR INFLUENZA A AGN: NEGATIVE
POC MOLECULAR INFLUENZA B AGN: NEGATIVE
POTASSIUM SERPL-SCNC: 3.7 MMOL/L (ref 3.5–5.1)
PROT SERPL-MCNC: 6.7 G/DL (ref 6–8.4)
PROT UR QL STRIP: NEGATIVE
RBC # BLD AUTO: 4.37 M/UL (ref 4–5.4)
RBC #/AREA URNS AUTO: 2 /HPF (ref 0–4)
SARS-COV-2 RDRP RESP QL NAA+PROBE: NEGATIVE
SODIUM SERPL-SCNC: 140 MMOL/L (ref 136–145)
SP GR UR STRIP: 1.01 (ref 1–1.03)
SQUAMOUS #/AREA URNS AUTO: 1 /HPF
TROPONIN I SERPL DL<=0.01 NG/ML-MCNC: <0.006 NG/ML (ref 0–0.03)
TSH SERPL DL<=0.005 MIU/L-ACNC: 2.26 UIU/ML (ref 0.4–4)
URN SPEC COLLECT METH UR: ABNORMAL
UROBILINOGEN UR STRIP-ACNC: NEGATIVE EU/DL
WBC # BLD AUTO: 10.32 K/UL (ref 3.9–12.7)
WBC #/AREA URNS AUTO: 1 /HPF (ref 0–5)

## 2022-05-13 PROCEDURE — 25000003 PHARM REV CODE 250: Mod: ER | Performed by: EMERGENCY MEDICINE

## 2022-05-13 PROCEDURE — 86803 HEPATITIS C AB TEST: CPT | Performed by: EMERGENCY MEDICINE

## 2022-05-13 PROCEDURE — 85025 COMPLETE CBC W/AUTO DIFF WBC: CPT | Mod: ER | Performed by: EMERGENCY MEDICINE

## 2022-05-13 PROCEDURE — 84484 ASSAY OF TROPONIN QUANT: CPT | Mod: ER | Performed by: EMERGENCY MEDICINE

## 2022-05-13 PROCEDURE — 84443 ASSAY THYROID STIM HORMONE: CPT | Mod: ER | Performed by: EMERGENCY MEDICINE

## 2022-05-13 PROCEDURE — U0002 COVID-19 LAB TEST NON-CDC: HCPCS | Mod: ER | Performed by: EMERGENCY MEDICINE

## 2022-05-13 PROCEDURE — 93010 EKG 12-LEAD: ICD-10-PCS | Mod: ,,, | Performed by: INTERNAL MEDICINE

## 2022-05-13 PROCEDURE — 83880 ASSAY OF NATRIURETIC PEPTIDE: CPT | Mod: ER | Performed by: EMERGENCY MEDICINE

## 2022-05-13 PROCEDURE — 83690 ASSAY OF LIPASE: CPT | Mod: ER | Performed by: EMERGENCY MEDICINE

## 2022-05-13 PROCEDURE — 81000 URINALYSIS NONAUTO W/SCOPE: CPT | Mod: ER | Performed by: EMERGENCY MEDICINE

## 2022-05-13 PROCEDURE — 80053 COMPREHEN METABOLIC PANEL: CPT | Mod: ER | Performed by: EMERGENCY MEDICINE

## 2022-05-13 PROCEDURE — 99285 EMERGENCY DEPT VISIT HI MDM: CPT | Mod: 25,ER

## 2022-05-13 PROCEDURE — 87502 INFLUENZA DNA AMP PROBE: CPT | Mod: ER

## 2022-05-13 PROCEDURE — 93010 ELECTROCARDIOGRAM REPORT: CPT | Mod: ,,, | Performed by: INTERNAL MEDICINE

## 2022-05-13 PROCEDURE — 93005 ELECTROCARDIOGRAM TRACING: CPT | Mod: ER

## 2022-05-13 RX ORDER — MAG HYDROX/ALUMINUM HYD/SIMETH 200-200-20
30 SUSPENSION, ORAL (FINAL DOSE FORM) ORAL ONCE
Status: COMPLETED | OUTPATIENT
Start: 2022-05-13 | End: 2022-05-13

## 2022-05-13 RX ORDER — LIDOCAINE HYDROCHLORIDE 20 MG/ML
10 SOLUTION OROPHARYNGEAL ONCE
Status: COMPLETED | OUTPATIENT
Start: 2022-05-13 | End: 2022-05-13

## 2022-05-13 RX ORDER — ALBUTEROL SULFATE 90 UG/1
1-2 AEROSOL, METERED RESPIRATORY (INHALATION) EVERY 6 HOURS PRN
Qty: 18 G | Refills: 1 | Status: SHIPPED | OUTPATIENT
Start: 2022-05-13

## 2022-05-13 RX ORDER — PANTOPRAZOLE SODIUM 40 MG/1
40 TABLET, DELAYED RELEASE ORAL DAILY
Qty: 30 TABLET | Refills: 0 | Status: SHIPPED | OUTPATIENT
Start: 2022-05-13 | End: 2022-08-22

## 2022-05-13 RX ORDER — TRAMADOL HYDROCHLORIDE 50 MG/1
50 TABLET ORAL EVERY 6 HOURS PRN
Qty: 18 TABLET | Refills: 0 | Status: SHIPPED | OUTPATIENT
Start: 2022-05-13 | End: 2022-05-31

## 2022-05-13 RX ADMIN — MAGNESIUM HYDROXIDE/ALUMINUM HYDROXICE/SIMETHICONE 30 ML: 120; 1200; 1200 SUSPENSION ORAL at 10:05

## 2022-05-13 RX ADMIN — LIDOCAINE HYDROCHLORIDE 10 ML: 20 SOLUTION ORAL; TOPICAL at 10:05

## 2022-05-13 NOTE — Clinical Note
"Salud BRANNONALEKS Echevarria was seen and treated in our emergency department on 5/13/2022.  She may return to work on 05/14/2022.       If you have any questions or concerns, please don't hesitate to call.      Taye Berger RN    "

## 2022-05-13 NOTE — TELEPHONE ENCOUNTER
----- Message from Eliane Bryan sent at 5/13/2022  7:47 AM CDT -----  Type:  Same Day Appointment Request    Caller is requesting a same day appointment.  Caller declined first available appointment listed below.    Name of Caller .Salud Bae Wale   When is the first available appointment? 6/6/2022  Symptoms: low blood pressure 106/56  Best Call Back Number .[phone]   Additional Information: MRN [id number] Milagros Gruber

## 2022-05-13 NOTE — ED PROVIDER NOTES
History     Chief Complaint   Patient presents with    Abdominal Pain     Abdominal pain, nausea, weakness all week.        Review of patient's allergies indicates:   Allergen Reactions    Dye Anaphylaxis     Contrast Dye    Iodine and iodide containing products Rash    Penicillins Shortness Of Breath     Shortness of breath^severe skin rash    Lovastatin Other (See Comments)     Causes leg cramp    Cephalexin Nausea And Vomiting    Diazepam Anxiety     Made pt overly anxious instead of relaxing    Naproxen Nausea And Vomiting       History of Present Illness   HPI    5/13/2022, 9:26 AM  The history is provided by the patient    Salud Echevarria is a 72 y.o. female presenting to the ED for multiple complaints.  Patient reports that for the past 7 days she has been feeling generally tired and weak, blood pressure lower than usual..  Patient reports that she has been sleeping more than usual.  She reports for the last 3 days blood pressure has been lower than usual.  It is been systolic of 106, 113.  Patient reports that she has occasional cough.  She reports that she started coughing more consistently.  She denies any posterior nasal drip.  Patient also notes she has had pain to the right upper quadrant.  She reports that she has pain from time to time.  She reports been more consistent for the past 48 hours.  Is located in the central region of the abdomen.  Is described as sharp.  It does not radiate.  Patient reports that she has headaches for which she takes aspirin.  She reports that she takes a lot of aspirin.  She has not taken aspirin for over 7 days.  She reports that she believes the sharp abdominal pain may be related to aspirin use.  Patient also has been taking Pepto-Bismol.  Patient notes that her stools have been dark in color.  Patient's pain is rated 6/10.  Patient also notes that she has had to urinate more frequently.  She notes that she got up every 2 hours last night.  She denies any  dysuria, urgency, frequency.  Patient is afraid that her hernia may come back.  Patient denies any diarrhea.  Patient is requesting refill for albuterol.      Arrival mode:  Personal Vehicle    PCP: Flaco Brownlee DO     Allergies:  Review of patient's allergies indicates:   Allergen Reactions    Dye Anaphylaxis     Contrast Dye    Iodine and iodide containing products Rash    Penicillins Shortness Of Breath     Shortness of breath^severe skin rash    Lovastatin Other (See Comments)     Causes leg cramp    Cephalexin Nausea And Vomiting    Diazepam Anxiety     Made pt overly anxious instead of relaxing    Naproxen Nausea And Vomiting       Past Medical History:  Past Medical History:   Diagnosis Date    Asthma     childhood - seasonal as an adult    Hematuria 6/10/2019    Hyperlipidemia     Hypertension     Spinal stenosis        Past Surgical History:  Past Surgical History:   Procedure Laterality Date    BACK SURGERY      BREAST BIOPSY      CARDIAC CATHETERIZATION  2018, 11/2020    HYSTERECTOMY      LYMPH NODE BIOPSY      ROBOT-ASSISTED REPAIR OF VENTRAL HERNIA USING DA JIM XI N/A 12/10/2020    Procedure: XI ROBOTIC REPAIR, HERNIA, VENTRAL;  Surgeon: Brandyn Panchal MD;  Location: [facility] OR;  Service: General;  Laterality: N/A;    TONSILLECTOMY           Family History:  Family History   Problem Relation Age of Onset    Heart disease Mother        Social History:  Social History     Tobacco Use    Smoking status: Former Smoker     Packs/day: 0.50     Years: 30.00     Pack years: 15.00     Types: Cigarettes    Smokeless tobacco: Former User    Tobacco comment: none past MN before surgery   Substance and Sexual Activity    Alcohol use: Yes     Comment: none 72 hrs prior to surgery    Drug use: No    Sexual activity: Not Currently     Partners: Male        Review of Systems   Review of Systems   Constitutional: Positive for fatigue. Negative for fever.        (+) Increase sleeping   HENT:  Negative for postnasal drip, rhinorrhea and sore throat.    Respiratory: Positive for cough. Negative for shortness of breath.    Cardiovascular: Negative for chest pain.   Gastrointestinal: Positive for abdominal pain. Negative for nausea and vomiting.   Genitourinary: Positive for frequency. Negative for difficulty urinating and dysuria.   Musculoskeletal: Negative for back pain.   Skin: Negative for rash.   Neurological: Positive for headaches. Negative for dizziness, facial asymmetry, speech difficulty, weakness, light-headedness and numbness.   Hematological: Does not bruise/bleed easily.          Physical Exam     Initial Vitals [05/13/22 0918]   BP Pulse Resp Temp SpO2   133/64 79 20 98.2 °F (36.8 °C) 99 %      MAP       --          Physical Exam    Nursing Notes and Vital Signs Reviewed.  Constitutional: Patient is in no apparent distress. Well-developed and well-nourished.  Head: Atraumatic. Normocephalic.  Eyes: PERRL. EOM intact. Conjunctivae are not pale. No scleral icterus.  ENT: Mucous membranes are moist. Oropharynx is clear and symmetric.    Neck: Supple. Full ROM. No lymphadenopathy.  Cardiovascular: Regular rate. Regular rhythm. No murmurs, rubs, or gallops. Distal pulses are 2+ and symmetric.  Pulmonary/Chest: No respiratory distress. Clear to auscultation bilaterally. No wheezing or rales.  Abdominal: Soft and non-distended.  There is (+) Epigastric tenderness.  No rebound, guarding, or rigidity. Good bowel sounds.  Genitourinary: No CVA tenderness  Musculoskeletal: Moves all extremities. No obvious deformities. No edema. No calf tenderness.  Skin: Warm and dry.  Neurological:  Alert, awake, and appropriate.  Normal speech.  No acute focal neurological deficits are appreciated. CNII-XII intact.     GCS is 15.   Psychiatric: Normal affect. Good eye contact. Appropriate in content.     ED Course     ED Procedures:  Procedures    ED Vital Signs:  Vitals:    05/13/22 0918 05/13/22 0920 05/13/22 0933  05/13/22 0949   BP: 133/64  (!) 121/57 123/66   Pulse: 79 74 74 77   Resp: 20  (!) 25    Temp: 98.2 °F (36.8 °C)      TempSrc: Oral      SpO2: 99%  99% 98%   Weight: 62 kg (136 lb 11 oz)       05/13/22 1002 05/13/22 1032 05/13/22 1149   BP: (!) 110/57 (!) 123/57    Pulse: 69 66 62   Resp: (!) 24 (!) 26 (!) 25   Temp:      TempSrc:      SpO2: 96% 97% 96%   Weight:          Abnormal Lab Results:  Labs Reviewed   URINALYSIS, REFLEX TO URINE CULTURE - Abnormal; Notable for the following components:       Result Value    Occult Blood UA 2+ (*)     All other components within normal limits    Narrative:     Specimen Source->Urine   CBC W/ AUTO DIFFERENTIAL   COMPREHENSIVE METABOLIC PANEL   LIPASE   TROPONIN I   TSH   B-TYPE NATRIURETIC PEPTIDE   URINALYSIS MICROSCOPIC    Narrative:     Specimen Source->Urine   HEPATITIS C ANTIBODY   HEP C VIRUS HOLD SPECIMEN   POCT INFLUENZA A/B MOLECULAR   SARS-COV-2 RDRP GENE    Narrative:     This test utilizes isothermal nucleic acid amplification   technology to detect the SARS-CoV-2 RdRp nucleic acid segment.   The analytical sensitivity (limit of detection) is 125 genome   equivalents/mL.   A POSITIVE result implies infection with the SARS-CoV-2 virus;   the patient is presumed to be contagious.     A NEGATIVE result means that SARS-CoV-2 nucleic acids are not   present above the limit of detection. A NEGATIVE result should be   treated as presumptive. It does not rule out the possibility of   COVID-19 and should not be the sole basis for treatment decisions.   If COVID-19 is strongly suspected based on clinical and exposure   history, re-testing using an alternate molecular assay should be   considered.   This test is only for use under the Food and Drug   Administration s Emergency Use Authorization (EUA).   Commercial kits are provided by [employer].   Performance characteristics of the EUA have been independently   verified by [facility] Department of    Pathology and Laboratory Medicine.   _________________________________________________________________   The authorized Fact Sheet for Healthcare Providers and the authorized Fact   Sheet for Patients of the ID NOW COVID-19 are available on the FDA   website:     https://www.fda.gov/media/263752/download  https://www.fda.gov/media/511223/download               All Lab Results:  Results for orders placed or performed during the hospital encounter of 05/13/22   Urinalysis, Reflex to Urine Culture Urine, Clean Catch    Specimen: Urine   Result Value Ref Range    Specimen UA Urine, Clean Catch     Color, UA Yellow Yellow, Straw, Laila    Appearance, UA Clear Clear    pH, UA 6.0 5.0 - 8.0    Specific Gravity, UA 1.015 1.005 - 1.030    Protein, UA Negative Negative    Glucose, UA Negative Negative    Ketones, UA Negative Negative    Bilirubin (UA) Negative Negative    Occult Blood UA 2+ (A) Negative    Nitrite, UA Negative Negative    Urobilinogen, UA Negative <2.0 EU/dL    Leukocytes, UA Negative Negative   CBC W/ AUTO DIFFERENTIAL   Result Value Ref Range    WBC 10.32 3.90 - 12.70 K/uL    RBC 4.37 4.00 - 5.40 M/uL    Hemoglobin 13.3 12.0 - 16.0 g/dL    Hematocrit 40.3 37.0 - 48.5 %    MCV 92 82 - 98 fL    MCH 30.4 27.0 - 31.0 pg    MCHC 33.0 32.0 - 36.0 g/dL    RDW 13.6 11.5 - 14.5 %    Platelets 251 150 - 450 K/uL    MPV 9.2 9.2 - 12.9 fL    Immature Granulocytes 0.4 0.0 - 0.5 %    Gran # (ANC) 7.0 1.8 - 7.7 K/uL    Immature Grans (Abs) 0.04 0.00 - 0.04 K/uL    Lymph # 2.0 1.0 - 4.8 K/uL    Mono # 0.9 0.3 - 1.0 K/uL    Eos # 0.2 0.0 - 0.5 K/uL    Baso # 0.11 0.00 - 0.20 K/uL    nRBC 0 0 /100 WBC    Gran % 68.1 38.0 - 73.0 %    Lymph % 19.5 18.0 - 48.0 %    Mono % 8.8 4.0 - 15.0 %    Eosinophil % 2.1 0.0 - 8.0 %    Basophil % 1.1 0.0 - 1.9 %    Differential Method Automated    Comp. Metabolic Panel   Result Value Ref Range    Sodium 140 136 - 145 mmol/L    Potassium 3.7 3.5 - 5.1 mmol/L    Chloride 103 95 - 110 mmol/L     CO2 27 23 - 29 mmol/L    Glucose 79 70 - 110 mg/dL    BUN 9 8 - 23 mg/dL    Creatinine 0.7 0.5 - 1.4 mg/dL    Calcium 9.1 8.7 - 10.5 mg/dL    Total Protein 6.7 6.0 - 8.4 g/dL    Albumin 3.6 3.5 - 5.2 g/dL    Total Bilirubin 0.5 0.1 - 1.0 mg/dL    Alkaline Phosphatase 78 55 - 135 U/L    AST 12 10 - 40 U/L    ALT 14 10 - 44 U/L    Anion Gap 10 8 - 16 mmol/L    eGFR if African American >60.0 >60 mL/min/1.73 m^2    eGFR if non African American >60.0 >60 mL/min/1.73 m^2   Lipase   Result Value Ref Range    Lipase 30 4 - 60 U/L   Troponin I   Result Value Ref Range    Troponin I <0.006 0.000 - 0.026 ng/mL   TSH   Result Value Ref Range    TSH 2.258 0.400 - 4.000 uIU/mL   Brain natriuretic peptide   Result Value Ref Range    BNP 17 0 - 99 pg/mL   Urinalysis Microscopic   Result Value Ref Range    RBC, UA 2 0 - 4 /hpf    WBC, UA 1 0 - 5 /hpf    Bacteria Rare None-Occ /hpf    Squam Epithel, UA 1 /hpf    Microscopic Comment SEE COMMENT    POCT Influenza A/B Molecular   Result Value Ref Range    POC Molecular Influenza A Ag Negative Negative, Not Reported    POC Molecular Influenza B Ag Negative Negative, Not Reported     Acceptable Yes    POCT COVID-19 Rapid Screening   Result Value Ref Range    POC Rapid COVID Negative Negative     Acceptable Yes          The EKG was ordered, reviewed, and independently interpreted by the ED provider.    Imaging Results:  Imaging Results          CT Abdomen Pelvis  Without Contrast (Final result)  Result time 05/13/22 11:07:57    Final result by Kira Bowen MD (05/13/22 11:07:57)                 Impression:      No acute abnormality within the abdomen or pelvis to explain the etiology of this patient's symptoms.  Incidental findings are detailed above.      Electronically signed by: Kira Bowen  Date:    05/13/2022  Time:    11:07             Narrative:    EXAMINATION:  CT ABDOMEN PELVIS WITHOUT CONTRAST    CLINICAL HISTORY:  Abdominal pain,  acute, nonlocalized;    TECHNIQUE:  Low dose axial images, sagittal and coronal reformations were obtained from the lung bases to the pubic symphysis, Oral contrast was not administered.  All CT scans at this facility are performed using dose optimization techniques including the following: automated exposure control; adjustment of the mA and/or kV; use of iterative reconstruction technique.    COMPARISON:  None    FINDINGS:  Heart: Normal in size. No pericardial effusion.    Lung Bases: Well aerated, without consolidation or pleural fluid.    Liver: Normal in size and attenuation, with no focal hepatic lesions.    Gallbladder: No calcified gallstones.    Bile Ducts: No evidence of dilated ducts.    Pancreas: No mass or peripancreatic fat stranding.    Spleen: Unremarkable.    Adrenals: Unremarkable.    Kidneys/ Ureters: The kidneys are normal in size and location.  Punctate calcification in the mid left kidney a suspected vascular in origin.  Ureters are normal in course and caliber.    Bladder: No evidence of wall thickening.    Reproductive organs: The uterus is absent.    GI Tract/Mesentery: No bowel inflammation or obstruction.  Moderate volume stool in the colon.  Rare colonic diverticula are present.  The appendix contains fluid, but is normal in caliber without surrounding inflammatory change to suggest appendicitis.    Peritoneal Space: No ascites. No free air.    Retroperitoneum: No significant adenopathy.    Abdominal wall: Unremarkable.    Vasculature: There is moderate aortoiliac atherosclerosis without aneurysm.    Bones: There is degenerative change of the spine and posterior instrumented spinal fusion from L4-L5.  Paravertebral location of the left L3 pedicle screw is noted.  Grade 1 anterolisthesis of L3 on L4.                               X-Ray Chest PA And Lateral (Final result)  Result time 05/13/22 10:15:42    Final result by Maik Chiu MD (05/13/22 10:15:42)                 Impression:       No acute process seen.      Electronically signed by: Maik Chiu MD  Date:    05/13/2022  Time:    10:15             Narrative:    EXAMINATION:  XR CHEST PA AND LATERAL    CLINICAL HISTORY:  Cough, unspecified    COMPARISON:  10/01/2020    FINDINGS:  Cardiac silhouette is normal.  Aorta demonstrates atherosclerotic disease.  Emphysematous changes are seen predominately within the mid and upper lung zones.    The lungs demonstrate no evidence of active disease.  No evidence of pleural effusion or pneumothorax.  Bones appear intact with moderate degenerative change.                                      The Emergency Provider reviewed the vital signs and test results, which are outlined above.     ED Discussion     ED Course as of 05/13/22 1241   Fri May 13, 2022   1037 Hemoglobin: 13.3 [LB]   1037 Hematocrit: 40.3 [LB]   1116 TSH: 2.258 [LB]   1211 Patient aware of blood in urine.  [LB]   1231 Abdomen soft, no rebound or guarding no masses.  Clinically, no signs of appendicitis.  Advised patient for dietary changes.  Discussed indications to return to the emergency room.  Patient also is aware of the blood in urine.  She has had a workup in the past. [LB]      ED Course User Index  [LB] Becka Jacome DO     12:38 PM  Reassessment: Dr. Jacome reassessed the pt.  The pt is resting comfortably and is NAD.  Pt states their sx have improved. Discussed test results, shared treatment plan, specific conditions for return, and the need for f/u.  Answered their questions at this time.  Pt understands and agrees to the plan.  The pt has remained hemodynamically stable through ED course and is stable for discharge.      I discussed with patient and/or family/caretaker that evaluation in the ED does not suggest any emergent or life threatening medical conditions requiring immediate intervention beyond what was provided in the ED, and I believe patient is safe for discharge.  Regardless, an unremarkable evaluation  "in the ED does not preclude the development or presence of a serious of life threatening condition. As such, patient was instructed to return immediately for any worsening or change in current symptoms.      ED Medication(s):  Medications   aluminum-magnesium hydroxide-simethicone 200-200-20 mg/5 mL suspension 30 mL (30 mLs Oral Given 5/13/22 1019)     And   LIDOcaine HCl 2% oral solution 10 mL (10 mLs Oral Given 5/13/22 1019)             MIPS Measures     Smoker? No     Hypertension: None         Medical Decision Making                 MDM  Reviewed: vitals, nursing note and previous chart  Interpretation: labs, ECG, CT scan and x-ray          Portions of this note may have been created with voice recognition software. Occasional "wrong-word" or "sound-a-like" substitutions may have occurred due to the inherent limitations of voice recognition software. Please, read the note carefully and recognize, using context, where substitutions have occurred.            Clinical Impression       ICD-10-CM ICD-9-CM   1. Constipation, unspecified constipation type  K59.00 564.00   2. Cough  R05.9 786.2   3. Abdominal pain, generalized  R10.9 789.00   4. Hematuria, unspecified type  R31.9 599.70   5. Encounter for medication refill  Z76.0 V68.1   6. Diverticulosis  K57.90 562.10   7. Fatigue, unspecified type  R53.83 780.79         ED Disposition       Disposition: Discharge to home  Patient condition: Stable    Medication List     Medication List      START taking these medications    albuterol 90 mcg/actuation inhaler  Commonly known as: PROVENTIL/VENTOLIN HFA  Inhale 1-2 puffs into the lungs every 6 (six) hours as needed for Wheezing. Rescue     pantoprazole 40 MG tablet  Commonly known as: PROTONIX  Take 1 tablet (40 mg total) by mouth once daily.     traMADoL 50 mg tablet  Commonly known as: ULTRAM  Take 1 tablet (50 mg total) by mouth every 6 (six) hours as needed for Pain.        ASK your doctor about these medications  "   aspirin 81 MG EC tablet  Commonly known as: ECOTRIN     pravastatin 40 MG tablet  Commonly known as: PRAVACHOL  Take 1 tablet by mouth once daily     prednisoLONE acetate 1 % Drps  Commonly known as: PRED FORTE  INSTILL 1 DROP INTO LEFT EYE 4 TIMES DAILY     pulse oximeter device  Commonly known as: pulse oximeter  by Apply Externally route 2 (two) times a day. Use twice daily at 8 AM and 3 PM and record the value in [employer] as directed.           Where to Get Your Medications      These medications were sent to [facility] - [place] - [address]  [address] [place] [place] [place] [address]    Phone: [phone]   · albuterol 90 mcg/actuation inhaler  · pantoprazole 40 MG tablet  · traMADoL 50 mg tablet         ED Follow-up   Follow-up Information     Milagros Thompson NP In 2 days.    Specialty: Family Medicine  Why: Return to emergency department for:  Lightheadedness, dizziness, pain in the right lower quadrant, fever, shortness of breath, difficulty breathing, or other concerns.  Contact information:  [address] [facility] [address]  [place] [address]  [phone]                                  Becka Jacome, DO  05/13/22 5904

## 2022-05-13 NOTE — TELEPHONE ENCOUNTER
Returned call to patient , who wanted to be seen today. Informed patient we nothing for today, offered patient to see someone else. Patient decline stating will find another doctor. She can never see a doctor when she need to. Patient ask to be removed from provider list

## 2022-05-20 ENCOUNTER — OFFICE VISIT (OUTPATIENT)
Dept: HOME HEALTH SERVICES | Facility: CLINIC | Age: 72
End: 2022-05-20
Payer: MEDICARE

## 2022-05-20 VITALS
OXYGEN SATURATION: 97 % | BODY MASS INDEX: 25.03 KG/M2 | SYSTOLIC BLOOD PRESSURE: 109 MMHG | DIASTOLIC BLOOD PRESSURE: 71 MMHG | TEMPERATURE: 98 F | HEIGHT: 62 IN | HEART RATE: 63 BPM | WEIGHT: 136 LBS

## 2022-05-20 DIAGNOSIS — M54.9 BACK PAIN, UNSPECIFIED BACK LOCATION, UNSPECIFIED BACK PAIN LATERALITY, UNSPECIFIED CHRONICITY: ICD-10-CM

## 2022-05-20 DIAGNOSIS — K43.9 VENTRAL HERNIA WITHOUT OBSTRUCTION OR GANGRENE: ICD-10-CM

## 2022-05-20 DIAGNOSIS — E78.5 HYPERLIPIDEMIA, UNSPECIFIED HYPERLIPIDEMIA TYPE: ICD-10-CM

## 2022-05-20 DIAGNOSIS — Z00.00 ENCOUNTER FOR PREVENTIVE HEALTH EXAMINATION: Primary | ICD-10-CM

## 2022-05-20 DIAGNOSIS — N39.41 URGE INCONTINENCE: ICD-10-CM

## 2022-05-20 DIAGNOSIS — M25.552 LEFT HIP PAIN: ICD-10-CM

## 2022-05-20 DIAGNOSIS — R91.1 PULMONARY NODULE, RIGHT: ICD-10-CM

## 2022-05-20 DIAGNOSIS — R42 DIZZINESS: ICD-10-CM

## 2022-05-20 DIAGNOSIS — M54.32 SCIATICA OF LEFT SIDE: ICD-10-CM

## 2022-05-20 DIAGNOSIS — R53.83 FATIGUE, UNSPECIFIED TYPE: ICD-10-CM

## 2022-05-20 DIAGNOSIS — F41.9 ANXIETY: ICD-10-CM

## 2022-05-20 DIAGNOSIS — I73.9 PERIPHERAL VASCULAR DISEASE, UNSPECIFIED: ICD-10-CM

## 2022-05-20 DIAGNOSIS — J30.1 SEASONAL ALLERGIC RHINITIS DUE TO POLLEN: ICD-10-CM

## 2022-05-20 DIAGNOSIS — J44.9 CHRONIC OBSTRUCTIVE PULMONARY DISEASE, UNSPECIFIED COPD TYPE: ICD-10-CM

## 2022-05-20 DIAGNOSIS — R31.21 ASYMPTOMATIC MICROSCOPIC HEMATURIA: ICD-10-CM

## 2022-05-20 DIAGNOSIS — H81.10 BENIGN PAROXYSMAL POSITIONAL VERTIGO, UNSPECIFIED LATERALITY: ICD-10-CM

## 2022-05-20 DIAGNOSIS — M48.07 SPINAL STENOSIS OF LUMBOSACRAL REGION: ICD-10-CM

## 2022-05-20 DIAGNOSIS — J45.20 MILD INTERMITTENT ASTHMA WITHOUT COMPLICATION: ICD-10-CM

## 2022-05-20 DIAGNOSIS — K21.00 GASTROESOPHAGEAL REFLUX DISEASE WITH ESOPHAGITIS, UNSPECIFIED WHETHER HEMORRHAGE: ICD-10-CM

## 2022-05-20 DIAGNOSIS — I25.10 CORONARY ARTERY DISEASE INVOLVING NATIVE CORONARY ARTERY OF NATIVE HEART WITHOUT ANGINA PECTORIS: ICD-10-CM

## 2022-05-20 DIAGNOSIS — M85.80 OSTEOPENIA, UNSPECIFIED LOCATION: ICD-10-CM

## 2022-05-20 DIAGNOSIS — I70.0 AORTIC ATHEROSCLEROSIS: ICD-10-CM

## 2022-05-20 DIAGNOSIS — Z87.891 HISTORY OF TOBACCO USE: ICD-10-CM

## 2022-05-20 PROBLEM — M79.674 PAIN OF TOE OF RIGHT FOOT: Status: RESOLVED | Noted: 2019-10-23 | Resolved: 2022-05-20

## 2022-05-20 PROCEDURE — 1160F RVW MEDS BY RX/DR IN RCRD: CPT | Mod: CPTII,S$GLB,, | Performed by: NURSE PRACTITIONER

## 2022-05-20 PROCEDURE — 1101F PR PT FALLS ASSESS DOC 0-1 FALLS W/OUT INJ PAST YR: ICD-10-PCS | Mod: CPTII,S$GLB,, | Performed by: NURSE PRACTITIONER

## 2022-05-20 PROCEDURE — 1159F PR MEDICATION LIST DOCUMENTED IN MEDICAL RECORD: ICD-10-PCS | Mod: CPTII,S$GLB,, | Performed by: NURSE PRACTITIONER

## 2022-05-20 PROCEDURE — 1159F MED LIST DOCD IN RCRD: CPT | Mod: CPTII,S$GLB,, | Performed by: NURSE PRACTITIONER

## 2022-05-20 PROCEDURE — G0439 PR MEDICARE ANNUAL WELLNESS SUBSEQUENT VISIT: ICD-10-PCS | Mod: S$GLB,,, | Performed by: NURSE PRACTITIONER

## 2022-05-20 PROCEDURE — 1160F PR REVIEW ALL MEDS BY PRESCRIBER/CLIN PHARMACIST DOCUMENTED: ICD-10-PCS | Mod: CPTII,S$GLB,, | Performed by: NURSE PRACTITIONER

## 2022-05-20 PROCEDURE — 3288F FALL RISK ASSESSMENT DOCD: CPT | Mod: CPTII,S$GLB,, | Performed by: NURSE PRACTITIONER

## 2022-05-20 PROCEDURE — 3074F SYST BP LT 130 MM HG: CPT | Mod: CPTII,S$GLB,, | Performed by: NURSE PRACTITIONER

## 2022-05-20 PROCEDURE — 1170F PR FUNCTIONAL STATUS ASSESSED: ICD-10-PCS | Mod: CPTII,S$GLB,, | Performed by: NURSE PRACTITIONER

## 2022-05-20 PROCEDURE — 1170F FXNL STATUS ASSESSED: CPT | Mod: CPTII,S$GLB,, | Performed by: NURSE PRACTITIONER

## 2022-05-20 PROCEDURE — 3008F BODY MASS INDEX DOCD: CPT | Mod: CPTII,S$GLB,, | Performed by: NURSE PRACTITIONER

## 2022-05-20 PROCEDURE — 3078F DIAST BP <80 MM HG: CPT | Mod: CPTII,S$GLB,, | Performed by: NURSE PRACTITIONER

## 2022-05-20 PROCEDURE — 1126F PR PAIN SEVERITY QUANTIFIED, NO PAIN PRESENT: ICD-10-PCS | Mod: CPTII,S$GLB,, | Performed by: NURSE PRACTITIONER

## 2022-05-20 PROCEDURE — 3288F PR FALLS RISK ASSESSMENT DOCUMENTED: ICD-10-PCS | Mod: CPTII,S$GLB,, | Performed by: NURSE PRACTITIONER

## 2022-05-20 PROCEDURE — 3078F PR MOST RECENT DIASTOLIC BLOOD PRESSURE < 80 MM HG: ICD-10-PCS | Mod: CPTII,S$GLB,, | Performed by: NURSE PRACTITIONER

## 2022-05-20 PROCEDURE — G0439 PPPS, SUBSEQ VISIT: HCPCS | Mod: S$GLB,,, | Performed by: NURSE PRACTITIONER

## 2022-05-20 PROCEDURE — 3074F PR MOST RECENT SYSTOLIC BLOOD PRESSURE < 130 MM HG: ICD-10-PCS | Mod: CPTII,S$GLB,, | Performed by: NURSE PRACTITIONER

## 2022-05-20 PROCEDURE — 1101F PT FALLS ASSESS-DOCD LE1/YR: CPT | Mod: CPTII,S$GLB,, | Performed by: NURSE PRACTITIONER

## 2022-05-20 PROCEDURE — 1126F AMNT PAIN NOTED NONE PRSNT: CPT | Mod: CPTII,S$GLB,, | Performed by: NURSE PRACTITIONER

## 2022-05-20 PROCEDURE — 3008F PR BODY MASS INDEX (BMI) DOCUMENTED: ICD-10-PCS | Mod: CPTII,S$GLB,, | Performed by: NURSE PRACTITIONER

## 2022-05-20 NOTE — PROGRESS NOTES
"Salud Echevarria presented for Medicare AW today. The following components were reviewed and updated:    · Medical history  · Family History  · Social history  · Allergies and Current Medications  · Health Risk Assessment  · Health Maintenance  · Care Team    **See Completed Assessments for Annual Wellness visit with in the encounter summary    The following assessments were completed:  · Depression Screening  · Cognitive function Screening        · Timed Get Up Test  · Whisper Test    Vitals:    05/20/22 0920 05/20/22 0931   BP: (!) 105/55 109/71   BP Location: Right arm Right arm   Patient Position: Sitting Standing   Pulse: 63    Temp: 97.7 °F (36.5 °C)    TempSrc: Temporal    SpO2: 97%    Weight: 61.7 kg (136 lb)    Height: 5' 2" (1.575 m)      Body mass index is 24.87 kg/m².   ]    Physical Exam  Vitals reviewed.   Constitutional:       Appearance: Normal appearance.   HENT:      Head: Normocephalic and atraumatic.   Cardiovascular:      Rate and Rhythm: Normal rate and regular rhythm.      Pulses: Normal pulses.      Heart sounds: Normal heart sounds.   Pulmonary:      Effort: Pulmonary effort is normal.      Breath sounds: Normal breath sounds.   Musculoskeletal:         General: Normal range of motion.      Cervical back: Normal range of motion and neck supple.   Skin:     General: Skin is warm and dry.   Neurological:      Mental Status: She is alert and oriented to person, place, and time.   Psychiatric:         Mood and Affect: Mood normal.         Behavior: Behavior normal.          Outpatient Medications Marked as Taking for the 5/20/22 encounter (Office Visit) with NEL Johnston   Medication Sig Dispense Refill    aspirin (ECOTRIN) 81 MG EC tablet Take 81 mg by mouth once daily.      pantoprazole (PROTONIX) 40 MG tablet Take 1 tablet (40 mg total) by mouth once daily. 30 tablet 0    pravastatin (PRAVACHOL) 40 MG tablet Take 1 tablet by mouth once daily 90 tablet 4    propylene glycol (SYSTANE " COMPLETE OPHT) Apply 1 drop to eye daily as needed.      traMADoL (ULTRAM) 50 mg tablet Take 1 tablet (50 mg total) by mouth every 6 (six) hours as needed for Pain. 18 tablet 0        Diagnoses and health risks identified today and associated recommendations/orders:  1. Encounter for preventive health examination  Medicare awv complete. Health maintenance:  Shingles vaccine due-encouraged patient to obtain. Mammogram and dexa scan due-patient states she will have these done with Dr. Sharif. Patient declined covid 19 vaccine.   On Tuesday, patient c/o symptoms near syncope numbness to hands and pale. Consider cardiology referral.     2. Aortic atherosclerosis  Chronic and stable on current management. See med list above. Follow up with PCP.      3. Chronic obstructive pulmonary disease, unspecified COPD type  Chronic and stable on current management. Follow up with pulmonology.      4. Peripheral vascular disease, unspecified  Chronic and stable on current management. See med list above. Follow up with PCP.      5. Mild intermittent asthma without complication  Chronic and stable on current management.  Follow up with pulmonology.      6. Coronary artery disease involving native coronary artery of native heart without angina pectoris  Chronic and stable. No acute issues. Continue current management. See med list above. Follow up with cardiology.      7. Hyperlipidemia, unspecified hyperlipidemia type  Lab Results   Component Value Date    CHOL 159 01/31/2022    CHOL 169 08/07/2020    CHOL 155 06/04/2019     Lab Results   Component Value Date    HDL 77 (H) 01/31/2022    HDL 75 08/07/2020    HDL 87 (H) 06/04/2019     Lab Results   Component Value Date    LDLCALC 69.8 01/31/2022    LDLCALC 80.2 08/07/2020    LDLCALC 51.4 (L) 06/04/2019     Lab Results   Component Value Date    TRIG 61 01/31/2022    TRIG 69 08/07/2020    TRIG 83 06/04/2019     Lab Results   Component Value Date    CHOLHDL 48.4 01/31/2022    CHOLHDL  44.4 08/07/2020    CHOLHDL 56.1 (H) 06/04/2019    Chronic and stable on current management. On pravastatin. See med list above. Follow up with PCP.      8. Pulmonary nodule, right  small 4 mm pulmonary nodule noted to the right lower lobe. Consider further evaluation.     9. Spinal stenosis of lumbosacral region  Chronic and stable on current management. See med list above. Follow up with PCP.      10. Anxiety  Chronic and stable on current management. See med list above. Follow up with PCP.      11. Benign paroxysmal positional vertigo, unspecified laterality  Stable. No acute issues. Fall precautions. F/u with pcp.     12. Seasonal allergic rhinitis due to pollen  Chronic and stable on current management. See med list above. Follow up with PCP.      13. Asymptomatic microscopic hematuria  Chronic and stable on current management. Follow up with urology.     14. Urge incontinence  Chronic and stable on current management. See med list above. Follow up with urology.     15. Gastroesophageal reflux disease with esophagitis, unspecified whether hemorrhage  Chronic and stable on current management. See med list above. Follow up with PCP.      16. Ventral hernia without obstruction or gangrene  S/p repair. No acute issues. F/u with pcp.     17. Back pain, unspecified back location, unspecified back pain laterality, unspecified chronicity  Chronic and stable on current management. See med list above. Follow up with PCP.      18. Left hip pain  Chronic and stable on current management. See med list above. Follow up with PCP.      19. Osteopenia, unspecified location  Chronic and stable on current management. Continue vitamin d, calcium in the diet, and weight bearing exercise. See med list above. Follow up with PCP.       20. Sciatica of left side  Chronic and stable on current management. See med list above. Follow up with PCP.      21. Dizziness  Stable. No acute issues. Fall precautions. F/u with pcp.     22. Fatigue,  unspecified type  Chronic and stable on current management. See med list above. Follow up with PCP.      23. History of tobacco use  Stable. No acute issues.           Provided Salud with a 5-10 year written screening schedule and personal prevention plan. Recommendations were developed using the USPSTF age appropriate recommendations. Education, counseling, and referrals were provided as needed.  After Visit Summary printed and given to patient which includes a list of additional screenings\tests needed.    Follow up in about 1 year (around 5/20/2023) for annual wellness visit.      NEL Johnston    I offered to discuss advanced care planning, including how to pick a person who would make decisions for you if you were unable to make them for yourself, called a health care power of , and what kind of decisions you might make such as use of life sustaining treatments such as ventilators and tube feeding when faced with a life limiting illness recorded on a living will that they will need to know. (How you want to be cared for as you near the end of your natural life)     X  Patient is unwilling to engage in a discussion regarding advance directives at this time.

## 2022-05-20 NOTE — PATIENT INSTRUCTIONS
Counseling and Referral of Other Preventative  (Italic type indicates deductible and co-insurance are waived)    Patient Name: Salud Echevarria  Today's Date: 5/20/2022    Health Maintenance       Date Due Completion Date    COVID-19 Vaccine (1) Never done ---    Shingles Vaccine (2 of 3) 03/12/2015 1/15/2015    Mammogram 02/03/2022 2/3/2021    DEXA Scan 02/04/2022 2/4/2019    Influenza Vaccine (Season Ended) 09/01/2022 10/1/2020    Lipid Panel 01/31/2023 1/31/2022    High Dose Statin 05/13/2023 5/13/2022    Aspirin/Antiplatelet Therapy 05/13/2023 5/13/2022    Colorectal Cancer Screening 10/21/2023 10/21/2020    TETANUS VACCINE 09/27/2026 9/27/2016        No orders of the defined types were placed in this encounter.    The following information is provided to all patients.  This information is to help you find resources for any of the problems found today that may be affecting your health:                Living healthy guide: www.[facility].[facility]      Understanding Diabetes: www.diabetes.org      Eating healthy: www.cdc.gov/healthyweight      CDC home safety checklist: www.cdc.gov/steadi/patient.html      Agency on Aging: www.goea.[place].gov      Alcoholics anonymous (AA): www.aa.org      Physical Activity: www.brennan.nih.gov/wq5kxcx      Tobacco use: www.quitwithusla.org

## 2022-07-24 ENCOUNTER — HOSPITAL ENCOUNTER (EMERGENCY)
Facility: HOSPITAL | Age: 72
Discharge: HOME | End: 2022-07-24
Attending: EMERGENCY MEDICINE
Payer: MEDICARE

## 2022-07-24 VITALS
BODY MASS INDEX: 25 KG/M2 | DIASTOLIC BLOOD PRESSURE: 69 MMHG | WEIGHT: 136.69 LBS | HEART RATE: 66 BPM | RESPIRATION RATE: 18 BRPM | SYSTOLIC BLOOD PRESSURE: 130 MMHG | TEMPERATURE: 98 F | OXYGEN SATURATION: 97 %

## 2022-07-24 DIAGNOSIS — U07.1 COVID-19 VIRUS DETECTED: ICD-10-CM

## 2022-07-24 DIAGNOSIS — B34.9 VIRAL SYNDROME: ICD-10-CM

## 2022-07-24 DIAGNOSIS — R42 VERTIGO: Primary | ICD-10-CM

## 2022-07-24 LAB
ALBUMIN SERPL BCP-MCNC: 3.8 G/DL (ref 3.5–5.2)
ALP SERPL-CCNC: 66 U/L (ref 55–135)
ALT SERPL W/O P-5'-P-CCNC: 11 U/L (ref 10–44)
ANION GAP SERPL CALC-SCNC: 9 MMOL/L (ref 8–16)
AST SERPL-CCNC: 13 U/L (ref 10–40)
BILIRUB SERPL-MCNC: 0.3 MG/DL (ref 0.1–1)
BNP SERPL-MCNC: 28 PG/ML (ref 0–99)
BUN SERPL-MCNC: 12 MG/DL (ref 8–23)
CALCIUM SERPL-MCNC: 9.5 MG/DL (ref 8.7–10.5)
CHLORIDE SERPL-SCNC: 105 MMOL/L (ref 95–110)
CO2 SERPL-SCNC: 28 MMOL/L (ref 23–29)
CREAT SERPL-MCNC: 0.7 MG/DL (ref 0.5–1.4)
CTP QC/QA: YES
ERYTHROCYTE [DISTWIDTH] IN BLOOD BY AUTOMATED COUNT: 13.9 % (ref 11.5–14.5)
EST. GFR  (AFRICAN AMERICAN): >60 ML/MIN/1.73 M^2
EST. GFR  (NON AFRICAN AMERICAN): >60 ML/MIN/1.73 M^2
GLUCOSE SERPL-MCNC: 109 MG/DL (ref 70–110)
HCT VFR BLD AUTO: 41.4 % (ref 37–48.5)
HGB BLD-MCNC: 13.7 G/DL (ref 12–16)
MCH RBC QN AUTO: 30.2 PG (ref 27–31)
MCHC RBC AUTO-ENTMCNC: 33.1 G/DL (ref 32–36)
MCV RBC AUTO: 91 FL (ref 82–98)
PLATELET # BLD AUTO: 277 K/UL (ref 150–450)
PMV BLD AUTO: 9.7 FL (ref 9.2–12.9)
POTASSIUM SERPL-SCNC: 4.5 MMOL/L (ref 3.5–5.1)
PROT SERPL-MCNC: 7.1 G/DL (ref 6–8.4)
RBC # BLD AUTO: 4.54 M/UL (ref 4–5.4)
SARS-COV-2 RDRP RESP QL NAA+PROBE: POSITIVE
SODIUM SERPL-SCNC: 142 MMOL/L (ref 136–145)
TROPONIN I SERPL DL<=0.01 NG/ML-MCNC: <0.006 NG/ML (ref 0–0.03)
WBC # BLD AUTO: 7.21 K/UL (ref 3.9–12.7)

## 2022-07-24 PROCEDURE — 63600175 PHARM REV CODE 636 W HCPCS: Mod: ER | Performed by: EMERGENCY MEDICINE

## 2022-07-24 PROCEDURE — 80053 COMPREHEN METABOLIC PANEL: CPT | Mod: ER | Performed by: EMERGENCY MEDICINE

## 2022-07-24 PROCEDURE — 83880 ASSAY OF NATRIURETIC PEPTIDE: CPT | Mod: ER | Performed by: EMERGENCY MEDICINE

## 2022-07-24 PROCEDURE — 99900035 HC TECH TIME PER 15 MIN (STAT): Mod: ER

## 2022-07-24 PROCEDURE — U0002 COVID-19 LAB TEST NON-CDC: HCPCS | Mod: ER | Performed by: EMERGENCY MEDICINE

## 2022-07-24 PROCEDURE — 96361 HYDRATE IV INFUSION ADD-ON: CPT | Mod: ER

## 2022-07-24 PROCEDURE — 96374 THER/PROPH/DIAG INJ IV PUSH: CPT | Mod: ER

## 2022-07-24 PROCEDURE — 94761 N-INVAS EAR/PLS OXIMETRY MLT: CPT | Mod: ER

## 2022-07-24 PROCEDURE — 25000003 PHARM REV CODE 250: Mod: ER | Performed by: EMERGENCY MEDICINE

## 2022-07-24 PROCEDURE — 85027 COMPLETE CBC AUTOMATED: CPT | Mod: ER | Performed by: EMERGENCY MEDICINE

## 2022-07-24 PROCEDURE — 99284 EMERGENCY DEPT VISIT MOD MDM: CPT | Mod: 25,ER

## 2022-07-24 PROCEDURE — 84484 ASSAY OF TROPONIN QUANT: CPT | Mod: ER | Performed by: EMERGENCY MEDICINE

## 2022-07-24 RX ORDER — ONDANSETRON 2 MG/ML
4 INJECTION INTRAMUSCULAR; INTRAVENOUS
Status: COMPLETED | OUTPATIENT
Start: 2022-07-24 | End: 2022-07-24

## 2022-07-24 RX ORDER — MECLIZINE HYDROCHLORIDE 25 MG/1
25 TABLET ORAL 3 TIMES DAILY PRN
Qty: 20 TABLET | Refills: 0 | Status: SHIPPED | OUTPATIENT
Start: 2022-07-24 | End: 2022-08-22 | Stop reason: ALTCHOICE

## 2022-07-24 RX ORDER — MECLIZINE HYDROCHLORIDE 25 MG/1
50 TABLET ORAL
Status: COMPLETED | OUTPATIENT
Start: 2022-07-24 | End: 2022-07-24

## 2022-07-24 RX ORDER — ONDANSETRON 4 MG/1
4 TABLET, ORALLY DISINTEGRATING ORAL EVERY 6 HOURS PRN
Qty: 30 TABLET | Refills: 0 | Status: SHIPPED | OUTPATIENT
Start: 2022-07-24 | End: 2022-08-22 | Stop reason: ALTCHOICE

## 2022-07-24 RX ORDER — ACETAMINOPHEN 500 MG
1000 TABLET ORAL
Status: COMPLETED | OUTPATIENT
Start: 2022-07-24 | End: 2022-07-24

## 2022-07-24 RX ADMIN — ACETAMINOPHEN 1000 MG: 500 TABLET ORAL at 10:07

## 2022-07-24 RX ADMIN — SODIUM CHLORIDE 1000 ML: 0.9 INJECTION, SOLUTION INTRAVENOUS at 10:07

## 2022-07-24 RX ADMIN — MECLIZINE HYDROCHLORIDE 50 MG: 25 TABLET ORAL at 10:07

## 2022-07-24 RX ADMIN — ONDANSETRON 4 MG: 2 INJECTION INTRAMUSCULAR; INTRAVENOUS at 10:07

## 2022-07-24 NOTE — DISCHARGE INSTRUCTIONS
Your test was POSITIVE for COVID-19 (coronavirus).       Please isolate yourself at home.  You may leave home and/or return to work once the following conditions are met:    If you were not hospitalized and are not moderately to severely immunocompromised:   More than 5 days since symptoms first appeared AND  More than 24 hours fever free without medications AND  Symptoms are improving  Continue to wear a mask around others for 5 additional days.    If you were hospitalized OR are moderately to severely immunocompromised:  More than 20 days since symptoms first appeared  More than 24 hours fever free without medications  Symptoms have improved    If you had no symptoms but tested positive:  More than 5 days since the date of the first positive test (20 days if moderately to severely immunocompromised). If you develop symptoms, then use the guidelines above.  Continue to wear a mask around others for 5 additional days.      Contact Tracing    As one of the next steps, you will receive a call or text from the [place] Department of Health ([facility]) COVID-19 Tracing Team. See the contact information below so you know not to ignore the health departments call. It is important that you contact them back immediately so they can help.      Contact Tracer Number:  [phone]  Caller ID for most carriers: [facility]     What is contact tracing?  Contact tracing is a process that helps identify everyone who has been in close contact with an infected person. Contact tracers let those people know they may have been exposed and guide them on next steps. Confidentiality is important for everyone; no one will be told who may have exposed them to the virus.  Your involvement is important. The more we know about where and how this virus is spreading, the better chance we have at stopping it from spreading further.  What does exposure mean?  Exposure means you have been within 6 feet for more than 15 minutes with a person who  has or had COVID-19.  What kind of questions do the contact tracers ask?  A contact tracer will confirm your basic contact information including name, address, phone number, and next of kin, as well as asking about any symptoms you may have had. Theyll also ask you how you think you may have gotten sick, such as places where you may have been exposed to the virus, and people you were with. Those names will never be shared with anyone outside of that call, and will only be used to help trace and stop the spread of the virus.   I have privacy concerns. How will the state use my information?  Your privacy about your health is important. All calls are completed using call centers that use the appropriate health privacy protection measures (HIPAA compliance), meaning that your patient information is safe. No one will ever ask you any questions related to immigration status. Your health comes first.   Do I have to participate?  You do not have to participate, but we strongly encourage you to. Contact tracing can help us catch and control new outbreaks as theyre developing to keep your friends and family safe.   What if I dont hear from anyone?  If you dont receive a call within 24 hours, you can call the number above right away to inquire about your status. That line is open from 8:00 am - 8:00 p.m., 7 days a week.  Contact tracing saves lives! Together, we have the power to beat this virus and keep our loved ones and neighbors safe.    For more information see CDC link below.      https://www.cdc.gov/coronavirus/2019-ncov/hcp/guidance-prevent-spread.html#precautions        Sources:  [facility], [facility]       Patient presents with upper respiratory and flulike symptoms consistent with a viral etiology. Based on my assessment in the ED, I do not suspect any respiratory, airway, pulmonary, cardiovascular (including myocarditis), metabolic, CNS, medical, or surgical emergency medical  condition. I have discussed with the patient and/or caregiver signs and symptoms for secondary bacterial infections, such as pneumonia. I believe that the patient's symptoms are most consistent with a viral illness. Patient is safe for discharge home with conservative therapy.  I recommended that the patient treat the symptoms and recommended that they:  Rest; drink plenty of clear fluids; use nasal saline spray to clear nasal drainage and help with nasal congestion; take an antihistamine (Allegra, Claritin, or Zyrtec) to help dry mucus and postnasal drip; take Mucinex or Mucinex DM for cough and chest congestion; take ibuprofen or acetaminophen for any fever, headache, body aches, or other pain; gargle with warm salt water gargles or lozenges for throat comfort; and follow up with primary care provider if there is no improvement or a worsening of symptoms.    Rest  Drink plenty of clear fluids   Nasal saline spray to clear nasal drainage and help with nasal congestion  Zyrtec or Claritin to help dry mucus and post nasal drip  Mucinex or Mucinex DM for cough and chest congestion  Tylenol or Ibuprofen for fever, headache and body aches  Warm salt water gargles for throat comfort  Chloraseptic spray or lozenges for throat comfort  RTC with no improvement or worsening    Regarding VERTIGO & DIZZINESS, I recommended strategies to prevent or manage symptoms such as: avoiding sudden head movements; refraining from bending over at the waist; keeping head raised when lying down (i,e., place pillows under upper back and head or rest in a recliner); avoid staying in bed for long periods of time or if bedrest is required, change position often when in bed; and always wear a helmet when riding bikes or playing sports. Instructed patient to contact primary care provider if symptoms worsen, falls become more frequent, or any questions arise regarding condition and/or treatment.  Patient instructed to return to the emergency  department if: a headache develops and becomes persistent and severe: notice any increase in weakness or visual changes; experience any shortness of breath or chest pain; and/or develop hearing problems or tinnitus.     Driving or other activities under influence of medications - Patient and/or family/caretaker was given a prescription for, or instructed to use a medicine that may impair ability to drive, operate machinery, or participate in other potentially dangerous activities.  Patient was instructed not to participate in these activities while under the influence of these medications.

## 2022-07-24 NOTE — Clinical Note
"Salud "SHAI Echevarria was seen and treated in our emergency department on 7/24/2022.     COVID-19 is present in our communities across the state. There is limited testing for COVID at this time, so not all patients can be tested. In this situation, your employee meets the following criteria:    Salud Echevarria has met the criteria for COVID-19 testing and has a POSITIVE result. She can return to work once they are asymptomatic for 24 hours without the use of fever reducing medications AND at least five days from the first positive result. A mask is recommended for 5 days post quarantine.     If you have any questions or concerns, or if I can be of further assistance, please do not hesitate to contact me.    Sincerely,             Marko Bojorquez RN"

## 2022-07-24 NOTE — ED PROVIDER NOTES
Encounter Date: 7/24/2022       History     Chief Complaint   Patient presents with    Dizziness     C/o dizziness and nausea onset yesterday. Pt also c/o HA. Pt in no resp distress. Pt denies SOB, or CP     The history is provided by the patient.   URI  The primary symptoms include fatigue, headaches, sore throat, cough and nausea. Primary symptoms do not include fever, ear pain, swollen glands, wheezing, abdominal pain, vomiting, myalgias, arthralgias or rash. The current episode started two days ago. This is a new problem. The problem has not changed since onset.  The fatigue began 2 days ago. The fatigue has been unchanged since its onset.   The headache began 2 days ago. The headache developed gradually. Headache is a new problem. The headache is present rarely. Pain scale: mild. Location/region(s) of the headache: frontal. The headache is associated with activity. The headache is not associated with aura, photophobia, eye pain, visual change, neck stiffness, paresthesias or loss of balance.   The sore throat began 2 days ago. The sore throat has been unchanged since its onset. The sore throat is not accompanied by trouble swallowing, drooling, hoarse voice or stridor. Sore Throat Pain Scale: mild.   The cough began 2 days ago. The cough is non-productive. There is nondescript sputum produced. It is exacerbated by smoking and exposure to smoke.   Nausea began 2 days ago.   The following treatments were addressed: Acetaminophen was not tried. A decongestant was not tried. Aspirin was not tried. NSAIDs were not tried. Risk factors for severe complications from URI include being elderly.     Review of patient's allergies indicates:   Allergen Reactions    Dye Anaphylaxis     Contrast Dye    Iodine and iodide containing products Rash    Penicillins Shortness Of Breath     Shortness of breath^severe skin rash    Lovastatin Other (See Comments)     Causes leg cramp    Mucinex [guaifenesin]     Cephalexin  Nausea And Vomiting    Diazepam Anxiety     Made pt overly anxious instead of relaxing    Naproxen Nausea And Vomiting     Past Medical History:   Diagnosis Date    Asthma     childhood - seasonal as an adult    Hematuria 6/10/2019    Hyperlipidemia     Hypertension     Spinal stenosis      Past Surgical History:   Procedure Laterality Date    BACK SURGERY      BREAST BIOPSY      CARDIAC CATHETERIZATION  2018, 11/2020    HYSTERECTOMY      LYMPH NODE BIOPSY      ROBOT-ASSISTED REPAIR OF VENTRAL HERNIA USING DA JIM XI N/A 12/10/2020    Procedure: XI ROBOTIC REPAIR, HERNIA, VENTRAL;  Surgeon: Brandyn Panchal MD;  Location: [facility];  Service: General;  Laterality: N/A;    TONSILLECTOMY       Family History   Problem Relation Age of Onset    Heart disease Mother      Social History     Tobacco Use    Smoking status: Former Smoker     Packs/day: 0.50     Years: 30.00     Pack years: 15.00     Types: Cigarettes    Smokeless tobacco: Former User    Tobacco comment: none past MN before surgery   Substance Use Topics    Alcohol use: Yes     Comment: 1-2 beers per month    Drug use: No     Review of Systems   Constitutional: Positive for fatigue. Negative for fever.   HENT: Positive for sore throat. Negative for drooling, ear pain, hoarse voice and trouble swallowing.    Eyes: Negative for photophobia and pain.   Respiratory: Positive for cough. Negative for shortness of breath, wheezing and stridor.    Cardiovascular: Negative for chest pain.   Gastrointestinal: Positive for nausea. Negative for abdominal pain and vomiting.   Genitourinary: Negative for dysuria.   Musculoskeletal: Negative for arthralgias, back pain, myalgias and neck stiffness.   Skin: Negative for rash.   Neurological: Positive for headaches. Negative for weakness, paresthesias and loss of balance.   Hematological: Does not bruise/bleed easily.   All other systems reviewed and are negative.      Physical Exam     Initial Vitals [07/24/22  0920]   BP Pulse Resp Temp SpO2   132/82 97 18 98.1 °F (36.7 °C) 97 %      MAP       --         Physical Exam    Nursing note and vitals reviewed.  Constitutional: She appears well-developed and well-nourished.   Pt states she has the sensation of the room spinning when going from sitting to lying down for several moments with spontaneous resolution.  Also reproduced when going from lying to sitting.  Also resolves spontaneously within moments.   HENT:   Head: Normocephalic and atraumatic.   Right Ear: Hearing, tympanic membrane, external ear and ear canal normal.   Left Ear: Hearing, tympanic membrane, external ear and ear canal normal.   Nose: Mucosal edema present. Right sinus exhibits no maxillary sinus tenderness and no frontal sinus tenderness. Left sinus exhibits no maxillary sinus tenderness and no frontal sinus tenderness.   Mouth/Throat: Uvula is midline and mucous membranes are normal. Posterior oropharyngeal erythema present. No oropharyngeal exudate or posterior oropharyngeal edema.   Eyes: Conjunctivae, EOM and lids are normal. Pupils are equal, round, and reactive to light.   Neck: Neck supple. No thyromegaly present.   Normal range of motion.  Cardiovascular: Normal rate, regular rhythm, normal heart sounds and intact distal pulses. Exam reveals no gallop and no friction rub.    No murmur heard.  Pulmonary/Chest: Breath sounds normal. No respiratory distress. She has no wheezes. She has no rhonchi. She exhibits no tenderness.   Abdominal: Abdomen is soft. Bowel sounds are normal. She exhibits no distension. There is no abdominal tenderness. There is no rebound and no guarding.   Musculoskeletal:         General: No tenderness or edema. Normal range of motion.      Cervical back: Normal range of motion and neck supple.     Lymphadenopathy:     She has no cervical adenopathy.   Neurological: She is alert and oriented to person, place, and time. She has normal strength. No cranial nerve deficit or  sensory deficit. GCS eye subscore is 4. GCS verbal subscore is 5. GCS motor subscore is 6.   No acute focal neuro deficits   Skin: Skin is warm and dry. No rash noted.   Psychiatric: She has a normal mood and affect. Her behavior is normal. Judgment and thought content normal.         ED Course   Procedures  Labs Reviewed   SARS-COV-2 RDRP GENE - Abnormal; Notable for the following components:       Result Value    POC Rapid COVID Positive (*)     All other components within normal limits   TROPONIN I   COMPREHENSIVE METABOLIC PANEL   B-TYPE NATRIURETIC PEPTIDE   CBC WITHOUT DIFFERENTIAL   TROPONIN I   CBC W/ AUTO DIFFERENTIAL   COMPREHENSIVE METABOLIC PANEL   TROPONIN I   B-TYPE NATRIURETIC PEPTIDE     EKG Readings: (Independently Interpreted)   Initial Reading: No STEMI. Rhythm: Normal Sinus Rhythm. Heart Rate: 81. Ectopy: No Ectopy. Conduction: Normal. ST Segments: Normal ST Segments. T Waves: Normal. Clinical Impression: Normal Sinus Rhythm       Imaging Results          X-Ray Chest AP Portable (Final result)  Result time 07/24/22 10:45:50    Final result by Dong Padgett MD (07/24/22 10:45:50)                 Impression:      Mild bilateral interstitial prominence predominantly in the lower lung zones may be related to subsegmental atelectasis, scarring, or atypical infection.      Electronically signed by: Dong Padgett  Date:    07/24/2022  Time:    10:45             Narrative:    EXAMINATION:  XR CHEST AP PORTABLE    CLINICAL HISTORY:  dizziness;    TECHNIQUE:  Single frontal view of the chest was performed.    COMPARISON:  Chest x-ray dated 05/13/2022 and 10/01/2020    FINDINGS:  Cardiomediastinal silhouette is unchanged in size and contour.  Faint aortic arch calcification again noted.  Trachea remains midline.  There is mild bilateral interstitial prominence particularly in the lower lung zones.  No focal consolidation, pleural effusion, or pneumothorax seen.  No acute bony abnormality.                                  Medications   sodium chloride 0.9% bolus 1,000 mL (0 mLs Intravenous Stopped 7/24/22 1125)   ondansetron injection 4 mg (4 mg Intravenous Given 7/24/22 1028)   meclizine tablet 50 mg (50 mg Oral Given 7/24/22 1025)   acetaminophen tablet 1,000 mg (1,000 mg Oral Given 7/24/22 1027)                       Vitals:    07/24/22 0920 07/24/22 0925 07/24/22 0931 07/24/22 1000   BP: 132/82   123/61   Pulse: 97  86 74   Resp: 18   18   Temp: 98.1 °F (36.7 °C)      TempSrc: Oral      SpO2: 97% 98%  (!) 94%   Weight: 62 kg (136 lb 11 oz)       07/24/22 1001 07/24/22 1002 07/24/22 1027 07/24/22 1133   BP: 129/63 138/66  (!) 131/58   Pulse: 77 101  (!) 59   Resp: 18 18  16   Temp:   98.1 °F (36.7 °C)    TempSrc:       SpO2: 97% 97%  97%   Weight:        07/24/22 1229   BP: 130/69   Pulse: 66   Resp: 18   Temp:    TempSrc:    SpO2: 97%   Weight:        Results for orders placed or performed during the hospital encounter of 07/24/22   Troponin I   Result Value Ref Range    Troponin I <0.006 0.000 - 0.026 ng/mL   Comprehensive Metabolic Panel   Result Value Ref Range    Sodium 142 136 - 145 mmol/L    Potassium 4.5 3.5 - 5.1 mmol/L    Chloride 105 95 - 110 mmol/L    CO2 28 23 - 29 mmol/L    Glucose 109 70 - 110 mg/dL    BUN 12 8 - 23 mg/dL    Creatinine 0.7 0.5 - 1.4 mg/dL    Calcium 9.5 8.7 - 10.5 mg/dL    Total Protein 7.1 6.0 - 8.4 g/dL    Albumin 3.8 3.5 - 5.2 g/dL    Total Bilirubin 0.3 0.1 - 1.0 mg/dL    Alkaline Phosphatase 66 55 - 135 U/L    AST 13 10 - 40 U/L    ALT 11 10 - 44 U/L    Anion Gap 9 8 - 16 mmol/L    eGFR if African American >60.0 >60 mL/min/1.73 m^2    eGFR if non African American >60.0 >60 mL/min/1.73 m^2   BNP   Result Value Ref Range    BNP 28 0 - 99 pg/mL   CBC Without Differential   Result Value Ref Range    WBC 7.21 3.90 - 12.70 K/uL    RBC 4.54 4.00 - 5.40 M/uL    Hemoglobin 13.7 12.0 - 16.0 g/dL    Hematocrit 41.4 37.0 - 48.5 %    MCV 91 82 - 98 fL    MCH 30.2 27.0 - 31.0 pg     MCHC 33.1 32.0 - 36.0 g/dL    RDW 13.9 11.5 - 14.5 %    Platelets 277 150 - 450 K/uL    MPV 9.7 9.2 - 12.9 fL   POCT COVID-19 Rapid Screening   Result Value Ref Range    POC Rapid COVID Positive (A) Negative     Acceptable Yes          Imaging Results          X-Ray Chest AP Portable (Final result)  Result time 07/24/22 10:45:50    Final result by Dong Padgett MD (07/24/22 10:45:50)                 Impression:      Mild bilateral interstitial prominence predominantly in the lower lung zones may be related to subsegmental atelectasis, scarring, or atypical infection.      Electronically signed by: Dong Padgett  Date:    07/24/2022  Time:    10:45             Narrative:    EXAMINATION:  XR CHEST AP PORTABLE    CLINICAL HISTORY:  dizziness;    TECHNIQUE:  Single frontal view of the chest was performed.    COMPARISON:  Chest x-ray dated 05/13/2022 and 10/01/2020    FINDINGS:  Cardiomediastinal silhouette is unchanged in size and contour.  Faint aortic arch calcification again noted.  Trachea remains midline.  There is mild bilateral interstitial prominence particularly in the lower lung zones.  No focal consolidation, pleural effusion, or pneumothorax seen.  No acute bony abnormality.                                Medications   sodium chloride 0.9% bolus 1,000 mL (0 mLs Intravenous Stopped 7/24/22 1125)   ondansetron injection 4 mg (4 mg Intravenous Given 7/24/22 1028)   meclizine tablet 50 mg (50 mg Oral Given 7/24/22 1025)   acetaminophen tablet 1,000 mg (1,000 mg Oral Given 7/24/22 1027)           12:04 PM - Re-evaluation: The patient is resting comfortably and is in no acute distress. She states that her symptoms have improved after treatment within ER.  Pt placed in symptom monitoring program. Discussed test results, shared treatment plan, specific conditions for return, and importance of follow up with patient and family.  Advised close f/u c pcp/ent within one week and to return to  ER if symptoms persist or worsen.  Driving precautions discussed.  She understands and agrees with the plan as discussed. Answered  her questions at this time. She has remained hemodynamically stable throughout the ED course and is appropriate for discharge home.     Your test was POSITIVE for COVID-19 (coronavirus).       Please isolate yourself at home.  You may leave home and/or return to work once the following conditions are met:    If you were not hospitalized and are not moderately to severely immunocompromised:    More than 5 days since symptoms first appeared AND   More than 24 hours fever free without medications AND   Symptoms are improving   Continue to wear a mask around others for 5 additional days.    If you were hospitalized OR are moderately to severely immunocompromised:   More than 20 days since symptoms first appeared   More than 24 hours fever free without medications   Symptoms have improved    If you had no symptoms but tested positive:   More than 5 days since the date of the first positive test (20 days if moderately to severely immunocompromised). If you develop symptoms, then use the guidelines above.   Continue to wear a mask around others for 5 additional days.      Contact Tracing    As one of the next steps, you will receive a call or text from the [place] Department of Health ([facility]) COVID-19 Tracing Team. See the contact information below so you know not to ignore the health departments call. It is important that you contact them back immediately so they can help.      Contact Tracer Number:  [phone]  Caller ID for most carriers: [facility]     What is contact tracing?  · Contact tracing is a process that helps identify everyone who has been in close contact with an infected person. Contact tracers let those people know they may have been exposed and guide them on next steps. Confidentiality is important for everyone; no one will be told who may have exposed them  to the virus.  · Your involvement is important. The more we know about where and how this virus is spreading, the better chance we have at stopping it from spreading further.  What does exposure mean?  · Exposure means you have been within 6 feet for more than 15 minutes with a person who has or had COVID-19.  What kind of questions do the contact tracers ask?  · A contact tracer will confirm your basic contact information including name, address, phone number, and next of kin, as well as asking about any symptoms you may have had. Theyll also ask you how you think you may have gotten sick, such as places where you may have been exposed to the virus, and people you were with. Those names will never be shared with anyone outside of that call, and will only be used to help trace and stop the spread of the virus.   I have privacy concerns. How will the state use my information?  · Your privacy about your health is important. All calls are completed using call centers that use the appropriate health privacy protection measures (HIPAA compliance), meaning that your patient information is safe. No one will ever ask you any questions related to immigration status. Your health comes first.   Do I have to participate?  · You do not have to participate, but we strongly encourage you to. Contact tracing can help us catch and control new outbreaks as theyre developing to keep your friends and family safe.   What if I dont hear from anyone?  · If you dont receive a call within 24 hours, you can call the number above right away to inquire about your status. That line is open from 8:00 am - 8:00 p.m., 7 days a week.  Contact tracing saves lives! Together, we have the power to beat this virus and keep our loved ones and neighbors safe.    For more information see CDC link below.      https://www.cdc.gov/coronavirus/2019-ncov/hcp/guidance-prevent-spread.html#precautions        Sources:  [facility], [place] Department of Health and  Hospitals       Patient presents with upper respiratory and flulike symptoms consistent with a viral etiology. Based on my assessment in the ED, I do not suspect any respiratory, airway, pulmonary, cardiovascular (including myocarditis), metabolic, CNS, medical, or surgical emergency medical condition. I have discussed with the patient and/or caregiver signs and symptoms for secondary bacterial infections, such as pneumonia. I believe that the patient's symptoms are most consistent with a viral illness. Patient is safe for discharge home with conservative therapy.  I recommended that the patient treat the symptoms and recommended that they:  Rest; drink plenty of clear fluids; use nasal saline spray to clear nasal drainage and help with nasal congestion; take an antihistamine (Allegra, Claritin, or Zyrtec) to help dry mucus and postnasal drip; take Mucinex or Mucinex DM for cough and chest congestion; take ibuprofen or acetaminophen for any fever, headache, body aches, or other pain; gargle with warm salt water gargles or lozenges for throat comfort; and follow up with primary care provider if there is no improvement or a worsening of symptoms.    Rest  Drink plenty of clear fluids   Nasal saline spray to clear nasal drainage and help with nasal congestion  Zyrtec or Claritin to help dry mucus and post nasal drip  Mucinex or Mucinex DM for cough and chest congestion  Tylenol or Ibuprofen for fever, headache and body aches  Warm salt water gargles for throat comfort  Chloraseptic spray or lozenges for throat comfort  RTC with no improvement or worsening    Regarding VERTIGO & DIZZINESS, I recommended strategies to prevent or manage symptoms such as: avoiding sudden head movements; refraining from bending over at the waist; keeping head raised when lying down (i,e., place pillows under upper back and head or rest in a recliner); avoid staying in bed for long periods of time or if bedrest is required, change position  often when in bed; and always wear a helmet when riding bikes or playing sports. Instructed patient to contact primary care provider if symptoms worsen, falls become more frequent, or any questions arise regarding condition and/or treatment.  Patient instructed to return to the emergency department if: a headache develops and becomes persistent and severe: notice any increase in weakness or visual changes; experience any shortness of breath or chest pain; and/or develop hearing problems or tinnitus.     Driving or other activities under influence of medications - Patient and/or family/caretaker was given a prescription for, or instructed to use a medicine that may impair ability to drive, operate machinery, or participate in other potentially dangerous activities.  Patient was instructed not to participate in these activities while under the influence of these medications.    Pre-hypertension/Hypertension: The pt has been informed that they may have pre-hypertension or hypertension based on a blood pressure reading in the ED. I recommend that the pt call the PCP listed on their discharge instructions or a physician of their choice this week to arrange f/u for further evaluation of possible pre-hypertension or hypertension.     Salud Echevarria was given a handout which discussed their disease process, precautions, and instructions for follow-up and therapy.     Follow-up Information     Rikki Sharif MD. Schedule an appointment as soon as possible for a visit in 1 week.    Specialty: Family Medicine  Contact information:  [address] [place] [address] [phone]             [facility] ENT Surgery. Schedule an appointment as soon as possible for a visit in 1 week.    Specialty: Otolaryngology  Contact information:  [address] [facility] [address] [address]  [phone]           [facility] - Emergency Dept.    Specialty: Emergency Medicine  Why: As needed, If symptoms worsen  Contact  information:  [address] [facility] [address]  [facility] [address] [phone]                          Medication List      START taking these medications    meclizine 25 mg tablet  Commonly known as: ANTIVERT  Take 1 tablet (25 mg total) by mouth 3 (three) times daily as needed.     nirmatrelvir-ritonavir 300 mg (150 mg x 2)-100 mg copackaged tablets (EUA)  Take 3 tablets by mouth 2 (two) times daily for 5 days. Each dose contains 2 nirmatrelvir (pink tablets) and 1 ritonavir (white tablet). Take all 3 tablets together     ondansetron 4 MG Tbdl  Commonly known as: ZOFRAN-ODT  Take 1 tablet (4 mg total) by mouth every 6 (six) hours as needed.        ASK your doctor about these medications    albuterol 90 mcg/actuation inhaler  Commonly known as: PROVENTIL/VENTOLIN HFA  Inhale 1-2 puffs into the lungs every 6 (six) hours as needed for Wheezing. Rescue     aspirin 81 MG EC tablet  Commonly known as: ECOTRIN     pantoprazole 40 MG tablet  Commonly known as: PROTONIX  Take 1 tablet (40 mg total) by mouth once daily.     pravastatin 40 MG tablet  Commonly known as: PRAVACHOL  Take 1 tablet by mouth once daily     SYSTANE COMPLETE OPHT           Where to Get Your Medications      You can get these medications from any pharmacy    Bring a paper prescription for each of these medications  · meclizine 25 mg tablet  · nirmatrelvir-ritonavir 300 mg (150 mg x 2)-100 mg copackaged tablets (EUA)  · ondansetron 4 MG Tbdl        Current Discharge Medication List            ED Diagnosis  1. Vertigo    2. Viral syndrome    3. COVID-19 virus detected             Clinical Impression:   Final diagnoses:  [R42] Vertigo (Primary)  [B34.9] Viral syndrome  [U07.1] COVID-19 virus detected          ED Disposition Condition    Discharge Stable        ED Prescriptions     Medication Sig Dispense Start Date End Date Auth. Provider    meclizine (ANTIVERT) 25 mg tablet Take 1 tablet (25 mg total) by mouth 3 (three) times daily as needed. 20 tablet  7/24/2022  Jin Lopez Jr., MD    ondansetron (ZOFRAN-ODT) 4 MG TbDL Take 1 tablet (4 mg total) by mouth every 6 (six) hours as needed. 30 tablet 7/24/2022  Jin Lopez Jr., MD    nirmatrelvir-ritonavir 300 mg (150 mg x 2)-100 mg copackaged tablets (EUA) Take 3 tablets by mouth 2 (two) times daily for 5 days. Each dose contains 2 nirmatrelvir (pink tablets) and 1 ritonavir (white tablet). Take all 3 tablets together 30 tablet 7/24/2022 7/29/2022 Jin Lopez Jr., MD        Follow-up Information     Follow up With Specialties Details Why Contact Info    Rikki Sharif MD Family Medicine Schedule an appointment as soon as possible for a visit in 1 week  [address]  [place] [address]  [phone]      [facility] ENT Surgery Otolaryngology Schedule an appointment as soon as possible for a visit in 1 week  [address] [facility] [address] [phone]    [facility] - Emergency Dept Emergency Medicine  As needed, If symptoms worsen [address] [address] [address] [phone]           Jin Lopez Jr., MD  07/24/22 1888

## 2022-08-09 ENCOUNTER — PATIENT MESSAGE (OUTPATIENT)
Dept: ADMINISTRATIVE | Facility: HOSPITAL | Age: 72
End: 2022-08-09
Payer: MEDICARE

## 2022-08-22 ENCOUNTER — OFFICE VISIT (OUTPATIENT)
Dept: PRIMARY CARE CLINIC | Facility: CLINIC | Age: 72
End: 2022-08-22
Payer: MEDICARE

## 2022-08-22 VITALS
TEMPERATURE: 97 F | WEIGHT: 138.88 LBS | SYSTOLIC BLOOD PRESSURE: 118 MMHG | OXYGEN SATURATION: 96 % | HEIGHT: 62 IN | BODY MASS INDEX: 25.55 KG/M2 | DIASTOLIC BLOOD PRESSURE: 60 MMHG | HEART RATE: 63 BPM

## 2022-08-22 DIAGNOSIS — Z78.0 ASYMPTOMATIC POSTMENOPAUSAL STATE: ICD-10-CM

## 2022-08-22 DIAGNOSIS — R05.9 COUGH: Primary | ICD-10-CM

## 2022-08-22 DIAGNOSIS — Z12.31 BREAST CANCER SCREENING BY MAMMOGRAM: ICD-10-CM

## 2022-08-22 DIAGNOSIS — R05.8 POST-VIRAL COUGH SYNDROME: ICD-10-CM

## 2022-08-22 DIAGNOSIS — L29.9 ITCHING: ICD-10-CM

## 2022-08-22 DIAGNOSIS — G89.29 CHRONIC LEFT-SIDED LOW BACK PAIN WITH LEFT-SIDED SCIATICA: ICD-10-CM

## 2022-08-22 DIAGNOSIS — M54.42 CHRONIC LEFT-SIDED LOW BACK PAIN WITH LEFT-SIDED SCIATICA: ICD-10-CM

## 2022-08-22 DIAGNOSIS — E78.5 HYPERLIPIDEMIA, UNSPECIFIED HYPERLIPIDEMIA TYPE: ICD-10-CM

## 2022-08-22 PROCEDURE — 3288F PR FALLS RISK ASSESSMENT DOCUMENTED: ICD-10-PCS | Mod: CPTII,S$GLB,, | Performed by: FAMILY MEDICINE

## 2022-08-22 PROCEDURE — 3074F PR MOST RECENT SYSTOLIC BLOOD PRESSURE < 130 MM HG: ICD-10-PCS | Mod: CPTII,S$GLB,, | Performed by: FAMILY MEDICINE

## 2022-08-22 PROCEDURE — 3008F PR BODY MASS INDEX (BMI) DOCUMENTED: ICD-10-PCS | Mod: CPTII,S$GLB,, | Performed by: FAMILY MEDICINE

## 2022-08-22 PROCEDURE — 3288F FALL RISK ASSESSMENT DOCD: CPT | Mod: CPTII,S$GLB,, | Performed by: FAMILY MEDICINE

## 2022-08-22 PROCEDURE — 1126F AMNT PAIN NOTED NONE PRSNT: CPT | Mod: CPTII,S$GLB,, | Performed by: FAMILY MEDICINE

## 2022-08-22 PROCEDURE — 1126F PR PAIN SEVERITY QUANTIFIED, NO PAIN PRESENT: ICD-10-PCS | Mod: CPTII,S$GLB,, | Performed by: FAMILY MEDICINE

## 2022-08-22 PROCEDURE — 99215 PR OFFICE/OUTPT VISIT, EST, LEVL V, 40-54 MIN: ICD-10-PCS | Mod: S$GLB,,, | Performed by: FAMILY MEDICINE

## 2022-08-22 PROCEDURE — 3078F DIAST BP <80 MM HG: CPT | Mod: CPTII,S$GLB,, | Performed by: FAMILY MEDICINE

## 2022-08-22 PROCEDURE — 99999 PR PBB SHADOW E&M-EST. PATIENT-LVL IV: CPT | Mod: PBBFAC,,, | Performed by: FAMILY MEDICINE

## 2022-08-22 PROCEDURE — 1159F MED LIST DOCD IN RCRD: CPT | Mod: CPTII,S$GLB,, | Performed by: FAMILY MEDICINE

## 2022-08-22 PROCEDURE — 3008F BODY MASS INDEX DOCD: CPT | Mod: CPTII,S$GLB,, | Performed by: FAMILY MEDICINE

## 2022-08-22 PROCEDURE — 1160F PR REVIEW ALL MEDS BY PRESCRIBER/CLIN PHARMACIST DOCUMENTED: ICD-10-PCS | Mod: CPTII,S$GLB,, | Performed by: FAMILY MEDICINE

## 2022-08-22 PROCEDURE — 3078F PR MOST RECENT DIASTOLIC BLOOD PRESSURE < 80 MM HG: ICD-10-PCS | Mod: CPTII,S$GLB,, | Performed by: FAMILY MEDICINE

## 2022-08-22 PROCEDURE — 3074F SYST BP LT 130 MM HG: CPT | Mod: CPTII,S$GLB,, | Performed by: FAMILY MEDICINE

## 2022-08-22 PROCEDURE — 1101F PT FALLS ASSESS-DOCD LE1/YR: CPT | Mod: CPTII,S$GLB,, | Performed by: FAMILY MEDICINE

## 2022-08-22 PROCEDURE — 1159F PR MEDICATION LIST DOCUMENTED IN MEDICAL RECORD: ICD-10-PCS | Mod: CPTII,S$GLB,, | Performed by: FAMILY MEDICINE

## 2022-08-22 PROCEDURE — 1101F PR PT FALLS ASSESS DOC 0-1 FALLS W/OUT INJ PAST YR: ICD-10-PCS | Mod: CPTII,S$GLB,, | Performed by: FAMILY MEDICINE

## 2022-08-22 PROCEDURE — 99215 OFFICE O/P EST HI 40 MIN: CPT | Mod: S$GLB,,, | Performed by: FAMILY MEDICINE

## 2022-08-22 PROCEDURE — 99999 PR PBB SHADOW E&M-EST. PATIENT-LVL IV: ICD-10-PCS | Mod: PBBFAC,,, | Performed by: FAMILY MEDICINE

## 2022-08-22 PROCEDURE — 1160F RVW MEDS BY RX/DR IN RCRD: CPT | Mod: CPTII,S$GLB,, | Performed by: FAMILY MEDICINE

## 2022-08-22 RX ORDER — TRIAMCINOLONE ACETONIDE 1 MG/G
CREAM TOPICAL NIGHTLY PRN
Qty: 30 G | Refills: 1 | Status: SHIPPED | OUTPATIENT
Start: 2022-08-22

## 2022-08-22 RX ORDER — FLUTICASONE PROPIONATE 110 UG/1
1 AEROSOL, METERED RESPIRATORY (INHALATION) 2 TIMES DAILY
Qty: 12 G | Refills: 3 | Status: SHIPPED | OUTPATIENT
Start: 2022-08-22 | End: 2023-09-26 | Stop reason: SDUPTHER

## 2022-08-22 RX ORDER — TRAMADOL HYDROCHLORIDE 50 MG/1
50 TABLET ORAL EVERY 6 HOURS PRN
Qty: 28 TABLET | Refills: 0 | Status: SHIPPED | OUTPATIENT
Start: 2022-08-22 | End: 2022-10-28

## 2022-08-22 RX ORDER — PRAVASTATIN SODIUM 40 MG/1
40 TABLET ORAL DAILY
Qty: 90 TABLET | Refills: 3 | Status: SHIPPED | OUTPATIENT
Start: 2022-08-22 | End: 2023-03-24 | Stop reason: SDUPTHER

## 2022-08-22 RX ORDER — PROMETHAZINE HYDROCHLORIDE AND DEXTROMETHORPHAN HYDROBROMIDE 6.25; 15 MG/5ML; MG/5ML
5 SYRUP ORAL EVERY 6 HOURS PRN
Qty: 120 ML | Refills: 0 | Status: SHIPPED | OUTPATIENT
Start: 2022-08-22 | End: 2022-09-07 | Stop reason: SDUPTHER

## 2022-08-22 NOTE — PATIENT INSTRUCTIONS
Physically, everything looks pretty good today.      You just had blood work done at the hospital.  Let us wait and repeat some things at next visit.      For the cough:   Use prescription cough medicine, as needed.    Instead of the albuterol inhaler, let us have you use the Flovent inhaler daily for the next two weeks.  This should help keep you from needing the albuterol inhaler.  After two weeks, go ahead and stop it.  If you feel tight, congested in the morning, go ahead and restart it.      Refill of tramadol given for back pains.  Use very sparingly.  Start with OTC Tylenol (two, 500 mg tablets, every 8 hours).  If you would like to see my back specialist, please let us know.  We can always place a referral.      For the itching, try using triamcinolone cream at bedtime when the itching starts.  This should cool it off.  If you get no relief, or if the toes continue to swell, please let me know and we can refer you to our podiatrist for another look.    Order placed for mammogram, DEXA scan today.    Continue to eat a healthy diet.  Be careful with portion sizes.  Includes lots of fresh fruits, vegetables, whole grains, lean proteins.  See info below.    Keep hydrated.  Be sure to drink at least 8-10, 8 oz, glasses of water every day.    Stay active.  Try to do some sort of physical activity every day.  Nothing outrageous, just try walking for 10-15 minutes each day.

## 2022-08-23 NOTE — PROGRESS NOTES
"    [facility]       [address] [place] [address]      Phone: [phone]      Fax: [phone]    Rikki Sharif MD                Office Visit  08/22/2022        Subjective      HPI:  Salud Echevarria is a 72 y.o. female presents today in clinic for "Establish Care (C/o right foot itching, med refill)  ."       72-year-old female presents today to establish care, discuss multiple issues.    Patient states she is doing okay today.  Reports that she had COVID about four weeks ago.  Overall, feels like she is doing better.  Still has cough, congestion.  Cough is nonproductive.  Sometimes feels like something is stuck in the back of her throat, sinuses.  No fever, chills.  Has been using her albuterol every morning, several days per week.  This does help keep her from feeling tight, makes it easier to breathe.    States she has history of reflux.  Had and said induced gastritis in the past.  Was able to wean herself off of Protonix.  Now, just uses Pepcid, as needed.  Feels this works well.     Still has issues with chronic back pains.  Has been diagnosed with spinal stenosis in the past.  Moving around tends to help.  She has had surgery, done physical therapy in the past.  Has seen spine specialist before.  Not interested in following up with anyone.  Not interested in surgery, nor injections.  Uses occasional tramadol, which seems to help.  Was recommended she take tramadol rather than NSAIDs, due to her gastritis.    Also states that for the last month or so, her right heel has been itching.  Mostly at night, when she goes to bed.  Occasionally the toes well.  No real pains.    Still gets leg cramps at night.  Feels like she has to move her legs to stay comfortable.     PMH: Back pains/spinal stenosis.  Diverticulosis.  NSAID gastritis.  PSH: Back surgery.  Heart cath (11/2020).  Umbilical hernia repair.  Tonsils/adenoids.  Hysterectomy.  Breast biopsy.  " Lymph node biopsy.  FMH: CHF, brain cancer, lung cancer, A. fib  Allergies: Penicillins make her sweat, shaky.  Iodine makes her sweat, vision changes, hypotensive.  Keflex makes her sick, upset stomach.  Naproxen makes her sick.  Statins, specifically lovastatin, causes leg cramps.  Pravastatin is okay.  Valium makes her loopy, goofy.  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     T: One half PPD x40+ years     A: Occasionally, rarely     D: Denies     Exercise: No regular exercise program.  Tries to stay active around the house, gardening.     ENT: Dr. Jama  Cardiology: Dr. Hannah  Urology: Dr. Greer  Ophthalmology: Dr. Reece     Colon: Cologbrenda - 10/21/2020.  Repeat 3 years ()  MM/3/2021         The following were updated and reviewed by myself in the chart: medications, past medical history, past surgical history, family history, social history, and allergies.     Medications:  Current Outpatient Medications on File Prior to Visit   Medication Sig Dispense Refill    albuterol (PROVENTIL/VENTOLIN HFA) 90 mcg/actuation inhaler Inhale 1-2 puffs into the lungs every 6 (six) hours as needed for Wheezing. Rescue 18 g 1    aspirin (ECOTRIN) 81 MG EC tablet Take 81 mg by mouth once daily.      propylene glycol (SYSTANE COMPLETE OPHT) Apply 1 drop to eye daily as needed.       No current facility-administered medications on file prior to visit.        PMHx:  Past Medical History:   Diagnosis Date    Asthma     childhood - seasonal as an adult    Hematuria 6/10/2019    Hyperlipidemia     Hypertension     Restless leg     Spinal stenosis       Patient Active Problem List    Diagnosis Date Noted    Viral syndrome 2022    COVID-19 virus detected 2022    Peripheral vascular disease, unspecified 2022    Seasonal allergic rhinitis due to pollen 2021    Chronic obstructive pulmonary disease 2021    Asymptomatic microscopic hematuria 2021    Anxiety 2021     Ventral hernia without obstruction or gangrene 12/10/2020    Urge incontinence 09/18/2020    Mild intermittent asthma without complication 08/07/2020    Vertigo 07/29/2020    Post-nasal drip 05/04/2020    Other specified abdominal hernia without obstruction or gangrene 10/23/2019    Fatigue 08/13/2019    Left hip pain 08/07/2019    Sciatica of left side 08/07/2019    BPPV (benign paroxysmal positional vertigo) 08/07/2019    Spinal stenosis 06/04/2019    Aortic atherosclerosis 07/10/2018    Gastroesophageal reflux disease with esophagitis 01/29/2018    Coronary artery disease involving native coronary artery of native heart without angina pectoris 03/14/2017    Pulmonary nodule, right 09/28/2016    History of tobacco use 09/28/2016    Osteopenia 07/20/2015    Hyperlipidemia 10/13/2014    Back pain 07/02/2013        PSHx:  Past Surgical History:   Procedure Laterality Date    BACK SURGERY      BREAST BIOPSY      CARDIAC CATHETERIZATION  2018, 11/2020    HYSTERECTOMY      LYMPH NODE BIOPSY      ROBOT-ASSISTED REPAIR OF VENTRAL HERNIA USING DA JIM XI N/A 12/10/2020    Procedure: XI ROBOTIC REPAIR, HERNIA, VENTRAL;  Surgeon: Brandyn Panchal MD;  Location: [facility];  Service: General;  Laterality: N/A;    TONSILLECTOMY          FHx:  Family History   Problem Relation Age of Onset    Heart disease Mother         Social:  Social History     Socioeconomic History    Marital status:    Tobacco Use    Smoking status: Former Smoker     Packs/day: 0.50     Years: 30.00     Pack years: 15.00     Types: Cigarettes    Smokeless tobacco: Former User    Tobacco comment: none past MN before surgery   Substance and Sexual Activity    Alcohol use: Yes     Comment: 1-2 beers per month    Drug use: No    Sexual activity: Not Currently     Partners: Male     Social Determinants of Health     Financial Resource Strain: Medium Risk    Difficulty of Paying Living Expenses: Somewhat hard   Food Insecurity:  No Food Insecurity    Worried About Running Out of Food in the Last Year: Never true    Ran Out of Food in the Last Year: Never true   Transportation Needs: No Transportation Needs    Lack of Transportation (Medical): No    Lack of Transportation (Non-Medical): No   Physical Activity: Sufficiently Active    Days of Exercise per Week: 7 days    Minutes of Exercise per Session: 30 min   Stress: Stress Concern Present    Feeling of Stress : To some extent   Social Connections: Moderately Isolated    Frequency of Communication with Friends and Family: More than three times a week    Frequency of Social Gatherings with Friends and Family: Once a week    Attends [employer] Services: 1 to 4 times per year    Active Member of Clubs or Organizations: No    Attends Club or Organization Meetings: Never    Marital Status:    Housing Stability: Low Risk     Unable to Pay for Housing in the Last Year: No    Number of Places Lived in the Last Year: 1    Unstable Housing in the Last Year: No        Allergies:  Review of patient's allergies indicates:   Allergen Reactions    Dye Anaphylaxis     Contrast Dye    Iodine and iodide containing products Rash    Penicillins Shortness Of Breath     Shortness of breath^severe skin rash    Lovastatin Other (See Comments)     Causes leg cramp    Mucinex [guaifenesin]     Cephalexin Nausea And Vomiting    Diazepam Anxiety     Made pt overly anxious instead of relaxing    Naproxen Nausea And Vomiting        ROS:  Review of Systems   Constitutional: Negative for activity change, appetite change, chills and fever.   HENT: Positive for postnasal drip. Negative for congestion, rhinorrhea, sore throat and trouble swallowing.    Respiratory: Positive for cough. Negative for shortness of breath and wheezing.    Cardiovascular: Negative for chest pain and palpitations.   Gastrointestinal: Negative for abdominal pain, constipation, diarrhea, nausea and vomiting.  "  Genitourinary: Negative for difficulty urinating.   Musculoskeletal: Negative for arthralgias and myalgias.   Skin: Negative for color change and rash.   Neurological: Negative for speech difficulty and headaches.   All other systems reviewed and are negative.         Objective      /60   Pulse 63   Temp 97.3 °F (36.3 °C)   Ht 5' 2" (1.575 m)   Wt 63 kg (138 lb 14.2 oz)   SpO2 96%   BMI 25.40 kg/m²   Ht Readings from Last 3 Encounters:   08/22/22 5' 2" (1.575 m)   05/20/22 5' 2" (1.575 m)   02/08/22 5' 2" (1.575 m)     Wt Readings from Last 3 Encounters:   08/22/22 63 kg (138 lb 14.2 oz)   07/24/22 62 kg (136 lb 11 oz)   05/20/22 61.7 kg (136 lb)       PHYSICAL EXAM:  Physical Exam  Vitals and nursing note reviewed.   Constitutional:       General: She is not in acute distress.     Appearance: Normal appearance.   HENT:      Head: Normocephalic and atraumatic.      Right Ear: Tympanic membrane, ear canal and external ear normal.      Left Ear: Tympanic membrane, ear canal and external ear normal.      Nose: Nose normal. No congestion or rhinorrhea.      Mouth/Throat:      Mouth: Mucous membranes are moist.      Pharynx: Oropharynx is clear. No oropharyngeal exudate or posterior oropharyngeal erythema.   Eyes:      Extraocular Movements: Extraocular movements intact.      Conjunctiva/sclera: Conjunctivae normal.      Pupils: Pupils are equal, round, and reactive to light.   Cardiovascular:      Rate and Rhythm: Normal rate and regular rhythm.   Pulmonary:      Effort: Pulmonary effort is normal. No respiratory distress.      Breath sounds: No wheezing, rhonchi or rales.   Musculoskeletal:         General: Normal range of motion.      Cervical back: Normal range of motion.   Lymphadenopathy:      Cervical: No cervical adenopathy.   Skin:     General: Skin is warm and dry.      Findings: No rash.   Neurological:      Mental Status: She is alert.              LABS / IMAGING:  Recent Results (from the past " 4368 hour(s))   Urinalysis, Reflex to Urine Culture Urine, Clean Catch    Collection Time: 05/13/22  9:31 AM    Specimen: Urine   Result Value Ref Range    Specimen UA Urine, Clean Catch     Color, UA Yellow Yellow, Straw, Laila    Appearance, UA Clear Clear    pH, UA 6.0 5.0 - 8.0    Specific Gravity, UA 1.015 1.005 - 1.030    Protein, UA Negative Negative    Glucose, UA Negative Negative    Ketones, UA Negative Negative    Bilirubin (UA) Negative Negative    Occult Blood UA 2+ (A) Negative    Nitrite, UA Negative Negative    Urobilinogen, UA Negative <2.0 EU/dL    Leukocytes, UA Negative Negative   Urinalysis Microscopic    Collection Time: 05/13/22  9:31 AM   Result Value Ref Range    RBC, UA 2 0 - 4 /hpf    WBC, UA 1 0 - 5 /hpf    Bacteria Rare None-Occ /hpf    Squam Epithel, UA 1 /hpf    Microscopic Comment SEE COMMENT    POCT Influenza A/B Molecular    Collection Time: 05/13/22  9:55 AM   Result Value Ref Range    POC Molecular Influenza A Ag Negative Negative, Not Reported    POC Molecular Influenza B Ag Negative Negative, Not Reported     Acceptable Yes    POCT COVID-19 Rapid Screening    Collection Time: 05/13/22  9:55 AM   Result Value Ref Range    POC Rapid COVID Negative Negative     Acceptable Yes    Hepatitis C Antibody    Collection Time: 05/13/22 10:21 AM   Result Value Ref Range    Hepatitis C Ab Negative Negative   HCV Virus Hold Specimen    Collection Time: 05/13/22 10:21 AM   Result Value Ref Range    HEP C Virus Hold Specimen Hold for HCV sendout    CBC W/ AUTO DIFFERENTIAL    Collection Time: 05/13/22 10:21 AM   Result Value Ref Range    WBC 10.32 3.90 - 12.70 K/uL    RBC 4.37 4.00 - 5.40 M/uL    Hemoglobin 13.3 12.0 - 16.0 g/dL    Hematocrit 40.3 37.0 - 48.5 %    MCV 92 82 - 98 fL    MCH 30.4 27.0 - 31.0 pg    MCHC 33.0 32.0 - 36.0 g/dL    RDW 13.6 11.5 - 14.5 %    Platelets 251 150 - 450 K/uL    MPV 9.2 9.2 - 12.9 fL    Immature Granulocytes 0.4 0.0 - 0.5 %     Gran # (ANC) 7.0 1.8 - 7.7 K/uL    Immature Grans (Abs) 0.04 0.00 - 0.04 K/uL    Lymph # 2.0 1.0 - 4.8 K/uL    Mono # 0.9 0.3 - 1.0 K/uL    Eos # 0.2 0.0 - 0.5 K/uL    Baso # 0.11 0.00 - 0.20 K/uL    nRBC 0 0 /100 WBC    Gran % 68.1 38.0 - 73.0 %    Lymph % 19.5 18.0 - 48.0 %    Mono % 8.8 4.0 - 15.0 %    Eosinophil % 2.1 0.0 - 8.0 %    Basophil % 1.1 0.0 - 1.9 %    Differential Method Automated    Comp. Metabolic Panel    Collection Time: 05/13/22 10:21 AM   Result Value Ref Range    Sodium 140 136 - 145 mmol/L    Potassium 3.7 3.5 - 5.1 mmol/L    Chloride 103 95 - 110 mmol/L    CO2 27 23 - 29 mmol/L    Glucose 79 70 - 110 mg/dL    BUN 9 8 - 23 mg/dL    Creatinine 0.7 0.5 - 1.4 mg/dL    Calcium 9.1 8.7 - 10.5 mg/dL    Total Protein 6.7 6.0 - 8.4 g/dL    Albumin 3.6 3.5 - 5.2 g/dL    Total Bilirubin 0.5 0.1 - 1.0 mg/dL    Alkaline Phosphatase 78 55 - 135 U/L    AST 12 10 - 40 U/L    ALT 14 10 - 44 U/L    Anion Gap 10 8 - 16 mmol/L    eGFR if African American >60.0 >60 mL/min/1.73 m^2    eGFR if non African American >60.0 >60 mL/min/1.73 m^2   Lipase    Collection Time: 05/13/22 10:21 AM   Result Value Ref Range    Lipase 30 4 - 60 U/L   Troponin I    Collection Time: 05/13/22 10:21 AM   Result Value Ref Range    Troponin I <0.006 0.000 - 0.026 ng/mL   TSH    Collection Time: 05/13/22 10:21 AM   Result Value Ref Range    TSH 2.258 0.400 - 4.000 uIU/mL   Brain natriuretic peptide    Collection Time: 05/13/22 10:21 AM   Result Value Ref Range    BNP 17 0 - 99 pg/mL   Troponin I    Collection Time: 07/24/22 10:12 AM   Result Value Ref Range    Troponin I <0.006 0.000 - 0.026 ng/mL   Comprehensive Metabolic Panel    Collection Time: 07/24/22 10:12 AM   Result Value Ref Range    Sodium 142 136 - 145 mmol/L    Potassium 4.5 3.5 - 5.1 mmol/L    Chloride 105 95 - 110 mmol/L    CO2 28 23 - 29 mmol/L    Glucose 109 70 - 110 mg/dL    BUN 12 8 - 23 mg/dL    Creatinine 0.7 0.5 - 1.4 mg/dL    Calcium 9.5 8.7 - 10.5 mg/dL    Total  Protein 7.1 6.0 - 8.4 g/dL    Albumin 3.8 3.5 - 5.2 g/dL    Total Bilirubin 0.3 0.1 - 1.0 mg/dL    Alkaline Phosphatase 66 55 - 135 U/L    AST 13 10 - 40 U/L    ALT 11 10 - 44 U/L    Anion Gap 9 8 - 16 mmol/L    eGFR if African American >60.0 >60 mL/min/1.73 m^2    eGFR if non African American >60.0 >60 mL/min/1.73 m^2   BNP    Collection Time: 07/24/22 10:12 AM   Result Value Ref Range    BNP 28 0 - 99 pg/mL   CBC Without Differential    Collection Time: 07/24/22 10:12 AM   Result Value Ref Range    WBC 7.21 3.90 - 12.70 K/uL    RBC 4.54 4.00 - 5.40 M/uL    Hemoglobin 13.7 12.0 - 16.0 g/dL    Hematocrit 41.4 37.0 - 48.5 %    MCV 91 82 - 98 fL    MCH 30.2 27.0 - 31.0 pg    MCHC 33.1 32.0 - 36.0 g/dL    RDW 13.9 11.5 - 14.5 %    Platelets 277 150 - 450 K/uL    MPV 9.7 9.2 - 12.9 fL   POCT COVID-19 Rapid Screening    Collection Time: 07/24/22 10:47 AM   Result Value Ref Range    POC Rapid COVID Positive (A) Negative     Acceptable Yes          Assessment    1. Cough    2. Post-viral cough syndrome    3. Hyperlipidemia, unspecified hyperlipidemia type    4. Chronic left-sided low back pain with left-sided sciatica    5. Itching    6. Breast cancer screening by mammogram    7. Asymptomatic postmenopausal state          Plan    Salud was seen today for establish care.    Diagnoses and all orders for this visit:    Cough  -     fluticasone propionate (FLOVENT HFA) 110 mcg/actuation inhaler; Inhale 1 puff into the lungs 2 (two) times daily. Controller  -     promethazine-dextromethorphan (PROMETHAZINE-DM) 6.25-15 mg/5 mL Syrp; Take 5 mLs by mouth every 6 (six) hours as needed (cough).    Post-viral cough syndrome  -     fluticasone propionate (FLOVENT HFA) 110 mcg/actuation inhaler; Inhale 1 puff into the lungs 2 (two) times daily. Controller  -     promethazine-dextromethorphan (PROMETHAZINE-DM) 6.25-15 mg/5 mL Syrp; Take 5 mLs by mouth every 6 (six) hours as needed (cough).    Hyperlipidemia, unspecified  hyperlipidemia type  -     pravastatin (PRAVACHOL) 40 MG tablet; Take 1 tablet (40 mg total) by mouth once daily.    Chronic left-sided low back pain with left-sided sciatica  -     traMADoL (ULTRAM) 50 mg tablet; Take 1 tablet (50 mg total) by mouth every 6 (six) hours as needed for Pain.    Itching  -     triamcinolone acetonide 0.1% (KENALOG) 0.1 % cream; Apply topically nightly as needed (itching).    Breast cancer screening by mammogram  -     Mammo Digital Screening Bilat w/ Jose Cruz; Future    Asymptomatic postmenopausal state  -     DXA Bone Density Spine And Hip; Future    Physically, looks fine today.  Still has a bit of postviral cough syndrome.  Because she is using her albuterol several days per week, will have her start using Flovent daily instead.  Try to reserve albuterol for rescue situations.  Follow-up with ENT if no improvement in sinuses.    Overdue for mammogram, DEXA scan.  Orders placed today.    Med refills given.    Would definitely rather her use Tylenol, tramadol than NSAIDs, especially given her history.  Okay to refill.  Offered referral to Dr. Sy, spine/pain management.  She declines at this time as she is not interested in doing any interventions.      FOLLOW-UP:  Follow up in about 6 months (around 2/22/2023) for check up.    I spent a total of 45 minutes face to face and non-face to face on the date of this visit.This includes time preparing to see the patient (eg, review of tests, notes), obtaining and/or reviewing additional history from an independent historian and/or outside medical records, documenting clinical information in the electronic health record, independently interpreting results and/or communicating results to the patient/family/caregiver, or care coordinator.    Signed by:  Rikki Sharif MD

## 2022-09-07 ENCOUNTER — PATIENT MESSAGE (OUTPATIENT)
Dept: PULMONOLOGY | Facility: CLINIC | Age: 72
End: 2022-09-07
Payer: MEDICARE

## 2022-09-07 ENCOUNTER — OFFICE VISIT (OUTPATIENT)
Dept: PRIMARY CARE CLINIC | Facility: CLINIC | Age: 72
End: 2022-09-07
Payer: MEDICARE

## 2022-09-07 ENCOUNTER — HOSPITAL ENCOUNTER (OUTPATIENT)
Dept: RADIOLOGY | Facility: HOSPITAL | Age: 72
Discharge: HOME | End: 2022-09-07
Attending: FAMILY MEDICINE
Payer: MEDICARE

## 2022-09-07 VITALS
HEIGHT: 62 IN | TEMPERATURE: 98 F | BODY MASS INDEX: 25.19 KG/M2 | RESPIRATION RATE: 18 BRPM | SYSTOLIC BLOOD PRESSURE: 114 MMHG | OXYGEN SATURATION: 95 % | WEIGHT: 136.88 LBS | HEART RATE: 64 BPM | DIASTOLIC BLOOD PRESSURE: 62 MMHG

## 2022-09-07 DIAGNOSIS — Z87.891 PERSONAL HISTORY OF SMOKING: ICD-10-CM

## 2022-09-07 DIAGNOSIS — J44.0 CHRONIC OBSTRUCTIVE PULMONARY DISEASE WITH ACUTE LOWER RESPIRATORY INFECTION: ICD-10-CM

## 2022-09-07 DIAGNOSIS — J40 BRONCHITIS: ICD-10-CM

## 2022-09-07 DIAGNOSIS — R05.9 COUGH: Primary | ICD-10-CM

## 2022-09-07 DIAGNOSIS — R05.9 COUGH: ICD-10-CM

## 2022-09-07 PROCEDURE — 96372 THER/PROPH/DIAG INJ SC/IM: CPT | Mod: S$GLB,,, | Performed by: FAMILY MEDICINE

## 2022-09-07 PROCEDURE — 3008F BODY MASS INDEX DOCD: CPT | Mod: CPTII,S$GLB,, | Performed by: FAMILY MEDICINE

## 2022-09-07 PROCEDURE — 3008F PR BODY MASS INDEX (BMI) DOCUMENTED: ICD-10-PCS | Mod: CPTII,S$GLB,, | Performed by: FAMILY MEDICINE

## 2022-09-07 PROCEDURE — 99215 PR OFFICE/OUTPT VISIT, EST, LEVL V, 40-54 MIN: ICD-10-PCS | Mod: 25,S$GLB,, | Performed by: FAMILY MEDICINE

## 2022-09-07 PROCEDURE — 1159F MED LIST DOCD IN RCRD: CPT | Mod: CPTII,S$GLB,, | Performed by: FAMILY MEDICINE

## 2022-09-07 PROCEDURE — 3074F PR MOST RECENT SYSTOLIC BLOOD PRESSURE < 130 MM HG: ICD-10-PCS | Mod: CPTII,S$GLB,, | Performed by: FAMILY MEDICINE

## 2022-09-07 PROCEDURE — 99999 PR PBB SHADOW E&M-EST. PATIENT-LVL V: CPT | Mod: PBBFAC,,, | Performed by: FAMILY MEDICINE

## 2022-09-07 PROCEDURE — 3074F SYST BP LT 130 MM HG: CPT | Mod: CPTII,S$GLB,, | Performed by: FAMILY MEDICINE

## 2022-09-07 PROCEDURE — 1159F PR MEDICATION LIST DOCUMENTED IN MEDICAL RECORD: ICD-10-PCS | Mod: CPTII,S$GLB,, | Performed by: FAMILY MEDICINE

## 2022-09-07 PROCEDURE — 96372 PR INJECTION,THERAP/PROPH/DIAG2ST, IM OR SUBCUT: ICD-10-PCS | Mod: S$GLB,,, | Performed by: FAMILY MEDICINE

## 2022-09-07 PROCEDURE — 3078F PR MOST RECENT DIASTOLIC BLOOD PRESSURE < 80 MM HG: ICD-10-PCS | Mod: CPTII,S$GLB,, | Performed by: FAMILY MEDICINE

## 2022-09-07 PROCEDURE — 3078F DIAST BP <80 MM HG: CPT | Mod: CPTII,S$GLB,, | Performed by: FAMILY MEDICINE

## 2022-09-07 PROCEDURE — 99215 OFFICE O/P EST HI 40 MIN: CPT | Mod: 25,S$GLB,, | Performed by: FAMILY MEDICINE

## 2022-09-07 PROCEDURE — 71046 X-RAY EXAM CHEST 2 VIEWS: CPT | Mod: TC,PN

## 2022-09-07 PROCEDURE — 99999 PR PBB SHADOW E&M-EST. PATIENT-LVL V: ICD-10-PCS | Mod: PBBFAC,,, | Performed by: FAMILY MEDICINE

## 2022-09-07 RX ORDER — PROMETHAZINE HYDROCHLORIDE AND DEXTROMETHORPHAN HYDROBROMIDE 6.25; 15 MG/5ML; MG/5ML
5 SYRUP ORAL EVERY 6 HOURS PRN
Qty: 120 ML | Refills: 2 | Status: SHIPPED | OUTPATIENT
Start: 2022-09-07 | End: 2023-03-24 | Stop reason: ALTCHOICE

## 2022-09-07 RX ORDER — LEVOFLOXACIN 500 MG/1
500 TABLET, FILM COATED ORAL DAILY
Qty: 5 TABLET | Refills: 0 | Status: SHIPPED | OUTPATIENT
Start: 2022-09-07 | End: 2022-09-12

## 2022-09-07 RX ORDER — PREDNISONE 20 MG/1
40 TABLET ORAL DAILY
Qty: 10 TABLET | Refills: 0 | Status: SHIPPED | OUTPATIENT
Start: 2022-09-07 | End: 2022-09-12

## 2022-09-07 RX ORDER — BETAMETHASONE SODIUM PHOSPHATE AND BETAMETHASONE ACETATE 3; 3 MG/ML; MG/ML
9 INJECTION, SUSPENSION INTRA-ARTICULAR; INTRALESIONAL; INTRAMUSCULAR; SOFT TISSUE
Status: COMPLETED | OUTPATIENT
Start: 2022-09-07 | End: 2022-09-07

## 2022-09-07 RX ADMIN — BETAMETHASONE SODIUM PHOSPHATE AND BETAMETHASONE ACETATE 9 MG: 3; 3 INJECTION, SUSPENSION INTRA-ARTICULAR; INTRALESIONAL; INTRAMUSCULAR; SOFT TISSUE at 11:09

## 2022-09-07 NOTE — PROGRESS NOTES
After obtaining consent, and per orders of Dr. Sharif, Celestone 9mg injection given by Sharon Lunsford LPN. Patient instructed to remain in room for 15 minutes afterwards, and to report any adverse reactions to me immediately.          Sharon Lunsford LPN

## 2022-09-07 NOTE — PROGRESS NOTES
"    [facility]       [address] [place] [address]      Phone: [phone]      Fax: [phone]    Rikki Sharif MD                Office Visit  09/07/2022        Subjective      HPI:  Salud Echevarria is a 72 y.o. female presents today in clinic for "Sinus Problem (C/o cough, headache and SOB since last Tuesday/Was seen in urgent care - neg for COVID and FLu)  ."     72-year-old female presents today complaining of cough, congestion for the last two weeks.      Patient states symptoms started Friday, a couple of weeks ago.  Symptoms worsened that we can, so she went to an urgent care last Tuesday.  There, she got a steroid shot, antibiotic is (azithromycin) and cough medicine.  Symptoms never really improved.  Still having cough, but unable to get anything up.  States she has to sit up at night to sleep otherwise the congestion is horrible.  Over the last couple of days she noticed her right eyelid, nose area swelling.  She reports no fevers, temperature always 97.7 when she checks.  Has been having some chills, achiness.  Lots of pressure in her sinuses.    Has chronic allergies, COPD/asthma.  Has been using Zyrtec daily.  Last week, went to the pharmacy and picked up some Sudafed cold and sinus.    PMH: Back pains/spinal stenosis.  Diverticulosis.  NSAID gastritis.  PSH: Back surgery.  Heart cath (11/2020).  Umbilical hernia repair.  Tonsils/adenoids.  Hysterectomy.  Breast biopsy.  Lymph node biopsy.  FMH: CHF, brain cancer, lung cancer, A. fib  Allergies: Penicillins make her sweat, shaky.  Iodine makes her sweat, vision changes, hypotensive.  Keflex makes her sick, upset stomach.  Naproxen makes her sick.  Statins, specifically lovastatin, causes leg cramps.  Pravastatin is okay.  Valium makes her loopy, goofy.  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     T: One half PPD x40+ years     A: Occasionally, rarely     D: Denies     Exercise: No regular " exercise program.  Tries to stay active around the house, gardening.     ENT: Dr. Jama  Cardiology: Dr. Hannah  Urology: Dr. Greer  Ophthalmology: Dr. Reece     Colon: [facility] - 10/21/2020.  Repeat 3 years ()  MM/3/2021         The following were updated and reviewed by myself in the chart: medications, past medical history, past surgical history, family history, social history, and allergies.     Medications:  Current Outpatient Medications on File Prior to Visit   Medication Sig Dispense Refill    albuterol (PROVENTIL/VENTOLIN HFA) 90 mcg/actuation inhaler Inhale 1-2 puffs into the lungs every 6 (six) hours as needed for Wheezing. Rescue 18 g 1    aspirin (ECOTRIN) 81 MG EC tablet Take 81 mg by mouth once daily.      fluticasone propionate (FLOVENT HFA) 110 mcg/actuation inhaler Inhale 1 puff into the lungs 2 (two) times daily. Controller 12 g 3    pravastatin (PRAVACHOL) 40 MG tablet Take 1 tablet (40 mg total) by mouth once daily. 90 tablet 3    propylene glycol (SYSTANE COMPLETE OPHT) Apply 1 drop to eye daily as needed.      traMADoL (ULTRAM) 50 mg tablet Take 1 tablet (50 mg total) by mouth every 6 (six) hours as needed for Pain. 28 tablet 0    triamcinolone acetonide 0.1% (KENALOG) 0.1 % cream Apply topically nightly as needed (itching). 30 g 1    [DISCONTINUED] promethazine-dextromethorphan (PROMETHAZINE-DM) 6.25-15 mg/5 mL Syrp Take 5 mLs by mouth every 6 (six) hours as needed (cough). 120 mL 0     No current facility-administered medications on file prior to visit.        PMHx:  Past Medical History:   Diagnosis Date    Asthma     childhood - seasonal as an adult    Hematuria 6/10/2019    Hyperlipidemia     Hypertension     Restless leg     Spinal stenosis       Patient Active Problem List    Diagnosis Date Noted    Viral syndrome 2022    COVID-19 virus detected 2022    Peripheral vascular disease, unspecified 2022    Seasonal allergic rhinitis due to pollen 2021     Chronic obstructive pulmonary disease 03/01/2021    Asymptomatic microscopic hematuria 02/22/2021    Anxiety 02/03/2021    Ventral hernia without obstruction or gangrene 12/10/2020    Urge incontinence 09/18/2020    Mild intermittent asthma without complication 08/07/2020    Vertigo 07/29/2020    Post-nasal drip 05/04/2020    Other specified abdominal hernia without obstruction or gangrene 10/23/2019    Fatigue 08/13/2019    Left hip pain 08/07/2019    Sciatica of left side 08/07/2019    BPPV (benign paroxysmal positional vertigo) 08/07/2019    Spinal stenosis 06/04/2019    Aortic atherosclerosis 07/10/2018    Gastroesophageal reflux disease with esophagitis 01/29/2018    Coronary artery disease involving native coronary artery of native heart without angina pectoris 03/14/2017    Pulmonary nodule, right 09/28/2016    History of tobacco use 09/28/2016    Osteopenia 07/20/2015    Hyperlipidemia 10/13/2014    Back pain 07/02/2013        PSHx:  Past Surgical History:   Procedure Laterality Date    BACK SURGERY      BREAST BIOPSY      CARDIAC CATHETERIZATION  2018, 11/2020    HYSTERECTOMY      LYMPH NODE BIOPSY      ROBOT-ASSISTED REPAIR OF VENTRAL HERNIA USING DA JIM XI N/A 12/10/2020    Procedure: XI ROBOTIC REPAIR, HERNIA, VENTRAL;  Surgeon: Brandyn Panchal MD;  Location: [facility];  Service: General;  Laterality: N/A;    TONSILLECTOMY          FHx:  Family History   Problem Relation Age of Onset    Heart disease Mother         Social:  Social History     Socioeconomic History    Marital status:    Tobacco Use    Smoking status: Former     Packs/day: 0.50     Years: 30.00     Pack years: 15.00     Types: Cigarettes    Smokeless tobacco: Former    Tobacco comments:     none past MN before surgery   Substance and Sexual Activity    Alcohol use: Yes     Comment: 1-2 beers per month    Drug use: No    Sexual activity: Not Currently     Partners: Male     Social Determinants of Health     Financial Resource Strain:  Medium Risk    Difficulty of Paying Living Expenses: Somewhat hard   Food Insecurity: No Food Insecurity    Worried About Running Out of Food in the Last Year: Never true    Ran Out of Food in the Last Year: Never true   Transportation Needs: No Transportation Needs    Lack of Transportation (Medical): No    Lack of Transportation (Non-Medical): No   Physical Activity: Sufficiently Active    Days of Exercise per Week: 7 days    Minutes of Exercise per Session: 30 min   Stress: Stress Concern Present    Feeling of Stress : To some extent   Social Connections: Moderately Isolated    Frequency of Communication with Friends and Family: More than three times a week    Frequency of Social Gatherings with Friends and Family: Once a week    Attends Confucianist Services: 1 to 4 times per year    Active Member of Clubs or Organizations: No    Attends Club or Organization Meetings: Never    Marital Status:    Housing Stability: Low Risk     Unable to Pay for Housing in the Last Year: No    Number of Places Lived in the Last Year: 1    Unstable Housing in the Last Year: No        Allergies:  Review of patient's allergies indicates:   Allergen Reactions    Dye Anaphylaxis     Contrast Dye    Iodine and iodide containing products Rash    Penicillins Shortness Of Breath     Shortness of breath^severe skin rash    Lovastatin Other (See Comments)     Causes leg cramp    Mucinex [guaifenesin]     Cephalexin Nausea And Vomiting    Diazepam Anxiety     Made pt overly anxious instead of relaxing    Naproxen Nausea And Vomiting        ROS:  Review of Systems   Constitutional:  Positive for activity change and chills. Negative for appetite change and fever.   HENT:  Positive for congestion, postnasal drip, rhinorrhea and sinus pressure. Negative for sore throat and trouble swallowing.    Respiratory:  Positive for cough and shortness of breath. Negative for wheezing.    Cardiovascular:  Negative for chest pain and palpitations.  "  Gastrointestinal:  Negative for abdominal pain, constipation, diarrhea, nausea and vomiting.   Genitourinary:  Negative for difficulty urinating.   Musculoskeletal:  Positive for arthralgias and myalgias.   Skin:  Negative for color change and rash.   Neurological:  Positive for headaches. Negative for speech difficulty.   All other systems reviewed and are negative.       Objective      /62   Pulse 64   Temp 97.7 °F (36.5 °C) (Temporal)   Resp 18   Ht 5' 2" (1.575 m)   Wt 62.1 kg (136 lb 14.5 oz)   SpO2 95%   BMI 25.04 kg/m²   Ht Readings from Last 3 Encounters:   09/07/22 5' 2" (1.575 m)   08/22/22 5' 2" (1.575 m)   05/20/22 5' 2" (1.575 m)     Wt Readings from Last 3 Encounters:   09/07/22 62.1 kg (136 lb 14.5 oz)   08/22/22 63 kg (138 lb 14.2 oz)   07/24/22 62 kg (136 lb 11 oz)       PHYSICAL EXAM:  Physical Exam  Vitals and nursing note reviewed.   Constitutional:       General: She is not in acute distress.     Appearance: Normal appearance.   HENT:      Head: Normocephalic and atraumatic.      Right Ear: Tympanic membrane, ear canal and external ear normal.      Left Ear: Tympanic membrane, ear canal and external ear normal.      Nose: Congestion present. No rhinorrhea.      Right Sinus: Frontal sinus tenderness present. No maxillary sinus tenderness.      Left Sinus: Frontal sinus tenderness present. No maxillary sinus tenderness.      Mouth/Throat:      Mouth: Mucous membranes are moist.      Pharynx: Oropharynx is clear. No oropharyngeal exudate or posterior oropharyngeal erythema.   Eyes:      Extraocular Movements: Extraocular movements intact.      Conjunctiva/sclera: Conjunctivae normal.      Pupils: Pupils are equal, round, and reactive to light.   Cardiovascular:      Rate and Rhythm: Normal rate and regular rhythm.   Pulmonary:      Effort: Pulmonary effort is normal. No respiratory distress.      Breath sounds: Rales present. No wheezing.   Musculoskeletal:         General: Normal " range of motion.      Cervical back: Normal range of motion.   Lymphadenopathy:      Cervical: No cervical adenopathy.   Skin:     General: Skin is warm and dry.      Findings: No rash.   Neurological:      Mental Status: She is alert.            LABS / IMAGING:  Recent Results (from the past 4368 hour(s))   Urinalysis, Reflex to Urine Culture Urine, Clean Catch    Collection Time: 05/13/22  9:31 AM    Specimen: Urine   Result Value Ref Range    Specimen UA Urine, Clean Catch     Color, UA Yellow Yellow, Straw, Laila    Appearance, UA Clear Clear    pH, UA 6.0 5.0 - 8.0    Specific Gravity, UA 1.015 1.005 - 1.030    Protein, UA Negative Negative    Glucose, UA Negative Negative    Ketones, UA Negative Negative    Bilirubin (UA) Negative Negative    Occult Blood UA 2+ (A) Negative    Nitrite, UA Negative Negative    Urobilinogen, UA Negative <2.0 EU/dL    Leukocytes, UA Negative Negative   Urinalysis Microscopic    Collection Time: 05/13/22  9:31 AM   Result Value Ref Range    RBC, UA 2 0 - 4 /hpf    WBC, UA 1 0 - 5 /hpf    Bacteria Rare None-Occ /hpf    Squam Epithel, UA 1 /hpf    Microscopic Comment SEE COMMENT    POCT Influenza A/B Molecular    Collection Time: 05/13/22  9:55 AM   Result Value Ref Range    POC Molecular Influenza A Ag Negative Negative, Not Reported    POC Molecular Influenza B Ag Negative Negative, Not Reported     Acceptable Yes    POCT COVID-19 Rapid Screening    Collection Time: 05/13/22  9:55 AM   Result Value Ref Range    POC Rapid COVID Negative Negative     Acceptable Yes    Hepatitis C Antibody    Collection Time: 05/13/22 10:21 AM   Result Value Ref Range    Hepatitis C Ab Negative Negative   HCV Virus Hold Specimen    Collection Time: 05/13/22 10:21 AM   Result Value Ref Range    HEP C Virus Hold Specimen Hold for HCV sendout    CBC W/ AUTO DIFFERENTIAL    Collection Time: 05/13/22 10:21 AM   Result Value Ref Range    WBC 10.32 3.90 - 12.70 K/uL    RBC 4.37  4.00 - 5.40 M/uL    Hemoglobin 13.3 12.0 - 16.0 g/dL    Hematocrit 40.3 37.0 - 48.5 %    MCV 92 82 - 98 fL    MCH 30.4 27.0 - 31.0 pg    MCHC 33.0 32.0 - 36.0 g/dL    RDW 13.6 11.5 - 14.5 %    Platelets 251 150 - 450 K/uL    MPV 9.2 9.2 - 12.9 fL    Immature Granulocytes 0.4 0.0 - 0.5 %    Gran # (ANC) 7.0 1.8 - 7.7 K/uL    Immature Grans (Abs) 0.04 0.00 - 0.04 K/uL    Lymph # 2.0 1.0 - 4.8 K/uL    Mono # 0.9 0.3 - 1.0 K/uL    Eos # 0.2 0.0 - 0.5 K/uL    Baso # 0.11 0.00 - 0.20 K/uL    nRBC 0 0 /100 WBC    Gran % 68.1 38.0 - 73.0 %    Lymph % 19.5 18.0 - 48.0 %    Mono % 8.8 4.0 - 15.0 %    Eosinophil % 2.1 0.0 - 8.0 %    Basophil % 1.1 0.0 - 1.9 %    Differential Method Automated    Comp. Metabolic Panel    Collection Time: 05/13/22 10:21 AM   Result Value Ref Range    Sodium 140 136 - 145 mmol/L    Potassium 3.7 3.5 - 5.1 mmol/L    Chloride 103 95 - 110 mmol/L    CO2 27 23 - 29 mmol/L    Glucose 79 70 - 110 mg/dL    BUN 9 8 - 23 mg/dL    Creatinine 0.7 0.5 - 1.4 mg/dL    Calcium 9.1 8.7 - 10.5 mg/dL    Total Protein 6.7 6.0 - 8.4 g/dL    Albumin 3.6 3.5 - 5.2 g/dL    Total Bilirubin 0.5 0.1 - 1.0 mg/dL    Alkaline Phosphatase 78 55 - 135 U/L    AST 12 10 - 40 U/L    ALT 14 10 - 44 U/L    Anion Gap 10 8 - 16 mmol/L    eGFR if African American >60.0 >60 mL/min/1.73 m^2    eGFR if non African American >60.0 >60 mL/min/1.73 m^2   Lipase    Collection Time: 05/13/22 10:21 AM   Result Value Ref Range    Lipase 30 4 - 60 U/L   Troponin I    Collection Time: 05/13/22 10:21 AM   Result Value Ref Range    Troponin I <0.006 0.000 - 0.026 ng/mL   TSH    Collection Time: 05/13/22 10:21 AM   Result Value Ref Range    TSH 2.258 0.400 - 4.000 uIU/mL   Brain natriuretic peptide    Collection Time: 05/13/22 10:21 AM   Result Value Ref Range    BNP 17 0 - 99 pg/mL   Troponin I    Collection Time: 07/24/22 10:12 AM   Result Value Ref Range    Troponin I <0.006 0.000 - 0.026 ng/mL   Comprehensive Metabolic Panel    Collection Time:  07/24/22 10:12 AM   Result Value Ref Range    Sodium 142 136 - 145 mmol/L    Potassium 4.5 3.5 - 5.1 mmol/L    Chloride 105 95 - 110 mmol/L    CO2 28 23 - 29 mmol/L    Glucose 109 70 - 110 mg/dL    BUN 12 8 - 23 mg/dL    Creatinine 0.7 0.5 - 1.4 mg/dL    Calcium 9.5 8.7 - 10.5 mg/dL    Total Protein 7.1 6.0 - 8.4 g/dL    Albumin 3.8 3.5 - 5.2 g/dL    Total Bilirubin 0.3 0.1 - 1.0 mg/dL    Alkaline Phosphatase 66 55 - 135 U/L    AST 13 10 - 40 U/L    ALT 11 10 - 44 U/L    Anion Gap 9 8 - 16 mmol/L    eGFR if African American >60.0 >60 mL/min/1.73 m^2    eGFR if non African American >60.0 >60 mL/min/1.73 m^2   BNP    Collection Time: 07/24/22 10:12 AM   Result Value Ref Range    BNP 28 0 - 99 pg/mL   CBC Without Differential    Collection Time: 07/24/22 10:12 AM   Result Value Ref Range    WBC 7.21 3.90 - 12.70 K/uL    RBC 4.54 4.00 - 5.40 M/uL    Hemoglobin 13.7 12.0 - 16.0 g/dL    Hematocrit 41.4 37.0 - 48.5 %    MCV 91 82 - 98 fL    MCH 30.2 27.0 - 31.0 pg    MCHC 33.1 32.0 - 36.0 g/dL    RDW 13.9 11.5 - 14.5 %    Platelets 277 150 - 450 K/uL    MPV 9.7 9.2 - 12.9 fL   POCT COVID-19 Rapid Screening    Collection Time: 07/24/22 10:47 AM   Result Value Ref Range    POC Rapid COVID Positive (A) Negative     Acceptable Yes          Assessment    1. Cough    2. Bronchitis    3. Chronic obstructive pulmonary disease with acute lower respiratory infection          Plan    Salud was seen today for sinus problem.    Diagnoses and all orders for this visit:    Cough  -     X-Ray Chest PA And Lateral; Future  -     promethazine-dextromethorphan (PROMETHAZINE-DM) 6.25-15 mg/5 mL Syrp; Take 5 mLs by mouth every 6 (six) hours as needed (cough).    Bronchitis  -     levoFLOXacin (LEVAQUIN) 500 MG tablet; Take 1 tablet (500 mg total) by mouth once daily. for 5 days  -     betamethasone acetate-betamethasone sodium phosphate injection 9 mg  -     predniSONE (DELTASONE) 20 MG tablet; Take 2 tablets (40 mg total) by  mouth once daily. for 5 days  -     promethazine-dextromethorphan (PROMETHAZINE-DM) 6.25-15 mg/5 mL Syrp; Take 5 mLs by mouth every 6 (six) hours as needed (cough).    Chronic obstructive pulmonary disease with acute lower respiratory infection  -     levoFLOXacin (LEVAQUIN) 500 MG tablet; Take 1 tablet (500 mg total) by mouth once daily. for 5 days  -     betamethasone acetate-betamethasone sodium phosphate injection 9 mg  -     predniSONE (DELTASONE) 20 MG tablet; Take 2 tablets (40 mg total) by mouth once daily. for 5 days  -     promethazine-dextromethorphan (PROMETHAZINE-DM) 6.25-15 mg/5 mL Syrp; Take 5 mLs by mouth every 6 (six) hours as needed (cough).  -     Ambulatory referral/consult to Pulmonology; Future    Chest x-ray looks clear.  Suspect she has a COPD exacerbation/bronchitis type episode.      Continue Flovent, use albuterol, as needed.      Will put her on short course of prednisone to help with inflammation.  Levofloxacin to cover lungs, chest.  Promethazine DM to help with cough, sleep.      She states she quit smoking about two weeks ago, when these symptoms started.  She does not plan to restart.  Referral to smoking cessation placed today.    Follow-up with pulmonology if no improvement.    FOLLOW-UP:  Follow up if symptoms worsen or fail to improve.    I spent a total of 45 minutes face to face and non-face to face on the date of this visit.This includes time preparing to see the patient (eg, review of tests, notes), obtaining and/or reviewing additional history from an independent historian and/or outside medical records, documenting clinical information in the electronic health record, independently interpreting results and/or communicating results to the patient/family/caregiver, or care coordinator.    Signed by:  Rikki Sharif MD

## 2022-09-07 NOTE — PATIENT INSTRUCTIONS
Chest x-ray looks pretty clear.  Radiologist reviewed it, no signs of pneumonia.    Take all of your medications, as prescribed.    Celestone injection today.  Start prednisone tablets tomorrow.  Go ahead and start the Levaquin (levofloxacin) today.    Continue using the Flovent inhaler, twice Daily, for the next couple of weeks.  You can supplement with albuterol inhaler during the day, as needed, for coughing fits, wheezing, shortness of breath, etc.    Prescription for cough medicine sent to pharmacy as well.  You can use it during the day, but it may make you sleepy or drowsy.  Definitely use it at bedtime to help you rest.    Rest  Stay hydrated, drink plenty of fluids   Use OTC nasal saline spray (Ocenn's, AYR, Arm&Hammer,etc.) to clear nasal drainage and help with nasal congestion  Use OTC zinc lozenges or OTC Cepacol lozenges (with benzocaine) for sore throat/cough    If you do not see significant improvement by next week, would like you to follow-up with pulmonology for recheck, additional alternatives.  Referral placed today.

## 2022-10-04 ENCOUNTER — PATIENT MESSAGE (OUTPATIENT)
Dept: ADMINISTRATIVE | Facility: HOSPITAL | Age: 72
End: 2022-10-04
Payer: MEDICARE

## 2023-01-25 ENCOUNTER — PATIENT MESSAGE (OUTPATIENT)
Dept: ADMINISTRATIVE | Facility: HOSPITAL | Age: 73
End: 2023-01-25
Payer: MEDICARE

## 2023-03-24 ENCOUNTER — LAB VISIT (OUTPATIENT)
Dept: LAB | Facility: HOSPITAL | Age: 73
End: 2023-03-24
Payer: MEDICARE

## 2023-03-24 ENCOUNTER — OFFICE VISIT (OUTPATIENT)
Dept: PRIMARY CARE CLINIC | Facility: CLINIC | Age: 73
End: 2023-03-24
Payer: MEDICARE

## 2023-03-24 VITALS
HEART RATE: 64 BPM | WEIGHT: 137.38 LBS | TEMPERATURE: 98 F | SYSTOLIC BLOOD PRESSURE: 108 MMHG | HEIGHT: 62 IN | DIASTOLIC BLOOD PRESSURE: 62 MMHG | OXYGEN SATURATION: 95 % | RESPIRATION RATE: 15 BRPM | BODY MASS INDEX: 25.28 KG/M2

## 2023-03-24 DIAGNOSIS — J41.0 SIMPLE CHRONIC BRONCHITIS: Primary | ICD-10-CM

## 2023-03-24 DIAGNOSIS — G89.29 CHRONIC LEFT-SIDED LOW BACK PAIN WITH LEFT-SIDED SCIATICA: ICD-10-CM

## 2023-03-24 DIAGNOSIS — R09.89 OTHER SPECIFIED SYMPTOMS AND SIGNS INVOLVING THE CIRCULATORY AND RESPIRATORY SYSTEMS: ICD-10-CM

## 2023-03-24 DIAGNOSIS — E78.5 HYPERLIPIDEMIA, UNSPECIFIED HYPERLIPIDEMIA TYPE: ICD-10-CM

## 2023-03-24 DIAGNOSIS — R42 DIZZINESS: ICD-10-CM

## 2023-03-24 DIAGNOSIS — J41.0 SIMPLE CHRONIC BRONCHITIS: ICD-10-CM

## 2023-03-24 DIAGNOSIS — Z87.891 PERSONAL HISTORY OF SMOKING: ICD-10-CM

## 2023-03-24 DIAGNOSIS — Z12.2 SCREENING FOR LUNG CANCER: ICD-10-CM

## 2023-03-24 DIAGNOSIS — Z12.31 BREAST CANCER SCREENING BY MAMMOGRAM: ICD-10-CM

## 2023-03-24 DIAGNOSIS — G25.81 RLS (RESTLESS LEGS SYNDROME): ICD-10-CM

## 2023-03-24 DIAGNOSIS — M54.42 CHRONIC LEFT-SIDED LOW BACK PAIN WITH LEFT-SIDED SCIATICA: ICD-10-CM

## 2023-03-24 LAB
ALBUMIN SERPL BCP-MCNC: 3.9 G/DL (ref 3.5–5.2)
ALP SERPL-CCNC: 62 U/L (ref 55–135)
ALT SERPL W/O P-5'-P-CCNC: 18 U/L (ref 10–44)
ANION GAP SERPL CALC-SCNC: 7 MMOL/L (ref 8–16)
AST SERPL-CCNC: 21 U/L (ref 10–40)
BILIRUB SERPL-MCNC: 0.6 MG/DL (ref 0.1–1)
BUN SERPL-MCNC: 16 MG/DL (ref 8–23)
CALCIUM SERPL-MCNC: 9 MG/DL (ref 8.7–10.5)
CHLORIDE SERPL-SCNC: 105 MMOL/L (ref 95–110)
CHOLEST SERPL-MCNC: 158 MG/DL (ref 120–199)
CHOLEST/HDLC SERPL: 1.8 {RATIO} (ref 2–5)
CO2 SERPL-SCNC: 29 MMOL/L (ref 23–29)
CREAT SERPL-MCNC: 0.6 MG/DL (ref 0.5–1.4)
EST. GFR  (NO RACE VARIABLE): >60 ML/MIN/1.73 M^2
GLUCOSE SERPL-MCNC: 87 MG/DL (ref 70–110)
HDLC SERPL-MCNC: 88 MG/DL (ref 40–75)
HDLC SERPL: 55.7 % (ref 20–50)
LDLC SERPL CALC-MCNC: 61.6 MG/DL (ref 63–159)
NONHDLC SERPL-MCNC: 70 MG/DL
POTASSIUM SERPL-SCNC: 4.3 MMOL/L (ref 3.5–5.1)
PROT SERPL-MCNC: 6.6 G/DL (ref 6–8.4)
SODIUM SERPL-SCNC: 141 MMOL/L (ref 136–145)
TRIGL SERPL-MCNC: 42 MG/DL (ref 30–150)

## 2023-03-24 PROCEDURE — 1126F PR PAIN SEVERITY QUANTIFIED, NO PAIN PRESENT: ICD-10-PCS | Mod: CPTII,S$GLB,, | Performed by: FAMILY MEDICINE

## 2023-03-24 PROCEDURE — 1160F PR REVIEW ALL MEDS BY PRESCRIBER/CLIN PHARMACIST DOCUMENTED: ICD-10-PCS | Mod: CPTII,S$GLB,, | Performed by: FAMILY MEDICINE

## 2023-03-24 PROCEDURE — 1159F MED LIST DOCD IN RCRD: CPT | Mod: CPTII,S$GLB,, | Performed by: FAMILY MEDICINE

## 2023-03-24 PROCEDURE — 99999 PR PBB SHADOW E&M-EST. PATIENT-LVL V: ICD-10-PCS | Mod: PBBFAC,,, | Performed by: FAMILY MEDICINE

## 2023-03-24 PROCEDURE — 3074F PR MOST RECENT SYSTOLIC BLOOD PRESSURE < 130 MM HG: ICD-10-PCS | Mod: CPTII,S$GLB,, | Performed by: FAMILY MEDICINE

## 2023-03-24 PROCEDURE — 1160F RVW MEDS BY RX/DR IN RCRD: CPT | Mod: CPTII,S$GLB,, | Performed by: FAMILY MEDICINE

## 2023-03-24 PROCEDURE — 80053 COMPREHEN METABOLIC PANEL: CPT | Performed by: FAMILY MEDICINE

## 2023-03-24 PROCEDURE — 99999 PR PBB SHADOW E&M-EST. PATIENT-LVL V: CPT | Mod: PBBFAC,,, | Performed by: FAMILY MEDICINE

## 2023-03-24 PROCEDURE — 80061 LIPID PANEL: CPT | Performed by: FAMILY MEDICINE

## 2023-03-24 PROCEDURE — 3008F BODY MASS INDEX DOCD: CPT | Mod: CPTII,S$GLB,, | Performed by: FAMILY MEDICINE

## 2023-03-24 PROCEDURE — 36415 COLL VENOUS BLD VENIPUNCTURE: CPT | Mod: PN | Performed by: FAMILY MEDICINE

## 2023-03-24 PROCEDURE — 3078F DIAST BP <80 MM HG: CPT | Mod: CPTII,S$GLB,, | Performed by: FAMILY MEDICINE

## 2023-03-24 PROCEDURE — 3074F SYST BP LT 130 MM HG: CPT | Mod: CPTII,S$GLB,, | Performed by: FAMILY MEDICINE

## 2023-03-24 PROCEDURE — 99215 PR OFFICE/OUTPT VISIT, EST, LEVL V, 40-54 MIN: ICD-10-PCS | Mod: S$GLB,,, | Performed by: FAMILY MEDICINE

## 2023-03-24 PROCEDURE — 1159F PR MEDICATION LIST DOCUMENTED IN MEDICAL RECORD: ICD-10-PCS | Mod: CPTII,S$GLB,, | Performed by: FAMILY MEDICINE

## 2023-03-24 PROCEDURE — 3008F PR BODY MASS INDEX (BMI) DOCUMENTED: ICD-10-PCS | Mod: CPTII,S$GLB,, | Performed by: FAMILY MEDICINE

## 2023-03-24 PROCEDURE — 99215 OFFICE O/P EST HI 40 MIN: CPT | Mod: S$GLB,,, | Performed by: FAMILY MEDICINE

## 2023-03-24 PROCEDURE — 85025 COMPLETE CBC W/AUTO DIFF WBC: CPT | Performed by: FAMILY MEDICINE

## 2023-03-24 PROCEDURE — 1126F AMNT PAIN NOTED NONE PRSNT: CPT | Mod: CPTII,S$GLB,, | Performed by: FAMILY MEDICINE

## 2023-03-24 PROCEDURE — 3078F PR MOST RECENT DIASTOLIC BLOOD PRESSURE < 80 MM HG: ICD-10-PCS | Mod: CPTII,S$GLB,, | Performed by: FAMILY MEDICINE

## 2023-03-24 RX ORDER — ROPINIROLE 0.5 MG/1
0.5 TABLET, FILM COATED ORAL NIGHTLY PRN
Qty: 90 TABLET | Refills: 3 | Status: SHIPPED | OUTPATIENT
Start: 2023-03-24 | End: 2024-03-23

## 2023-03-24 RX ORDER — PRAVASTATIN SODIUM 40 MG/1
40 TABLET ORAL DAILY
Qty: 90 TABLET | Refills: 3 | Status: SHIPPED | OUTPATIENT
Start: 2023-03-24 | End: 2023-09-26 | Stop reason: SDUPTHER

## 2023-03-24 RX ORDER — TRAMADOL HYDROCHLORIDE 50 MG/1
50 TABLET ORAL EVERY 6 HOURS PRN
Qty: 28 TABLET | Refills: 0 | Status: SHIPPED | OUTPATIENT
Start: 2023-03-24 | End: 2023-04-17 | Stop reason: ALTCHOICE

## 2023-03-24 NOTE — PATIENT INSTRUCTIONS
Physically, everything looks great today!     Let us get some screening blood work done today.  Results will be posted MyChart as soon as they are available.      For the legs, let us try using Requip (ropinirole) at bedtime.  This should help calm the legs down and help you sleep better.  Keep me posted on how it works.  Given at least 4-6 weeks.  If no improvement, we can try an alternative medicine instead.      Refills of your other medications have been sent to the pharmacy.  Please continue taking them, as directed.      We are overdue for screening mammogram.  With your smoking history, would like to get a CT scan of the lungs as well just to check for lung cancer.  Those orders have been placed today.  Let us schedule them prior to your next visit.  This way we can get them all done here at the same time.    Continue to eat a healthy diet.  Be careful with portion sizes.  Includes lots of fresh fruits, vegetables, whole grains, lean proteins.  See info below.    Keep hydrated.  Be sure to drink at least 8-10, 8 oz, glasses of water every day.    Stay active.  Try to do some sort of physical activity every day.  Nothing outrageous, just try walking for 10-15 minutes each day.

## 2023-03-25 LAB
BASOPHILS # BLD AUTO: 0.11 K/UL (ref 0–0.2)
BASOPHILS NFR BLD: 1.3 % (ref 0–1.9)
DIFFERENTIAL METHOD: NORMAL
EOSINOPHIL # BLD AUTO: 0.3 K/UL (ref 0–0.5)
EOSINOPHIL NFR BLD: 3.9 % (ref 0–8)
ERYTHROCYTE [DISTWIDTH] IN BLOOD BY AUTOMATED COUNT: 13.5 % (ref 11.5–14.5)
HCT VFR BLD AUTO: 41.9 % (ref 37–48.5)
HGB BLD-MCNC: 13.4 G/DL (ref 12–16)
IMM GRANULOCYTES # BLD AUTO: 0.01 K/UL (ref 0–0.04)
IMM GRANULOCYTES NFR BLD AUTO: 0.1 % (ref 0–0.5)
LYMPHOCYTES # BLD AUTO: 2.5 K/UL (ref 1–4.8)
LYMPHOCYTES NFR BLD: 28.4 % (ref 18–48)
MCH RBC QN AUTO: 30.6 PG (ref 27–31)
MCHC RBC AUTO-ENTMCNC: 32 G/DL (ref 32–36)
MCV RBC AUTO: 96 FL (ref 82–98)
MONOCYTES # BLD AUTO: 0.8 K/UL (ref 0.3–1)
MONOCYTES NFR BLD: 9.2 % (ref 4–15)
NEUTROPHILS # BLD AUTO: 4.9 K/UL (ref 1.8–7.7)
NEUTROPHILS NFR BLD: 57.1 % (ref 38–73)
NRBC BLD-RTO: 0 /100 WBC
PLATELET # BLD AUTO: 294 K/UL (ref 150–450)
PMV BLD AUTO: 10.8 FL (ref 9.2–12.9)
RBC # BLD AUTO: 4.38 M/UL (ref 4–5.4)
WBC # BLD AUTO: 8.62 K/UL (ref 3.9–12.7)

## 2023-03-26 NOTE — PROGRESS NOTES
Ms. Echevarria,     You should be able to see the results of your recent blood work in [facility].  Everything looks good!     Blood counts were all normal.  Electrolytes, kidney function, and liver function were also normal.  Cholesterol levels are excellent!    Please let us know if you have any questions.

## 2023-03-26 NOTE — PROGRESS NOTES
"    [facility]       [address] [place] [address]      Phone: [phone]      Fax: [phone]    Rikki Sharif MD                Office Visit  03/24/2023        Subjective      HPI:  Salud Echevarria is a 73 y.o. female presents today in clinic for "No chief complaint on file.  ."     73-year-old female presents today for checkup of multiple issues.      Patient states that she feels pretty good today.  No chest pain, shortness a breath.  No fever, chills, body aches.  No coughing, sneezing, URI type symptoms.  Appetite normal.  Bowel movements normal.  No urinary issues.      Still having ongoing back pain.  Diagnosed with spinal stenosis in the past.  Has had surgery, done PT, saw spine specialist.  Takes the occasional tramadol, as needed.  Tends to help significantly.  Has issues with gastritis, so tries to avoid NSAIDs.    Also has elevated cholesterol levels.  Takes pravastatin 40 mg daily.  No issues with this medication.    States that she is still having issues with her feet.  Comes and goes.  At night, she gets Charley horses, cramps in both the legs and the feet.  When she sleeping, she has to frequently stretch, move around to get comfortable.  Some nights she actually has to get up and walk around her bedroom to relax the muscles.    PMH: Back pains/spinal stenosis.  Diverticulosis.  NSAID gastritis.  PSH: Back surgery.  Heart cath (11/2020).  Umbilical hernia repair.  Tonsils/adenoids.  Hysterectomy.  Breast biopsy.  Lymph node biopsy.  FMH: CHF, brain cancer, lung cancer, A. fib  Allergies: Penicillins make her sweat, shaky.  Iodine makes her sweat, vision changes, hypotensive.  Keflex makes her sick, upset stomach.  Naproxen makes her sick.  Statins, specifically lovastatin, causes leg cramps.  Pravastatin is okay.  Valium makes her loopy, goofy.  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     T:  Quit smoking within the last six months.  " Previously smoked .5-1 PPD x40+ years     A: Occasionally, rarely     D: Denies     Exercise: No regular exercise program.  Tries to stay active around the house, gardening.     ENT: Dr. Jama  Cardiology: Dr. Hannah  Urology: Dr. Greer  Ophthalmology: Dr. Reece     Colon: Shanelle - 10/21/2020.  Repeat 3 years ()  MM/3/2021 - overdue         The following were updated and reviewed by myself in the chart: medications, past medical history, past surgical history, family history, social history, and allergies.     Medications:  Current Outpatient Medications on File Prior to Visit   Medication Sig Dispense Refill    albuterol (PROVENTIL/VENTOLIN HFA) 90 mcg/actuation inhaler Inhale 1-2 puffs into the lungs every 6 (six) hours as needed for Wheezing. Rescue 18 g 1    aspirin (ECOTRIN) 81 MG EC tablet Take 81 mg by mouth once daily.      fluticasone propionate (FLOVENT HFA) 110 mcg/actuation inhaler Inhale 1 puff into the lungs 2 (two) times daily. Controller 12 g 3    propylene glycol (SYSTANE COMPLETE OPHT) Apply 1 drop to eye daily as needed.      triamcinolone acetonide 0.1% (KENALOG) 0.1 % cream Apply topically nightly as needed (itching). 30 g 1     No current facility-administered medications on file prior to visit.        PMHx:  Past Medical History:   Diagnosis Date    Asthma     childhood - seasonal as an adult    Hematuria 6/10/2019    Hyperlipidemia     Hypertension     Restless leg     Spinal stenosis       Patient Active Problem List    Diagnosis Date Noted    Viral syndrome 2022    COVID-19 virus detected 2022    Peripheral vascular disease, unspecified 2022    Seasonal allergic rhinitis due to pollen 2021    Chronic obstructive pulmonary disease 2021    Asymptomatic microscopic hematuria 2021    Anxiety 2021    Ventral hernia without obstruction or gangrene 12/10/2020    Urge incontinence 2020    Mild intermittent asthma without  complication 08/07/2020    Vertigo 07/29/2020    Post-nasal drip 05/04/2020    Other specified abdominal hernia without obstruction or gangrene 10/23/2019    Fatigue 08/13/2019    Left hip pain 08/07/2019    Sciatica of left side 08/07/2019    BPPV (benign paroxysmal positional vertigo) 08/07/2019    Spinal stenosis 06/04/2019    Aortic atherosclerosis 07/10/2018    Gastroesophageal reflux disease with esophagitis 01/29/2018    Coronary artery disease involving native coronary artery of native heart without angina pectoris 03/14/2017    Pulmonary nodule, right 09/28/2016    History of tobacco use 09/28/2016    Osteopenia 07/20/2015    Hyperlipidemia 10/13/2014    Back pain 07/02/2013        PSHx:  Past Surgical History:   Procedure Laterality Date    BACK SURGERY      BREAST BIOPSY      CARDIAC CATHETERIZATION  2018, 11/2020    HYSTERECTOMY      LYMPH NODE BIOPSY      ROBOT-ASSISTED REPAIR OF VENTRAL HERNIA USING DA JIM XI N/A 12/10/2020    Procedure: XI ROBOTIC REPAIR, HERNIA, VENTRAL;  Surgeon: Brandyn Panchal MD;  Location: [facility];  Service: General;  Laterality: N/A;    TONSILLECTOMY          FHx:  Family History   Problem Relation Age of Onset    Heart disease Mother         Social:  Social History     Socioeconomic History    Marital status:    Tobacco Use    Smoking status: Former     Packs/day: 0.50     Years: 30.00     Pack years: 15.00     Types: Cigarettes    Smokeless tobacco: Former    Tobacco comments:     none past MN before surgery   Substance and Sexual Activity    Alcohol use: Yes     Comment: 1-2 beers per month    Drug use: No    Sexual activity: Not Currently     Partners: Male     Social Determinants of Health     Financial Resource Strain: Medium Risk    Difficulty of Paying Living Expenses: Somewhat hard   Food Insecurity: No Food Insecurity    Worried About Running Out of Food in the Last Year: Never true    Ran Out of Food in the Last Year: Never true   Transportation Needs: No  Transportation Needs    Lack of Transportation (Medical): No    Lack of Transportation (Non-Medical): No   Physical Activity: Sufficiently Active    Days of Exercise per Week: 7 days    Minutes of Exercise per Session: 30 min   Stress: Stress Concern Present    Feeling of Stress : To some extent   Social Connections: Moderately Isolated    Frequency of Communication with Friends and Family: More than three times a week    Frequency of Social Gatherings with Friends and Family: Once a week    Attends Evangelical Services: 1 to 4 times per year    Active Member of Clubs or Organizations: No    Attends Club or Organization Meetings: Never    Marital Status:    Housing Stability: Low Risk     Unable to Pay for Housing in the Last Year: No    Number of Places Lived in the Last Year: 1    Unstable Housing in the Last Year: No        Allergies:  Review of patient's allergies indicates:   Allergen Reactions    Dye Anaphylaxis     Contrast Dye    Iodine and iodide containing products Rash    Penicillins Shortness Of Breath     Shortness of breath^severe skin rash    Lovastatin Other (See Comments)     Causes leg cramp    Mucinex [guaifenesin]     Cephalexin Nausea And Vomiting    Diazepam Anxiety     Made pt overly anxious instead of relaxing    Naproxen Nausea And Vomiting        ROS:  Review of Systems   Constitutional:  Negative for activity change, appetite change, chills and fever.   HENT:  Negative for congestion, postnasal drip, rhinorrhea, sore throat and trouble swallowing.    Respiratory:  Negative for cough, shortness of breath and wheezing.    Cardiovascular:  Negative for chest pain and palpitations.   Gastrointestinal:  Negative for abdominal pain, constipation, diarrhea, nausea and vomiting.   Genitourinary:  Negative for difficulty urinating.   Musculoskeletal:  Positive for back pain and myalgias.   Skin:  Negative for color change and rash.   Neurological:  Positive for dizziness and light-headedness.  "Negative for speech difficulty and headaches.   All other systems reviewed and are negative.       Objective      /62   Pulse 64   Temp 97.7 °F (36.5 °C) (Temporal)   Resp 15   Ht 5' 2" (1.575 m)   Wt 62.3 kg (137 lb 5.6 oz)   SpO2 95%   BMI 25.12 kg/m²   Ht Readings from Last 3 Encounters:   03/24/23 5' 2" (1.575 m)   09/07/22 5' 2" (1.575 m)   08/22/22 5' 2" (1.575 m)     Wt Readings from Last 3 Encounters:   03/24/23 62.3 kg (137 lb 5.6 oz)   09/07/22 62.1 kg (136 lb 14.5 oz)   08/22/22 63 kg (138 lb 14.2 oz)       PHYSICAL EXAM:  Physical Exam  Vitals and nursing note reviewed.   Constitutional:       General: She is not in acute distress.     Appearance: Normal appearance.   HENT:      Head: Normocephalic and atraumatic.      Right Ear: Tympanic membrane, ear canal and external ear normal.      Left Ear: Tympanic membrane, ear canal and external ear normal.      Nose: Nose normal. No congestion or rhinorrhea.      Mouth/Throat:      Mouth: Mucous membranes are moist.      Pharynx: Oropharynx is clear. No oropharyngeal exudate or posterior oropharyngeal erythema.   Eyes:      Extraocular Movements: Extraocular movements intact.      Conjunctiva/sclera: Conjunctivae normal.      Pupils: Pupils are equal, round, and reactive to light.   Cardiovascular:      Rate and Rhythm: Normal rate and regular rhythm.   Pulmonary:      Effort: Pulmonary effort is normal. No respiratory distress.      Breath sounds: No wheezing, rhonchi or rales.   Musculoskeletal:         General: Normal range of motion.      Cervical back: Normal range of motion.   Lymphadenopathy:      Cervical: No cervical adenopathy.   Skin:     General: Skin is warm and dry.      Findings: No rash.   Neurological:      Mental Status: She is alert.            LABS / IMAGING:  Recent Results (from the past 4368 hour(s))   CBC Auto Differential    Collection Time: 03/24/23 11:22 AM   Result Value Ref Range    WBC 8.62 3.90 - 12.70 K/uL    RBC " 4.38 4.00 - 5.40 M/uL    Hemoglobin 13.4 12.0 - 16.0 g/dL    Hematocrit 41.9 37.0 - 48.5 %    MCV 96 82 - 98 fL    MCH 30.6 27.0 - 31.0 pg    MCHC 32.0 32.0 - 36.0 g/dL    RDW 13.5 11.5 - 14.5 %    Platelets 294 150 - 450 K/uL    MPV 10.8 9.2 - 12.9 fL    Immature Granulocytes 0.1 0.0 - 0.5 %    Gran # (ANC) 4.9 1.8 - 7.7 K/uL    Immature Grans (Abs) 0.01 0.00 - 0.04 K/uL    Lymph # 2.5 1.0 - 4.8 K/uL    Mono # 0.8 0.3 - 1.0 K/uL    Eos # 0.3 0.0 - 0.5 K/uL    Baso # 0.11 0.00 - 0.20 K/uL    nRBC 0 0 /100 WBC    Gran % 57.1 38.0 - 73.0 %    Lymph % 28.4 18.0 - 48.0 %    Mono % 9.2 4.0 - 15.0 %    Eosinophil % 3.9 0.0 - 8.0 %    Basophil % 1.3 0.0 - 1.9 %    Differential Method Automated    Comprehensive Metabolic Panel    Collection Time: 03/24/23 11:22 AM   Result Value Ref Range    Sodium 141 136 - 145 mmol/L    Potassium 4.3 3.5 - 5.1 mmol/L    Chloride 105 95 - 110 mmol/L    CO2 29 23 - 29 mmol/L    Glucose 87 70 - 110 mg/dL    BUN 16 8 - 23 mg/dL    Creatinine 0.6 0.5 - 1.4 mg/dL    Calcium 9.0 8.7 - 10.5 mg/dL    Total Protein 6.6 6.0 - 8.4 g/dL    Albumin 3.9 3.5 - 5.2 g/dL    Total Bilirubin 0.6 0.1 - 1.0 mg/dL    Alkaline Phosphatase 62 55 - 135 U/L    AST 21 10 - 40 U/L    ALT 18 10 - 44 U/L    Anion Gap 7 (L) 8 - 16 mmol/L    eGFR >60.0 >60 mL/min/1.73 m^2   Lipid Panel    Collection Time: 03/24/23 11:22 AM   Result Value Ref Range    Cholesterol 158 120 - 199 mg/dL    Triglycerides 42 30 - 150 mg/dL    HDL 88 (H) 40 - 75 mg/dL    LDL Cholesterol 61.6 (L) 63.0 - 159.0 mg/dL    HDL/Cholesterol Ratio 55.7 (H) 20.0 - 50.0 %    Total Cholesterol/HDL Ratio 1.8 (L) 2.0 - 5.0    Non-HDL Cholesterol 70 mg/dL         Assessment    1. Simple chronic bronchitis    2. Hyperlipidemia, unspecified hyperlipidemia type    3. Personal history of smoking    4. Breast cancer screening by mammogram    5. Screening for lung cancer    6. RLS (restless legs syndrome)    7. Chronic left-sided low back pain with left-sided  sciatica          Plan    Diagnoses and all orders for this visit:    Simple chronic bronchitis  -     CBC Auto Differential; Future  -     Comprehensive Metabolic Panel; Future    Hyperlipidemia, unspecified hyperlipidemia type  -     pravastatin (PRAVACHOL) 40 MG tablet; Take 1 tablet (40 mg total) by mouth once daily.  -     CBC Auto Differential; Future  -     Comprehensive Metabolic Panel; Future  -     Lipid Panel; Future    Personal history of smoking  -     CT Chest Lung Screening Low Dose; Future    Breast cancer screening by mammogram  -     Mammo Digital Screening Bilat w/ Jose Cruz; Future    Screening for lung cancer  -     CT Chest Lung Screening Low Dose; Future    RLS (restless legs syndrome)  -     rOPINIRole (REQUIP) 0.5 MG tablet; Take 1 tablet (0.5 mg total) by mouth nightly as needed (restless legs).    Chronic left-sided low back pain with left-sided sciatica  -     traMADoL (ULTRAM) 50 mg tablet; Take 1 tablet (50 mg total) by mouth every 6 (six) hours as needed for Pain.    Physically, everything looks pretty good today.      Leg/foot cramps sound suspicious for RLS.  Will get some screening labs, as above to check for electrolyte deficiencies.  In the meantime, will try using Requip at bedtime.  If no improvement, can then try Mirapex or gabapentin.    With her smoking history, we will get CT chest to screen for lung cancer.      Overdue for screening mammogram.  Will place that order, as well.  Ideally, she should be able to mammogram and CT scan prior to her next visit.  That way, we can review the results together.    Other med refills, as above.    FOLLOW-UP:  Follow up in about 6 months (around 9/24/2023) for check up.    I spent a total of 45 minutes face to face and non-face to face on the date of this visit.This includes time preparing to see the patient (eg, review of tests, notes), obtaining and/or reviewing additional history from an independent historian and/or outside medical  records, documenting clinical information in the electronic health record, independently interpreting results and/or communicating results to the patient/family/caregiver, or care coordinator.    Signed by:  Rikki Sharif MD

## 2023-03-29 ENCOUNTER — TELEPHONE (OUTPATIENT)
Dept: PRIMARY CARE CLINIC | Facility: CLINIC | Age: 73
End: 2023-03-29
Payer: MEDICARE

## 2023-03-29 NOTE — TELEPHONE ENCOUNTER
Spoke with pt and she wanted to know if you still wanted to get a U/S of the carotid. She said yall spoke about it

## 2023-03-29 NOTE — TELEPHONE ENCOUNTER
----- Message from Husam Weathers sent at 3/29/2023 10:34 AM CDT -----  Contact: Salud Brannon is calling to speak with the nurse in regards to her lab results. Please call her at [phone]

## 2023-04-05 ENCOUNTER — TELEPHONE (OUTPATIENT)
Dept: PRIMARY CARE CLINIC | Facility: CLINIC | Age: 73
End: 2023-04-05
Payer: MEDICARE

## 2023-04-05 NOTE — TELEPHONE ENCOUNTER
----- Message from Flower Olmedo sent at 4/5/2023  9:35 AM CDT -----  Contact: Salud  Patient is calling regarding dizziness, reports leaving previous message but haven't heard back from nurse, please call back at .[phone]

## 2023-04-05 NOTE — TELEPHONE ENCOUNTER
Pt reports she continues to have dizziness, was supposed to have carotid US but has not been contacted for scheduling. Pt advised Dr Sharif will place orders today, we will call her back to schedule.

## 2023-04-06 ENCOUNTER — HOSPITAL ENCOUNTER (OUTPATIENT)
Dept: RADIOLOGY | Facility: HOSPITAL | Age: 73
Discharge: HOME | End: 2023-04-06
Attending: FAMILY MEDICINE
Payer: MEDICARE

## 2023-04-06 DIAGNOSIS — R42 DIZZINESS: ICD-10-CM

## 2023-04-06 DIAGNOSIS — R09.89 OTHER SPECIFIED SYMPTOMS AND SIGNS INVOLVING THE CIRCULATORY AND RESPIRATORY SYSTEMS: ICD-10-CM

## 2023-04-06 PROCEDURE — 93880 US CAROTID BILATERAL: ICD-10-PCS | Mod: 26,,, | Performed by: RADIOLOGY

## 2023-04-06 PROCEDURE — 93880 EXTRACRANIAL BILAT STUDY: CPT | Mod: 26,,, | Performed by: RADIOLOGY

## 2023-04-06 PROCEDURE — 93880 EXTRACRANIAL BILAT STUDY: CPT | Mod: TC,PO

## 2023-04-06 NOTE — PROGRESS NOTES
Ms. Echevarria,     Attached are the results of your carotid ultrasound.  Everything looks fine! Good blood flow, minimal plaques.  No signs of any stenosis.

## 2023-04-17 ENCOUNTER — HOSPITAL ENCOUNTER (EMERGENCY)
Facility: HOSPITAL | Age: 73
Discharge: HOME | End: 2023-04-17
Attending: EMERGENCY MEDICINE
Payer: MEDICARE

## 2023-04-17 VITALS
SYSTOLIC BLOOD PRESSURE: 161 MMHG | BODY MASS INDEX: 26.09 KG/M2 | TEMPERATURE: 98 F | HEART RATE: 69 BPM | RESPIRATION RATE: 16 BRPM | WEIGHT: 142.63 LBS | DIASTOLIC BLOOD PRESSURE: 67 MMHG | OXYGEN SATURATION: 98 %

## 2023-04-17 DIAGNOSIS — S80.12XA CONTUSION OF LEFT LEG, INITIAL ENCOUNTER: Primary | ICD-10-CM

## 2023-04-17 PROCEDURE — 99283 EMERGENCY DEPT VISIT LOW MDM: CPT | Mod: ER

## 2023-04-17 RX ORDER — HYDROCODONE BITARTRATE AND ACETAMINOPHEN 5; 325 MG/1; MG/1
1 TABLET ORAL EVERY 4 HOURS PRN
Qty: 8 TABLET | Refills: 0 | Status: SHIPPED | OUTPATIENT
Start: 2023-04-17 | End: 2023-04-27 | Stop reason: ALTCHOICE

## 2023-04-18 ENCOUNTER — TELEPHONE (OUTPATIENT)
Dept: PRIMARY CARE CLINIC | Facility: CLINIC | Age: 73
End: 2023-04-18
Payer: MEDICARE

## 2023-04-18 DIAGNOSIS — R60.0 EDEMA OF LEFT LOWER EXTREMITY: Primary | ICD-10-CM

## 2023-04-18 DIAGNOSIS — S80.12XS CONTUSION OF LEFT LOWER LEG, SEQUELA: ICD-10-CM

## 2023-04-18 NOTE — DISCHARGE INSTRUCTIONS
You may return for US imaging to rule out DVT of left lower leg injury during 8-3:30 tomorrow in lieu of transfer to [facility](pt preference)

## 2023-04-18 NOTE — TELEPHONE ENCOUNTER
----- Message from Destiny Johnson sent at 4/18/2023  9:38 AM CDT -----  Contact: Salud Brannon needs a callback at [phone], Regards to getting orders in to have an ultrasound on her left leg.    Thanks  Td

## 2023-04-18 NOTE — TELEPHONE ENCOUNTER
Patient fely, was seen in ER yesterday, 04/17/2023.  They recommended doing an ultrasound of the left lower extremity to rule out blood clot/DVT.  Unfortunately, they did not have ultrasound coverage at that time she was at the ER.  They offered transfer to [facility] to get the ultrasound, but she declined because she would not have a way to get back from [facility].    Placing orders today for venous ultrasound of left lower extremity to rule out DVT.

## 2023-04-18 NOTE — ED PROVIDER NOTES
Encounter Date: 4/17/2023       History     Chief Complaint   Patient presents with    Leg Injury     Fell out of chair on Saturday. Left lower leg bruising and ankle swelling     The history is provided by the patient.   Fall  The accident occurred several days ago. Fall occurred: out of chair. She fell from a height of 1 to 2 ft. She landed on A hard floor. There was no blood loss. Point of impact: left leg. Pain location: left leg. The pain is at a severity of 3/10. She was Ambulatory at the scene. There was No entrapment after the fall. There was No drug use involved in the accident. There was No alcohol use involved in the accident. Pertinent negatives include no neck pain, no back pain, no paresthesias, no paralysis, no visual change, no fever, no numbness, no abdominal pain, no bowel incontinence, no nausea, no vomiting, no hematuria, no headaches, no hearing loss, no loss of consciousness and no tingling. The symptoms are aggravated by activity.   Review of patient's allergies indicates:   Allergen Reactions    Dye Anaphylaxis     Contrast Dye    Iodine and iodide containing products Rash    Penicillins Shortness Of Breath     Shortness of breath^severe skin rash    Lovastatin Other (See Comments)     Causes leg cramp    Mucinex [guaifenesin]     Cephalexin Nausea And Vomiting    Diazepam Anxiety     Made pt overly anxious instead of relaxing    Naproxen Nausea And Vomiting     Past Medical History:   Diagnosis Date    Asthma     childhood - seasonal as an adult    Hematuria 6/10/2019    Hyperlipidemia     Hypertension     Restless leg     Spinal stenosis      Past Surgical History:   Procedure Laterality Date    BACK SURGERY      BREAST BIOPSY      CARDIAC CATHETERIZATION  2018, 11/2020    HYSTERECTOMY      LYMPH NODE BIOPSY      ROBOT-ASSISTED REPAIR OF VENTRAL HERNIA USING DA JIM XI N/A 12/10/2020    Procedure: XI ROBOTIC REPAIR, HERNIA, VENTRAL;  Surgeon: Brandyn Panchal MD;  Location: [facility];  Service:  General;  Laterality: N/A;    TONSILLECTOMY       Family History   Problem Relation Age of Onset    Heart disease Mother      Social History     Tobacco Use    Smoking status: Former     Packs/day: 0.50     Years: 30.00     Pack years: 15.00     Types: Cigarettes    Smokeless tobacco: Former    Tobacco comments:     none past MN before surgery   Substance Use Topics    Alcohol use: Yes     Comment: 1-2 beers per month    Drug use: No     Review of Systems   Constitutional:  Negative for fever.   HENT:  Negative for congestion.    Respiratory:  Negative for shortness of breath.    Cardiovascular:  Negative for chest pain.   Gastrointestinal:  Negative for abdominal pain, bowel incontinence, nausea and vomiting.   Genitourinary:  Negative for hematuria.   Musculoskeletal:  Negative for back pain and neck pain.        Left leg pain   Neurological:  Negative for tingling, loss of consciousness, numbness, headaches and paresthesias.   Hematological:  Does not bruise/bleed easily.     Physical Exam     Initial Vitals [04/17/23 2111]   BP Pulse Resp Temp SpO2   (!) 161/67 69 16 97.7 °F (36.5 °C) 98 %      MAP       --         Physical Exam    Nursing note and vitals reviewed.  Constitutional: She appears well-developed and well-nourished. No distress.   HENT:   Head: Normocephalic and atraumatic.   Mouth/Throat: Oropharynx is clear and moist.   Eyes: Conjunctivae and EOM are normal. Pupils are equal, round, and reactive to light.   Neck: Neck supple.   Normal range of motion.  Cardiovascular:  Normal rate, regular rhythm and normal heart sounds.           Pulmonary/Chest: Breath sounds normal. No respiratory distress.   Abdominal: Abdomen is soft. Bowel sounds are normal. She exhibits no distension. There is no abdominal tenderness.   Musculoskeletal:         General: Tenderness present. Normal range of motion.      Cervical back: Normal range of motion and neck supple.      Left lower leg: Swelling and tenderness present.  No deformity or lacerations.      Comments: Mild ecchymoses medial lower leg, No palpable cord. No calf tenderness     Neurological: She is alert and oriented to person, place, and time. She has normal strength.   Skin: Skin is warm and dry.   Psychiatric: She has a normal mood and affect. Thought content normal.       ED Course   Procedures  Labs Reviewed - No data to display       Imaging Results              X-Ray Tibia Fibula 2 View Left (Final result)  Result time 04/17/23 21:35:44      Final result by Janet Jensen MD (04/17/23 21:35:44)                   Impression:       No acute osseous finding    Finalized on: 4/17/2023 9:35 PM By:  Janet Jensen MD  [facility]# [id number]      2023-04-17 21:37:50.448    [facility]               Narrative:    EXAM: XR TIBIA FIBULA 2 VIEW LEFT    CLINICAL INDICATION:  Trauma    PRIORS:   None    FINDINGS:  No acute fracture or malalignment identified on two-view exam.                                    Pt offered transfer for US imaging to rule out DVT in left leg after traumatic contusion causing bruising and swelling to left leg. Pt refuses transfer to  because it is inconvenient. Discussed no US coverage at this facility tonight, pt states she prefers to return tomorrow to rule out DVT during daytime hours when we have US coverage.    9:48 PM - Counseling: Spoke with the patient and discussed todays findings, in addition to providing specific details for the plan of care and counseling regarding the diagnosis and prognosis. Questions are answered at this time.       Medications - No data to display  Medical Decision Making:   Initial Assessment:   Fall, left leg bruising and swelling  Differential Diagnosis:   Contusion, fracture, DVT  Clinical Tests:   Radiological Study: Ordered and Reviewed  ED Management:  Trauma precautions were discussed with patient and/or family/caretaker; I do not specifically detect any abdominal, thoracic, CNS, orthopedic, or other emergent or life  threatening condition and that patient is safe to be discharged.  It was also discussed that despite an unrevealing examination and negative radiographic examination for serious or life threatening injury, these conditions may still exist.  As such, patient should return to ED immediately should they experience, severe or worsening pain, shortness of breath, abdominal pain, headache, vomiting, or any other concern.  It was also discussed that not infrequently, injuries may not be diagnosed during the initial ED visit (such as fractures) and that if the patient discovers a new area of concern, a new area of injury that was not evaluated in the ED, they should return for evaluation as they may have an injury that requires treatment.                          Clinical Impression:   Final diagnoses:  [S80.12XA] Contusion of left leg, initial encounter (Primary)        ED Disposition Condition    Discharge Stable          ED Prescriptions       Medication Sig Dispense Start Date End Date Auth. Provider    HYDROcodone-acetaminophen (NORCO) 5-325 mg per tablet Take 1 tablet by mouth every 4 (four) hours as needed. 8 tablet 4/17/2023 -- Dong Murry MD          Follow-up Information       Follow up With Specialties Details Why Contact Info    Rikki Sharif MD Family Medicine Call in 2 days  [address]  [place] [address]  [phone]      [facility] - Emergency Dept Emergency Medicine  If symptoms worsen [address]  [facility] [address] [phone]             Dong Murry MD  04/17/23 6649

## 2023-04-18 NOTE — ED NOTES
Patient examined, evaluated, and educated on discharge prescriptions and instructions by Dr Jeanmarie MARTINEZ. Patient [facility] to [facility] by Dr Jeanmarie MARTINEZ.

## 2023-04-18 NOTE — TELEPHONE ENCOUNTER
----- Message from Destiny Johnson sent at 4/18/2023  9:38 AM CDT -----  Contact: Salud Brannon needs a callback at [phone], Regards to getting orders in to have an ultrasound on her left leg.    Thanks  Td

## 2023-04-19 ENCOUNTER — HOSPITAL ENCOUNTER (OUTPATIENT)
Dept: RADIOLOGY | Facility: HOSPITAL | Age: 73
Discharge: HOME | End: 2023-04-19
Attending: FAMILY MEDICINE
Payer: MEDICARE

## 2023-04-19 DIAGNOSIS — R60.0 EDEMA OF LEFT LOWER EXTREMITY: ICD-10-CM

## 2023-04-19 DIAGNOSIS — S80.12XS CONTUSION OF LEFT LOWER LEG, SEQUELA: ICD-10-CM

## 2023-04-19 PROCEDURE — 93971 EXTREMITY STUDY: CPT | Mod: 26,LT,, | Performed by: RADIOLOGY

## 2023-04-19 PROCEDURE — 93971 EXTREMITY STUDY: CPT | Mod: TC,PO,LT

## 2023-04-19 PROCEDURE — 93971 US LOWER EXTREMITY VEINS LEFT: ICD-10-PCS | Mod: 26,LT,, | Performed by: RADIOLOGY

## 2023-04-19 NOTE — PROGRESS NOTES
Ms. Echevarria,     You should be able to see the results of your ultrasound in [facility].      The good news is that there are no signs of DVT, nor blood clot.      The bad news is that you have something called a hematoma in that area.  This is a giant bruise.    Expect the area to be sore for at least a week or so.  For the 1st couple of days, place an ice pack or cold compress on it two or 3 times per day, 10-15 minutes at a time.  This will help with inflammation, pain, swelling.  Starting Friday or Saturday, alternate the cold compress with warm, moist heat.  Simply run a washcloth under the hot water, ring it out, then apply it to the area for about 5 minutes.  When it cools off, repeat the washcloth.  The the warmth will increase blood flow to the area and help speed up healing.      Expected to change colors multiple times during the healing process.  It will go from black/purple to red to green to yellow, etc..  This is part of the progression.      If you have any questions, please let us know!

## 2023-04-25 ENCOUNTER — TELEPHONE (OUTPATIENT)
Dept: PRIMARY CARE CLINIC | Facility: CLINIC | Age: 73
End: 2023-04-25
Payer: MEDICARE

## 2023-04-25 NOTE — TELEPHONE ENCOUNTER
----- Message from Flwoer Olmedo sent at 4/25/2023  9:00 AM CDT -----  Contact: Salud  Patient is requesting a call back from nurse regarding concerns. Reports having a fall on 4/15 and ws taken to ER for ultrasound but stated she has been having bruising and pain in foot and wants to know if she needs to come in. Please call pt at .[phone]

## 2023-04-27 ENCOUNTER — OFFICE VISIT (OUTPATIENT)
Dept: PRIMARY CARE CLINIC | Facility: CLINIC | Age: 73
End: 2023-04-27
Payer: MEDICARE

## 2023-04-27 ENCOUNTER — HOSPITAL ENCOUNTER (OUTPATIENT)
Dept: RADIOLOGY | Facility: HOSPITAL | Age: 73
Discharge: HOME | End: 2023-04-27
Attending: FAMILY MEDICINE
Payer: MEDICARE

## 2023-04-27 VITALS
HEIGHT: 62 IN | OXYGEN SATURATION: 96 % | HEART RATE: 62 BPM | DIASTOLIC BLOOD PRESSURE: 74 MMHG | BODY MASS INDEX: 25.97 KG/M2 | SYSTOLIC BLOOD PRESSURE: 112 MMHG | TEMPERATURE: 97 F | WEIGHT: 141.13 LBS

## 2023-04-27 DIAGNOSIS — M25.472 PAIN AND SWELLING OF LEFT ANKLE: ICD-10-CM

## 2023-04-27 DIAGNOSIS — M25.572 PAIN AND SWELLING OF LEFT ANKLE: ICD-10-CM
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
## 2023-04-27 DIAGNOSIS — M25.572 PAIN AND SWELLING OF LEFT ANKLE: Primary | ICD-10-CM

## 2023-04-27 DIAGNOSIS — M25.472 PAIN AND SWELLING OF LEFT ANKLE: Primary | ICD-10-CM

## 2023-04-27 PROCEDURE — 1100F PR PT FALLS ASSESS DOC 2+ FALLS/FALL W/INJURY/YR: ICD-10-PCS | Mod: CPTII,S$GLB,, | Performed by: FAMILY MEDICINE

## 2023-04-27 PROCEDURE — 3288F PR FALLS RISK ASSESSMENT DOCUMENTED: ICD-10-PCS | Mod: CPTII,S$GLB,, | Performed by: FAMILY MEDICINE

## 2023-04-27 PROCEDURE — 1160F RVW MEDS BY RX/DR IN RCRD: CPT | Mod: CPTII,S$GLB,, | Performed by: FAMILY MEDICINE

## 2023-04-27 PROCEDURE — 1125F AMNT PAIN NOTED PAIN PRSNT: CPT | Mod: CPTII,S$GLB,, | Performed by: FAMILY MEDICINE

## 2023-04-27 PROCEDURE — 3074F PR MOST RECENT SYSTOLIC BLOOD PRESSURE < 130 MM HG: ICD-10-PCS | Mod: CPTII,S$GLB,, | Performed by: FAMILY MEDICINE

## 2023-04-27 PROCEDURE — 99215 PR OFFICE/OUTPT VISIT, EST, LEVL V, 40-54 MIN: ICD-10-PCS | Mod: S$GLB,,, | Performed by: FAMILY MEDICINE

## 2023-04-27 PROCEDURE — 1160F PR REVIEW ALL MEDS BY PRESCRIBER/CLIN PHARMACIST DOCUMENTED: ICD-10-PCS | Mod: CPTII,S$GLB,, | Performed by: FAMILY MEDICINE

## 2023-04-27 PROCEDURE — 99999 PR PBB SHADOW E&M-EST. PATIENT-LVL V: CPT | Mod: PBBFAC,,, | Performed by: FAMILY MEDICINE

## 2023-04-27 PROCEDURE — 1159F MED LIST DOCD IN RCRD: CPT | Mod: CPTII,S$GLB,, | Performed by: FAMILY MEDICINE

## 2023-04-27 PROCEDURE — 1159F PR MEDICATION LIST DOCUMENTED IN MEDICAL RECORD: ICD-10-PCS | Mod: CPTII,S$GLB,, | Performed by: FAMILY MEDICINE

## 2023-04-27 PROCEDURE — 1125F PR PAIN SEVERITY QUANTIFIED, PAIN PRESENT: ICD-10-PCS | Mod: CPTII,S$GLB,, | Performed by: FAMILY MEDICINE

## 2023-04-27 PROCEDURE — 73610 XR ANKLE COMPLETE 3 VIEW LEFT: ICD-10-PCS | Mod: 26,LT,, | Performed by: RADIOLOGY

## 2023-04-27 PROCEDURE — 1100F PTFALLS ASSESS-DOCD GE2>/YR: CPT | Mod: CPTII,S$GLB,, | Performed by: FAMILY MEDICINE

## 2023-04-27 PROCEDURE — 3008F BODY MASS INDEX DOCD: CPT | Mod: CPTII,S$GLB,, | Performed by: FAMILY MEDICINE

## 2023-04-27 PROCEDURE — 3288F FALL RISK ASSESSMENT DOCD: CPT | Mod: CPTII,S$GLB,, | Performed by: FAMILY MEDICINE

## 2023-04-27 PROCEDURE — 99215 OFFICE O/P EST HI 40 MIN: CPT | Mod: S$GLB,,, | Performed by: FAMILY MEDICINE

## 2023-04-27 PROCEDURE — 3074F SYST BP LT 130 MM HG: CPT | Mod: CPTII,S$GLB,, | Performed by: FAMILY MEDICINE

## 2023-04-27 PROCEDURE — 73610 X-RAY EXAM OF ANKLE: CPT | Mod: TC,PN,LT

## 2023-04-27 PROCEDURE — 73610 X-RAY EXAM OF ANKLE: CPT | Mod: 26,LT,, | Performed by: RADIOLOGY

## 2023-04-27 PROCEDURE — 3078F PR MOST RECENT DIASTOLIC BLOOD PRESSURE < 80 MM HG: ICD-10-PCS | Mod: CPTII,S$GLB,, | Performed by: FAMILY MEDICINE

## 2023-04-27 PROCEDURE — 99999 PR PBB SHADOW E&M-EST. PATIENT-LVL V: ICD-10-PCS | Mod: PBBFAC,,, | Performed by: FAMILY MEDICINE

## 2023-04-27 PROCEDURE — 3008F PR BODY MASS INDEX (BMI) DOCUMENTED: ICD-10-PCS | Mod: CPTII,S$GLB,, | Performed by: FAMILY MEDICINE

## 2023-04-27 PROCEDURE — 3078F DIAST BP <80 MM HG: CPT | Mod: CPTII,S$GLB,, | Performed by: FAMILY MEDICINE

## 2023-04-27 RX ORDER — CELECOXIB 200 MG/1
200 CAPSULE ORAL 2 TIMES DAILY PRN
Qty: 60 CAPSULE | Refills: 0 | Status: SHIPPED | OUTPATIENT
Start: 2023-04-27 | End: 2023-10-25

## 2023-04-27 NOTE — LETTER
April 27, 2023      [facility]  [address] [address]  Phone: [phone]  Fax: [phone]       Patient: Salud Echevarria   YOB: 1950  Date of Visit: 04/27/2023    To Whom It May Concern:    APPLE Echevarria  was at [facility] on 04/27/2023. The patient may return to work/school on today with restrictions.      Please allow her frequent rest breaks, or any other accommodations, to help her stay off of her foot.  Would like her to prop it up as much as possible.  Would also like her to have breaks to be able to apply ice and/or heat, as needed.    Please also allow her time off of work on Monday, 5/1/23, to attend a follow-up appointment with the specialist.    If you have any questions or concerns, or if I can be of further assistance, please do not hesitate to contact me.    Sincerely,    Rikki Sharif MD

## 2023-04-27 NOTE — PATIENT INSTRUCTIONS
With the new swelling and discoloration at the ankle, let us go ahead and take an x-ray of it today.  This will let us know if there was an occult fracture that was missed on the if original x-ray.      Rest the foot/leg as much as possible.  Try to stay off of it it, if you can.  Prop it up as much as possible.    For pain, start with OTC Tylenol - two, 500mg tablets every 8 hours.    You can supplement with prescription Celebrex, twice a day, as needed.     Apply ice or cold compress 3-4 times per day, 10-15 minutes at a time  Alternate cold compress with warm, moist heat    Let us also have you follow-up with our sports medicine specialist, Dr. Yuan, on Monday.  Referral placed today.

## 2023-04-27 NOTE — PROGRESS NOTES
Ms. Echevarria,     You should be able to see the results of the ankle x-ray in [facility].  Everything looks fine.  No signs of fracture, nor dislocation.      There is a fair bit of swelling, consistent with a large bruise.    Do the things we discussed over the weekend.  Let us have you see Dr. Yuan next week, on Monday, to get his thoughts.

## 2023-04-27 NOTE — PROGRESS NOTES
"    [facility]       [address] [place] [address]      Phone: [phone]      Fax: [phone]    Rikki Sharif MD                Office Visit  04/27/2023        Subjective      HPI:  Salud Echevarria is a 73 y.o. female presents today in clinic for "Ankle Injury  ."     73-year-old female presents today complaining of ankle swelling.      Patient states that approximately 10 days ago she was sitting on a chair.  The chair was counter height.  When she was seated, she had her left leg tucked up under her behind.  She went to get off of the chair in for got has she was sitting.  Wound up falling to the ground.    Almost immediately, her left shin started swelling, changing colors.  She went to the ER for evaluation.  They did x-ray, which showed no fractures.  They recommended an ultrasound, but they did not have an ultrasound tech available, so they recommended she contact us to get it done.  We placed the order for the ultrasound last week.  Reports showed large hematoma.    She states that since then, the swelling in the upper part of her leg has diminished a bit, but now her ankle is swollen, bruised, purple.  Tender to touch.  Okay to walk on.  Gets better overnight when she lays down, but as soon as she stands up it started swelling again.    PMH: Back pains/spinal stenosis.  Diverticulosis.  NSAID gastritis.  PSH: Back surgery.  Heart cath (11/2020).  Umbilical hernia repair.  Tonsils/adenoids.  Hysterectomy.  Breast biopsy.  Lymph node biopsy.  FMH: CHF, brain cancer, lung cancer, A. fib  Allergies: Penicillins make her sweat, shaky.  Iodine makes her sweat, vision changes, hypotensive.  Keflex makes her sick, upset stomach.  Naproxen makes her sick.  Statins, specifically lovastatin, causes leg cramps.  Pravastatin is okay.  Valium makes her loopy, goofy.  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     T:  Quit smoking within the last six months.  " Previously smoked .5-1 PPD x40+ years     A: Occasionally, rarely     D: Denies     Exercise: No regular exercise program.  Tries to stay active around the house, gardening.     ENT: Dr. Jama  Cardiology: Dr. Hannah  Urology: Dr. Greer  Ophthalmology: Dr. Reece     Colon: Shanelle - 10/21/2020.  Repeat 3 years ()  MM/3/2021 - overdue         The following were updated and reviewed by myself in the chart: medications, past medical history, past surgical history, family history, social history, and allergies.     Medications:  Current Outpatient Medications on File Prior to Visit   Medication Sig Dispense Refill    albuterol (PROVENTIL/VENTOLIN HFA) 90 mcg/actuation inhaler Inhale 1-2 puffs into the lungs every 6 (six) hours as needed for Wheezing. Rescue 18 g 1    aspirin (ECOTRIN) 81 MG EC tablet Take 81 mg by mouth once daily.      fluticasone propionate (FLOVENT HFA) 110 mcg/actuation inhaler Inhale 1 puff into the lungs 2 (two) times daily. Controller 12 g 3    pravastatin (PRAVACHOL) 40 MG tablet Take 1 tablet (40 mg total) by mouth once daily. 90 tablet 3    propylene glycol (SYSTANE COMPLETE OPHT) Apply 1 drop to eye daily as needed.      rOPINIRole (REQUIP) 0.5 MG tablet Take 1 tablet (0.5 mg total) by mouth nightly as needed (restless legs). 90 tablet 3    triamcinolone acetonide 0.1% (KENALOG) 0.1 % cream Apply topically nightly as needed (itching). 30 g 1    [DISCONTINUED] HYDROcodone-acetaminophen (NORCO) 5-325 mg per tablet Take 1 tablet by mouth every 4 (four) hours as needed. (Patient not taking: Reported on 2023) 8 tablet 0     No current facility-administered medications on file prior to visit.        PMHx:  Past Medical History:   Diagnosis Date    Asthma     childhood - seasonal as an adult    Hematuria 6/10/2019    Hyperlipidemia     Hypertension     Restless leg     Spinal stenosis       Patient Active Problem List    Diagnosis Date Noted    Viral syndrome 2022     COVID-19 virus detected 02/08/2022    Peripheral vascular disease, unspecified 02/08/2022    Seasonal allergic rhinitis due to pollen 05/03/2021    Chronic obstructive pulmonary disease 03/01/2021    Asymptomatic microscopic hematuria 02/22/2021    Anxiety 02/03/2021    Ventral hernia without obstruction or gangrene 12/10/2020    Urge incontinence 09/18/2020    Mild intermittent asthma without complication 08/07/2020    Vertigo 07/29/2020    Post-nasal drip 05/04/2020    Other specified abdominal hernia without obstruction or gangrene 10/23/2019    Fatigue 08/13/2019    Left hip pain 08/07/2019    Sciatica of left side 08/07/2019    BPPV (benign paroxysmal positional vertigo) 08/07/2019    Spinal stenosis 06/04/2019    Aortic atherosclerosis 07/10/2018    Gastroesophageal reflux disease with esophagitis 01/29/2018    Coronary artery disease involving native coronary artery of native heart without angina pectoris 03/14/2017    Pulmonary nodule, right 09/28/2016    History of tobacco use 09/28/2016    Osteopenia 07/20/2015    Hyperlipidemia 10/13/2014    Back pain 07/02/2013        PSHx:  Past Surgical History:   Procedure Laterality Date    BACK SURGERY      BREAST BIOPSY      CARDIAC CATHETERIZATION  2018, 11/2020    HYSTERECTOMY      LYMPH NODE BIOPSY      ROBOT-ASSISTED REPAIR OF VENTRAL HERNIA USING DA JIM XI N/A 12/10/2020    Procedure: XI ROBOTIC REPAIR, HERNIA, VENTRAL;  Surgeon: Brandyn Panchal MD;  Location: [facility];  Service: General;  Laterality: N/A;    TONSILLECTOMY          FHx:  Family History   Problem Relation Age of Onset    Heart disease Mother         Social:  Social History     Socioeconomic History    Marital status:    Tobacco Use    Smoking status: Former     Packs/day: 0.50     Years: 30.00     Pack years: 15.00     Types: Cigarettes    Smokeless tobacco: Former    Tobacco comments:     none past MN before surgery   Substance and Sexual Activity    Alcohol use: Yes     Comment: 1-2 beers  per month    Drug use: No    Sexual activity: Not Currently     Partners: Male     Social Determinants of Health     Financial Resource Strain: Medium Risk    Difficulty of Paying Living Expenses: Somewhat hard   Food Insecurity: No Food Insecurity    Worried About Running Out of Food in the Last Year: Never true    Ran Out of Food in the Last Year: Never true   Transportation Needs: No Transportation Needs    Lack of Transportation (Medical): No    Lack of Transportation (Non-Medical): No   Physical Activity: Sufficiently Active    Days of Exercise per Week: 7 days    Minutes of Exercise per Session: 30 min   Stress: Stress Concern Present    Feeling of Stress : To some extent   Social Connections: Moderately Isolated    Frequency of Communication with Friends and Family: More than three times a week    Frequency of Social Gatherings with Friends and Family: Once a week    Attends Taoism Services: 1 to 4 times per year    Active Member of Clubs or Organizations: No    Attends Club or Organization Meetings: Never    Marital Status:    Housing Stability: Low Risk     Unable to Pay for Housing in the Last Year: No    Number of Places Lived in the Last Year: 1    Unstable Housing in the Last Year: No        Allergies:  Review of patient's allergies indicates:   Allergen Reactions    Dye Anaphylaxis     Contrast Dye    Iodine and iodide containing products Rash    Penicillins Shortness Of Breath     Shortness of breath^severe skin rash    Lovastatin Other (See Comments)     Causes leg cramp    Mucinex [guaifenesin]     Cephalexin Nausea And Vomiting    Diazepam Anxiety     Made pt overly anxious instead of relaxing    Naproxen Nausea And Vomiting        ROS:  Review of Systems   Constitutional:  Negative for activity change, appetite change, chills and fever.   HENT:  Negative for congestion, postnasal drip, rhinorrhea, sore throat and trouble swallowing.    Respiratory:  Negative for cough, shortness of  "breath and wheezing.    Cardiovascular:  Negative for chest pain and palpitations.   Gastrointestinal:  Negative for abdominal pain, constipation, diarrhea, nausea and vomiting.   Genitourinary:  Negative for difficulty urinating.   Musculoskeletal:  Positive for joint swelling and myalgias.   Skin:  Positive for color change. Negative for rash and wound.   Neurological:  Negative for speech difficulty and headaches.   All other systems reviewed and are negative.       Objective      /74   Pulse 62   Temp 96.5 °F (35.8 °C)   Ht 5' 2" (1.575 m)   Wt 64 kg (141 lb 1.5 oz)   SpO2 96%   BMI 25.81 kg/m²   Ht Readings from Last 3 Encounters:   04/27/23 5' 2" (1.575 m)   03/24/23 5' 2" (1.575 m)   09/07/22 5' 2" (1.575 m)     Wt Readings from Last 3 Encounters:   04/27/23 64 kg (141 lb 1.5 oz)   04/17/23 64.7 kg (142 lb 10.2 oz)   03/24/23 62.3 kg (137 lb 5.6 oz)       PHYSICAL EXAM:  Physical Exam  Vitals and nursing note reviewed.   Constitutional:       General: She is not in acute distress.     Appearance: Normal appearance.   HENT:      Head: Normocephalic and atraumatic.      Right Ear: Tympanic membrane, ear canal and external ear normal.      Left Ear: Tympanic membrane, ear canal and external ear normal.      Nose: Nose normal. No congestion or rhinorrhea.      Mouth/Throat:      Mouth: Mucous membranes are moist.      Pharynx: Oropharynx is clear. No oropharyngeal exudate or posterior oropharyngeal erythema.   Eyes:      Extraocular Movements: Extraocular movements intact.      Conjunctiva/sclera: Conjunctivae normal.      Pupils: Pupils are equal, round, and reactive to light.   Cardiovascular:      Rate and Rhythm: Normal rate and regular rhythm.   Pulmonary:      Effort: Pulmonary effort is normal. No respiratory distress.      Breath sounds: No wheezing, rhonchi or rales.   Musculoskeletal:         General: Normal range of motion.      Cervical back: Normal range of motion.      Left ankle: " Swelling and ecchymosis present. Tenderness present.   Lymphadenopathy:      Cervical: No cervical adenopathy.   Skin:     General: Skin is warm and dry.      Findings: No rash.   Neurological:      Mental Status: She is alert.            LABS / IMAGING:  Recent Results (from the past 4368 hour(s))   CBC Auto Differential    Collection Time: 03/24/23 11:22 AM   Result Value Ref Range    WBC 8.62 3.90 - 12.70 K/uL    RBC 4.38 4.00 - 5.40 M/uL    Hemoglobin 13.4 12.0 - 16.0 g/dL    Hematocrit 41.9 37.0 - 48.5 %    MCV 96 82 - 98 fL    MCH 30.6 27.0 - 31.0 pg    MCHC 32.0 32.0 - 36.0 g/dL    RDW 13.5 11.5 - 14.5 %    Platelets 294 150 - 450 K/uL    MPV 10.8 9.2 - 12.9 fL    Immature Granulocytes 0.1 0.0 - 0.5 %    Gran # (ANC) 4.9 1.8 - 7.7 K/uL    Immature Grans (Abs) 0.01 0.00 - 0.04 K/uL    Lymph # 2.5 1.0 - 4.8 K/uL    Mono # 0.8 0.3 - 1.0 K/uL    Eos # 0.3 0.0 - 0.5 K/uL    Baso # 0.11 0.00 - 0.20 K/uL    nRBC 0 0 /100 WBC    Gran % 57.1 38.0 - 73.0 %    Lymph % 28.4 18.0 - 48.0 %    Mono % 9.2 4.0 - 15.0 %    Eosinophil % 3.9 0.0 - 8.0 %    Basophil % 1.3 0.0 - 1.9 %    Differential Method Automated    Comprehensive Metabolic Panel    Collection Time: 03/24/23 11:22 AM   Result Value Ref Range    Sodium 141 136 - 145 mmol/L    Potassium 4.3 3.5 - 5.1 mmol/L    Chloride 105 95 - 110 mmol/L    CO2 29 23 - 29 mmol/L    Glucose 87 70 - 110 mg/dL    BUN 16 8 - 23 mg/dL    Creatinine 0.6 0.5 - 1.4 mg/dL    Calcium 9.0 8.7 - 10.5 mg/dL    Total Protein 6.6 6.0 - 8.4 g/dL    Albumin 3.9 3.5 - 5.2 g/dL    Total Bilirubin 0.6 0.1 - 1.0 mg/dL    Alkaline Phosphatase 62 55 - 135 U/L    AST 21 10 - 40 U/L    ALT 18 10 - 44 U/L    Anion Gap 7 (L) 8 - 16 mmol/L    eGFR >60.0 >60 mL/min/1.73 m^2   Lipid Panel    Collection Time: 03/24/23 11:22 AM   Result Value Ref Range    Cholesterol 158 120 - 199 mg/dL    Triglycerides 42 30 - 150 mg/dL    HDL 88 (H) 40 - 75 mg/dL    LDL Cholesterol 61.6 (L) 63.0 - 159.0 mg/dL     HDL/Cholesterol Ratio 55.7 (H) 20.0 - 50.0 %    Total Cholesterol/HDL Ratio 1.8 (L) 2.0 - 5.0    Non-HDL Cholesterol 70 mg/dL         Assessment    1. Pain and swelling of left ankle    2. Fall, sequela          Plan    Salud was seen today for ankle injury.    Diagnoses and all orders for this visit:    Pain and swelling of left ankle  -     X-Ray Ankle Complete Left; Future  -     celecoxib (CELEBREX) 200 MG capsule; Take 1 capsule (200 mg total) by mouth 2 (two) times daily as needed for Pain.  -     Ambulatory referral/consult to Sports Medicine; Future    Fall, sequela  -     X-Ray Ankle Complete Left; Future  -     celecoxib (CELEBREX) 200 MG capsule; Take 1 capsule (200 mg total) by mouth 2 (two) times daily as needed for Pain.  -     Ambulatory referral/consult to Sports Medicine; Future    Reviewed the tibia fibula x-rays from the ER.  No apparent fractures.  Although, with new swelling, color change, will repeat the ankle x-ray just to make sure.  No obvious fractures seen on ankle x-ray.      Suspect this is either a continuation of the hematoma or residual/new sprain of the ankle.      Will have her rest as much as possible.  Elevate the foot/leg as much as possible.  Alternate ice/heat.  Tylenol, Celebrex.  Follow-up with Sports Medicine next week if no improvement.      FOLLOW-UP:  Follow up if symptoms worsen or fail to improve.    I spent a total of 45 minutes face to face and non-face to face on the date of this visit.This includes time preparing to see the patient (eg, review of tests, notes), obtaining and/or reviewing additional history from an independent historian and/or outside medical records, documenting clinical information in the electronic health record, independently interpreting results and/or communicating results to the patient/family/caregiver, or care coordinator.    Signed by:  Rikki Sharif MD

## 2023-05-01 ENCOUNTER — OFFICE VISIT (OUTPATIENT)
Dept: SPORTS MEDICINE | Facility: CLINIC | Age: 73
End: 2023-05-01
Payer: MEDICARE

## 2023-05-01 DIAGNOSIS — S80.12XA CONTUSION OF LEFT LOWER LEG, INITIAL ENCOUNTER: Primary | ICD-10-CM

## 2023-05-01 DIAGNOSIS — M25.572 PAIN AND SWELLING OF LEFT ANKLE: ICD-10-CM

## 2023-05-01 DIAGNOSIS — S80.12XA HEMATOMA OF LEFT LOWER LEG: ICD-10-CM

## 2023-05-01 DIAGNOSIS — S93.422A SPRAIN OF LEFT MEDIAL ANKLE JOINT, INITIAL ENCOUNTER: ICD-10-CM

## 2023-05-01 DIAGNOSIS — M25.472 PAIN AND SWELLING OF LEFT ANKLE: ICD-10-CM
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
## 2023-05-01 PROCEDURE — 99204 PR OFFICE/OUTPT VISIT, NEW, LEVL IV, 45-59 MIN: ICD-10-PCS | Mod: S$GLB,,, | Performed by: STUDENT IN AN ORGANIZED HEALTH CARE EDUCATION/TRAINING PROGRAM

## 2023-05-01 PROCEDURE — 1125F AMNT PAIN NOTED PAIN PRSNT: CPT | Mod: CPTII,S$GLB,, | Performed by: STUDENT IN AN ORGANIZED HEALTH CARE EDUCATION/TRAINING PROGRAM

## 2023-05-01 PROCEDURE — 99204 OFFICE O/P NEW MOD 45 MIN: CPT | Mod: S$GLB,,, | Performed by: STUDENT IN AN ORGANIZED HEALTH CARE EDUCATION/TRAINING PROGRAM

## 2023-05-01 PROCEDURE — 1159F MED LIST DOCD IN RCRD: CPT | Mod: CPTII,S$GLB,, | Performed by: STUDENT IN AN ORGANIZED HEALTH CARE EDUCATION/TRAINING PROGRAM

## 2023-05-01 PROCEDURE — 99999 PR PBB SHADOW E&M-EST. PATIENT-LVL III: ICD-10-PCS | Mod: PBBFAC,,, | Performed by: STUDENT IN AN ORGANIZED HEALTH CARE EDUCATION/TRAINING PROGRAM

## 2023-05-01 PROCEDURE — 1100F PR PT FALLS ASSESS DOC 2+ FALLS/FALL W/INJURY/YR: ICD-10-PCS | Mod: CPTII,S$GLB,, | Performed by: STUDENT IN AN ORGANIZED HEALTH CARE EDUCATION/TRAINING PROGRAM

## 2023-05-01 PROCEDURE — 99999 PR PBB SHADOW E&M-EST. PATIENT-LVL III: CPT | Mod: PBBFAC,,, | Performed by: STUDENT IN AN ORGANIZED HEALTH CARE EDUCATION/TRAINING PROGRAM

## 2023-05-01 PROCEDURE — 1125F PR PAIN SEVERITY QUANTIFIED, PAIN PRESENT: ICD-10-PCS | Mod: CPTII,S$GLB,, | Performed by: STUDENT IN AN ORGANIZED HEALTH CARE EDUCATION/TRAINING PROGRAM

## 2023-05-01 PROCEDURE — 97110 THERAPEUTIC EXERCISES: CPT | Mod: GP,S$GLB,, | Performed by: STUDENT IN AN ORGANIZED HEALTH CARE EDUCATION/TRAINING PROGRAM

## 2023-05-01 PROCEDURE — 3288F PR FALLS RISK ASSESSMENT DOCUMENTED: ICD-10-PCS | Mod: CPTII,S$GLB,, | Performed by: STUDENT IN AN ORGANIZED HEALTH CARE EDUCATION/TRAINING PROGRAM

## 2023-05-01 PROCEDURE — 97110 PR THERAPEUTIC EXERCISES: ICD-10-PCS | Mod: GP,S$GLB,, | Performed by: STUDENT IN AN ORGANIZED HEALTH CARE EDUCATION/TRAINING PROGRAM

## 2023-05-01 PROCEDURE — 3288F FALL RISK ASSESSMENT DOCD: CPT | Mod: CPTII,S$GLB,, | Performed by: STUDENT IN AN ORGANIZED HEALTH CARE EDUCATION/TRAINING PROGRAM

## 2023-05-01 PROCEDURE — 1100F PTFALLS ASSESS-DOCD GE2>/YR: CPT | Mod: CPTII,S$GLB,, | Performed by: STUDENT IN AN ORGANIZED HEALTH CARE EDUCATION/TRAINING PROGRAM

## 2023-05-01 PROCEDURE — 1159F PR MEDICATION LIST DOCUMENTED IN MEDICAL RECORD: ICD-10-PCS | Mod: CPTII,S$GLB,, | Performed by: STUDENT IN AN ORGANIZED HEALTH CARE EDUCATION/TRAINING PROGRAM

## 2023-05-01 NOTE — PROGRESS NOTES
Patient ID: Salud Echevarria  YOB: 1950  MRN: [id number]    Chief Complaint: Injury, Pain, Swelling, and Numbness of the Left Ankle    Referred By: Dr. Rikki Sharif    Occupation: Data Unavailable      History of Present Illness: Salud Echevarria is a 73 y.o. female who presents today with Injury, Pain, Swelling, and Numbness of the Left Ankle    Patient reports to the clinic today complaining of 2/10 left ankle swelling and pain.  She states that on 4/15/23 she fell out of a bar stool and her left foot got stuck on the foot rest. She states that she can't remember if her ankle twisted, because it all happened so fast.  She reports intermittent burning and shooting into her big toe.  She reports previous tingling in her big toe due to back injury, but it has increased since the fall.    Past Medical History:   Past Medical History:   Diagnosis Date    Asthma     childhood - seasonal as an adult    Hematuria 06/10/2019    Hyperlipidemia     Hypertension     pt denies    Restless leg     Spinal stenosis      Past Surgical History:   Procedure Laterality Date    BACK SURGERY      BREAST BIOPSY      CARDIAC CATHETERIZATION  2018, 11/2020    HYSTERECTOMY      LYMPH NODE BIOPSY      ROBOT-ASSISTED REPAIR OF VENTRAL HERNIA USING DA JIM XI N/A 12/10/2020    Procedure: XI ROBOTIC REPAIR, HERNIA, VENTRAL;  Surgeon: Brandyn Panchal MD;  Location: [facility];  Service: General;  Laterality: N/A;    TONSILLECTOMY       Family History   Problem Relation Age of Onset    Heart disease Mother     Cancer Maternal Uncle      Social History     Socioeconomic History    Marital status:    Tobacco Use    Smoking status: Former     Packs/day: 0.50     Years: 30.00     Pack years: 15.00     Types: Cigarettes    Smokeless tobacco: Former    Tobacco comments:     none past MN before surgery   Substance and Sexual Activity    Alcohol use: Yes     Comment: 1-2 beers per month    Drug use: No    Sexual activity: Not  Currently     Partners: Male     Social Determinants of Health     Financial Resource Strain: Medium Risk    Difficulty of Paying Living Expenses: Somewhat hard   Food Insecurity: No Food Insecurity    Worried About Running Out of Food in the Last Year: Never true    Ran Out of Food in the Last Year: Never true   Transportation Needs: No Transportation Needs    Lack of Transportation (Medical): No    Lack of Transportation (Non-Medical): No   Physical Activity: Sufficiently Active    Days of Exercise per Week: 7 days    Minutes of Exercise per Session: 30 min   Stress: Stress Concern Present    Feeling of Stress : To some extent   Social Connections: Moderately Isolated    Frequency of Communication with Friends and Family: More than three times a week    Frequency of Social Gatherings with Friends and Family: Once a week    Attends L[employer] Services: 1 to 4 times per year    Active Member of Clubs or Organizations: No    Attends Club or Organization Meetings: Never    Marital Status:    Housing Stability: Low Risk     Unable to Pay for Housing in the Last Year: No    Number of Places Lived in the Last Year: 1    Unstable Housing in the Last Year: No     Medication List with Changes/Refills   Current Medications    ALBUTEROL (PROVENTIL/VENTOLIN HFA) 90 MCG/ACTUATION INHALER    Inhale 1-2 puffs into the lungs every 6 (six) hours as needed for Wheezing. Rescue    ASPIRIN (ECOTRIN) 81 MG EC TABLET    Take 81 mg by mouth once daily.    CELECOXIB (CELEBREX) 200 MG CAPSULE    Take 1 capsule (200 mg total) by mouth 2 (two) times daily as needed for Pain.    FLUTICASONE PROPIONATE (FLOVENT HFA) 110 MCG/ACTUATION INHALER    Inhale 1 puff into the lungs 2 (two) times daily. Controller    PRAVASTATIN (PRAVACHOL) 40 MG TABLET    Take 1 tablet (40 mg total) by mouth once daily.    PROPYLENE GLYCOL (SYSTANE COMPLETE OPHT)    Apply 1 drop to eye daily as needed.    ROPINIROLE (REQUIP) 0.5 MG TABLET    Take 1 tablet (0.5  mg total) by mouth nightly as needed (restless legs).    TRIAMCINOLONE ACETONIDE 0.1% (KENALOG) 0.1 % CREAM    Apply topically nightly as needed (itching).     Review of patient's allergies indicates:   Allergen Reactions    Dye Anaphylaxis     Contrast Dye    Iodine and iodide containing products Rash    Penicillins Shortness Of Breath     Shortness of breath^severe skin rash    Lovastatin Other (See Comments)     Causes leg cramp    Mucinex [guaifenesin]     Cephalexin Nausea And Vomiting    Diazepam Anxiety     Made pt overly anxious instead of relaxing    Naproxen Nausea And Vomiting       Physical Exam:   There is no height or weight on file to calculate BMI.    Physical Exam  Constitutional:       General: She is not in acute distress.     Appearance: Normal appearance.   HENT:      Head: Normocephalic and atraumatic.   Eyes:      Extraocular Movements: Extraocular movements intact.      Conjunctiva/sclera: Conjunctivae normal.   Pulmonary:      Effort: Pulmonary effort is normal. No respiratory distress.   Skin:     General: Skin is warm and dry.   Neurological:      General: No focal deficit present.      Mental Status: She is alert and oriented to person, place, and time.   Psychiatric:         Mood and Affect: Mood normal.     Detailed MSK exam:   General    Constitutional: She is oriented to person, place, and time. No distress.   HENT:   Head: Normocephalic and atraumatic.   Eyes: Conjunctivae are normal.   Pulmonary/Chest: Effort normal. No respiratory distress.   Abdominal: Normal appearance.   Neurological: She is alert and oriented to person, place, and time.   Psychiatric: Mood normal.              Left Ankle:    Inspection: Swelling over medial calf, swelling into medial ankle    Palpation tenderness: Firmness at medial calf corresponding with swelling and without fluctuance, Medial malleolus, TDH tendons into muscle belly    Range of motion:  40 deg DF     30 deg PF       20 Inversion       15  Eversion    Strength:  5/5 DF    4/5 PF    4/5 Inversion    5/5 Eversion    N/V Exam:  Tibial:    Normal sensory (plantar foot)  Normal motor (FHL)    Sup Peroneal:   Normal sensory (dorsal foot)  Normal motor (Peroneals) - Noted decreased sensation of Great toe and decreased strength.              Deep Peroneal:   Normal sensory (1st web space)  Normal motor (EHL)    Sural:   Normal sensory (lateral foot)   Saphenous:   Normal sensory (medial lower leg)   Normal pedal pulses, warm and well perfused with capillary refill < 2 sec   Calf soft and compressible  Patella tendon reflex normal  Achilles tendon reflex normal      Imaging:  X-Ray Ankle Complete Left  Narrative: EXAMINATION:  XR ANKLE COMPLETE 3 VIEW LEFT    CLINICAL HISTORY:  Pain in left ankle and joints of left foot    COMPARISON:  None    FINDINGS:  There is no fracture. There is no dislocation.  There is mild prominence of the soft tissue thickness medial to the left ankle.  Impression: There is mild prominence of the soft tissue thickness medial to the left ankle.  This is characteristic of a soft tissue contusion or cellulitis.    Electronically signed by: Chad Rodgers MD  Date:    04/27/2023  Time:    13:11    US lower extremity venous - 4/19/23  EXAMINATION:  US LOWER EXTREMITY VEINS LEFT     CLINICAL HISTORY:  Localized edema     TECHNIQUE:  Duplex and color flow Doppler evaluation of the left lower extremity veins was performed.     COMPARISON:  None     FINDINGS:  Thigh veins: The left common femoral, femoral, popliteal, proximal medial saphenous, and deep femoral veins are patent and free of thrombus. The veins are normally compressible and have normal phasic flow and augmentation response.     Calf veins:The paired peroneal and posterior tibial calf veins are patent.     There is a complex collection within the subcutaneous soft tissues in the area of bruising that measures approximately 3.7 x 0.5 x 2.5 cm and is suspicious for a hematoma.      Impression:  No evidence of deep venous thrombosis in the left lower extremity.  Findings most likely representing a hematoma within the subcutaneous soft tissues at the site of bruising.       Relevant imaging results were reviewed and interpreted by me and per my read:   XR - negative for fracture. Normal alignment. No acute pathology    US - negative for DVT. Approx 4x3x0.5 cm superficial fluid collection consistent with hematoma    This was discussed with the patient and / or family today.     Patient Instructions   Assessment:  Salud Echevarria is a 73 y.o. female with a chief complaint of Injury, Pain, Swelling, and Numbness of the Left Ankle    Encounter Diagnoses   Name Primary?    Contusion of left lower leg, initial encounter Yes    Hematoma of left lower leg     Sprain of left medial ankle joint, initial encounter     Pain and swelling of left ankle     Fall, sequela       Plan:  XR reviewed - no fracture, diffuse soft tissue edema  US reviewed - no DVT, small hematoma present  Labs reviewed - Cr 0.6, GFR > 60, LFTs WNL  History & clinical exam consistent with contusion & hematoma of the left medial lower leg/calf area and probably mild medial ankle sprain. Hematoma is improving, likley now with some more organization of the area and hardening, contributing to her discomfort, as well as dependent edema distal to the contusion.  Recommend conservative management. No indication for hematoma aspiration given length of time since injury  Compression wrap applied today, recommend to keep wrapped especially when upright during the day  Discontinue Celebrex, as inefrfective  Okay to combine over the counter BC powder or Advil, with Tylenol (up to 1g per dose), 2-3 times per day, as needed  Use ice and/or heat, as needed  Home exercise ankle program given today, as below  At least 15 minutes were spent developing, teaching, and performing a home exercise program under the directio of Jordan Yuan MD.  A  written summary was provided and all questions were answered.  CPT 70625-KJ.    Follow-up: As needed or sooner if there are any problems between now and then.    Thank you for choosing [facility] and Dr. Jordan Yuan for your orthopedic & sports medicine care. It is our goal to provide you with exceptional care that will help keep you healthy, active, and get you back in the game.    Please do not hesitate to reach out to us via email, phone, or MyChart with any questions, concerns, or feedback.    If you are experiencing pain/discomfort ,or have questions after 5pm and would like to be connected to the [facility] on-call team, please call this number and specify which Sports Medicine provider is treating you: [phone]       A copy of today's visit note has been sent to the referring provider.       Jordan Yuan MD  Primary Care Sports Medicine    Disclaimer: This note was prepared using a voice recognition system and is likely to have sound alike errors within the text.

## 2023-05-01 NOTE — PATIENT INSTRUCTIONS
Assessment:  Salud Echevarria is a 73 y.o. female with a chief complaint of Injury, Pain, Swelling, and Numbness of the Left Ankle    Encounter Diagnoses   Name Primary?    Contusion of left lower leg, initial encounter Yes    Hematoma of left lower leg     Sprain of left medial ankle joint, initial encounter     Pain and swelling of left ankle     Fall, sequela       Plan:  XR reviewed - no fracture, diffuse soft tissue edema  US reviewed - no DVT, small hematoma present  Labs reviewed - Cr 0.6, GFR > 60, LFTs WNL  History & clinical exam consistent with contusion & hematoma of the left medial lower leg/calf area and probably mild medial ankle sprain. Hematoma is improving, likley now with some more organization of the area and hardening, contributing to her discomfort, as well as dependent edema distal to the contusion.  Recommend conservative management. No indication for hematoma aspiration given length of time since injury  Compression wrap applied today, recommend to keep wrapped especially when upright during the day  Discontinue Celebrex, as ineffective  Okay to combine over the counter BC powder or Advil, with Tylenol (up to 1g per dose), 2-3 times per day, as needed  Use ice and/or heat, as needed  Home exercise ankle program given today, as below  At least 15 minutes were spent developing, teaching, and performing a home exercise program under the directio of Jordan Yuan MD.  A written summary was provided and all questions were answered.  CPT [id number].    Follow-up: As needed or sooner if there are any problems between now and then.    Thank you for choosing [facility] and Dr. Jordan Yuan for your orthopedic & sports medicine care. It is our goal to provide you with exceptional care that will help keep you healthy, active, and get you back in the game.    Please do not hesitate to reach out to us via email, phone, or MyChart with any questions, concerns, or feedback.    If  you are experiencing pain/discomfort ,or have questions after 5pm and would like to be connected to the [facility] on-call team, please call this number and specify which Sports Medicine provider is treating you: [phone]         STRETCHING EXERCISES                STRENGTHENING EXERCISES              If you have any difficulties reading this information, you may visit the online version using the following link: Foot and Ankle Conditioning Program ([email or web address])

## 2023-05-15 ENCOUNTER — TELEPHONE (OUTPATIENT)
Dept: PRIMARY CARE CLINIC | Facility: CLINIC | Age: 73
End: 2023-05-15
Payer: MEDICARE

## 2023-05-15 ENCOUNTER — TELEPHONE (OUTPATIENT)
Dept: SPORTS MEDICINE | Facility: CLINIC | Age: 73
End: 2023-05-15
Payer: MEDICARE

## 2023-05-15 NOTE — TELEPHONE ENCOUNTER
----- Message from Soledad Silva sent at 5/15/2023  8:38 AM CDT -----  Contact: Pt Mobile [phone]  Patient said that she had a fall a few weeks ago and she went to see a doctor but her left leg and left foot is still swollen and she would like to speak with you about this please.

## 2023-05-15 NOTE — TELEPHONE ENCOUNTER
Patient called reporting that her swelling and hematoma have not improved.  She does not have worsening pain at this time.  I discussed with Dr. Yuan.  He recommends continued observation.  She will call us next Monday if she has not had improvement to come in for a repeat evaluation.

## 2023-05-22 ENCOUNTER — PATIENT MESSAGE (OUTPATIENT)
Dept: RESEARCH | Facility: HOSPITAL | Age: 73
End: 2023-05-22
Payer: MEDICARE

## 2023-07-19 DIAGNOSIS — G89.29 CHRONIC LEFT-SIDED LOW BACK PAIN WITH LEFT-SIDED SCIATICA: ICD-10-CM

## 2023-07-19 DIAGNOSIS — M54.42 CHRONIC LEFT-SIDED LOW BACK PAIN WITH LEFT-SIDED SCIATICA: ICD-10-CM

## 2023-07-19 RX ORDER — TRAMADOL HYDROCHLORIDE 50 MG/1
TABLET ORAL
Qty: 28 TABLET | Refills: 0 | Status: SHIPPED | OUTPATIENT
Start: 2023-07-19 | End: 2023-09-26 | Stop reason: SDUPTHER

## 2023-07-19 NOTE — TELEPHONE ENCOUNTER
No care due was identified.  [facility] Embedded Care Due Messages. Reference number: [id number].   7/19/2023 3:39:23 PM CDT

## 2023-09-22 ENCOUNTER — HOSPITAL ENCOUNTER (OUTPATIENT)
Dept: RADIOLOGY | Facility: HOSPITAL | Age: 73
Discharge: HOME | End: 2023-09-22
Attending: FAMILY MEDICINE
Payer: MEDICARE

## 2023-09-22 VITALS — WEIGHT: 141.13 LBS | HEIGHT: 62 IN | BODY MASS INDEX: 25.97 KG/M2

## 2023-09-22 DIAGNOSIS — Z12.31 BREAST CANCER SCREENING BY MAMMOGRAM: ICD-10-CM

## 2023-09-22 PROCEDURE — 77063 MAMMO DIGITAL SCREENING BILAT WITH TOMO: ICD-10-PCS | Mod: 26,,, | Performed by: RADIOLOGY

## 2023-09-22 PROCEDURE — 77067 SCR MAMMO BI INCL CAD: CPT | Mod: 26,,, | Performed by: RADIOLOGY

## 2023-09-22 PROCEDURE — 77067 SCR MAMMO BI INCL CAD: CPT | Mod: TC,PO

## 2023-09-22 PROCEDURE — 77067 MAMMO DIGITAL SCREENING BILAT WITH TOMO: ICD-10-PCS | Mod: 26,,, | Performed by: RADIOLOGY

## 2023-09-22 PROCEDURE — 77063 BREAST TOMOSYNTHESIS BI: CPT | Mod: 26,,, | Performed by: RADIOLOGY

## 2023-09-25 NOTE — PROGRESS NOTES
Attached are the results of your recent mammogram.  Everything looks fine! No signs of any masses, tumors, nor lesions.  We can plan to repeat this in one year.

## 2023-09-26 ENCOUNTER — OFFICE VISIT (OUTPATIENT)
Dept: PRIMARY CARE CLINIC | Facility: CLINIC | Age: 73
End: 2023-09-26
Payer: MEDICARE

## 2023-09-26 VITALS
HEART RATE: 65 BPM | DIASTOLIC BLOOD PRESSURE: 60 MMHG | WEIGHT: 139.75 LBS | OXYGEN SATURATION: 99 % | HEIGHT: 62 IN | TEMPERATURE: 98 F | SYSTOLIC BLOOD PRESSURE: 104 MMHG | BODY MASS INDEX: 25.72 KG/M2

## 2023-09-26 DIAGNOSIS — J41.0 SIMPLE CHRONIC BRONCHITIS: ICD-10-CM

## 2023-09-26 DIAGNOSIS — G89.29 CHRONIC LEFT-SIDED LOW BACK PAIN WITH LEFT-SIDED SCIATICA: ICD-10-CM

## 2023-09-26 DIAGNOSIS — R31.0 GROSS HEMATURIA: ICD-10-CM

## 2023-09-26 DIAGNOSIS — Z86.39 HISTORY OF ELEVATED GLUCOSE: ICD-10-CM

## 2023-09-26 DIAGNOSIS — E78.5 HYPERLIPIDEMIA, UNSPECIFIED HYPERLIPIDEMIA TYPE: ICD-10-CM

## 2023-09-26 DIAGNOSIS — M54.42 CHRONIC LEFT-SIDED LOW BACK PAIN WITH LEFT-SIDED SCIATICA: ICD-10-CM

## 2023-09-26 DIAGNOSIS — R82.998 DARK URINE: Primary | ICD-10-CM

## 2023-09-26 DIAGNOSIS — Z13.6 ENCOUNTER FOR LIPID SCREENING FOR CARDIOVASCULAR DISEASE: ICD-10-CM

## 2023-09-26 DIAGNOSIS — Z13.220 ENCOUNTER FOR LIPID SCREENING FOR CARDIOVASCULAR DISEASE: ICD-10-CM

## 2023-09-26 DIAGNOSIS — J00 ACUTE RHINITIS: ICD-10-CM

## 2023-09-26 LAB
BILIRUB SERPL-MCNC: NEGATIVE MG/DL
BLOOD URINE, POC: NORMAL
CLARITY, POC UA: CLEAR
COLOR, POC UA: YELLOW
GLUCOSE UR QL STRIP: NEGATIVE
KETONES UR QL STRIP: NEGATIVE
LEUKOCYTE ESTERASE URINE, POC: NEGATIVE
NITRITE, POC UA: NEGATIVE
PH, POC UA: 6
PROTEIN, POC: NEGATIVE
SPECIFIC GRAVITY, POC UA: 1.01
UROBILINOGEN, POC UA: 0.2

## 2023-09-26 PROCEDURE — 99999 PR PBB SHADOW E&M-EST. PATIENT-LVL V: CPT | Mod: PBBFAC,,, | Performed by: FAMILY MEDICINE

## 2023-09-26 PROCEDURE — 1101F PT FALLS ASSESS-DOCD LE1/YR: CPT | Mod: CPTII,S$GLB,, | Performed by: FAMILY MEDICINE

## 2023-09-26 PROCEDURE — 87086 URINE CULTURE/COLONY COUNT: CPT | Performed by: FAMILY MEDICINE

## 2023-09-26 PROCEDURE — 3288F FALL RISK ASSESSMENT DOCD: CPT | Mod: CPTII,S$GLB,, | Performed by: FAMILY MEDICINE

## 2023-09-26 PROCEDURE — 99215 OFFICE O/P EST HI 40 MIN: CPT | Mod: S$GLB,,, | Performed by: FAMILY MEDICINE

## 2023-09-26 PROCEDURE — 81002 POCT URINE DIPSTICK WITHOUT MICROSCOPE: ICD-10-PCS | Mod: S$GLB,,, | Performed by: FAMILY MEDICINE

## 2023-09-26 PROCEDURE — 3078F DIAST BP <80 MM HG: CPT | Mod: CPTII,S$GLB,, | Performed by: FAMILY MEDICINE

## 2023-09-26 PROCEDURE — 3008F BODY MASS INDEX DOCD: CPT | Mod: CPTII,S$GLB,, | Performed by: FAMILY MEDICINE

## 2023-09-26 PROCEDURE — 1159F MED LIST DOCD IN RCRD: CPT | Mod: CPTII,S$GLB,, | Performed by: FAMILY MEDICINE

## 2023-09-26 PROCEDURE — 3008F PR BODY MASS INDEX (BMI) DOCUMENTED: ICD-10-PCS | Mod: CPTII,S$GLB,, | Performed by: FAMILY MEDICINE

## 2023-09-26 PROCEDURE — 99215 PR OFFICE/OUTPT VISIT, EST, LEVL V, 40-54 MIN: ICD-10-PCS | Mod: S$GLB,,, | Performed by: FAMILY MEDICINE

## 2023-09-26 PROCEDURE — 1126F PR PAIN SEVERITY QUANTIFIED, NO PAIN PRESENT: ICD-10-PCS | Mod: CPTII,S$GLB,, | Performed by: FAMILY MEDICINE

## 2023-09-26 PROCEDURE — 3078F PR MOST RECENT DIASTOLIC BLOOD PRESSURE < 80 MM HG: ICD-10-PCS | Mod: CPTII,S$GLB,, | Performed by: FAMILY MEDICINE

## 2023-09-26 PROCEDURE — 1126F AMNT PAIN NOTED NONE PRSNT: CPT | Mod: CPTII,S$GLB,, | Performed by: FAMILY MEDICINE

## 2023-09-26 PROCEDURE — 3074F SYST BP LT 130 MM HG: CPT | Mod: CPTII,S$GLB,, | Performed by: FAMILY MEDICINE

## 2023-09-26 PROCEDURE — 81002 URINALYSIS NONAUTO W/O SCOPE: CPT | Mod: S$GLB,,, | Performed by: FAMILY MEDICINE

## 2023-09-26 PROCEDURE — 1159F PR MEDICATION LIST DOCUMENTED IN MEDICAL RECORD: ICD-10-PCS | Mod: CPTII,S$GLB,, | Performed by: FAMILY MEDICINE

## 2023-09-26 PROCEDURE — 3074F PR MOST RECENT SYSTOLIC BLOOD PRESSURE < 130 MM HG: ICD-10-PCS | Mod: CPTII,S$GLB,, | Performed by: FAMILY MEDICINE

## 2023-09-26 PROCEDURE — 3288F PR FALLS RISK ASSESSMENT DOCUMENTED: ICD-10-PCS | Mod: CPTII,S$GLB,, | Performed by: FAMILY MEDICINE

## 2023-09-26 PROCEDURE — 1101F PR PT FALLS ASSESS DOC 0-1 FALLS W/OUT INJ PAST YR: ICD-10-PCS | Mod: CPTII,S$GLB,, | Performed by: FAMILY MEDICINE

## 2023-09-26 PROCEDURE — 99999 PR PBB SHADOW E&M-EST. PATIENT-LVL V: ICD-10-PCS | Mod: PBBFAC,,, | Performed by: FAMILY MEDICINE

## 2023-09-26 RX ORDER — PROMETHAZINE HYDROCHLORIDE AND DEXTROMETHORPHAN HYDROBROMIDE 6.25; 15 MG/5ML; MG/5ML
5 SYRUP ORAL NIGHTLY PRN
Qty: 120 ML | Refills: 0 | Status: SHIPPED | OUTPATIENT
Start: 2023-09-26 | End: 2023-10-06

## 2023-09-26 RX ORDER — TRAMADOL HYDROCHLORIDE 50 MG/1
TABLET ORAL
Qty: 28 TABLET | Refills: 0 | Status: SHIPPED | OUTPATIENT
Start: 2023-09-26 | End: 2024-01-04

## 2023-09-26 RX ORDER — PRAVASTATIN SODIUM 20 MG/1
20 TABLET ORAL DAILY
Qty: 90 TABLET | Refills: 3 | Status: SHIPPED | OUTPATIENT
Start: 2023-09-26

## 2023-09-26 RX ORDER — FLUTICASONE PROPIONATE 220 UG/1
1 AEROSOL, METERED RESPIRATORY (INHALATION) 2 TIMES DAILY
Qty: 12 G | Refills: 11 | Status: SHIPPED | OUTPATIENT
Start: 2023-09-26 | End: 2024-09-25

## 2023-09-26 RX ORDER — LEVOCETIRIZINE DIHYDROCHLORIDE 5 MG/1
5 TABLET, FILM COATED ORAL NIGHTLY
Qty: 30 TABLET | Refills: 0 | Status: SHIPPED | OUTPATIENT
Start: 2023-09-26 | End: 2023-10-26

## 2023-09-26 NOTE — PROGRESS NOTES
"    [facility]       [address] [place] [address]      Phone: [phone]      Fax: [phone]    Rikki Sharif MD                Office Visit  09/26/2023        Subjective      HPI:  Salud Echevarria is a 73 y.o. female presents today in clinic for "Follow-up  ."     73-year-old female presents today to checkup on multiple issues.      Overall, she feels okay.  States several little things tend to bother her.    Lately, her urine has been dark, smelling foul.  In the past, she had blood in her urine.  She wonders if that is restarting.  Would like to get urine checked today.  No burning with urination.    Has appointment with her cardiologist tomorrow.  Does not like having to drive all the way to [place] to see him.    States her allergies have been acting up.  Has some postnasal drip, cough.  Feels tired, fatigued.  No fever, chills, body aches.    Sometimes gets short of breath.  40+ pack-year smoking history.  Has chronic bronchitis.  Flovent inhaler tends to help.    Has had elevated cholesterol levels in the past.  Has been taking pravastatin 40 mg daily.  Wonders if she really needs to take it?    Has chronic issues with back pains, spinal stenosis.  Tends to aggravate her more after she is been on her feet all day.  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  When the back flares up, she uses occasional tramadol.  Works very well for her.    PMH: Back pains/spinal stenosis.  Diverticulosis.  NSAID gastritis.  PSH: Back surgery.  Heart cath (11/2020).  Umbilical hernia repair.  Tonsils/adenoids.  Hysterectomy.  Breast biopsy.  Lymph node biopsy.  FMH: CHF, brain cancer, lung cancer, A. fib  Allergies: Penicillins make her sweat, shaky.  Iodine makes her sweat, vision changes, hypotensive.  Keflex makes her sick, upset stomach.  Naproxen makes her sick.  Statins, specifically lovastatin, " causes leg cramps.  Pravastatin is okay.  Valium makes her loopy, goofy.  Social: Helps out at a [employer] shop.     T:  Quit smoking in .  Previously smoked .5-1 PPD x40+ years     A: Occasionally, rarely     D: Denies     Exercise: No regular exercise program.  Tries to stay active around the house, gardening.     ENT: Dr. Jama  Cardiology: Dr. Hannah  Urology: Dr. Greer  Ophthalmology: Dr. Reece     Colon: Cologuard - 10/21/2020.  Repeat 3 years ()  MM2023           The following were updated and reviewed by myself in the chart: medications, past medical history, past surgical history, family history, social history, and allergies.     Medications:  Current Outpatient Medications on File Prior to Visit   Medication Sig Dispense Refill    albuterol (PROVENTIL/VENTOLIN HFA) 90 mcg/actuation inhaler Inhale 1-2 puffs into the lungs every 6 (six) hours as needed for Wheezing. Rescue 18 g 1    aspirin (ECOTRIN) 81 MG EC tablet Take 81 mg by mouth once daily.      celecoxib (CELEBREX) 200 MG capsule Take 1 capsule (200 mg total) by mouth 2 (two) times daily as needed for Pain. 60 capsule 0    propylene glycol (SYSTANE COMPLETE OPHT) Apply 1 drop to eye daily as needed.      rOPINIRole (REQUIP) 0.5 MG tablet Take 1 tablet (0.5 mg total) by mouth nightly as needed (restless legs). 90 tablet 3    triamcinolone acetonide 0.1% (KENALOG) 0.1 % cream Apply topically nightly as needed (itching). 30 g 1    [DISCONTINUED] fluticasone propionate (FLOVENT HFA) 110 mcg/actuation inhaler Inhale 1 puff into the lungs 2 (two) times daily. Controller 12 g 3    [DISCONTINUED] pravastatin (PRAVACHOL) 40 MG tablet Take 1 tablet (40 mg total) by mouth once daily. 90 tablet 3    [DISCONTINUED] traMADoL (ULTRAM) 50 mg tablet TAKE 1 TABLET BY MOUTH EVERY 6 HOURS AS NEEDED FOR PAIN 28 tablet 0     No current facility-administered medications on file prior to visit.        PMHx:  Past Medical History:   Diagnosis Date     Asthma     childhood - seasonal as an adult    Hematuria 06/10/2019    Hyperlipidemia     Hypertension     pt denies    Restless leg     Spinal stenosis       Patient Active Problem List    Diagnosis Date Noted    Viral syndrome 07/24/2022    COVID-19 virus detected 02/08/2022    Peripheral vascular disease, unspecified 02/08/2022    Seasonal allergic rhinitis due to pollen 05/03/2021    Chronic obstructive pulmonary disease 03/01/2021    Asymptomatic microscopic hematuria 02/22/2021    Anxiety 02/03/2021    Ventral hernia without obstruction or gangrene 12/10/2020    Urge incontinence 09/18/2020    Mild intermittent asthma without complication 08/07/2020    Vertigo 07/29/2020    Post-nasal drip 05/04/2020    Other specified abdominal hernia without obstruction or gangrene 10/23/2019    Fatigue 08/13/2019    Left hip pain 08/07/2019    Sciatica of left side 08/07/2019    BPPV (benign paroxysmal positional vertigo) 08/07/2019    Spinal stenosis 06/04/2019    Aortic atherosclerosis 07/10/2018    Gastroesophageal reflux disease with esophagitis 01/29/2018    Coronary artery disease involving native coronary artery of native heart without angina pectoris 03/14/2017    Pulmonary nodule, right 09/28/2016    History of tobacco use 09/28/2016    Osteopenia 07/20/2015    Hyperlipidemia 10/13/2014    Back pain 07/02/2013        PSHx:  Past Surgical History:   Procedure Laterality Date    BACK SURGERY      BREAST BIOPSY      CARDIAC CATHETERIZATION  2018, 11/2020    HYSTERECTOMY      LYMPH NODE BIOPSY      ROBOT-ASSISTED REPAIR OF VENTRAL HERNIA USING DA JIM XI N/A 12/10/2020    Procedure: XI ROBOTIC REPAIR, HERNIA, VENTRAL;  Surgeon: Brandyn Panchal MD;  Location: [facility];  Service: General;  Laterality: N/A;    TONSILLECTOMY          FHx:  Family History   Problem Relation Age of Onset    Heart disease Mother     Cancer Maternal Uncle         Social:  Social History     Socioeconomic History    Marital status:     Tobacco Use    Smoking status: Former     Current packs/day: 0.50     Average packs/day: 0.5 packs/day for 30.0 years (15.0 ttl pk-yrs)     Types: Cigarettes    Smokeless tobacco: Former    Tobacco comments:     none past MN before surgery   Substance and Sexual Activity    Alcohol use: Yes     Comment: 1-2 beers per month    Drug use: No    Sexual activity: Not Currently     Partners: Male     Social Determinants of Health     Financial Resource Strain: Medium Risk (5/20/2022)    Overall Financial Resource Strain (CARDIA)     Difficulty of Paying Living Expenses: Somewhat hard   Food Insecurity: No Food Insecurity (5/20/2022)    Hunger Vital Sign     Worried About Running Out of Food in the Last Year: Never true     Ran Out of Food in the Last Year: Never true   Transportation Needs: No Transportation Needs (5/20/2022)    PRAPARE - Transportation     Lack of Transportation (Medical): No     Lack of Transportation (Non-Medical): No   Physical Activity: Sufficiently Active (5/20/2022)    Exercise Vital Sign     Days of Exercise per Week: 7 days     Minutes of Exercise per Session: 30 min   Stress: Stress Concern Present (5/20/2022)    [place] [employer] - Occupational Stress Questionnaire     Feeling of Stress : To some extent   Social Connections: Moderately Isolated (5/20/2022)    Social Connection and Isolation Panel [NHANES]     Frequency of Communication with Friends and Family: More than three times a week     Frequency of Social Gatherings with Friends and Family: Once a week     Attends [employer] Services: 1 to 4 times per year     Active Member of Clubs or Organizations: No     Attends Club or Organization Meetings: Never     Marital Status:    Housing Stability: Low Risk  (5/20/2022)    Housing Stability Vital Sign     Unable to Pay for Housing in the Last Year: No     Number of Places Lived in the Last Year: 1     Unstable Housing in the Last Year: No     "    Allergies:  Review of patient's allergies indicates:   Allergen Reactions    Dye Anaphylaxis     Contrast Dye    Iodine and iodide containing products Rash    Penicillins Shortness Of Breath     Shortness of breath^severe skin rash    Lovastatin Other (See Comments)     Causes leg cramp    Mucinex [guaifenesin]     Cephalexin Nausea And Vomiting    Diazepam Anxiety     Made pt overly anxious instead of relaxing    Naproxen Nausea And Vomiting        ROS:  Review of Systems   Constitutional:  Negative for activity change, appetite change, chills and fever.   HENT:  Positive for congestion and postnasal drip. Negative for rhinorrhea, sore throat and trouble swallowing.    Respiratory:  Positive for cough. Negative for shortness of breath and wheezing.    Cardiovascular:  Negative for chest pain and palpitations.   Gastrointestinal:  Negative for abdominal pain, constipation, diarrhea, nausea and vomiting.   Genitourinary:  Positive for hematuria (?). Negative for difficulty urinating and dysuria.   Musculoskeletal:  Positive for arthralgias and back pain. Negative for myalgias.   Skin:  Negative for color change and rash.   Neurological:  Negative for speech difficulty and headaches.   All other systems reviewed and are negative.         Objective      /60   Pulse 65   Temp 97.8 °F (36.6 °C)   Ht 5' 2" (1.575 m)   Wt 63.4 kg (139 lb 12.4 oz)   SpO2 99%   BMI 25.56 kg/m²   Ht Readings from Last 3 Encounters:   09/26/23 5' 2" (1.575 m)   09/22/23 5' 2" (1.575 m)   04/27/23 5' 2" (1.575 m)     Wt Readings from Last 3 Encounters:   09/26/23 63.4 kg (139 lb 12.4 oz)   09/22/23 64 kg (141 lb 1.5 oz)   04/27/23 64 kg (141 lb 1.5 oz)       PHYSICAL EXAM:  Physical Exam  Vitals and nursing note reviewed.   Constitutional:       General: She is not in acute distress.     Appearance: Normal appearance.   HENT:      Head: Normocephalic and atraumatic.      Right Ear: Tympanic membrane, ear canal and external ear " normal.      Left Ear: Tympanic membrane, ear canal and external ear normal.      Nose: Nose normal. No congestion or rhinorrhea.      Mouth/Throat:      Mouth: Mucous membranes are moist.      Pharynx: Oropharynx is clear. No oropharyngeal exudate or posterior oropharyngeal erythema.   Eyes:      Extraocular Movements: Extraocular movements intact.      Conjunctiva/sclera: Conjunctivae normal.      Pupils: Pupils are equal, round, and reactive to light.   Cardiovascular:      Rate and Rhythm: Normal rate and regular rhythm.   Pulmonary:      Effort: Pulmonary effort is normal. No respiratory distress.      Breath sounds: No wheezing, rhonchi or rales.   Musculoskeletal:         General: Normal range of motion.      Cervical back: Normal range of motion.   Lymphadenopathy:      Cervical: No cervical adenopathy.   Skin:     General: Skin is warm and dry.      Findings: No rash.   Neurological:      Mental Status: She is alert.              LABS / IMAGING:  Recent Results (from the past 4368 hour(s))   POCT URINE DIPSTICK WITHOUT MICROSCOPE    Collection Time: 09/26/23 10:51 AM   Result Value Ref Range    Color, UA Yellow     pH, UA 6.0     WBC, UA negative     Nitrite, UA negative     Protein, POC negative     Glucose, UA negative     Ketones, UA negative     Urobilinogen, UA 0.2     Bilirubin, POC negative     Blood, UA small     Clarity, UA Clear     Spec Grav UA 1.010          Assessment    1. Dark urine    2. Gross hematuria    3. Acute rhinitis    4. Simple chronic bronchitis    5. Hyperlipidemia, unspecified hyperlipidemia type    6. Chronic left-sided low back pain with left-sided sciatica    7. Encounter for lipid screening for cardiovascular disease    8. History of elevated glucose          Plan    Salud was seen today for follow-up.    Diagnoses and all orders for this visit:    Dark urine  -     POCT URINE DIPSTICK WITHOUT MICROSCOPE  -     CULTURE, URINE  -     Ambulatory referral/consult to Urology;  Future    Gross hematuria  -     POCT URINE DIPSTICK WITHOUT MICROSCOPE  -     CULTURE, URINE  -     Ambulatory referral/consult to Urology; Future    Acute rhinitis  -     levocetirizine (XYZAL) 5 MG tablet; Take 1 tablet (5 mg total) by mouth every evening.    Simple chronic bronchitis  -     fluticasone propionate (FLOVENT HFA) 220 mcg/actuation inhaler; Inhale 1 puff into the lungs 2 (two) times a day. Controller  -     promethazine-dextromethorphan (PROMETHAZINE-DM) 6.25-15 mg/5 mL Syrp; Take 5 mLs by mouth nightly as needed (cough).    Hyperlipidemia, unspecified hyperlipidemia type  -     pravastatin (PRAVACHOL) 20 MG tablet; Take 1 tablet (20 mg total) by mouth once daily.  -     Lipid Panel; Future  -     Comprehensive Metabolic Panel; Future  -     CBC Auto Differential; Future    Chronic left-sided low back pain with left-sided sciatica  -     traMADoL (ULTRAM) 50 mg tablet; TAKE 1 TABLET BY MOUTH EVERY 6 HOURS AS NEEDED FOR PAIN    Encounter for lipid screening for cardiovascular disease  -     Lipid Panel; Future    History of elevated glucose  -     Hemoglobin A1C; Future      Physically, everything looks pretty good today.      We will go ahead and get urinalysis, culture today.  If all that comes back okay, will have her see Dr. Moreno, urology, here.      Encouraged her keep her appointment with Cardiology tomorrow.  Recommended she ask them about following up with one of the providers on [address] next year instead.    Reviewed blood work she had done in March.  Cholesterol levels were excellent.  We will decrease her pravastatin to 20 mg daily.  Recheck at next visit.  If cholesterol level stay good, we can consider discontinuing the medication.      Will be due to repeat your colon cancer screening next March.  At that point, she will be 74.  If she is not having any bowel issues we can likely discontinue colon cancer screening, as well.    Orders placed for screening blood work to be done prior  to next visit.    FOLLOW-UP:  Follow up in about 6 months (around 3/26/2024) for check up, labs 1 week prior.    I spent a total of 45 minutes face to face and non-face to face on the date of this visit.This includes time preparing to see the patient (eg, review of tests, notes), obtaining and/or reviewing additional history from an independent historian and/or outside medical records, documenting clinical information in the electronic health record, independently interpreting results and/or communicating results to the patient/family/caregiver, or care coordinator.    Signed by:  Rikki Sharif MD

## 2023-09-26 NOTE — PATIENT INSTRUCTIONS
Overall, everything looks pretty good today.      Let us go ahead and collect urine sample today.  We will check it for infection.  If it looks like you have an infection, I will send some antibiotics to the pharmacy.  Regardless, let us also have you follow-up with our urologist, Dr. Moreno.  He comes here every Thursday.    Keep your appointment with the cardiologist tomorrow.  When you see them, if they recommend a follow-up appointment, ask if you can schedule it with Dr. Berg, or any of the other cardiologists on [address] in [place].  That will be much more convenient for you.  They should not have a problem with this.      All of your blood work done in March looked great.  In fact, your cholesterol levels were so good, let us try decreasing your pravastatin to 20 mg daily for the next six months.  We will check your levels before the next visit and if they stay this good, we can probably discontinue it at that time.    Refills of your medications have been sent to the pharmacy.  Please continue taking them, as directed.    For your allergies, postnasal drip, cough, try using Xyzal nightly for the next 30 days.  This should help cut down on some of the symptoms.  I also sent a cough medicine that you can use at bedtime.  It will help dry things up and help you rest.    Continue to eat a healthy diet.  Be careful with portion sizes.  Includes lots of fresh fruits, vegetables, whole grains, lean proteins.  See info below.    Keep hydrated.  Be sure to drink at least 8-10, 8 oz, glasses of water every day.    Stay active.  Try to do some sort of physical activity every day.  Nothing outrageous, just try walking for 10-15 minutes each day.

## 2023-09-27 NOTE — PROGRESS NOTES
Urine test looks good.  No signs of any infection, but let us see what the culture shows.      There is, however, a trace of blood in the sample.  Probably a good idea to see the urologist.  Let me see what I can do about getting you in with Dr. Moreno on a Thursday.    Oh yay!  I was able to schedule you an appointment with him, here, on Thursday, 9/28, at 11:00 a.m..  Can you make that appointment?

## 2023-09-28 LAB — BACTERIA UR CULT: NORMAL

## 2023-10-02 ENCOUNTER — TELEPHONE (OUTPATIENT)
Dept: UROLOGY | Facility: CLINIC | Age: 73
End: 2023-10-02
Payer: MEDICARE

## 2023-10-02 NOTE — TELEPHONE ENCOUNTER
Called patient and after verifying name and date of birth, patient states that she can't make this Thursday to see Dr. Moreno. Rescheduled appointment to 10/19/2023 at 09:30 am. Voiced understanding.    Olive Castañeda LPN  ----- Message from Chante Beatty sent at 10/2/2023 12:33 PM CDT -----  Contact: [phone]    Patient: Salud Bae Wale-    Reason: The patient is requesting a call back from the nurse to get assistance with rescheduling an     appointment.     Comments: Please call the patient back to advise.

## 2023-10-25 ENCOUNTER — OFFICE VISIT (OUTPATIENT)
Dept: PRIMARY CARE CLINIC | Facility: CLINIC | Age: 73
End: 2023-10-25
Payer: MEDICARE

## 2023-10-25 VITALS
SYSTOLIC BLOOD PRESSURE: 106 MMHG | BODY MASS INDEX: 25.36 KG/M2 | OXYGEN SATURATION: 93 % | WEIGHT: 137.81 LBS | TEMPERATURE: 98 F | HEART RATE: 73 BPM | DIASTOLIC BLOOD PRESSURE: 60 MMHG | HEIGHT: 62 IN

## 2023-10-25 DIAGNOSIS — H92.03 EAR PAIN, BILATERAL: Primary | ICD-10-CM

## 2023-10-25 DIAGNOSIS — K13.79 OTHER LESIONS OF ORAL MUCOSA: ICD-10-CM

## 2023-10-25 DIAGNOSIS — R51.9 ACUTE NONINTRACTABLE HEADACHE, UNSPECIFIED HEADACHE TYPE: ICD-10-CM

## 2023-10-25 PROCEDURE — 1125F PR PAIN SEVERITY QUANTIFIED, PAIN PRESENT: ICD-10-PCS | Mod: CPTII,S$GLB,, | Performed by: FAMILY MEDICINE

## 2023-10-25 PROCEDURE — 99999 PR PBB SHADOW E&M-EST. PATIENT-LVL V: ICD-10-PCS | Mod: PBBFAC,,, | Performed by: FAMILY MEDICINE

## 2023-10-25 PROCEDURE — 3074F PR MOST RECENT SYSTOLIC BLOOD PRESSURE < 130 MM HG: ICD-10-PCS | Mod: CPTII,S$GLB,, | Performed by: FAMILY MEDICINE

## 2023-10-25 PROCEDURE — 99215 OFFICE O/P EST HI 40 MIN: CPT | Mod: 25,S$GLB,, | Performed by: FAMILY MEDICINE

## 2023-10-25 PROCEDURE — 3288F FALL RISK ASSESSMENT DOCD: CPT | Mod: CPTII,S$GLB,, | Performed by: FAMILY MEDICINE

## 2023-10-25 PROCEDURE — 3008F PR BODY MASS INDEX (BMI) DOCUMENTED: ICD-10-PCS | Mod: CPTII,S$GLB,, | Performed by: FAMILY MEDICINE

## 2023-10-25 PROCEDURE — 3288F PR FALLS RISK ASSESSMENT DOCUMENTED: ICD-10-PCS | Mod: CPTII,S$GLB,, | Performed by: FAMILY MEDICINE

## 2023-10-25 PROCEDURE — 1159F MED LIST DOCD IN RCRD: CPT | Mod: CPTII,S$GLB,, | Performed by: FAMILY MEDICINE

## 2023-10-25 PROCEDURE — 1159F PR MEDICATION LIST DOCUMENTED IN MEDICAL RECORD: ICD-10-PCS | Mod: CPTII,S$GLB,, | Performed by: FAMILY MEDICINE

## 2023-10-25 PROCEDURE — 96372 PR INJECTION,THERAP/PROPH/DIAG2ST, IM OR SUBCUT: ICD-10-PCS | Mod: S$GLB,,, | Performed by: FAMILY MEDICINE

## 2023-10-25 PROCEDURE — 3074F SYST BP LT 130 MM HG: CPT | Mod: CPTII,S$GLB,, | Performed by: FAMILY MEDICINE

## 2023-10-25 PROCEDURE — 3008F BODY MASS INDEX DOCD: CPT | Mod: CPTII,S$GLB,, | Performed by: FAMILY MEDICINE

## 2023-10-25 PROCEDURE — 99215 PR OFFICE/OUTPT VISIT, EST, LEVL V, 40-54 MIN: ICD-10-PCS | Mod: 25,S$GLB,, | Performed by: FAMILY MEDICINE

## 2023-10-25 PROCEDURE — 96372 THER/PROPH/DIAG INJ SC/IM: CPT | Mod: S$GLB,,, | Performed by: FAMILY MEDICINE

## 2023-10-25 PROCEDURE — 3078F DIAST BP <80 MM HG: CPT | Mod: CPTII,S$GLB,, | Performed by: FAMILY MEDICINE

## 2023-10-25 PROCEDURE — 1101F PR PT FALLS ASSESS DOC 0-1 FALLS W/OUT INJ PAST YR: ICD-10-PCS | Mod: CPTII,S$GLB,, | Performed by: FAMILY MEDICINE

## 2023-10-25 PROCEDURE — 99999 PR PBB SHADOW E&M-EST. PATIENT-LVL V: CPT | Mod: PBBFAC,,, | Performed by: FAMILY MEDICINE

## 2023-10-25 PROCEDURE — 3078F PR MOST RECENT DIASTOLIC BLOOD PRESSURE < 80 MM HG: ICD-10-PCS | Mod: CPTII,S$GLB,, | Performed by: FAMILY MEDICINE

## 2023-10-25 PROCEDURE — 1125F AMNT PAIN NOTED PAIN PRSNT: CPT | Mod: CPTII,S$GLB,, | Performed by: FAMILY MEDICINE

## 2023-10-25 PROCEDURE — 1101F PT FALLS ASSESS-DOCD LE1/YR: CPT | Mod: CPTII,S$GLB,, | Performed by: FAMILY MEDICINE

## 2023-10-25 RX ORDER — CLINDAMYCIN HYDROCHLORIDE 300 MG/1
300 CAPSULE ORAL 3 TIMES DAILY
Qty: 21 CAPSULE | Refills: 0 | Status: SHIPPED | OUTPATIENT
Start: 2023-10-25 | End: 2023-11-01

## 2023-10-25 RX ORDER — KETOROLAC TROMETHAMINE 30 MG/ML
30 INJECTION, SOLUTION INTRAMUSCULAR; INTRAVENOUS
Status: COMPLETED | OUTPATIENT
Start: 2023-10-25 | End: 2023-10-25

## 2023-10-25 RX ORDER — TRIAMCINOLONE ACETONIDE 1 MG/G
PASTE DENTAL 2 TIMES DAILY
Qty: 5 G | Refills: 0 | Status: SHIPPED | OUTPATIENT
Start: 2023-10-25 | End: 2023-10-30

## 2023-10-25 RX ADMIN — KETOROLAC TROMETHAMINE 30 MG: 30 INJECTION, SOLUTION INTRAMUSCULAR; INTRAVENOUS at 11:10

## 2023-10-25 NOTE — PROGRESS NOTES
"    [facility]       [address] [place] [address]      Phone: [phone]      Fax: [phone]    Rikki Sharif MD                Office Visit  10/25/2023        Subjective      HPI:  Salud Echevarria is a 73 y.o. female presents today in clinic for "Otalgia and Dental Pain  ."     73-year-old female presents today complaining of ear pain.      Patient states that her ears started hurting her on Saturday.  Started with the left ear, then switch to the right ear.  Now, both ears hurt.  She also reports a mild cough, sometimes productive of sputum.  Subjective fever.  Mild congestion.  No runny nose, postnasal drip.  States he is headache.  Eyes hurt.    Additionally, her dentures have been bothering her, on the top.  Gums are red, inflamed.  She has some sores on them.  Has been rinsing her mouth with colgate peroxyl mouthwash.  No relief.  Starting to make them hurt more.    PMH: Back pains/spinal stenosis.  Diverticulosis.  NSAID gastritis.  PSH: Back surgery.  Heart cath (2020).  Umbilical hernia repair.  Tonsils/adenoids.  Hysterectomy.  Breast biopsy.  Lymph node biopsy.  FMH: CHF, brain cancer, lung cancer, A. fib  Allergies: Penicillins make her sweat, shaky.  Iodine makes her sweat, vision changes, hypotensive.  Keflex makes her sick, upset stomach.  Naproxen makes her sick.  Statins, specifically lovastatin, causes leg cramps.  Pravastatin is okay.  Valium makes her loopy, goofy.  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     T:  Quit smoking in .  Previously smoked .5-1 PPD x40+ years     A: Occasionally, rarely     D: Denies     Exercise: No regular exercise program.  Tries to stay active around the house, gardening.     ENT: Dr. Jama  Cardiology: Dr. Hannah  Urology: Dr. Greer  Ophthalmology: Dr. Reece     Colon: Shanelle - 10/21/2020.  Repeat 3 years ()  MM2023           The following were updated and reviewed by " myself in the chart: medications, past medical history, past surgical history, family history, social history, and allergies.     Medications:  Current Outpatient Medications on File Prior to Visit   Medication Sig Dispense Refill    albuterol (PROVENTIL/VENTOLIN HFA) 90 mcg/actuation inhaler Inhale 1-2 puffs into the lungs every 6 (six) hours as needed for Wheezing. Rescue 18 g 1    aspirin (ECOTRIN) 81 MG EC tablet Take 81 mg by mouth once daily.      fluticasone propionate (FLOVENT HFA) 220 mcg/actuation inhaler Inhale 1 puff into the lungs 2 (two) times a day. Controller 12 g 11    levocetirizine (XYZAL) 5 MG tablet Take 1 tablet (5 mg total) by mouth every evening. 30 tablet 0    pravastatin (PRAVACHOL) 20 MG tablet Take 1 tablet (20 mg total) by mouth once daily. 90 tablet 3    propylene glycol (SYSTANE COMPLETE OPHT) Apply 1 drop to eye daily as needed.      rOPINIRole (REQUIP) 0.5 MG tablet Take 1 tablet (0.5 mg total) by mouth nightly as needed (restless legs). 90 tablet 3    traMADoL (ULTRAM) 50 mg tablet TAKE 1 TABLET BY MOUTH EVERY 6 HOURS AS NEEDED FOR PAIN 28 tablet 0    triamcinolone acetonide 0.1% (KENALOG) 0.1 % cream Apply topically nightly as needed (itching). 30 g 1    UNABLE TO FIND medication name: peroxil mouth wash      [DISCONTINUED] celecoxib (CELEBREX) 200 MG capsule Take 1 capsule (200 mg total) by mouth 2 (two) times daily as needed for Pain. 60 capsule 0     No current facility-administered medications on file prior to visit.        PMHx:  Past Medical History:   Diagnosis Date    Asthma     childhood - seasonal as an adult    Hematuria 06/10/2019    Hyperlipidemia     Hypertension     pt denies    Restless leg     Spinal stenosis       Patient Active Problem List    Diagnosis Date Noted    Viral syndrome 07/24/2022    COVID-19 virus detected 02/08/2022    Peripheral vascular disease, unspecified 02/08/2022    Seasonal allergic rhinitis due to pollen 05/03/2021    Chronic obstructive  pulmonary disease 03/01/2021    Asymptomatic microscopic hematuria 02/22/2021    Anxiety 02/03/2021    Ventral hernia without obstruction or gangrene 12/10/2020    Urge incontinence 09/18/2020    Mild intermittent asthma without complication 08/07/2020    Vertigo 07/29/2020    Post-nasal drip 05/04/2020    Other specified abdominal hernia without obstruction or gangrene 10/23/2019    Fatigue 08/13/2019    Left hip pain 08/07/2019    Sciatica of left side 08/07/2019    BPPV (benign paroxysmal positional vertigo) 08/07/2019    Spinal stenosis 06/04/2019    Aortic atherosclerosis 07/10/2018    Gastroesophageal reflux disease with esophagitis 01/29/2018    Coronary artery disease involving native coronary artery of native heart without angina pectoris 03/14/2017    Pulmonary nodule, right 09/28/2016    History of tobacco use 09/28/2016    Osteopenia 07/20/2015    Hyperlipidemia 10/13/2014    Back pain 07/02/2013        PSHx:  Past Surgical History:   Procedure Laterality Date    BACK SURGERY      BREAST BIOPSY      CARDIAC CATHETERIZATION  2018, 11/2020    HYSTERECTOMY      LYMPH NODE BIOPSY      ROBOT-ASSISTED REPAIR OF VENTRAL HERNIA USING DA JIM XI N/A 12/10/2020    Procedure: XI ROBOTIC REPAIR, HERNIA, VENTRAL;  Surgeon: Brandyn Panchal MD;  Location: [facility] OR;  Service: General;  Laterality: N/A;    TONSILLECTOMY          FHx:  Family History   Problem Relation Age of Onset    Heart disease Mother     Cancer Maternal Uncle         Social:  Social History     Socioeconomic History    Marital status:    Tobacco Use    Smoking status: Former     Current packs/day: 0.50     Average packs/day: 0.5 packs/day for 30.0 years (15.0 ttl pk-yrs)     Types: Cigarettes    Smokeless tobacco: Former    Tobacco comments:     none past MN before surgery   Substance and Sexual Activity    Alcohol use: Yes     Comment: 1-2 beers per month    Drug use: No    Sexual activity: Not Currently     Partners: Male     Social Determinants  of Health     Financial Resource Strain: Medium Risk (5/20/2022)    Overall Financial Resource Strain (CARDIA)     Difficulty of Paying Living Expenses: Somewhat hard   Food Insecurity: No Food Insecurity (5/20/2022)    Hunger Vital Sign     Worried About Running Out of Food in the Last Year: Never true     Ran Out of Food in the Last Year: Never true   Transportation Needs: No Transportation Needs (5/20/2022)    PRAPARE - Transportation     Lack of Transportation (Medical): No     Lack of Transportation (Non-Medical): No   Physical Activity: Sufficiently Active (5/20/2022)    Exercise Vital Sign     Days of Exercise per Week: 7 days     Minutes of Exercise per Session: 30 min   Stress: Stress Concern Present (5/20/2022)    [employer] - Occupational Stress Questionnaire     Feeling of Stress : To some extent   Social Connections: Moderately Isolated (5/20/2022)    Social Connection and Isolation Panel [NHANES]     Frequency of Communication with Friends and Family: More than three times a week     Frequency of Social Gatherings with Friends and Family: Once a week     Attends Rastafari Services: 1 to 4 times per year     Active Member of Clubs or Organizations: No     Attends Club or Organization Meetings: Never     Marital Status:    Housing Stability: Low Risk  (5/20/2022)    Housing Stability Vital Sign     Unable to Pay for Housing in the Last Year: No     Number of Places Lived in the Last Year: 1     Unstable Housing in the Last Year: No        Allergies:  Review of patient's allergies indicates:   Allergen Reactions    Dye Anaphylaxis     Contrast Dye    Iodine and iodide containing products Rash    Penicillins Shortness Of Breath     Shortness of breath^severe skin rash    Lovastatin Other (See Comments)     Causes leg cramp    Mucinex [guaifenesin]     Cephalexin Nausea And Vomiting    Diazepam Anxiety     Made pt overly anxious instead of relaxing    Naproxen Nausea  "And Vomiting        ROS:  Review of Systems   Constitutional:  Negative for activity change, appetite change, chills and fever.   HENT:  Positive for congestion and mouth sores. Negative for postnasal drip, rhinorrhea, sore throat and trouble swallowing.    Respiratory:  Positive for cough. Negative for shortness of breath and wheezing.    Cardiovascular:  Negative for chest pain and palpitations.   Gastrointestinal:  Negative for abdominal pain, constipation, diarrhea, nausea and vomiting.   Genitourinary:  Negative for difficulty urinating.   Musculoskeletal:  Negative for arthralgias and myalgias.   Skin:  Negative for color change and rash.   Neurological:  Positive for headaches. Negative for speech difficulty.   All other systems reviewed and are negative.         Objective      /60   Pulse 73   Temp 98.1 °F (36.7 °C)   Ht 5' 2" (1.575 m)   Wt 62.5 kg (137 lb 12.6 oz)   SpO2 (!) 93%   BMI 25.20 kg/m²   Ht Readings from Last 3 Encounters:   10/25/23 5' 2" (1.575 m)   09/26/23 5' 2" (1.575 m)   09/22/23 5' 2" (1.575 m)     Wt Readings from Last 3 Encounters:   10/25/23 62.5 kg (137 lb 12.6 oz)   09/26/23 63.4 kg (139 lb 12.4 oz)   09/22/23 64 kg (141 lb 1.5 oz)       PHYSICAL EXAM:  Physical Exam  Vitals and nursing note reviewed.   Constitutional:       General: She is not in acute distress.     Appearance: Normal appearance.   HENT:      Head: Normocephalic and atraumatic.      Right Ear: Tympanic membrane, ear canal and external ear normal.      Left Ear: Tympanic membrane, ear canal and external ear normal.      Nose: Nose normal. No congestion or rhinorrhea.      Mouth/Throat:      Mouth: Mucous membranes are moist.      Dentition: Gum lesions present.      Pharynx: Oropharynx is clear. No oropharyngeal exudate or posterior oropharyngeal erythema.      Comments: Has a couple of lesions on her gums, wear the dentures sit.  No drainage.  Tender to touch.  Eyes:      Extraocular Movements: " Extraocular movements intact.      Conjunctiva/sclera: Conjunctivae normal.      Pupils: Pupils are equal, round, and reactive to light.   Cardiovascular:      Rate and Rhythm: Normal rate and regular rhythm.   Pulmonary:      Effort: Pulmonary effort is normal. No respiratory distress.      Breath sounds: No wheezing, rhonchi or rales.   Musculoskeletal:         General: Normal range of motion.      Cervical back: Normal range of motion.   Lymphadenopathy:      Cervical: No cervical adenopathy.   Skin:     General: Skin is warm and dry.      Findings: No rash.   Neurological:      Mental Status: She is alert.              LABS / IMAGING:  Recent Results (from the past 4368 hour(s))   POCT URINE DIPSTICK WITHOUT MICROSCOPE    Collection Time: 09/26/23 10:51 AM   Result Value Ref Range    Color, UA Yellow     pH, UA 6.0     WBC, UA negative     Nitrite, UA negative     Protein, POC negative     Glucose, UA negative     Ketones, UA negative     Urobilinogen, UA 0.2     Bilirubin, POC negative     Blood, UA small     Clarity, UA Clear     Spec Grav UA 1.010    CULTURE, URINE    Collection Time: 09/26/23 10:54 AM    Specimen: Urine, Clean Catch   Result Value Ref Range    Urine Culture, Routine No significant growth          Assessment    1. Ear pain, bilateral    2. Acute nonintractable headache, unspecified headache type    3. Other lesions of oral mucosa          Plan    Salud was seen today for otalgia and dental pain.    Diagnoses and all orders for this visit:    Ear pain, bilateral  -     ketorolac injection 30 mg  -     clindamycin (CLEOCIN) 300 MG capsule; Take 1 capsule (300 mg total) by mouth 3 (three) times daily. for 7 days  -     Ambulatory referral/consult to ENT; Future    Acute nonintractable headache, unspecified headache type  -     ketorolac injection 30 mg    Other lesions of oral mucosa  -     clindamycin (CLEOCIN) 300 MG capsule; Take 1 capsule (300 mg total) by mouth 3 (three) times daily. for 7  days  -     triamcinolone acetonide 0.1% (KENALOG) 0.1 % paste; Place onto teeth 2 (two) times daily. for 5 days  -     Ambulatory referral/consult to ENT; Future    Overall, she looks okay.      Ears look fine, no signs of ear infection.  Sinuses are a little congested.  Suspect that most of her discomfort is coming from these oral lesions.  Tissue is irritated.    Will treat clindamycin t.i.d..  Stop using the peroxide.  Instead, will try some Kenalog with Orabase.  Have her follow-up with ENT and/or dentist if no improvement.      FOLLOW-UP:  Follow up if symptoms worsen or fail to improve.    I spent a total of 45 minutes face to face and non-face to face on the date of this visit.This includes time preparing to see the patient (eg, review of tests, notes), obtaining and/or reviewing additional history from an independent historian and/or outside medical records, documenting clinical information in the electronic health record, independently interpreting results and/or communicating results to the patient/family/caregiver, or care coordinator.    Signed by:  Rikki Sharif MD

## 2023-10-25 NOTE — PROGRESS NOTES
After obtaining consent, and per orders of Dr. Sharif Toradol 30mg injection given by Sharon Lunsford LPN. Patient instructed to remain in room for 15 minutes afterwards, and to report any adverse reactions to me immediately. Pt advised me that she was ok and didn't want to wait          Sharon Lunsford LPN

## 2023-10-25 NOTE — PATIENT INSTRUCTIONS
Suspect that the ear pain and headache are coming from the irritation and ulcers on your gum tissue.    Let us try Toradol injection today to see if that gets ahead of the pain.  At home, supplement with OTC Tylenol and ibuprofen, as needed.      For the sores, let us try treating with clindamycin to make sure there is nothing bacterial causing this.  Prescription sent to pharmacy today.      Would also like you to use my triamcinolone dental paste for the next 3-5 days.  Dab a small amount onto each ulcer looking lesion.  Do not rub in.  Use this instead of or a gel.  This should so the irritation and help the tissue heal.    If you get no relief, or the pain worsens, our next step would be to have you either see the dentist or an ENT for another opinion.  Referral to ENT placed today.      If you would like to see a dentist before November 6, you can try contacting Dr. Fredrick Carmona at [facility] ([phone]).  His office is located at [address] [place] [address]

## 2023-11-06 ENCOUNTER — HOSPITAL ENCOUNTER (EMERGENCY)
Facility: HOSPITAL | Age: 73
Discharge: HOME | End: 2023-11-06
Attending: EMERGENCY MEDICINE
Payer: MEDICARE

## 2023-11-06 VITALS
RESPIRATION RATE: 18 BRPM | TEMPERATURE: 97 F | DIASTOLIC BLOOD PRESSURE: 67 MMHG | BODY MASS INDEX: 25.45 KG/M2 | OXYGEN SATURATION: 97 % | WEIGHT: 138.31 LBS | HEART RATE: 81 BPM | SYSTOLIC BLOOD PRESSURE: 136 MMHG | HEIGHT: 62 IN

## 2023-11-06 DIAGNOSIS — M54.2 NECK PAIN: Primary | ICD-10-CM

## 2023-11-06 PROCEDURE — 25000003 PHARM REV CODE 250: Mod: ER | Performed by: NURSE PRACTITIONER

## 2023-11-06 PROCEDURE — 99283 EMERGENCY DEPT VISIT LOW MDM: CPT | Mod: ER

## 2023-11-06 RX ORDER — CYCLOBENZAPRINE HCL 5 MG
5 TABLET ORAL 3 TIMES DAILY PRN
Qty: 15 TABLET | Refills: 0 | Status: SHIPPED | OUTPATIENT
Start: 2023-11-06 | End: 2023-11-11

## 2023-11-06 RX ORDER — CYCLOBENZAPRINE HCL 5 MG
5 TABLET ORAL 3 TIMES DAILY PRN
Qty: 15 TABLET | Refills: 0 | Status: SHIPPED | OUTPATIENT
Start: 2023-11-06 | End: 2023-11-06 | Stop reason: SDUPTHER

## 2023-11-06 RX ORDER — CYCLOBENZAPRINE HCL 5 MG
5 TABLET ORAL
Status: COMPLETED | OUTPATIENT
Start: 2023-11-06 | End: 2023-11-06

## 2023-11-06 RX ADMIN — CYCLOBENZAPRINE HYDROCHLORIDE 5 MG: 5 TABLET, FILM COATED ORAL at 01:11

## 2023-11-06 NOTE — ED PROVIDER NOTES
Encounter Date: 11/6/2023       History     Chief Complaint   Patient presents with    Neck Pain     Pt complains of neck pain since waking up yesterday morning. Thinks she may have slept wrong. OTC meds not working.     Patient presents to ER for neck pain, onset yesterday upon waking up.  Denies any associated symptoms.  Pain has been constant since onset.  She localizes pain to right and left lateral neck.  She has tried over-the-counter medications with minimal relief.  She denies any known injury.  She denies fever, chills, generalized body aches, numbness, tingling, bladder/bowel dysfunction, saddle anesthesia, chest pain, shortness of breath, headache, visual changes.    The history is provided by the patient.     Review of patient's allergies indicates:   Allergen Reactions    Dye Anaphylaxis     Contrast Dye    Iodine and iodide containing products Rash    Penicillins Shortness Of Breath     Shortness of breath^severe skin rash    Lovastatin Other (See Comments)     Causes leg cramp    Mucinex [guaifenesin]     Cephalexin Nausea And Vomiting    Diazepam Anxiety     Made pt overly anxious instead of relaxing    Naproxen Nausea And Vomiting     Past Medical History:   Diagnosis Date    Asthma     childhood - seasonal as an adult    Hematuria 06/10/2019    Hyperlipidemia     Hypertension     pt denies    Restless leg     Spinal stenosis      Past Surgical History:   Procedure Laterality Date    BACK SURGERY      BREAST BIOPSY      CARDIAC CATHETERIZATION  2018, 11/2020    HYSTERECTOMY      LYMPH NODE BIOPSY      ROBOT-ASSISTED REPAIR OF VENTRAL HERNIA USING DA JIM XI N/A 12/10/2020    Procedure: XI ROBOTIC REPAIR, HERNIA, VENTRAL;  Surgeon: Brandyn Panchal MD;  Location: [facility];  Service: General;  Laterality: N/A;    TONSILLECTOMY       Family History   Problem Relation Age of Onset    Heart disease Mother     Cancer Maternal Uncle      Social History     Tobacco Use    Smoking status: Former     Current  packs/day: 0.50     Average packs/day: 0.5 packs/day for 30.0 years (15.0 ttl pk-yrs)     Types: Cigarettes    Smokeless tobacco: Former    Tobacco comments:     none past MN before surgery   Substance Use Topics    Alcohol use: Yes     Comment: 1-2 beers per month    Drug use: No     Review of Systems   Constitutional:  Negative for chills, fatigue and fever.   HENT:  Negative for ear pain and sore throat.    Eyes:  Negative for pain.   Respiratory:  Negative for cough and shortness of breath.    Cardiovascular:  Negative for chest pain.   Gastrointestinal:  Negative for abdominal pain, nausea and vomiting.   Musculoskeletal:  Positive for neck pain. Negative for back pain.   Skin:  Negative for rash.   Neurological:  Negative for weakness, numbness and headaches.   All other systems reviewed and are negative.      Physical Exam     Initial Vitals [11/06/23 1210]   BP Pulse Resp Temp SpO2   (!) 140/65 88 17 97.4 °F (36.3 °C) 96 %      MAP       --         Physical Exam    Nursing note and vitals reviewed.  Constitutional: She appears well-developed and well-nourished. She is not diaphoretic. No distress.   HENT:   Head: Normocephalic and atraumatic.   Eyes: Conjunctivae are normal.   Neck:   Normal range of motion.  Cardiovascular:  Normal rate, regular rhythm and intact distal pulses.           Pulmonary/Chest: Breath sounds normal. No respiratory distress.   Musculoskeletal:         General: Normal range of motion.      Cervical back: Normal range of motion. Tenderness present. No swelling, edema, deformity or erythema.      Thoracic back: Normal.      Lumbar back: Normal.     Neurological: She is alert and oriented to person, place, and time. She has normal strength. No sensory deficit. GCS score is 15. GCS eye subscore is 4. GCS verbal subscore is 5. GCS motor subscore is 6.   Skin: Skin is warm and dry. Capillary refill takes less than 2 seconds.         ED Course   Procedures  Labs Reviewed - No data to  display       Imaging Results              X-Ray Cervical Spine AP And Lateral (Final result)  Result time 11/06/23 12:54:36      Final result by Parag Jamil MD (11/06/23 12:54:36)                   Impression:      Degenerative changes as above.      Electronically signed by: Parag Jamil MD  Date:    11/06/2023  Time:    12:54               Narrative:    EXAMINATION:  XR CERVICAL SPINE AP LATERAL    CLINICAL HISTORY:  Neck pain.    TECHNIQUE:  4 views.    COMPARISON:  None    FINDINGS:  Minimal retrolisthesis at C5 measuring 3 mm secondary to degenerative disc disease.  Moderate degenerative disc disease from C3-4 through C7-T1 with facet DJD.  No acute fracture or subluxation.  No soft tissue abnormality detected.                                       Medications   cyclobenzaprine tablet 5 mg (5 mg Oral Given 11/6/23 1312)     Medical Decision Making  Amount and/or Complexity of Data Reviewed  Radiology: ordered.    Risk  Prescription drug management.                     Results reviewed and discussed with patient she verbalized understanding with no further concerns.  Patient is neurovascularly intact.  Steady gait.  No spinal tenderness noted on exam.  Patient is nontoxic/non ill-appearing.  Vital signs are stable.  Discussed outpatient follow-up with her PCP.  Discussed signs and symptoms to return to ER.  Patient agrees with plan and voiced no further concerns.  I discussed with patient that evaluation in the ED does not suggest any emergent or life threatening medical conditions requiring immediate intervention beyond what was provided in the ED, and I believe patient is safe for discharge. Regardless, an unremarkable evaluation in the ED does not preclude the development or presence of a serious of life threatening condition. As such, patient was instructed to return immediately for any worsening or change in current symptoms.            Clinical Impression:   Final diagnoses:  [M54.2] Neck pain  (Primary)        ED Disposition Condition    Discharge Stable          ED Prescriptions       Medication Sig Dispense Start Date End Date Auth. Provider    cyclobenzaprine (FLEXERIL) 5 MG tablet  (Status: Discontinued) Take 1 tablet (5 mg total) by mouth 3 (three) times daily as needed for Muscle spasms. 15 tablet 11/6/2023 11/6/2023 Kaiden Quick NP    cyclobenzaprine (FLEXERIL) 5 MG tablet Take 1 tablet (5 mg total) by mouth 3 (three) times daily as needed for Muscle spasms. 15 tablet 11/6/2023 11/11/2023 Kaiden Quick NP          Follow-up Information       Follow up With Specialties Details Why Contact Info    Rikki Sharif MD Family Medicine In 2 days  [address] [place] [address]  [phone]      [facility] Emergency Dept Emergency Medicine  As needed, If symptoms worsen [id number] [facility] [address]  [facility] [address] [phone]             Kaiden Quick NP  11/06/23 [id number]

## 2023-11-14 RX ORDER — CYCLOBENZAPRINE HCL 5 MG
5 TABLET ORAL NIGHTLY
Qty: 30 TABLET | Refills: 0 | Status: SHIPPED | OUTPATIENT
Start: 2023-11-14 | End: 2023-12-14

## 2023-11-14 NOTE — TELEPHONE ENCOUNTER
----- Message from Sandee Alvarado sent at 11/14/2023 11:54 AM CST -----  Contact: [phone]  Type:  RX Refill Request    Who Called: Salud  Refill or New Rx:New   RX Name and Strength:cyclobenzaprine 5mg  How is the patient currently taking it? (ex. 1XDay):  Is this a 30 day or 90 day RX:  Preferred Pharmacy with phone number:  [facility] [place] - [address]  [address] [address] [address]  Phone: [phone] Fax: [phone]  Local or Mail Order:local   Ordering Provider:Dr Sharif   Would the patient rather a call back or a response via [facility]? Call back   Best Call Back Number:[phone]   Additional Information: was giving in hospital , needing refill

## 2023-12-13 ENCOUNTER — HOSPITAL ENCOUNTER (EMERGENCY)
Facility: HOSPITAL | Age: 73
Discharge: HOME | End: 2023-12-13
Attending: EMERGENCY MEDICINE
Payer: MEDICARE

## 2023-12-13 VITALS
HEART RATE: 74 BPM | OXYGEN SATURATION: 98 % | WEIGHT: 138 LBS | RESPIRATION RATE: 21 BRPM | TEMPERATURE: 98 F | BODY MASS INDEX: 25.4 KG/M2 | HEIGHT: 62 IN | DIASTOLIC BLOOD PRESSURE: 57 MMHG | SYSTOLIC BLOOD PRESSURE: 112 MMHG

## 2023-12-13 DIAGNOSIS — R00.2 PALPITATIONS: ICD-10-CM

## 2023-12-13 DIAGNOSIS — R07.9 CHEST PAIN: ICD-10-CM

## 2023-12-13 DIAGNOSIS — F41.9 ANXIETY: Primary | ICD-10-CM

## 2023-12-13 LAB
ALBUMIN SERPL BCP-MCNC: 3.8 G/DL (ref 3.5–5.2)
ALP SERPL-CCNC: 60 U/L (ref 55–135)
ALT SERPL W/O P-5'-P-CCNC: 12 U/L (ref 10–44)
ANION GAP SERPL CALC-SCNC: 12 MMOL/L (ref 8–16)
AST SERPL-CCNC: 13 U/L (ref 10–40)
BASOPHILS # BLD AUTO: 0.12 K/UL (ref 0–0.2)
BASOPHILS NFR BLD: 1.4 % (ref 0–1.9)
BILIRUB SERPL-MCNC: 0.2 MG/DL (ref 0.1–1)
BNP SERPL-MCNC: 13 PG/ML (ref 0–99)
BUN SERPL-MCNC: 10 MG/DL (ref 8–23)
CALCIUM SERPL-MCNC: 9.2 MG/DL (ref 8.7–10.5)
CHLORIDE SERPL-SCNC: 102 MMOL/L (ref 95–110)
CO2 SERPL-SCNC: 27 MMOL/L (ref 23–29)
CREAT SERPL-MCNC: 0.8 MG/DL (ref 0.5–1.4)
DIFFERENTIAL METHOD: NORMAL
EOSINOPHIL # BLD AUTO: 0.3 K/UL (ref 0–0.5)
EOSINOPHIL NFR BLD: 3.7 % (ref 0–8)
ERYTHROCYTE [DISTWIDTH] IN BLOOD BY AUTOMATED COUNT: 13.5 % (ref 11.5–14.5)
EST. GFR  (NO RACE VARIABLE): >60 ML/MIN/1.73 M^2
GLUCOSE SERPL-MCNC: 106 MG/DL (ref 70–110)
HCT VFR BLD AUTO: 39.9 % (ref 37–48.5)
HGB BLD-MCNC: 13.3 G/DL (ref 12–16)
IMM GRANULOCYTES # BLD AUTO: 0.02 K/UL (ref 0–0.04)
IMM GRANULOCYTES NFR BLD AUTO: 0.2 % (ref 0–0.5)
LYMPHOCYTES # BLD AUTO: 2.8 K/UL (ref 1–4.8)
LYMPHOCYTES NFR BLD: 32 % (ref 18–48)
MCH RBC QN AUTO: 30.5 PG (ref 27–31)
MCHC RBC AUTO-ENTMCNC: 33.3 G/DL (ref 32–36)
MCV RBC AUTO: 92 FL (ref 82–98)
MONOCYTES # BLD AUTO: 0.8 K/UL (ref 0.3–1)
MONOCYTES NFR BLD: 9.3 % (ref 4–15)
NEUTROPHILS # BLD AUTO: 4.7 K/UL (ref 1.8–7.7)
NEUTROPHILS NFR BLD: 53.4 % (ref 38–73)
NRBC BLD-RTO: 0 /100 WBC
PLATELET # BLD AUTO: 244 K/UL (ref 150–450)
PMV BLD AUTO: 9.5 FL (ref 9.2–12.9)
POTASSIUM SERPL-SCNC: 3.6 MMOL/L (ref 3.5–5.1)
PROT SERPL-MCNC: 6.8 G/DL (ref 6–8.4)
RBC # BLD AUTO: 4.36 M/UL (ref 4–5.4)
SODIUM SERPL-SCNC: 141 MMOL/L (ref 136–145)
TROPONIN I SERPL DL<=0.01 NG/ML-MCNC: <0.006 NG/ML (ref 0–0.03)
WBC # BLD AUTO: 8.84 K/UL (ref 3.9–12.7)

## 2023-12-13 PROCEDURE — 93010 ELECTROCARDIOGRAM REPORT: CPT | Mod: ,,, | Performed by: INTERNAL MEDICINE

## 2023-12-13 PROCEDURE — 99285 EMERGENCY DEPT VISIT HI MDM: CPT | Mod: 25,ER

## 2023-12-13 PROCEDURE — 93010 EKG 12-LEAD: ICD-10-PCS | Mod: ,,, | Performed by: INTERNAL MEDICINE

## 2023-12-13 PROCEDURE — 25000003 PHARM REV CODE 250: Mod: ER | Performed by: EMERGENCY MEDICINE

## 2023-12-13 PROCEDURE — 93005 ELECTROCARDIOGRAM TRACING: CPT | Mod: ER

## 2023-12-13 PROCEDURE — 84484 ASSAY OF TROPONIN QUANT: CPT | Mod: ER | Performed by: EMERGENCY MEDICINE

## 2023-12-13 PROCEDURE — 83880 ASSAY OF NATRIURETIC PEPTIDE: CPT | Mod: ER | Performed by: EMERGENCY MEDICINE

## 2023-12-13 PROCEDURE — 85025 COMPLETE CBC W/AUTO DIFF WBC: CPT | Mod: ER | Performed by: EMERGENCY MEDICINE

## 2023-12-13 PROCEDURE — 80053 COMPREHEN METABOLIC PANEL: CPT | Mod: ER | Performed by: EMERGENCY MEDICINE

## 2023-12-13 RX ORDER — ALPRAZOLAM 0.5 MG/1
0.5 TABLET ORAL 2 TIMES DAILY PRN
Qty: 15 TABLET | Refills: 0 | Status: SHIPPED | OUTPATIENT
Start: 2023-12-13 | End: 2023-12-19 | Stop reason: DRUGHIGH

## 2023-12-13 RX ORDER — ALPRAZOLAM 0.25 MG/1
1 TABLET ORAL
Status: COMPLETED | OUTPATIENT
Start: 2023-12-13 | End: 2023-12-13

## 2023-12-13 RX ADMIN — ALPRAZOLAM 1 MG: 0.25 TABLET ORAL at 03:12

## 2023-12-13 NOTE — Clinical Note
"Salud"SHAI Echevarria was seen and treated in our emergency department on 12/13/2023.  She may return to work on 12/13/2023.       If you have any questions or concerns, please don't hesitate to call.      HILLARY Felix RN    "

## 2023-12-13 NOTE — DISCHARGE INSTRUCTIONS
Evaluation is fine. Let your primary care provider know about this episode and this prescription.    Return as needed.

## 2023-12-13 NOTE — ED PROVIDER NOTES
Encounter Date: 12/13/2023       History     Chief Complaint   Patient presents with    Anxiety     C/o of being anxious feeling; intermittent pain that comes in her left axillary region and left deltoid region      She reports anxiety for about 5 hours, no identified trigger, minimal nausea, some degree of palpitations and tachycardia.  She has some ongoing left axillary, inframammary, and left deltoid pain on and off without recent change, she does not describe discrete chest pain otherwise.  No dyspnea, vomiting, diarrhea, abdominal pain, urinary complaints, or other particular symptoms.  She used to take Xanax but has not had medicine like that in a long time, and denies any current anxiety or depression medication.  No other specific complaints.    The history is provided by the patient. No  was used.     Review of patient's allergies indicates:   Allergen Reactions    Dye Anaphylaxis     Contrast Dye    Iodine and iodide containing products Rash    Penicillins Shortness Of Breath     Shortness of breath^severe skin rash    Lovastatin Other (See Comments)     Causes leg cramp    Mucinex [guaifenesin]     Cephalexin Nausea And Vomiting    Diazepam Anxiety     Made pt overly anxious instead of relaxing    Naproxen Nausea And Vomiting     Past Medical History:   Diagnosis Date    Asthma     childhood - seasonal as an adult    Hematuria 06/10/2019    Hyperlipidemia     Hypertension     pt denies    Restless leg     Spinal stenosis      Past Surgical History:   Procedure Laterality Date    BACK SURGERY      BREAST BIOPSY      CARDIAC CATHETERIZATION  2018, 11/2020    HYSTERECTOMY      LYMPH NODE BIOPSY      ROBOT-ASSISTED REPAIR OF VENTRAL HERNIA USING DA JIM XI N/A 12/10/2020    Procedure: XI ROBOTIC REPAIR, HERNIA, VENTRAL;  Surgeon: Brandyn Panchal MD;  Location: [facility];  Service: General;  Laterality: N/A;    TONSILLECTOMY       Family History   Problem Relation Age of Onset    Heart disease  Mother     Cancer Maternal Uncle      Social History     Tobacco Use    Smoking status: Former     Current packs/day: 0.50     Average packs/day: 0.5 packs/day for 30.0 years (15.0 ttl pk-yrs)     Types: Cigarettes    Smokeless tobacco: Former    Tobacco comments:     none past MN before surgery   Substance Use Topics    Alcohol use: Yes     Comment: 1-2 beers per month    Drug use: No     Review of Systems   Constitutional:  Negative for activity change, fatigue and fever.   HENT:  Negative for congestion, ear pain, facial swelling, nosebleeds, sinus pressure and sore throat.    Eyes:  Negative for pain, discharge, redness and visual disturbance.   Respiratory:  Negative for cough, choking, chest tightness, shortness of breath and wheezing.    Cardiovascular:  Positive for chest pain. Negative for palpitations and leg swelling.   Gastrointestinal:  Negative for abdominal distention, abdominal pain, nausea and vomiting.   Endocrine: Negative for heat intolerance, polydipsia and polyuria.   Genitourinary:  Negative for difficulty urinating, dysuria, flank pain, hematuria and urgency.   Musculoskeletal:  Negative for back pain, gait problem, joint swelling and myalgias.   Skin:  Negative for color change and rash.   Allergic/Immunologic: Negative for environmental allergies and food allergies.   Neurological:  Negative for dizziness, weakness, numbness and headaches.   Hematological:  Negative for adenopathy. Does not bruise/bleed easily.   Psychiatric/Behavioral:  Negative for agitation and behavioral problems. The patient is nervous/anxious.    All other systems reviewed and are negative.      Physical Exam     Initial Vitals   BP Pulse Resp Temp SpO2   12/13/23 0251 12/13/23 0249 12/13/23 0249 12/13/23 0249 12/13/23 0249   (!) 168/74 91 19 97.8 °F (36.6 °C) 98 %      MAP       --                Physical Exam    Nursing note and vitals reviewed.  Constitutional: She appears well-developed and well-nourished. She is  not diaphoretic. No distress.   HENT:   Head: Normocephalic and atraumatic.   Mouth/Throat: No oropharyngeal exudate.   Eyes: Conjunctivae and EOM are normal. Pupils are equal, round, and reactive to light. Right eye exhibits no discharge. Left eye exhibits no discharge. No scleral icterus.   Neck: Neck supple. No thyromegaly present. No tracheal deviation present. No JVD present.   Normal range of motion.  Cardiovascular:  Normal rate, regular rhythm, normal heart sounds and intact distal pulses.     Exam reveals no gallop and no friction rub.       No murmur heard.  Frequent ectopy   Pulmonary/Chest: Breath sounds normal. No stridor. No respiratory distress. She has no wheezes. She has no rhonchi. She has no rales. She exhibits no tenderness.   Abdominal: Abdomen is soft. Bowel sounds are normal. She exhibits no distension and no mass. There is no abdominal tenderness. There is no rebound and no guarding.   Musculoskeletal:         General: No tenderness or edema. Normal range of motion.      Cervical back: Normal range of motion and neck supple.     Neurological: She is alert and oriented to person, place, and time. She has normal strength.   Skin: Skin is warm and dry. No rash and no abscess noted. No erythema.   Psychiatric: Her behavior is normal. Judgment and thought content normal.   Anxious         ED Course   Procedures  Labs Reviewed   CBC W/ AUTO DIFFERENTIAL   COMPREHENSIVE METABOLIC PANEL   TROPONIN I   B-TYPE NATRIURETIC PEPTIDE     EKG Readings: (Independently Interpreted)   Initial Reading: No STEMI. Rhythm: Normal Sinus Rhythm. Heart Rate: 83.   Normal sinus rhythm with PACs, 83 beats per minute, noisy baseline, possible old septal infarction.  No change from previous.       Imaging Results              X-Ray Chest AP Portable (Preliminary result)  Result time 12/13/23 03:45:45      Wet Read by Christiano Guo MD (12/13/23 03:45:45, [facility] - Emergency Dept, Emergency Medicine)    WNL                                      Medications   ALPRAZolam tablet 1 mg (1 mg Oral Given 12/13/23 0303)       3:46 AM Resting comfortably, vital signs stable, ectopy currently resolved, feels back to normal.  Would like a refill of Xanax. Stable for d/c and follow-up primary care.    Medical Decision Making  Problems Addressed:  Anxiety: acute illness or injury  Palpitations: acute illness or injury    Amount and/or Complexity of Data Reviewed  Labs: ordered. Decision-making details documented in ED Course.  Radiology: ordered and independent interpretation performed. Decision-making details documented in ED Course.  ECG/medicine tests: ordered and independent interpretation performed. Decision-making details documented in ED Course.    Risk  Prescription drug management.  Decision regarding hospitalization.      Additional MDM:   Differential Diagnosis:   Anxiety, palpitations, angina, other causes of chest pain                                    Clinical Impression:  Final diagnoses:  [R07.9] Chest pain  [F41.9] Anxiety (Primary)  [R00.2] Palpitations          ED Disposition Condition    Discharge Stable          ED Prescriptions       Medication Sig Dispense Start Date End Date Auth. Provider    ALPRAZolam (XANAX) 0.5 MG tablet Take 1 tablet (0.5 mg total) by mouth 2 (two) times daily as needed for Anxiety. 15 tablet 12/13/2023 -- Christiano Guo MD          Follow-up Information       Follow up With Specialties Details Why Contact Info    [facility] - Emergency Dept Emergency Medicine  As needed [address]  [facility] [id number] [phone]    Rikki Sharif MD Family Medicine Schedule an appointment as soon as possible for a visit   [address] [facility] [address]  [phone]               Christiano Guo MD  12/13/23 6060

## 2023-12-14 ENCOUNTER — TELEPHONE (OUTPATIENT)
Dept: PRIMARY CARE CLINIC | Facility: CLINIC | Age: 73
End: 2023-12-14
Payer: MEDICARE

## 2023-12-14 NOTE — TELEPHONE ENCOUNTER
----- Message from Jo King sent at 12/14/2023 11:06 AM CST -----  Pt is calling in regards to receiving a missed call. Please call back at [phone]                          Thanks  KT

## 2023-12-18 ENCOUNTER — TELEPHONE (OUTPATIENT)
Dept: PRIMARY CARE CLINIC | Facility: CLINIC | Age: 73
End: 2023-12-18
Payer: MEDICARE

## 2023-12-19 ENCOUNTER — OFFICE VISIT (OUTPATIENT)
Dept: PRIMARY CARE CLINIC | Facility: CLINIC | Age: 73
End: 2023-12-19
Payer: MEDICARE

## 2023-12-19 DIAGNOSIS — F41.0 PANIC ATTACKS: ICD-10-CM

## 2023-12-19 DIAGNOSIS — F41.9 ANXIETY: Primary | ICD-10-CM

## 2023-12-19 PROCEDURE — 99214 OFFICE O/P EST MOD 30 MIN: CPT | Mod: 95,,, | Performed by: FAMILY MEDICINE

## 2023-12-19 PROCEDURE — 99214 PR OFFICE/OUTPT VISIT, EST, LEVL IV, 30-39 MIN: ICD-10-PCS | Mod: 95,,, | Performed by: FAMILY MEDICINE

## 2023-12-19 RX ORDER — PROPRANOLOL HYDROCHLORIDE 20 MG/1
20 TABLET ORAL 3 TIMES DAILY PRN
Qty: 90 TABLET | Refills: 11 | Status: SHIPPED | OUTPATIENT
Start: 2023-12-19 | End: 2024-12-18

## 2023-12-19 RX ORDER — ALPRAZOLAM 0.25 MG/1
0.25 TABLET ORAL NIGHTLY PRN
Qty: 30 TABLET | Refills: 2 | Status: SHIPPED | OUTPATIENT
Start: 2023-12-19

## 2023-12-19 NOTE — PROGRESS NOTES
"    [facility] - [facility]       [address] [place] [address]      Phone: [phone]      Fax: [phone]    Rikki Sharif MD                Video Visit  12/19/2023        Subjective      HPI:  Salud Echevarria is a 73 y.o. female presents today via video for "No chief complaint on file.  ."     73-year-old female presents today for hospital follow-up, discuss anxiety.      Patient states that last Tuesday, one week ago, she was at home, started feeling extremely nervous, anxious.  Blood pressure increased, increased.  Felt some tightness in her chest.  Initially, try to relax, but symptoms persisted, possibly got worse.  She went to the ER around 2:00 a.m..  They did labs, workup.  EKG.  Was told she was not having a heart attack.  Symptoms likely anxiety/panic attack.  Was given Xanax in the ER.  That calmed her down significantly.  Made her feel much better.  They also gave her a prescription for 0.5 mg to take home.  She has been taking half a tablet at bedtime.  Helps relaxer, help her sleep.    States that since coming home, she finds that she gets more anxious when she is at home, alone.  Feels better when around other people.  Has been trying to keep busy, active, keep herself surrounded by other people.  States that there has been a lot of stress in her life lately.  Lots of things going with family members.  Her son has been drinking more lately.  She told him to get out of the house.  She is very close to her son, so this was stressful for her.    Will be seeing a new cardiologist in January, Dr. Berg.      PMH: Back pains/spinal stenosis.  Diverticulosis.  NSAID gastritis.  PSH: Back surgery.  Heart cath (11/2020).  Umbilical hernia repair.  Tonsils/adenoids.  Hysterectomy.  Breast biopsy.  Lymph node biopsy.  FMH: CHF, brain cancer, lung cancer, A. fib  Allergies: Penicillins make her sweat, shaky.  Iodine makes her sweat, vision changes, hypotensive.  " Keflex makes her sick, upset stomach.  Naproxen makes her sick.  Statins, specifically lovastatin, causes leg cramps.  Pravastatin is okay.  Valium makes her loopy, goofy.  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     T:  Quit smoking in .  Previously smoked .5-1 PPD x40+ years     A: Occasionally, rarely     D: Denies     Exercise: No regular exercise program.  Tries to stay active around the house, gardening.     ENT: Dr. Jama  Cardiology: Dr. Hannah  Urology: Dr. Greer  Ophthalmology: Dr. Reece     Colon: Cologuard - 10/21/2020.  Repeat 3 years ()  MM2023           The following were updated and reviewed by myself in the chart: medications, past medical history, past surgical history, family history, social history, and allergies.     Medications:  Current Outpatient Medications on File Prior to Visit   Medication Sig Dispense Refill    albuterol (PROVENTIL/VENTOLIN HFA) 90 mcg/actuation inhaler Inhale 1-2 puffs into the lungs every 6 (six) hours as needed for Wheezing. Rescue 18 g 1    aspirin (ECOTRIN) 81 MG EC tablet Take 81 mg by mouth once daily.      fluticasone propionate (FLOVENT HFA) 220 mcg/actuation inhaler Inhale 1 puff into the lungs 2 (two) times a day. Controller 12 g 11    levocetirizine (XYZAL) 5 MG tablet Take 1 tablet (5 mg total) by mouth every evening. 30 tablet 0    pravastatin (PRAVACHOL) 20 MG tablet Take 1 tablet (20 mg total) by mouth once daily. 90 tablet 3    propylene glycol (SYSTANE COMPLETE OPHT) Apply 1 drop to eye daily as needed.      rOPINIRole (REQUIP) 0.5 MG tablet Take 1 tablet (0.5 mg total) by mouth nightly as needed (restless legs). 90 tablet 3    traMADoL (ULTRAM) 50 mg tablet TAKE 1 TABLET BY MOUTH EVERY 6 HOURS AS NEEDED FOR PAIN 28 tablet 0    triamcinolone acetonide 0.1% (KENALOG) 0.1 % cream Apply topically nightly as needed (itching). 30 g 1    UNABLE TO FIND medication name: peroxil mouth wash      [DISCONTINUED] ALPRAZolam (XANAX) 0.5 MG  tablet Take 1 tablet (0.5 mg total) by mouth 2 (two) times daily as needed for Anxiety. 15 tablet 0     No current facility-administered medications on file prior to visit.        PMHx:  Past Medical History:   Diagnosis Date    Asthma     childhood - seasonal as an adult    Hematuria 06/10/2019    Hyperlipidemia     Hypertension     pt denies    Restless leg     Spinal stenosis       Patient Active Problem List    Diagnosis Date Noted    Viral syndrome 07/24/2022    COVID-19 virus detected 02/08/2022    Peripheral vascular disease, unspecified 02/08/2022    Seasonal allergic rhinitis due to pollen 05/03/2021    Chronic obstructive pulmonary disease 03/01/2021    Asymptomatic microscopic hematuria 02/22/2021    Anxiety 02/03/2021    Ventral hernia without obstruction or gangrene 12/10/2020    Urge incontinence 09/18/2020    Mild intermittent asthma without complication 08/07/2020    Vertigo 07/29/2020    Post-nasal drip 05/04/2020    Other specified abdominal hernia without obstruction or gangrene 10/23/2019    Fatigue 08/13/2019    Left hip pain 08/07/2019    Sciatica of left side 08/07/2019    BPPV (benign paroxysmal positional vertigo) 08/07/2019    Spinal stenosis 06/04/2019    Aortic atherosclerosis 07/10/2018    Gastroesophageal reflux disease with esophagitis 01/29/2018    Coronary artery disease involving native coronary artery of native heart without angina pectoris 03/14/2017    Pulmonary nodule, right 09/28/2016    History of tobacco use 09/28/2016    Osteopenia 07/20/2015    Hyperlipidemia 10/13/2014    Back pain 07/02/2013        PSHx:  Past Surgical History:   Procedure Laterality Date    BACK SURGERY      BREAST BIOPSY      CARDIAC CATHETERIZATION  2018, 11/2020    HYSTERECTOMY      LYMPH NODE BIOPSY      ROBOT-ASSISTED REPAIR OF VENTRAL HERNIA USING DA JIM XI N/A 12/10/2020    Procedure: XI ROBOTIC REPAIR, HERNIA, VENTRAL;  Surgeon: Brandyn Panchal MD;  Location: [facility];  Service: General;   Laterality: N/A;    TONSILLECTOMY          FHx:  Family History   Problem Relation Age of Onset    Heart disease Mother     Cancer Maternal Uncle         Social:  Social History     Socioeconomic History    Marital status:    Tobacco Use    Smoking status: Former     Current packs/day: 0.50     Average packs/day: 0.5 packs/day for 30.0 years (15.0 ttl pk-yrs)     Types: Cigarettes    Smokeless tobacco: Former    Tobacco comments:     none past MN before surgery   Substance and Sexual Activity    Alcohol use: Yes     Comment: 1-2 beers per month    Drug use: No    Sexual activity: Not Currently     Partners: Male     Social Determinants of Health     Financial Resource Strain: Medium Risk (5/20/2022)    Overall Financial Resource Strain (CARDIA)     Difficulty of Paying Living Expenses: Somewhat hard   Food Insecurity: No Food Insecurity (5/20/2022)    Hunger Vital Sign     Worried About Running Out of Food in the Last Year: Never true     Ran Out of Food in the Last Year: Never true   Transportation Needs: No Transportation Needs (5/20/2022)    PRAPARE - Transportation     Lack of Transportation (Medical): No     Lack of Transportation (Non-Medical): No   Physical Activity: Sufficiently Active (5/20/2022)    Exercise Vital Sign     Days of Exercise per Week: 7 days     Minutes of Exercise per Session: 30 min   Stress: Stress Concern Present (5/20/2022)    [employer] - Occupational Stress Questionnaire     Feeling of Stress : To some extent   Social Connections: Moderately Isolated (5/20/2022)    Social Connection and Isolation Panel [NHANES]     Frequency of Communication with Friends and Family: More than three times a week     Frequency of Social Gatherings with Friends and Family: Once a week     Attends Bahai Services: 1 to 4 times per year     Active Member of Clubs or Organizations: No     Attends Club or Organization Meetings: Never     Marital Status:   "  Housing Stability: Low Risk  (5/20/2022)    Housing Stability Vital Sign     Unable to Pay for Housing in the Last Year: No     Number of Places Lived in the Last Year: 1     Unstable Housing in the Last Year: No        Allergies:  Review of patient's allergies indicates:   Allergen Reactions    Dye Anaphylaxis     Contrast Dye    Iodine and iodide containing products Rash    Penicillins Shortness Of Breath     Shortness of breath^severe skin rash    Lovastatin Other (See Comments)     Causes leg cramp    Mucinex [guaifenesin]     Cephalexin Nausea And Vomiting    Diazepam Anxiety     Made pt overly anxious instead of relaxing    Naproxen Nausea And Vomiting        ROS:  Review of Systems   Constitutional:  Positive for activity change. Negative for appetite change, chills and fever.   HENT:  Negative for congestion, postnasal drip, rhinorrhea, sore throat and trouble swallowing.    Respiratory:  Positive for chest tightness. Negative for cough, shortness of breath and wheezing.    Cardiovascular:  Positive for palpitations. Negative for chest pain.   Gastrointestinal:  Negative for abdominal pain, constipation, diarrhea, nausea and vomiting.   Genitourinary:  Negative for difficulty urinating.   Musculoskeletal:  Negative for arthralgias and myalgias.   Skin:  Negative for color change and rash.   Neurological:  Negative for speech difficulty and headaches.   Psychiatric/Behavioral:  Positive for sleep disturbance. The patient is nervous/anxious.    All other systems reviewed and are negative.         Objective      There were no vitals taken for this visit.  Ht Readings from Last 3 Encounters:   12/13/23 5' 2" (1.575 m)   11/06/23 5' 2" (1.575 m)   10/25/23 5' 2" (1.575 m)     Wt Readings from Last 3 Encounters:   12/13/23 62.6 kg (138 lb)   11/06/23 62.8 kg (138 lb 5.4 oz)   10/25/23 62.5 kg (137 lb 12.6 oz)       PHYSICAL EXAM:  Physical Exam  Constitutional:       General: She is not in acute distress.     " Appearance: Normal appearance. She is not ill-appearing.   HENT:      Head: Normocephalic and atraumatic.   Pulmonary:      Effort: Pulmonary effort is normal. No respiratory distress.   Neurological:      Mental Status: She is alert.   Psychiatric:         Mood and Affect: Mood is anxious.         Behavior: Behavior normal.         Thought Content: Thought content normal.              LABS / IMAGING:  Recent Results (from the past 4368 hour(s))   POCT URINE DIPSTICK WITHOUT MICROSCOPE    Collection Time: 09/26/23 10:51 AM   Result Value Ref Range    Color, UA Yellow     pH, UA 6.0     WBC, UA negative     Nitrite, UA negative     Protein, POC negative     Glucose, UA negative     Ketones, UA negative     Urobilinogen, UA 0.2     Bilirubin, POC negative     Blood, UA small     Clarity, UA Clear     Spec Grav UA 1.010    CULTURE, URINE    Collection Time: 09/26/23 10:54 AM    Specimen: Urine, Clean Catch   Result Value Ref Range    Urine Culture, Routine No significant growth    CBC auto differential    Collection Time: 12/13/23  3:03 AM   Result Value Ref Range    WBC 8.84 3.90 - 12.70 K/uL    RBC 4.36 4.00 - 5.40 M/uL    Hemoglobin 13.3 12.0 - 16.0 g/dL    Hematocrit 39.9 37.0 - 48.5 %    MCV 92 82 - 98 fL    MCH 30.5 27.0 - 31.0 pg    MCHC 33.3 32.0 - 36.0 g/dL    RDW 13.5 11.5 - 14.5 %    Platelets 244 150 - 450 K/uL    MPV 9.5 9.2 - 12.9 fL    Immature Granulocytes 0.2 0.0 - 0.5 %    Gran # (ANC) 4.7 1.8 - 7.7 K/uL    Immature Grans (Abs) 0.02 0.00 - 0.04 K/uL    Lymph # 2.8 1.0 - 4.8 K/uL    Mono # 0.8 0.3 - 1.0 K/uL    Eos # 0.3 0.0 - 0.5 K/uL    Baso # 0.12 0.00 - 0.20 K/uL    nRBC 0 0 /100 WBC    Gran % 53.4 38.0 - 73.0 %    Lymph % 32.0 18.0 - 48.0 %    Mono % 9.3 4.0 - 15.0 %    Eosinophil % 3.7 0.0 - 8.0 %    Basophil % 1.4 0.0 - 1.9 %    Differential Method Automated    Comprehensive metabolic panel    Collection Time: 12/13/23  3:03 AM   Result Value Ref Range    Sodium 141 136 - 145 mmol/L    Potassium  3.6 3.5 - 5.1 mmol/L    Chloride 102 95 - 110 mmol/L    CO2 27 23 - 29 mmol/L    Glucose 106 70 - 110 mg/dL    BUN 10 8 - 23 mg/dL    Creatinine 0.8 0.5 - 1.4 mg/dL    Calcium 9.2 8.7 - 10.5 mg/dL    Total Protein 6.8 6.0 - 8.4 g/dL    Albumin 3.8 3.5 - 5.2 g/dL    Total Bilirubin 0.2 0.1 - 1.0 mg/dL    Alkaline Phosphatase 60 55 - 135 U/L    AST 13 10 - 40 U/L    ALT 12 10 - 44 U/L    eGFR >60.0 >60 mL/min/1.73 m^2    Anion Gap 12 8 - 16 mmol/L   Troponin I #1    Collection Time: 12/13/23  3:03 AM   Result Value Ref Range    Troponin I <0.006 0.000 - 0.026 ng/mL   BNP    Collection Time: 12/13/23  3:03 AM   Result Value Ref Range    BNP 13 0 - 99 pg/mL         Assessment    1. Anxiety    2. Panic attacks          Plan    Diagnoses and all orders for this visit:    Anxiety  -     ALPRAZolam (XANAX) 0.25 MG tablet; Take 1 tablet (0.25 mg total) by mouth nightly as needed for Anxiety or Insomnia.    Panic attacks  -     propranoloL (INDERAL) 20 MG tablet; Take 1 tablet (20 mg total) by mouth 3 (three) times daily as needed (anxiety/panic attack).    Overall, she seems okay today.      Symptoms do sound consistent with anxiety attack/panic attack.  Likely brought on by the extra stress of the events with her son.    She has tried Zoloft in the past, but that made her feel worse.    The Xanax definitely helps calm her down, but it also makes her incredibly sleepy.  Does not want to use it during the daytime.  Instead, we will try propranolol during the day.  She can still take Xanax (0.25 mg) at bedtime.    Keep appointment with Cardiology in January.      Recommended/offered counseling, therapy.  Not ready at this time.  She will let us know.      FOLLOW-UP:  Follow up if symptoms worsen or fail to improve.    The patient location is:  [place]  The chief complaint leading to consultation is:  Hospital follow-up, anxiety    Visit type: audiovisual    Face to Face time with patient: 15 minutes    35 minutes of total  time spent on the encounter, which includes face to face time and non-face to face time preparing to see the patient (eg, review of tests), Obtaining and/or reviewing separately obtained history, Documenting clinical information in the electronic or other health record, Independently interpreting results (not separately reported) and communicating results to the patient/family/caregiver, or Care coordination (not separately reported).     Each patient to whom he or she provides medical services by telemedicine is:  (1) informed of the relationship between the physician and patient and the respective role of any other health care provider with respect to management of the patient; and (2) notified that he or she may decline to receive medical services by telemedicine and may withdraw from such care at any time.    Signed by:  Rikki Sharif MD

## 2023-12-19 NOTE — PATIENT INSTRUCTIONS
Let us try using propranolol during the daytime, as needed, for when the symptoms develop.  If you feel your heart racing, feeling anxious, panicky, go ahead and take a dose.  It should help calm things down, but without making you sleepy/drowsy.    You can continue using Xanax at bedtime.  The ER gave you a prescription for 0.5 mg tablets.  Since you have been using half a tablet, you can continue that until they run out.  I am also sending a prescription for the 0.25 mg tablets to the pharmacy.  You can take one whole tablet of those.    Medication is great, but counseling can help even more to help you get through this time.  If you decide you would like to talk to a counselor/therapist, please let me know.  I can always place a referral.

## 2024-01-04 DIAGNOSIS — M54.42 CHRONIC LEFT-SIDED LOW BACK PAIN WITH LEFT-SIDED SCIATICA: ICD-10-CM

## 2024-01-04 DIAGNOSIS — G89.29 CHRONIC LEFT-SIDED LOW BACK PAIN WITH LEFT-SIDED SCIATICA: ICD-10-CM

## 2024-01-04 RX ORDER — TRAMADOL HYDROCHLORIDE 50 MG/1
TABLET ORAL
Qty: 28 TABLET | Refills: 0 | Status: SHIPPED | OUTPATIENT
Start: 2024-01-04

## 2024-01-04 NOTE — TELEPHONE ENCOUNTER
Care Due:                  Date            Visit Type   Department     Provider  --------------------------------------------------------------------------------                                ESTABLISHED                              PATIENT -    [facility]  Last Visit: 12-      [facility]      CARE           Rikki Sharif                               -                              PRIMARY      [facility]  Next Visit: 03-      CARE ([facility])   [facility]           Rikki Sharif                                                            Last  Test          Frequency    Reason                     Performed    Due Date  --------------------------------------------------------------------------------    Lipid Panel.  12 months..  pravastatin..............  03- 03-    [facility] Embedded Care Due Messages. Reference number: [id number].   1/04/2024 1:31:31 PM CST

## 2024-01-30 ENCOUNTER — PATIENT OUTREACH (OUTPATIENT)
Dept: ADMINISTRATIVE | Facility: HOSPITAL | Age: 74
End: 2024-01-30
Payer: MEDICARE

## 2024-01-30 NOTE — PROGRESS NOTES
Population Health Chart Review & Patient Outreach Details    Outreach Performed: NO did not contact     Additional [facility] Notes:    Cologuard gap report. Upon reviewing pt's listed pcp is Rikki Sharif not Dr. Flaco Brownlee        Updates Requested / Reviewed:               Health Maintenance Topics Overdue:    Health Maintenance Due   Topic Date Due    COVID-19 Vaccine (1) Never done    High Dose Statin  Never done    RSV Vaccine (Age 60+ and Pregnant patients) (1 - 1-dose 60+ series) Never done    Shingles Vaccine (2 of 3) 03/12/2015    DEXA Scan  02/04/2022    Influenza Vaccine (1) 09/01/2023    Colorectal Cancer Screening  10/21/2023         Health Maintenance Topic(s) Outreach Outcomes & Actions Taken:

## 2024-03-19 ENCOUNTER — LAB VISIT (OUTPATIENT)
Dept: LAB | Facility: HOSPITAL | Age: 74
End: 2024-03-19
Attending: FAMILY MEDICINE
Payer: MEDICARE

## 2024-03-19 DIAGNOSIS — Z86.39 HISTORY OF ELEVATED GLUCOSE: ICD-10-CM

## 2024-03-19 DIAGNOSIS — E78.5 HYPERLIPIDEMIA, UNSPECIFIED HYPERLIPIDEMIA TYPE: ICD-10-CM

## 2024-03-19 DIAGNOSIS — Z13.220 ENCOUNTER FOR LIPID SCREENING FOR CARDIOVASCULAR DISEASE: ICD-10-CM

## 2024-03-19 DIAGNOSIS — Z13.6 ENCOUNTER FOR LIPID SCREENING FOR CARDIOVASCULAR DISEASE: ICD-10-CM

## 2024-03-19 LAB
ALBUMIN SERPL BCP-MCNC: 3.6 G/DL (ref 3.5–5.2)
ALP SERPL-CCNC: 52 U/L (ref 55–135)
ALT SERPL W/O P-5'-P-CCNC: 11 U/L (ref 10–44)
ANION GAP SERPL CALC-SCNC: 11 MMOL/L (ref 8–16)
AST SERPL-CCNC: 15 U/L (ref 10–40)
BASOPHILS # BLD AUTO: 0.1 K/UL (ref 0–0.2)
BASOPHILS NFR BLD: 1.5 % (ref 0–1.9)
BILIRUB SERPL-MCNC: 0.4 MG/DL (ref 0.1–1)
BUN SERPL-MCNC: 14 MG/DL (ref 8–23)
CALCIUM SERPL-MCNC: 9 MG/DL (ref 8.7–10.5)
CHLORIDE SERPL-SCNC: 107 MMOL/L (ref 95–110)
CHOLEST SERPL-MCNC: 171 MG/DL (ref 120–199)
CHOLEST/HDLC SERPL: 2.1 {RATIO} (ref 2–5)
CO2 SERPL-SCNC: 27 MMOL/L (ref 23–29)
CREAT SERPL-MCNC: 0.7 MG/DL (ref 0.5–1.4)
DIFFERENTIAL METHOD BLD: NORMAL
EOSINOPHIL # BLD AUTO: 0.3 K/UL (ref 0–0.5)
EOSINOPHIL NFR BLD: 5.2 % (ref 0–8)
ERYTHROCYTE [DISTWIDTH] IN BLOOD BY AUTOMATED COUNT: 14 % (ref 11.5–14.5)
EST. GFR  (NO RACE VARIABLE): >60 ML/MIN/1.73 M^2
ESTIMATED AVG GLUCOSE: 111 MG/DL (ref 68–131)
GLUCOSE SERPL-MCNC: 102 MG/DL (ref 70–110)
HBA1C MFR BLD: 5.5 % (ref 4–5.6)
HCT VFR BLD AUTO: 41 % (ref 37–48.5)
HDLC SERPL-MCNC: 81 MG/DL (ref 40–75)
HDLC SERPL: 47.4 % (ref 20–50)
HGB BLD-MCNC: 13.4 G/DL (ref 12–16)
IMM GRANULOCYTES # BLD AUTO: 0.01 K/UL (ref 0–0.04)
IMM GRANULOCYTES NFR BLD AUTO: 0.2 % (ref 0–0.5)
LDLC SERPL CALC-MCNC: 78.6 MG/DL (ref 63–159)
LYMPHOCYTES # BLD AUTO: 2.5 K/UL (ref 1–4.8)
LYMPHOCYTES NFR BLD: 38.6 % (ref 18–48)
MCH RBC QN AUTO: 29.6 PG (ref 27–31)
MCHC RBC AUTO-ENTMCNC: 32.7 G/DL (ref 32–36)
MCV RBC AUTO: 91 FL (ref 82–98)
MONOCYTES # BLD AUTO: 0.6 K/UL (ref 0.3–1)
MONOCYTES NFR BLD: 8.4 % (ref 4–15)
NEUTROPHILS # BLD AUTO: 3 K/UL (ref 1.8–7.7)
NEUTROPHILS NFR BLD: 46.1 % (ref 38–73)
NONHDLC SERPL-MCNC: 90 MG/DL
NRBC BLD-RTO: 0 /100 WBC
PLATELET # BLD AUTO: 239 K/UL (ref 150–450)
PMV BLD AUTO: 9.4 FL (ref 9.2–12.9)
POTASSIUM SERPL-SCNC: 3.8 MMOL/L (ref 3.5–5.1)
PROT SERPL-MCNC: 6.5 G/DL (ref 6–8.4)
RBC # BLD AUTO: 4.52 M/UL (ref 4–5.4)
SODIUM SERPL-SCNC: 145 MMOL/L (ref 136–145)
TRIGL SERPL-MCNC: 57 MG/DL (ref 30–150)
WBC # BLD AUTO: 6.55 K/UL (ref 3.9–12.7)

## 2024-03-19 PROCEDURE — 80061 LIPID PANEL: CPT | Performed by: FAMILY MEDICINE

## 2024-03-19 PROCEDURE — 83036 HEMOGLOBIN GLYCOSYLATED A1C: CPT | Performed by: FAMILY MEDICINE

## 2024-03-19 PROCEDURE — 80053 COMPREHEN METABOLIC PANEL: CPT | Mod: PO | Performed by: FAMILY MEDICINE

## 2024-03-19 PROCEDURE — 36415 COLL VENOUS BLD VENIPUNCTURE: CPT | Mod: PO | Performed by: FAMILY MEDICINE

## 2024-03-19 PROCEDURE — 85025 COMPLETE CBC W/AUTO DIFF WBC: CPT | Mod: PO | Performed by: FAMILY MEDICINE

## 2024-04-03 DIAGNOSIS — G89.29 CHRONIC LEFT-SIDED LOW BACK PAIN WITH LEFT-SIDED SCIATICA: ICD-10-CM

## 2024-04-03 DIAGNOSIS — M54.42 CHRONIC LEFT-SIDED LOW BACK PAIN WITH LEFT-SIDED SCIATICA: ICD-10-CM

## 2024-04-03 RX ORDER — TRAMADOL HYDROCHLORIDE 50 MG/1
TABLET ORAL
Qty: 28 TABLET | Refills: 0 | Status: SHIPPED | OUTPATIENT
Start: 2024-04-03 | End: 2024-04-22 | Stop reason: SDUPTHER

## 2024-04-03 NOTE — TELEPHONE ENCOUNTER
No care due was identified.  Health [facility] Embedded Care Due Messages. Reference number: [id number].   4/03/2024 2:58:00 PM CDT

## 2024-04-15 ENCOUNTER — TELEPHONE (OUTPATIENT)
Dept: PRIMARY CARE CLINIC | Facility: CLINIC | Age: 74
End: 2024-04-15
Payer: MEDICARE

## 2024-04-15 NOTE — TELEPHONE ENCOUNTER
Spoke with pt and advised her that Dr. Sharif does not have a opening today. Pt advised me that she will find another doctor that she can see when ever she need to.

## 2024-04-15 NOTE — TELEPHONE ENCOUNTER
----- Message from Bertha Brewster sent at 4/15/2024  8:54 AM CDT -----  Contact: Salud Brannon is needing a call back in regards to a possible squeeze in appoint for today. Please give her a call back at [phone]

## 2024-04-22 ENCOUNTER — TELEPHONE (OUTPATIENT)
Dept: PAIN MEDICINE | Facility: CLINIC | Age: 74
End: 2024-04-22
Payer: MEDICARE

## 2024-04-22 ENCOUNTER — HOSPITAL ENCOUNTER (OUTPATIENT)
Dept: RADIOLOGY | Facility: HOSPITAL | Age: 74
Discharge: HOME | End: 2024-04-22
Attending: FAMILY MEDICINE
Payer: MEDICARE

## 2024-04-22 ENCOUNTER — TELEPHONE (OUTPATIENT)
Dept: PRIMARY CARE CLINIC | Facility: CLINIC | Age: 74
End: 2024-04-22
Payer: MEDICARE

## 2024-04-22 ENCOUNTER — OFFICE VISIT (OUTPATIENT)
Dept: PRIMARY CARE CLINIC | Facility: CLINIC | Age: 74
End: 2024-04-22
Payer: MEDICARE

## 2024-04-22 VITALS
HEIGHT: 62 IN | SYSTOLIC BLOOD PRESSURE: 120 MMHG | HEART RATE: 76 BPM | WEIGHT: 141.75 LBS | TEMPERATURE: 98 F | OXYGEN SATURATION: 96 % | DIASTOLIC BLOOD PRESSURE: 60 MMHG | BODY MASS INDEX: 26.09 KG/M2

## 2024-04-22 DIAGNOSIS — I73.9 PERIPHERAL VASCULAR DISEASE, UNSPECIFIED: ICD-10-CM

## 2024-04-22 DIAGNOSIS — G89.29 CHRONIC LEFT-SIDED LOW BACK PAIN WITH LEFT-SIDED SCIATICA: ICD-10-CM

## 2024-04-22 DIAGNOSIS — M54.41 ACUTE RIGHT-SIDED LOW BACK PAIN WITH RIGHT-SIDED SCIATICA: Primary | ICD-10-CM

## 2024-04-22 DIAGNOSIS — M54.42 CHRONIC LEFT-SIDED LOW BACK PAIN WITH LEFT-SIDED SCIATICA: ICD-10-CM

## 2024-04-22 DIAGNOSIS — Z12.11 COLON CANCER SCREENING: ICD-10-CM

## 2024-04-22 PROBLEM — J44.9 CHRONIC OBSTRUCTIVE PULMONARY DISEASE: Status: RESOLVED | Noted: 2021-03-01 | Resolved: 2024-04-22

## 2024-04-22 PROCEDURE — 93925 LOWER EXTREMITY STUDY: CPT | Mod: TC,PN

## 2024-04-22 PROCEDURE — 93925 LOWER EXTREMITY STUDY: CPT | Mod: 26,,, | Performed by: RADIOLOGY

## 2024-04-22 PROCEDURE — 99999 PR PBB SHADOW E&M-EST. PATIENT-LVL V: CPT | Mod: PBBFAC,,, | Performed by: FAMILY MEDICINE

## 2024-04-22 PROCEDURE — 99499 UNLISTED E&M SERVICE: CPT | Mod: S$GLB,,, | Performed by: FAMILY MEDICINE

## 2024-04-22 PROCEDURE — 96372 THER/PROPH/DIAG INJ SC/IM: CPT | Mod: S$GLB,,, | Performed by: FAMILY MEDICINE

## 2024-04-22 RX ORDER — GABAPENTIN 300 MG/1
300 CAPSULE ORAL NIGHTLY
Qty: 90 CAPSULE | Refills: 3 | Status: SHIPPED | OUTPATIENT
Start: 2024-04-22 | End: 2024-06-19

## 2024-04-22 RX ORDER — TRAMADOL HYDROCHLORIDE 50 MG/1
TABLET ORAL
Qty: 28 TABLET | Refills: 0 | Status: SHIPPED | OUTPATIENT
Start: 2024-04-22 | End: 2024-05-31 | Stop reason: SDUPTHER

## 2024-04-22 RX ORDER — KETOROLAC TROMETHAMINE 15 MG/ML
30 INJECTION, SOLUTION INTRAMUSCULAR; INTRAVENOUS
Status: DISCONTINUED | OUTPATIENT
Start: 2024-04-22 | End: 2024-04-22

## 2024-04-22 RX ORDER — KETOROLAC TROMETHAMINE 30 MG/ML
30 INJECTION, SOLUTION INTRAMUSCULAR; INTRAVENOUS
Status: COMPLETED | OUTPATIENT
Start: 2024-04-22 | End: 2024-04-22

## 2024-04-22 RX ORDER — KETOROLAC TROMETHAMINE 10 MG/1
10 TABLET, FILM COATED ORAL EVERY 6 HOURS
COMMUNITY
End: 2024-04-22 | Stop reason: ALTCHOICE

## 2024-04-22 RX ADMIN — KETOROLAC TROMETHAMINE 30 MG: 30 INJECTION, SOLUTION INTRAMUSCULAR; INTRAVENOUS at 11:04

## 2024-04-22 NOTE — PATIENT INSTRUCTIONS
Toradol injection today     Take Prednisone tablets, as directed  Take 2 tablets each morning for the next 5 days     Supplement with:              OTC Tylenol - two, 500mg tablets every 8 hours              Prescription tramadol, as needed, for severe pain     Prescription gabapentin at bedtime     Apply ice or cold compress 3-4 times per day, 10-15 minutes at a time  Alternate cold compress with warm, moist heat    Finish using the prescription baclofen, as directed.  When that runs out, then you can switch to the methocarbamol.  Follow the directions on the bottles.      Stop taking the Celebrex (celecoxib) and ketorolac.  These are similar to ibuprofen/naproxen.    Let us also have you follow up with our back specialist, Dr. Sy.  He comes here on Tuesdays and Fridays.  Referral placed today.    For the stress/anxiety, it is okay to use 1/2 or 1/4 of Xanax, as needed.  Just remember, it can make you really sleepy or drowsy.    Let us go ahead and get an ultrasound of the arteries in both legs.  This will let us know if there is any blockages in there that could be contributing to some of your pains.  We will contact you with those results as soon as they are available.    Order placed today for Cologuard colon cancer screening test.  [facility] may contact you to verify address and insurance info.  When you receive the kit, please try to complete it asap and send it back to them.  If you have any questions regarding completing the test, feel free to call them directly at [phone].  They have 24/7 telephone support available.

## 2024-04-22 NOTE — PROGRESS NOTES
Interesting!  Does look like we have some blockages in the superficial femoral arteries.  But, looks like the body has rerouted some of the blood flow around it.    Not convinced this is causing your pain.  Suspect the pain is more sciatica.  But, if you would like to see a vascular surgeon to get their opinion, please let me know and I can place a referral.

## 2024-04-23 ENCOUNTER — PATIENT MESSAGE (OUTPATIENT)
Dept: PRIMARY CARE CLINIC | Facility: CLINIC | Age: 74
End: 2024-04-23
Payer: MEDICARE

## 2024-04-23 DIAGNOSIS — I73.9 PERIPHERAL VASCULAR DISEASE, UNSPECIFIED: Primary | ICD-10-CM

## 2024-04-23 NOTE — PROGRESS NOTES
After obtaining consent, and per orders of Dr. Sharif, Toradol injection given by ELSI Jeffrey. Patient instructed to remain in clinic for 20 minutes afterwards, and to report any adverse reactions to me immediately. Pt pain level was a 7

## 2024-04-24 DIAGNOSIS — I73.9 PERIPHERAL VASCULAR DISEASE, UNSPECIFIED: Primary | ICD-10-CM

## 2024-04-29 ENCOUNTER — HOSPITAL ENCOUNTER (EMERGENCY)
Facility: HOSPITAL | Age: 74
Discharge: HOME | End: 2024-04-29
Attending: EMERGENCY MEDICINE
Payer: MEDICARE

## 2024-04-29 VITALS
HEART RATE: 81 BPM | SYSTOLIC BLOOD PRESSURE: 155 MMHG | WEIGHT: 141.75 LBS | TEMPERATURE: 98 F | BODY MASS INDEX: 26.09 KG/M2 | OXYGEN SATURATION: 96 % | HEIGHT: 62 IN | DIASTOLIC BLOOD PRESSURE: 68 MMHG | RESPIRATION RATE: 18 BRPM

## 2024-04-29 DIAGNOSIS — M54.41 CHRONIC RIGHT-SIDED LOW BACK PAIN WITH RIGHT-SIDED SCIATICA: Primary | ICD-10-CM

## 2024-04-29 DIAGNOSIS — G89.29 CHRONIC RIGHT-SIDED LOW BACK PAIN WITH RIGHT-SIDED SCIATICA: Primary | ICD-10-CM

## 2024-04-29 PROCEDURE — 96372 THER/PROPH/DIAG INJ SC/IM: CPT | Performed by: EMERGENCY MEDICINE

## 2024-04-29 PROCEDURE — 63600175 PHARM REV CODE 636 W HCPCS: Mod: ER | Performed by: EMERGENCY MEDICINE

## 2024-04-29 PROCEDURE — 99284 EMERGENCY DEPT VISIT MOD MDM: CPT | Mod: 25,ER

## 2024-04-29 PROCEDURE — 25000003 PHARM REV CODE 250: Mod: ER | Performed by: EMERGENCY MEDICINE

## 2024-04-29 RX ORDER — HYDROCODONE BITARTRATE AND ACETAMINOPHEN 7.5; 325 MG/1; MG/1
1 TABLET ORAL EVERY 6 HOURS PRN
Qty: 11 TABLET | Refills: 0 | Status: SHIPPED | OUTPATIENT
Start: 2024-04-29 | End: 2024-05-04

## 2024-04-29 RX ORDER — HYDROCODONE BITARTRATE AND ACETAMINOPHEN 10; 325 MG/1; MG/1
1 TABLET ORAL
Status: COMPLETED | OUTPATIENT
Start: 2024-04-29 | End: 2024-04-29

## 2024-04-29 RX ORDER — DEXAMETHASONE SODIUM PHOSPHATE 4 MG/ML
8 INJECTION, SOLUTION INTRA-ARTICULAR; INTRALESIONAL; INTRAMUSCULAR; INTRAVENOUS; SOFT TISSUE
Status: COMPLETED | OUTPATIENT
Start: 2024-04-29 | End: 2024-04-29

## 2024-04-29 RX ADMIN — DEXAMETHASONE SODIUM PHOSPHATE 8 MG: 4 INJECTION INTRA-ARTICULAR; INTRALESIONAL; INTRAMUSCULAR; INTRAVENOUS; SOFT TISSUE at 06:04

## 2024-04-29 RX ADMIN — HYDROCODONE BITARTRATE AND ACETAMINOPHEN 1 TABLET: 10; 325 TABLET ORAL at 06:04

## 2024-04-29 NOTE — ED PROVIDER NOTES
Encounter Date: 4/29/2024       History     Chief Complaint   Patient presents with    Back Pain     R lower back pain radiating into her hip and down RLE. Reports being dx with sciatica. Worsening pain for the past two days with no relief from tramadol     The history is provided by the patient.   Back Pain   This is a recurrent problem. The current episode started several days ago. The problem occurs constantly. The problem has been unchanged. The pain is associated with twisting. The pain is present in the lumbar spine. The quality of the pain is described as shooting. The pain radiates to the right leg. Pertinent negatives include no chest pain, no fever, no abdominal pain, no abdominal swelling, no bladder incontinence, no dysuria, no paresthesias, no paresis and no weakness.     Review of patient's allergies indicates:   Allergen Reactions    Dye Anaphylaxis     Contrast Dye    Iodine and iodide containing products Rash    Penicillins Shortness Of Breath     Shortness of breath^severe skin rash    Lovastatin Other (See Comments)     Causes leg cramp    Mucinex [guaifenesin]     Cephalexin Nausea And Vomiting    Diazepam Anxiety     Made pt overly anxious instead of relaxing    Naproxen Nausea And Vomiting     Past Medical History:   Diagnosis Date    Asthma     childhood - seasonal as an adult    Hematuria 06/10/2019    Hyperlipidemia     Hypertension     pt denies    Restless leg     Spinal stenosis      Past Surgical History:   Procedure Laterality Date    BACK SURGERY      BREAST BIOPSY      CARDIAC CATHETERIZATION  2018, 11/2020    HYSTERECTOMY      LYMPH NODE BIOPSY      ROBOT-ASSISTED REPAIR OF VENTRAL HERNIA USING DA JIM XI N/A 12/10/2020    Procedure: XI ROBOTIC REPAIR, HERNIA, VENTRAL;  Surgeon: Brandyn Panchal MD;  Location: [facility];  Service: General;  Laterality: N/A;    TONSILLECTOMY       Family History   Problem Relation Name Age of Onset    Heart disease Mother      Esophageal cancer Brother  73         Stage 4    Cancer Maternal Uncle       Social History     Tobacco Use    Smoking status: Former     Current packs/day: 0.50     Average packs/day: 0.5 packs/day for 30.0 years (15.0 ttl pk-yrs)     Types: Cigarettes    Smokeless tobacco: Former    Tobacco comments:     none past MN before surgery   Substance Use Topics    Alcohol use: Yes     Comment: 1-2 beers per month    Drug use: No     Review of Systems   Constitutional:  Negative for fever.   HENT:  Negative for sore throat.    Respiratory:  Negative for shortness of breath.    Cardiovascular:  Negative for chest pain.   Gastrointestinal:  Negative for abdominal pain and nausea.   Genitourinary:  Negative for bladder incontinence and dysuria.   Musculoskeletal:  Positive for back pain.   Skin:  Negative for rash.   Neurological:  Negative for weakness and paresthesias.   Hematological:  Does not bruise/bleed easily.       Physical Exam     Initial Vitals [04/29/24 0623]   BP Pulse Resp Temp SpO2   (!) 155/68 81 18 98.3 °F (36.8 °C) 96 %      MAP       --         Physical Exam    Constitutional: She appears well-developed and well-nourished. No distress.   HENT:   Head: Normocephalic and atraumatic.   Eyes: Conjunctivae are normal. Pupils are equal, round, and reactive to light.   Neck: Neck supple.   Normal range of motion.  Cardiovascular:  Normal rate, regular rhythm and normal heart sounds.           Pulmonary/Chest: Breath sounds normal.   Abdominal: Abdomen is soft. Bowel sounds are normal. She exhibits no distension. There is no abdominal tenderness. There is no rebound.   Musculoskeletal:         General: No edema. Normal range of motion.      Cervical back: Normal range of motion and neck supple.     Neurological: She is alert and oriented to person, place, and time. She has normal strength.   Skin: Skin is warm and dry.   Psychiatric: She has a normal mood and affect.         ED Course   Procedures  Labs Reviewed - No data to display        Imaging Results    None          Medications   dexAMETHasone injection 8 mg (8 mg Intramuscular Given 4/29/24 0646)   HYDROcodone-acetaminophen  mg per tablet 1 tablet (1 tablet Oral Given 4/29/24 0646)     Medical Decision Making  DDx: sciatica, back pain    Amount and/or Complexity of Data Reviewed  Discussion of management or test interpretation with external provider(s): Feeling better after treatment in the ED, and ready to go home.     Risk  Prescription drug management.                                      Clinical Impression:  Final diagnoses:  [M54.41, G89.29] Chronic right-sided low back pain with right-sided sciatica (Primary)          ED Disposition Condition    Discharge Stable          ED Prescriptions       Medication Sig Dispense Start Date End Date Auth. Provider    HYDROcodone-acetaminophen (NORCO) 7.5-325 mg per tablet Take 1 tablet by mouth every 6 (six) hours as needed. 11 tablet 4/29/2024 5/4/2024 Jose Mejia MD          Follow-up Information       Follow up With Specialties Details Why Contact Info    Rikki Sharif MD Family Medicine   [address] [place] [address]  [phone]               Jose Mejia MD  04/29/24 0706

## 2024-04-30 ENCOUNTER — TELEPHONE (OUTPATIENT)
Dept: PRIMARY CARE CLINIC | Facility: CLINIC | Age: 74
End: 2024-04-30
Payer: MEDICARE

## 2024-04-30 ENCOUNTER — PATIENT OUTREACH (OUTPATIENT)
Dept: EMERGENCY MEDICINE | Facility: HOSPITAL | Age: 74
End: 2024-04-30
Payer: MEDICARE

## 2024-04-30 NOTE — PROGRESS NOTES
Lyly Li  ED Navigator  Emergency Department    [place] [facility] [place]  Role: Community Health Worker    Date: 04/30/2024  Patient Name: Salud Echevarria  MRN: [id number]  PCP: Rikki Sharif MD    Assessment:     Salud Echevarria is a 74 y.o. female who has presented to ED for Chronic right-sided low back pain with right-sided sciatica. Patient has visited the ED 1 times in the past 3 months. Patient did not contact PCP.     ED Navigator Initial Assessment    ED Navigator Enrollment Documentation  Consent to Services  Does patient consent to completing the assessment?: Yes  Contact  Method of Initial Contact: Phone  Transportation  Does the patient have issues with Transportation?: No  Does the patient have transportation to and from healthcare appointments?: Yes  Insurance Coverage  Do you have coverage/adequate coverage?: Yes  Type/kind of coverage: [facility]  Is patient able to afford co-pays/deductibles?: Yes  Is patient able to afford HME or supplies?: Yes  Does patient have an established [facility] PCP?: Yes  Able to access?: Yes  Does the patient have a lack of adequate coverage?: No  Specialist Appointment  Did the patient come to the ED to see a specialist?: No  Does the patient have a pending specialist referral?: No  Does the patient have a specialist appointment made?: No  PCP Follow Up Appointment  Has the patient had an appointment with a primary care provider in the past year?: Yes  Approximate date: 4/22/24  Provider: Rikki Sharif MD  Does the patient have a follow up appontment with a PCP?: No  When was the last time you saw your PCP?: 4/22/24  Why does the patient not have a follow up scheduled?: Other (see comments)  Medications  Is patient able to afford medication?: Yes  Is patient unable to get medication due to lack of transportation?: No  Psychological  Food  Communication/Education  Does the patient have limited English proficiency/English not primary language?: No  Does patient  have low literacy and/or low health literacy?: Yes  Does patient have concerns with care?: No  Does patient have dissatisfaction with care?: No  Other Financial Concerns  Other Social Barriers/Concerns  Does the patient have any additional barriers or concerns?: Other (see comments) (Comment: Chronic Conditons)  Primary Barrier  Barriers identified: Cognitive barrier (health literacy, language and communication, etc.)  Root Cause of ED Utilization: Chronic Conditions  Next steps: Provided Education  Additional Documentation: Pt was seen in the ED on 4/29/24 for Chronic right-sided low back pain with right-sided sciatica. I spoke with pt for Post ED visit follow up navigation to assist with scheduling a 7-day Post ED visit follow up appt. Pt states that she had not yet contacted pcp to schedule and accepted scheduling assistance. I was unable to schedule appt for pt and sent a request for assistance to pcp.  Pt will be contacted directly for scheduling. Pt does not have transportation issues and has no additional needs at this time.    Lyly Li            Social History     Socioeconomic History    Marital status:    Tobacco Use    Smoking status: Former     Current packs/day: 0.50     Average packs/day: 0.5 packs/day for 30.0 years (15.0 ttl pk-yrs)     Types: Cigarettes    Smokeless tobacco: Former    Tobacco comments:     none past MN before surgery   Substance and Sexual Activity    Alcohol use: Yes     Comment: 1-2 beers per month    Drug use: No    Sexual activity: Not Currently     Partners: Male     Social Determinants of Health     Financial Resource Strain: Medium Risk (5/20/2022)    Overall Financial Resource Strain (CARDIA)     Difficulty of Paying Living Expenses: Somewhat hard   Food Insecurity: No Food Insecurity (5/20/2022)    Hunger Vital Sign     Worried About Running Out of Food in the Last Year: Never true     Ran Out of Food in the Last Year: Never true   Transportation Needs: No  Transportation Needs (5/20/2022)    PRAPARE - Transportation     Lack of Transportation (Medical): No     Lack of Transportation (Non-Medical): No   Physical Activity: Sufficiently Active (5/20/2022)    Exercise Vital Sign     Days of Exercise per Week: 7 days     Minutes of Exercise per Session: 30 min   Stress: Stress Concern Present (5/20/2022)    [employer] - Occupational Stress Questionnaire     Feeling of Stress : To some extent   Housing Stability: Low Risk  (5/20/2022)    Housing Stability Vital Sign     Unable to Pay for Housing in the Last Year: No     Number of Places Lived in the Last Year: 1     Unstable Housing in the Last Year: No       Plan:     Pt was seen in the ED on 4/29/24 for Chronic right-sided low back pain with right-sided sciatica. I spoke with pt for Post ED visit follow up navigation to assist with scheduling a 7-day Post ED visit follow up appt. Pt states that she had not yet contacted pcp to schedule and accepted scheduling assistance. I was unable to schedule appt for pt and sent a request for assistance to pcp.  Pt will be contacted directly for scheduling. Pt does not have transportation issues and has no additional needs at this time.    Lyly Li    Appointment made with: Rikki Shraif MD

## 2024-04-30 NOTE — TELEPHONE ENCOUNTER
Hi, I am sending this message on behalf of my mother, Salud Echevarria. She would like to have a referral sent to Dr. Mark Marcos. She would like to see him for her sciatica. His office number is [phone]. If you need to speak directly with Salud, she can be reached at [phone].

## 2024-04-30 NOTE — TELEPHONE ENCOUNTER
----- Message from Ronshaq Massey sent at 4/30/2024  4:45 PM CDT -----  Regarding: self  Type: Patient Call Back    Who called:self    What is the request in detail:need the office to send over a referral for the patient to see Dr kwan   Vascular      Can the clinic reply by [facility]?no    Would the patient rather a call back or a response via [facility]? callback    Best call back number:[phone]    Additional Information:[phone]

## 2024-04-30 NOTE — TELEPHONE ENCOUNTER
----- Message from Lyly Li sent at 4/30/2024 11:47 AM CDT -----  Regarding: Post ED Visit follow up appt within 7 days of d/c date 4/29/24  Good afternoon: Pt was seen in the ED on 4/29/24 for Chronic right-sided low back pain with right-sided sciatica. I spoke with pt for a follow up and pt states that she had not yet scheduled a follow up appt and would like assistance. I am requesting assistance as I am unable to schedule pt within 7 days. Pt is requesting Monday or Tuesday of next week in the mid-morning and would like to be contacted directly for scheduling.     Thank you for your assistance,  Lyly Li

## 2024-04-30 NOTE — TELEPHONE ENCOUNTER
Pt would like to see  Dr. Christiano Casarez Jr., MD - Vascular Surgery . This doctor is with [facility]

## 2024-05-06 ENCOUNTER — TELEPHONE (OUTPATIENT)
Dept: PRIMARY CARE CLINIC | Facility: CLINIC | Age: 74
End: 2024-05-06
Payer: MEDICARE

## 2024-05-06 NOTE — TELEPHONE ENCOUNTER
----- Message from Meche Ford sent at 5/6/2024  4:42 PM CDT -----  Contact: Salud  Patient is calling back to leave fax number to send referral. Please send Attn: Carlene and the fax number [phone]. Please send this to Dr Ellis office today. This is patient's second time calling per patient concerning this.

## 2024-05-06 NOTE — TELEPHONE ENCOUNTER
----- Message from Orquidea Frederick sent at 5/6/2024 11:38 AM CDT -----  Regarding: referral  Name of Who is Calling:  Pt called        What is the request in detail:    Referral for dr dean Clay in [place] vascular office fax [phone] Carlene       Can the clinic reply by [facility]:  No         [facility]:Telephone Information:  Mobile          [phone]

## 2024-05-07 ENCOUNTER — HOSPITAL ENCOUNTER (EMERGENCY)
Facility: HOSPITAL | Age: 74
Discharge: HOME | End: 2024-05-07
Attending: EMERGENCY MEDICINE
Payer: MEDICARE

## 2024-05-07 VITALS
RESPIRATION RATE: 18 BRPM | SYSTOLIC BLOOD PRESSURE: 122 MMHG | HEIGHT: 62 IN | BODY MASS INDEX: 24.59 KG/M2 | OXYGEN SATURATION: 98 % | TEMPERATURE: 98 F | HEART RATE: 80 BPM | WEIGHT: 133.63 LBS | DIASTOLIC BLOOD PRESSURE: 77 MMHG

## 2024-05-07 DIAGNOSIS — M54.31 SCIATICA, RIGHT SIDE: Primary | ICD-10-CM

## 2024-05-07 DIAGNOSIS — M79.604 LEG PAIN, RIGHT: ICD-10-CM

## 2024-05-07 PROCEDURE — 96372 THER/PROPH/DIAG INJ SC/IM: CPT | Performed by: EMERGENCY MEDICINE

## 2024-05-07 PROCEDURE — 63600175 PHARM REV CODE 636 W HCPCS: Mod: ER | Performed by: EMERGENCY MEDICINE

## 2024-05-07 PROCEDURE — 25000003 PHARM REV CODE 250: Mod: ER | Performed by: EMERGENCY MEDICINE

## 2024-05-07 PROCEDURE — 99285 EMERGENCY DEPT VISIT HI MDM: CPT | Mod: 25,ER

## 2024-05-07 RX ORDER — DEXAMETHASONE SODIUM PHOSPHATE 4 MG/ML
4 INJECTION, SOLUTION INTRA-ARTICULAR; INTRALESIONAL; INTRAMUSCULAR; INTRAVENOUS; SOFT TISSUE
Status: COMPLETED | OUTPATIENT
Start: 2024-05-07 | End: 2024-05-07

## 2024-05-07 RX ORDER — OXYCODONE AND ACETAMINOPHEN 10; 325 MG/1; MG/1
1 TABLET ORAL EVERY 6 HOURS PRN
Qty: 10 TABLET | Refills: 0 | Status: SHIPPED | OUTPATIENT
Start: 2024-05-07 | End: 2024-06-03 | Stop reason: ALTCHOICE

## 2024-05-07 RX ORDER — HYDROCODONE BITARTRATE AND ACETAMINOPHEN 5; 325 MG/1; MG/1
1 TABLET ORAL
Status: COMPLETED | OUTPATIENT
Start: 2024-05-07 | End: 2024-05-07

## 2024-05-07 RX ADMIN — HYDROCODONE BITARTRATE AND ACETAMINOPHEN 1 TABLET: 5; 325 TABLET ORAL at 08:05

## 2024-05-07 RX ADMIN — DEXAMETHASONE SODIUM PHOSPHATE 4 MG: 4 INJECTION INTRA-ARTICULAR; INTRALESIONAL; INTRAMUSCULAR; INTRAVENOUS; SOFT TISSUE at 08:05

## 2024-05-07 NOTE — ED PROVIDER NOTES
Encounter Date: 5/7/2024       History     Chief Complaint   Patient presents with    Leg Pain     Behind right knee post needling yest. Noticed swelling and pain. Also pain right buttocks , down leg to foot     The history is provided by the patient.   Leg Pain   The incident occurred at home. There was no injury mechanism. The incident occurred several weeks ago. The pain is present in the right leg. The quality of the pain is described as throbbing. The pain is at a severity of 8/10. The pain has been Constant since onset. Pertinent negatives include no inability to bear weight, no loss of motion and no muscle weakness. She reports no foreign bodies present.     Review of patient's allergies indicates:   Allergen Reactions    Dye Anaphylaxis     Contrast Dye    Iodine and iodide containing products Rash    Penicillins Shortness Of Breath     Shortness of breath^severe skin rash    Gabapentin     Lovastatin Other (See Comments)     Causes leg cramp    Mucinex [guaifenesin]     Cephalexin Nausea And Vomiting    Diazepam Anxiety     Made pt overly anxious instead of relaxing    Naproxen Nausea And Vomiting     Past Medical History:   Diagnosis Date    Asthma     childhood - seasonal as an adult    Hematuria 06/10/2019    Hyperlipidemia     Hypertension     pt denies    Restless leg     Spinal stenosis      Past Surgical History:   Procedure Laterality Date    BACK SURGERY      BREAST BIOPSY      CARDIAC CATHETERIZATION  2018, 11/2020    HYSTERECTOMY      LYMPH NODE BIOPSY      ROBOT-ASSISTED REPAIR OF VENTRAL HERNIA USING DA JIM XI N/A 12/10/2020    Procedure: XI ROBOTIC REPAIR, HERNIA, VENTRAL;  Surgeon: Brandyn Panchal MD;  Location: [facility];  Service: General;  Laterality: N/A;    TONSILLECTOMY       Family History   Problem Relation Name Age of Onset    Heart disease Mother      Esophageal cancer Brother  73        Stage 4    Cancer Maternal Uncle       Social History     Tobacco Use    Smoking status: Former      Current packs/day: 0.50     Average packs/day: 0.5 packs/day for 30.0 years (15.0 ttl pk-yrs)     Types: Cigarettes    Smokeless tobacco: Former    Tobacco comments:     none past MN before surgery   Substance Use Topics    Alcohol use: Yes     Comment: 1-2 beers per month    Drug use: No     Review of Systems   Constitutional:  Negative for fever.   HENT:  Negative for sore throat.    Respiratory:  Negative for shortness of breath.    Cardiovascular:  Negative for chest pain.   Gastrointestinal:  Negative for nausea.   Genitourinary:  Negative for dysuria.   Musculoskeletal:  Positive for back pain.   Skin:  Negative for rash.   Neurological:  Negative for weakness.   Hematological:  Does not bruise/bleed easily.       Physical Exam     Initial Vitals [05/07/24 0814]   BP Pulse Resp Temp SpO2   132/79 100 20 97.6 °F (36.4 °C) 95 %      MAP       --         Physical Exam    Nursing note and vitals reviewed.  Constitutional: She appears well-developed and well-nourished. No distress.   Elderly   HENT:   Head: Normocephalic and atraumatic.   Mouth/Throat: Oropharynx is clear and moist.   Eyes: Conjunctivae and EOM are normal. Pupils are equal, round, and reactive to light.   Neck: Neck supple.   Normal range of motion.  Cardiovascular:  Normal rate, regular rhythm and normal heart sounds.           Pulmonary/Chest: Breath sounds normal. No respiratory distress.   Abdominal: Abdomen is soft. Bowel sounds are normal. She exhibits no distension. There is no abdominal tenderness.   Musculoskeletal:         General: Normal range of motion.      Cervical back: Normal range of motion and neck supple.     Neurological: She is alert and oriented to person, place, and time. She has normal strength.   Skin: Skin is warm and dry.   Psychiatric: She has a normal mood and affect. Thought content normal.         ED Course   Procedures  Labs Reviewed - No data to display       Imaging Results              US Lower Extremity Veins  Right (Final result)  Result time 05/07/24 11:16:15      Final result by Scar Chahal MD (05/07/24 11:16:15)                   Impression:      No evidence of deep venous thrombosis in the right lower extremity.      Electronically signed by: Scar Chahal MD  Date:    05/07/2024  Time:    11:16               Narrative:    EXAMINATION:  US LOWER EXTREMITY VEINS RIGHT    CLINICAL HISTORY:  Pain in right leg    TECHNIQUE:  Duplex and color flow Doppler evaluation and graded compression of the right lower extremity veins was performed.    COMPARISON:  None    FINDINGS:  Right thigh veins: The common femoral, femoral, popliteal, upper greater saphenous, and deep femoral veins are patent and free of thrombus. The veins are normally compressible and have normal phasic flow and augmentation response.    Right calf veins: The visualized calf veins are patent.    Contralateral CFV: The contralateral (left) common femoral vein is patent and free of thrombus.    Miscellaneous: None                                       Medications   dexAMETHasone injection 4 mg (4 mg Intramuscular Given 5/7/24 0852)   HYDROcodone-acetaminophen 5-325 mg per tablet 1 tablet (1 tablet Oral Given 5/7/24 0850)     Medical Decision Making  DDx Sciatica, MS pain, Strain, DVT    Problems Addressed:  Leg pain, right: chronic illness or injury with exacerbation, progression, or side effects of treatment  Sciatica, right side: chronic illness or injury with exacerbation, progression, or side effects of treatment    Amount and/or Complexity of Data Reviewed  Radiology: ordered.    Risk  Prescription drug management.    11:21 AM - Counseling: Spoke with the patient and discussed todays findings, in addition to providing specific details for the plan of care and counseling regarding the diagnosis and prognosis. Questions are answered at this time.                                     Clinical Impression:  Final diagnoses:  [M54.31] Sciatica, right side  (Primary)  [M79.604] Leg pain, right          ED Disposition Condition    Discharge Stable          ED Prescriptions       Medication Sig Dispense Start Date End Date Auth. Provider    oxyCODONE-acetaminophen (PERCOCET)  mg per tablet Take 1 tablet by mouth every 6 (six) hours as needed. 10 tablet 5/7/2024 -- Dong Murry MD          Follow-up Information       Follow up With Specialties Details Why Contact Info    Rikki Sharif MD Family Medicine Schedule an appointment as soon as possible for a visit   [address] [place] [address]  [phone]      [facility] Emergency Medicine  If symptoms worsen [address] [address] [address] [phone]             Dong Murry MD  05/07/24 1127

## 2024-05-08 ENCOUNTER — PATIENT OUTREACH (OUTPATIENT)
Dept: EMERGENCY MEDICINE | Facility: HOSPITAL | Age: 74
End: 2024-05-08
Payer: MEDICARE

## 2024-05-08 DIAGNOSIS — I73.9 PERIPHERAL VASCULAR DISEASE, UNSPECIFIED: Primary | ICD-10-CM

## 2024-05-09 NOTE — ASSESSMENT & PLAN NOTE
Known history of spinal stenosis which place her at risk for issues with lower back pain.   [place] [id number]

## 2024-05-09 NOTE — PROGRESS NOTES
Pt visited the ED on 5/7/24. I made 2 attempts to reach patient to assist with scheduling a post ED 7-day follow up with PCP. Unable to reach. Closing encounter.    Lyly Nunez

## 2024-05-15 LAB — NONINV COLON CA DNA+OCC BLD SCRN STL QL: POSITIVE

## 2024-05-20 ENCOUNTER — INITIAL CONSULT (OUTPATIENT)
Dept: VASCULAR SURGERY | Facility: CLINIC | Age: 74
End: 2024-05-20
Payer: MEDICARE

## 2024-05-20 ENCOUNTER — HOSPITAL ENCOUNTER (OUTPATIENT)
Dept: VASCULAR SURGERY | Facility: HOSPITAL | Age: 74
Discharge: HOME | End: 2024-05-20
Attending: NURSE PRACTITIONER
Payer: MEDICARE

## 2024-05-20 VITALS — HEART RATE: 83 BPM | SYSTOLIC BLOOD PRESSURE: 136 MMHG | DIASTOLIC BLOOD PRESSURE: 71 MMHG

## 2024-05-20 DIAGNOSIS — Z87.891 HISTORY OF TOBACCO USE: ICD-10-CM

## 2024-05-20 DIAGNOSIS — I73.9 PERIPHERAL VASCULAR DISEASE, UNSPECIFIED: ICD-10-CM

## 2024-05-20 DIAGNOSIS — I73.9 PAD (PERIPHERAL ARTERY DISEASE): Primary | ICD-10-CM

## 2024-05-20 DIAGNOSIS — M48.07 SPINAL STENOSIS OF LUMBOSACRAL REGION: ICD-10-CM

## 2024-05-20 PROCEDURE — 93922 UPR/L XTREMITY ART 2 LEVELS: CPT | Mod: 26,,, | Performed by: SURGERY

## 2024-05-20 PROCEDURE — 99999 PR PBB SHADOW E&M-EST. PATIENT-LVL III: CPT | Mod: PBBFAC,,, | Performed by: SURGERY

## 2024-05-20 PROCEDURE — 93922 UPR/L XTREMITY ART 2 LEVELS: CPT

## 2024-05-20 PROCEDURE — 99204 OFFICE O/P NEW MOD 45 MIN: CPT | Mod: S$GLB,,, | Performed by: SURGERY

## 2024-05-20 NOTE — ASSESSMENT & PLAN NOTE
Patient with 6weeks of continued right LE pains. Her SUZANNA in clinic today is 0.60 on the right and 1.03 on the left. Discussed cares with patient in clinic and that with her current hx and exam, her pains sound consistent with lumbar radiculopathy. Wound like to proceed with referral to spine surgeon for new assessment. Discussed if no issues from prior hardware or re-stenosis, will see her back in clinic in 6 months with repeat SUZANNA to discuss moving forward with RLE angiogram. She agreed with plan as stated.

## 2024-05-20 NOTE — PROGRESS NOTES
[facility], [place]UAN ANTONIO  [facility]    OFFICE NOTE    DATE OF VISIT: 2024  PATIENT NAME: Salud Echevarria  : 1950  MRN: [id number]  PRIMARY CARE PHYSICIAN: Rikki Sharif MD  CARDIOLOGIST: Paul Hannah  REFERRING PROVIDER: Rikki Sharif MD    CHIEF COMPLAINT   Chief Complaint   Patient presents with    Consult     Consult for PAD.       HISTORY OF PRESENT ILLNESS:  Salud Echevarria is a 74 y.o. female who presents to clinic today as referral for lower extremity PVD. She states she has been having new onset of right lower extremity pains for about the past 6 weeks, since the end of March. She does not recall when exactly the pain started or what she was doing when the pain began. She was states she had to go to the ED twice over the past 6 weeks due to uncontrolled pain in the right leg. She states the pain begins at the hip and buttock and radiates down the backside of the right leg. The pain is worse at night when she is trying to sleep and it will wake her up. She has been in physical therapy for the past 3 weeks and believes she is improving with the exercises she's been doing.  She does have hx of lumbar surgery and fusion at [facility] about 11 years ago in [place]. She has not followed up with a spine surgeon in many years.   She denies any wound or sores to her toes. She denies muscle aches or pains consistent with claudication while ambulating. She denies rest pains but does have night time pains.    She unfortunately does continue to smoke daily but states she is trying to quit.       ALLERGIES:  Review of patient's allergies indicates:   Allergen Reactions    Dye Anaphylaxis     Contrast Dye    Iodine and iodide containing products Rash    Penicillins Shortness Of Breath     Shortness of breath^severe skin rash    Gabapentin     Lovastatin Other (See Comments)     Causes leg cramp    Mucinex [guaifenesin]     Cephalexin Nausea And Vomiting    Diazepam Anxiety     Made pt  overly anxious instead of relaxing    Naproxen Nausea And Vomiting       PAST MEDICAL HISTORY:  Past Medical History:   Diagnosis Date    Asthma     childhood - seasonal as an adult    Hematuria 06/10/2019    Hyperlipidemia     Hypertension     pt denies    Restless leg     Spinal stenosis        PAST SURGICAL HISTORY:  Past Surgical History:   Procedure Laterality Date    BACK SURGERY      BREAST BIOPSY      CARDIAC CATHETERIZATION  2018, 11/2020    HYSTERECTOMY      LYMPH NODE BIOPSY      ROBOT-ASSISTED REPAIR OF VENTRAL HERNIA USING DA JIM XI N/A 12/10/2020    Procedure: XI ROBOTIC REPAIR, HERNIA, VENTRAL;  Surgeon: Brandyn Panchal MD;  Location: [facility] OR;  Service: General;  Laterality: N/A;    TONSILLECTOMY         SOCIAL HISTORY:   Social History     Tobacco Use    Smoking status: Former     Current packs/day: 0.50     Average packs/day: 0.5 packs/day for 30.0 years (15.0 ttl pk-yrs)     Types: Cigarettes    Smokeless tobacco: Former    Tobacco comments:     none past MN before surgery   Substance Use Topics    Alcohol use: Yes     Comment: 1-2 beers per month    Drug use: No       FAMILY HISTORY:  Family History   Problem Relation Name Age of Onset    Heart disease Mother      Esophageal cancer Brother  73        Stage 4    Cancer Maternal Uncle         REVIEW OF SYSTEMS:  Review of Systems   All other systems reviewed and are negative.      PHYSICAL EXAM:  Vitals:    05/20/24 1342   BP: 136/71   Pulse: 83      Physical Exam  Vitals and nursing note reviewed.   Constitutional:       Appearance: Normal appearance.   HENT:      Head: Normocephalic.      Nose: Nose normal.   Eyes:      Pupils: Pupils are equal, round, and reactive to light.   Cardiovascular:      Rate and Rhythm: Normal rate.      Pulses:           Dorsalis pedis pulses are detected w/ Doppler on the right side and detected w/ Doppler on the left side.        Posterior tibial pulses are detected w/ Doppler on the right side and detected w/ Doppler  on the left side.   Pulmonary:      Effort: Pulmonary effort is normal.   Abdominal:      General: Abdomen is flat. There is no distension.   Musculoskeletal:      Cervical back: Normal range of motion.      Right lower leg: No edema.      Left lower leg: No edema.   Skin:     General: Skin is warm and dry.   Neurological:      General: No focal deficit present.      Mental Status: She is alert and oriented to person, place, and time. Mental status is at baseline.   Psychiatric:         Mood and Affect: Mood normal.         Behavior: Behavior normal.         Judgment: Judgment normal.             VASCULAR LAB STUDIES:  Dr. Griffin and I have personally reviewed the studies.  SUZANNA performed today in clinic:   Right is 0.60 with a TP of 64   Left is 1.03 with a TP of 94   Moderate disease in the RLE      ASSESSMENT AND PLAN:  Spinal stenosis  Discussed cares with patient today in clinic with Dr. Griffin. Patient needs to have formal follow up with Spine surgeon due to prior lumbar fusion. Current assessment and exam leads more to lumbar radicular pains as patient does not have any claudication pains in the past 6 weeks since her discomfort has begun. Will send referral to Dr. Arora with [facility].     Peripheral vascular disease, unspecified  Patient with 6weeks of continued right LE pains. Her SUZANNA in clinic today is 0.60 on the right and 1.03 on the left. Discussed cares with patient in clinic and that with her current hx and exam, her pains sound consistent with lumbar radiculopathy. Wound like to proceed with referral to spine surgeon for new assessment. Discussed if no issues from prior hardware or re-stenosis, will see her back in clinic in 6 months with repeat SUZANNA to discuss moving forward with RLE angiogram. She agreed with plan as stated.     History of tobacco use  Discussed smoking cessation today in clinic, she stated she is trying to quit.       Salud was seen today for consult.    Diagnoses and all orders for this  visit:    PAD (peripheral artery disease)  -     Ambulatory referral/consult to Vascular Surgery  -     Ankle Brachial Indices (SUZANNA); Future    Spinal stenosis of lumbosacral region  -     Ambulatory referral/consult to Spine Surgery; Future    History of tobacco use        Follow up in about 6 months (around 11/20/2024) for PAD.        Fredrick Bojorquez  [facility]  Vascular Surgery  [phone] (Clinic Number)

## 2024-05-20 NOTE — ASSESSMENT & PLAN NOTE
Discussed cares with patient today in clinic with Dr. Griffin. Patient needs to have formal follow up with Spine surgeon due to prior lumbar fusion. Current assessment and exam leads more to lumbar radicular pains as patient does not have any claudication pains in the past 6 weeks since her discomfort has begun. Will send referral to Dr. Arora with [facility].

## 2024-05-28 LAB
LEFT ABI: 1.03
LEFT ARM BP: 127 MMHG
LEFT CALF BP: 133 MMHG
LEFT DORSALIS PEDIS: 131 MMHG
LEFT POSTERIOR TIBIAL: 122 MMHG
LEFT TBI: 0.74
LEFT TOE PRESSURE: 94 MMHG
LEFT UPPER LEG BP: 144 MMHG
RIGHT ABI: 0.61
RIGHT ARM BP: 123 MMHG
RIGHT DORSALIS PEDIS: 76 MMHG
RIGHT POSTERIOR TIBIAL: 78 MMHG
RIGHT TBI: 0.5
RIGHT TOE PRESSURE: 64 MMHG
RIGHT UPPER LEG BP: 120 MMHG

## 2024-05-30 DIAGNOSIS — M54.42 CHRONIC LEFT-SIDED LOW BACK PAIN WITH LEFT-SIDED SCIATICA: ICD-10-CM

## 2024-05-30 DIAGNOSIS — G89.29 CHRONIC LEFT-SIDED LOW BACK PAIN WITH LEFT-SIDED SCIATICA: ICD-10-CM

## 2024-05-30 NOTE — TELEPHONE ENCOUNTER
No care due was identified.  [facility] Embedded Care Due Messages. Reference number: [id number].   5/30/2024 6:10:50 PM CDT

## 2024-05-31 RX ORDER — TRAMADOL HYDROCHLORIDE 50 MG/1
TABLET ORAL
Qty: 28 TABLET | Refills: 0 | OUTPATIENT
Start: 2024-05-31

## 2024-05-31 NOTE — TELEPHONE ENCOUNTER
Refill Routing Note   Medication(s) are not appropriate for processing by [facility] for the following reason(s):        Outside of protocol    [facility] action(s):  Route               Appointments  past 12m or future 3m with PCP    Date Provider   Last Visit   4/22/2024 Rikki Sharif MD   Next Visit   Visit date not found Rikki Sharif MD   ED visits in past 90 days: 2        Note composed:12:30 PM 05/31/2024

## 2024-05-31 NOTE — TELEPHONE ENCOUNTER
----- Message from Sandee Alvarado sent at 5/31/2024 12:14 PM CDT -----  Contact: [phone]  Pt is calling requesting to speak with nurse, medication denial. Please call back at [phone] . Thanksdj

## 2024-06-03 RX ORDER — TRAMADOL HYDROCHLORIDE 50 MG/1
TABLET ORAL
Qty: 28 TABLET | Refills: 0 | Status: SHIPPED | OUTPATIENT
Start: 2024-06-03

## 2024-06-10 ENCOUNTER — TELEPHONE (OUTPATIENT)
Dept: PRIMARY CARE CLINIC | Facility: CLINIC | Age: 74
End: 2024-06-10
Payer: MEDICARE

## 2024-06-10 RX ORDER — ALPHA LIPOIC ACID 300 MG
CAPSULE ORAL
COMMUNITY
End: 2024-06-19

## 2024-06-10 NOTE — TELEPHONE ENCOUNTER
Called and spoke with pt . Pt c/o sciatic nerve pain. Pt states she has done dry needling and physical therapy .     Please order SciatiEase Sciatic Nerve Support.   (AlphaPalm, Pea, Vitamin B Complex, Alpha Lipoic Acid 300mg)

## 2024-06-10 NOTE — TELEPHONE ENCOUNTER
----- Message from Brianda Newell sent at 6/10/2024 10:10 AM CDT -----  Contact: Salud  Patient is calling to receive a call back at .[phone]. Reports having issuers with sciatic nerve and wants to speak with a nurse further.

## 2024-06-11 ENCOUNTER — TELEPHONE (OUTPATIENT)
Dept: PRIMARY CARE CLINIC | Facility: CLINIC | Age: 74
End: 2024-06-11
Payer: MEDICARE

## 2024-06-11 NOTE — TELEPHONE ENCOUNTER
Called and spoke with pt. Informed the pt.he has not response yet. As soon as he does I will call with providers recommendation. Pt acknowledge

## 2024-06-11 NOTE — TELEPHONE ENCOUNTER
----- Message from Janene Delaney sent at 6/11/2024  9:51 AM CDT -----  Regarding: patient advice  Contact: Salud  .Type:  Needs Medical Advice    Who Called:   Symptoms (please be specific):    How long has patient had these symptoms:    Pharmacy name and phone #:    Would the patient rather a call back or a response via [facility]? call  Best Call Back Number:  [phone]  Additional Information: Salud is requesting a callback today from [facility].

## 2024-06-18 ENCOUNTER — TELEPHONE (OUTPATIENT)
Dept: PRIMARY CARE CLINIC | Facility: CLINIC | Age: 74
End: 2024-06-18
Payer: MEDICARE

## 2024-06-18 NOTE — TELEPHONE ENCOUNTER
----- Message from Payam Angeles sent at 6/18/2024  9:05 AM CDT -----  Contact: SELF  [phone]  .Type:  Sooner Apoointment Request    Caller is requesting a sooner appointment.  Caller declined first available appointment listed below.  Caller will not accept being placed on the waitlist and is requesting a message be sent to doctor.  Name of Caller:PATIENT   When is the first available appointment?DEC 18   Symptoms:PATIENT STATES THAT SHE HAS REAL BAD SCIATICA AND IS IN PAIN . PAIN ALSO STATES THAT SHE FEELS WEAK   Would the patient rather a call back or a response via [facility]? CALL BACK   Best Call Back Number:.[phone]    Additional Information: PATIENT WOULD LIKE  TO BE SEEN TOMORROW IF POSSIBLE

## 2024-06-18 NOTE — TELEPHONE ENCOUNTER
Spoke with pt and she advised me that her leg is hurting and she is feeling weak. I advised pt that Dr. Sharif is booked and the first available he has is a virtual appt on Thursday. Pt advised me that she don't want a virtual she want in person. I offered pt to see another provider at another location pt declined and said she will find another doctor that she will be able to see

## 2024-06-19 ENCOUNTER — HOSPITAL ENCOUNTER (OUTPATIENT)
Dept: RADIOLOGY | Facility: HOSPITAL | Age: 74
Discharge: HOME | End: 2024-06-19
Attending: STUDENT IN AN ORGANIZED HEALTH CARE EDUCATION/TRAINING PROGRAM
Payer: MEDICARE

## 2024-06-19 ENCOUNTER — OFFICE VISIT (OUTPATIENT)
Dept: INTERNAL MEDICINE | Facility: CLINIC | Age: 74
End: 2024-06-19
Payer: MEDICARE

## 2024-06-19 VITALS
HEART RATE: 92 BPM | TEMPERATURE: 98 F | DIASTOLIC BLOOD PRESSURE: 78 MMHG | HEIGHT: 62 IN | WEIGHT: 134.94 LBS | SYSTOLIC BLOOD PRESSURE: 124 MMHG | BODY MASS INDEX: 24.83 KG/M2 | OXYGEN SATURATION: 94 %

## 2024-06-19 DIAGNOSIS — M54.16 LUMBAR RADICULOPATHY, CHRONIC: ICD-10-CM

## 2024-06-19 DIAGNOSIS — M54.16 LUMBAR RADICULOPATHY, CHRONIC: Primary | ICD-10-CM

## 2024-06-19 PROCEDURE — 99999 PR PBB SHADOW E&M-EST. PATIENT-LVL IV: CPT | Mod: PBBFAC,,, | Performed by: STUDENT IN AN ORGANIZED HEALTH CARE EDUCATION/TRAINING PROGRAM

## 2024-06-19 PROCEDURE — 1125F AMNT PAIN NOTED PAIN PRSNT: CPT | Mod: CPTII,S$GLB,, | Performed by: STUDENT IN AN ORGANIZED HEALTH CARE EDUCATION/TRAINING PROGRAM

## 2024-06-19 PROCEDURE — 1159F MED LIST DOCD IN RCRD: CPT | Mod: CPTII,S$GLB,, | Performed by: STUDENT IN AN ORGANIZED HEALTH CARE EDUCATION/TRAINING PROGRAM

## 2024-06-19 PROCEDURE — 1160F RVW MEDS BY RX/DR IN RCRD: CPT | Mod: CPTII,S$GLB,, | Performed by: STUDENT IN AN ORGANIZED HEALTH CARE EDUCATION/TRAINING PROGRAM

## 2024-06-19 PROCEDURE — 3288F FALL RISK ASSESSMENT DOCD: CPT | Mod: CPTII,S$GLB,, | Performed by: STUDENT IN AN ORGANIZED HEALTH CARE EDUCATION/TRAINING PROGRAM

## 2024-06-19 PROCEDURE — 99214 OFFICE O/P EST MOD 30 MIN: CPT | Mod: 25,S$GLB,, | Performed by: STUDENT IN AN ORGANIZED HEALTH CARE EDUCATION/TRAINING PROGRAM

## 2024-06-19 PROCEDURE — 96372 THER/PROPH/DIAG INJ SC/IM: CPT | Mod: S$GLB,,, | Performed by: STUDENT IN AN ORGANIZED HEALTH CARE EDUCATION/TRAINING PROGRAM

## 2024-06-19 PROCEDURE — 3044F HG A1C LEVEL LT 7.0%: CPT | Mod: CPTII,S$GLB,, | Performed by: STUDENT IN AN ORGANIZED HEALTH CARE EDUCATION/TRAINING PROGRAM

## 2024-06-19 PROCEDURE — 3074F SYST BP LT 130 MM HG: CPT | Mod: CPTII,S$GLB,, | Performed by: STUDENT IN AN ORGANIZED HEALTH CARE EDUCATION/TRAINING PROGRAM

## 2024-06-19 PROCEDURE — 1101F PT FALLS ASSESS-DOCD LE1/YR: CPT | Mod: CPTII,S$GLB,, | Performed by: STUDENT IN AN ORGANIZED HEALTH CARE EDUCATION/TRAINING PROGRAM

## 2024-06-19 PROCEDURE — 3078F DIAST BP <80 MM HG: CPT | Mod: CPTII,S$GLB,, | Performed by: STUDENT IN AN ORGANIZED HEALTH CARE EDUCATION/TRAINING PROGRAM

## 2024-06-19 PROCEDURE — 72131 CT LUMBAR SPINE W/O DYE: CPT | Mod: TC,PO

## 2024-06-19 PROCEDURE — 72131 CT LUMBAR SPINE W/O DYE: CPT | Mod: 26,,, | Performed by: RADIOLOGY

## 2024-06-19 RX ORDER — KETOROLAC TROMETHAMINE 30 MG/ML
30 INJECTION, SOLUTION INTRAMUSCULAR; INTRAVENOUS
Status: COMPLETED | OUTPATIENT
Start: 2024-06-19 | End: 2024-06-19

## 2024-06-19 RX ORDER — TRIAMCINOLONE ACETONIDE 40 MG/ML
40 INJECTION, SUSPENSION INTRA-ARTICULAR; INTRAMUSCULAR ONCE
Status: COMPLETED | OUTPATIENT
Start: 2024-06-19 | End: 2024-06-19

## 2024-06-19 RX ORDER — HYDROCODONE BITARTRATE AND ACETAMINOPHEN 7.5; 325 MG/1; MG/1
1 TABLET ORAL EVERY 6 HOURS PRN
Qty: 28 TABLET | Refills: 0 | Status: SHIPPED | OUTPATIENT
Start: 2024-06-19 | End: 2024-06-26

## 2024-06-19 RX ADMIN — KETOROLAC TROMETHAMINE 30 MG: 30 INJECTION, SOLUTION INTRAMUSCULAR; INTRAVENOUS at 01:06

## 2024-06-19 RX ADMIN — TRIAMCINOLONE ACETONIDE 40 MG: 40 INJECTION, SUSPENSION INTRA-ARTICULAR; INTRAMUSCULAR at 01:06

## 2024-06-19 NOTE — PROGRESS NOTES
Chief Complaint   Patient presents with    Sciatica     HPI: Salud Echevarria is a 74 y.o. female  with Pmhx listed below who presents to clinic for evaluation of back pain.  As per the patient, she was looking after her ailing brother around 3 months back was hospitalized.  During this time she was staying with him at hospital sleeping on the hospital benches which she tells could have triggered her back pain.  It is located in her left lumbar reason, sharp, shooting down her left leg on the posterior aspect.  Gets aggravated with movement and no relieving factors present.  So far she has tried gabapentin which made her hallucinate and hence she can not use it.  She has also tried meloxicam which did not help with her pain.  She is tried physical therapy for 3 weeks but then stopped using it after that because of pain.  She is being followed by Dr. Sharif  has been managing her pain with tramadol in the meantime.  She reports the tramadol is not working today.  Review of the chart reveals that she was seen on ER on 04/29/2024 and 05/25/2024 for the same reason.  During the time she was treated with steroid injection along with t Toradol with partial relief.  She was also sent to pain management whom she has an appointment with on 06/28/2024.  Advised her to keep up with the appointment. She reportst o have have h/o lumbar stenosis in the past and had surgeries. She reports to have metal implant in her back and hence cannot do MRI. Will get a CT LS spine today.     Problem List:  Patient Active Problem List   Diagnosis    Aortic atherosclerosis    Back pain    Coronary artery disease involving native coronary artery of native heart without angina pectoris    Gastroesophageal reflux disease with esophagitis    Hyperlipidemia    Osteopenia    Pulmonary nodule, right    Spinal stenosis    History of tobacco use    Left hip pain    Sciatica of left side    BPPV (benign paroxysmal positional vertigo)    Fatigue     Other specified abdominal hernia without obstruction or gangrene    Post-nasal drip    Vertigo    Mild intermittent asthma without complication    Urge incontinence    Ventral hernia without obstruction or gangrene    Anxiety    Asymptomatic microscopic hematuria    Seasonal allergic rhinitis due to pollen    COVID-19 virus detected    Peripheral vascular disease, unspecified    Viral syndrome       ROS: Negative except as noted above.       Current Meds:  Current Outpatient Medications   Medication Sig Dispense Refill    albuterol (PROVENTIL/VENTOLIN HFA) 90 mcg/actuation inhaler Inhale 1-2 puffs into the lungs every 6 (six) hours as needed for Wheezing. Rescue 18 g 1    ALPRAZolam (XANAX) 0.25 MG tablet Take 1 tablet (0.25 mg total) by mouth nightly as needed for Anxiety or Insomnia. 30 tablet 2    aspirin (ECOTRIN) 81 MG EC tablet Take 81 mg by mouth once daily.      fluticasone propionate (FLOVENT HFA) 220 mcg/actuation inhaler Inhale 1 puff into the lungs 2 (two) times a day. Controller 12 g 11    pravastatin (PRAVACHOL) 20 MG tablet Take 1 tablet (20 mg total) by mouth once daily. 90 tablet 3    propylene glycol (SYSTANE COMPLETE OPHT) Apply 1 drop to eye daily as needed.      traMADoL (ULTRAM) 50 mg tablet TAKE 1 TABLET BY MOUTH EVERY 6 HOURS AS NEEDED FOR PAIN 28 tablet 0    alpha lipoic acid 300 mg Cap Take by mouth.      gabapentin (NEURONTIN) 300 MG capsule Take 1 capsule (300 mg total) by mouth every evening. 90 capsule 3    levocetirizine (XYZAL) 5 MG tablet Take 1 tablet (5 mg total) by mouth every evening. 30 tablet 0    propranoloL (INDERAL) 20 MG tablet Take 1 tablet (20 mg total) by mouth 3 (three) times daily as needed (anxiety/panic attack). 90 tablet 11    rOPINIRole (REQUIP) 0.5 MG tablet Take 1 tablet (0.5 mg total) by mouth nightly as needed (restless legs). 90 tablet 3    triamcinolone acetonide 0.1% (KENALOG) 0.1 % cream Apply topically nightly as needed (itching). 30 g 1    UNABLE TO FIND  medication name: peroxil mouth wash       No current facility-administered medications for this visit.      PE:  BP: 124/78  Pulse: 92     Temp: 97.9 °F (36.6 °C)  Weight: 61.2 kg (134 lb 14.7 oz) Body mass index is 24.68 kg/m².    Wt Readings from Last 5 Encounters:   06/19/24 61.2 kg (134 lb 14.7 oz)   05/07/24 60.6 kg (133 lb 9.6 oz)   04/29/24 64.3 kg (141 lb 12.1 oz)   04/22/24 64.3 kg (141 lb 12.1 oz)   12/13/23 62.6 kg (138 lb)     General appearance: alert and cooperative, not in acute distress  Head: normocephalic, without obvious abnormality, atraumatic  Eyes: conjunctivae/corneas clear. PERRL, EOM's intact.  Ears: clear tympanic membranes   Neck: no adenopathy, supple, symmetrical, trachea midline and thyroid not enlarged, symmetric, no tenderness/mass/nodules, no JVD  Throat: lips, mucosa, and tongue normal; teeth and gums normal; no thrush  Chest: no reproducible chest pain   Heart: regular rate and rhythm, S1, S2 normal, no murmur, click, rub or gallop  Lungs: unlabored respiration, bilateral equal air entry, normal vesicular breath sound heard, no wheezing, rhonchi   Abdomen: soft, non-tender, non-distended; bowel sounds +; no masses,  no organomegaly, no ascites   Extremities: normal, atraumatic, no cyanosis or edema noted B/L upper and lower extremities.  Skin: skin color, texture, turgor normal. No rashes or lesions noted.  Neurologic: grossly intact      Lab:  Lab Results   Component Value Date    WBC 6.55 03/19/2024    HGB 13.4 03/19/2024    HCT 41.0 03/19/2024    MCV 91 03/19/2024     03/19/2024     03/19/2024    K 3.8 03/19/2024     03/19/2024    CO2 27 03/19/2024    BUN 14 03/19/2024     03/19/2024    CALCIUM 9.0 03/19/2024    AST 15 03/19/2024    ALT 11 03/19/2024    CHOL 171 03/19/2024    HDL 81 (H) 03/19/2024    LDLCALC 78.6 03/19/2024    TRIG 57 03/19/2024    TSH 2.258 05/13/2022       Impression:  1. Lumbar radiculopathy, chronic  - CT Lumbar Spine Without  Contrast; Future  - ketorolac injection 30 mg  - triamcinolone acetonide injection 40 mg  - HYDROcodone-acetaminophen (NORCO) 7.5-325 mg per tablet; Take 1 tablet by mouth every 6 (six) hours as needed for Pain.  Dispense: 28 tablet; Refill: 0      Future Appointments   Date Time Provider Department Center   6/28/2024 12:00 PM Amando Sy MD [facility] PAIN [place]   11/18/2024  2:00 PM [facility] CVT VASCULAR ULTRASOUND [facility] [place]   11/18/2024  2:30 PM Eliot Sadler MD [facility]AS[facility] [place]     Rtc in 2 weeks to establish care.  Visit today included increased complexity associated with the care of the episodic problem   addressed and managing the longitudinal care of the patient due to the serious and/or complex managed problem(s) .     Andrea Del Castillo MD

## 2024-07-03 ENCOUNTER — OFFICE VISIT (OUTPATIENT)
Dept: INTERNAL MEDICINE | Facility: CLINIC | Age: 74
End: 2024-07-03
Payer: MEDICARE

## 2024-07-03 VITALS
SYSTOLIC BLOOD PRESSURE: 122 MMHG | OXYGEN SATURATION: 94 % | BODY MASS INDEX: 24.54 KG/M2 | WEIGHT: 133.38 LBS | HEIGHT: 62 IN | DIASTOLIC BLOOD PRESSURE: 70 MMHG | HEART RATE: 94 BPM | TEMPERATURE: 97 F

## 2024-07-03 DIAGNOSIS — M48.062 SPINAL STENOSIS OF LUMBAR REGION WITH NEUROGENIC CLAUDICATION: Primary | ICD-10-CM

## 2024-07-03 DIAGNOSIS — N39.41 URGE INCONTINENCE: ICD-10-CM

## 2024-07-03 DIAGNOSIS — Z13.820 SCREENING FOR OSTEOPOROSIS: ICD-10-CM

## 2024-07-03 DIAGNOSIS — J00 ACUTE RHINITIS: ICD-10-CM

## 2024-07-03 DIAGNOSIS — I70.0 AORTIC ATHEROSCLEROSIS: ICD-10-CM

## 2024-07-03 DIAGNOSIS — E78.49 OTHER HYPERLIPIDEMIA: ICD-10-CM

## 2024-07-03 DIAGNOSIS — I25.10 CORONARY ARTERY DISEASE INVOLVING NATIVE CORONARY ARTERY OF NATIVE HEART WITHOUT ANGINA PECTORIS: ICD-10-CM

## 2024-07-03 DIAGNOSIS — Z87.891 HISTORY OF TOBACCO USE: ICD-10-CM

## 2024-07-03 DIAGNOSIS — Z78.0 ASYMPTOMATIC MENOPAUSAL STATE: ICD-10-CM

## 2024-07-03 PROCEDURE — 99214 OFFICE O/P EST MOD 30 MIN: CPT | Mod: S$GLB,,, | Performed by: STUDENT IN AN ORGANIZED HEALTH CARE EDUCATION/TRAINING PROGRAM

## 2024-07-03 PROCEDURE — 3074F SYST BP LT 130 MM HG: CPT | Mod: CPTII,S$GLB,, | Performed by: STUDENT IN AN ORGANIZED HEALTH CARE EDUCATION/TRAINING PROGRAM

## 2024-07-03 PROCEDURE — 1159F MED LIST DOCD IN RCRD: CPT | Mod: CPTII,S$GLB,, | Performed by: STUDENT IN AN ORGANIZED HEALTH CARE EDUCATION/TRAINING PROGRAM

## 2024-07-03 PROCEDURE — 1101F PT FALLS ASSESS-DOCD LE1/YR: CPT | Mod: CPTII,S$GLB,, | Performed by: STUDENT IN AN ORGANIZED HEALTH CARE EDUCATION/TRAINING PROGRAM

## 2024-07-03 PROCEDURE — 99999 PR PBB SHADOW E&M-EST. PATIENT-LVL IV: CPT | Mod: PBBFAC,,, | Performed by: STUDENT IN AN ORGANIZED HEALTH CARE EDUCATION/TRAINING PROGRAM

## 2024-07-03 PROCEDURE — 3288F FALL RISK ASSESSMENT DOCD: CPT | Mod: CPTII,S$GLB,, | Performed by: STUDENT IN AN ORGANIZED HEALTH CARE EDUCATION/TRAINING PROGRAM

## 2024-07-03 PROCEDURE — 1125F AMNT PAIN NOTED PAIN PRSNT: CPT | Mod: CPTII,S$GLB,, | Performed by: STUDENT IN AN ORGANIZED HEALTH CARE EDUCATION/TRAINING PROGRAM

## 2024-07-03 PROCEDURE — 3078F DIAST BP <80 MM HG: CPT | Mod: CPTII,S$GLB,, | Performed by: STUDENT IN AN ORGANIZED HEALTH CARE EDUCATION/TRAINING PROGRAM

## 2024-07-03 PROCEDURE — 3044F HG A1C LEVEL LT 7.0%: CPT | Mod: CPTII,S$GLB,, | Performed by: STUDENT IN AN ORGANIZED HEALTH CARE EDUCATION/TRAINING PROGRAM

## 2024-07-03 PROCEDURE — G2211 COMPLEX E/M VISIT ADD ON: HCPCS | Mod: S$GLB,,, | Performed by: STUDENT IN AN ORGANIZED HEALTH CARE EDUCATION/TRAINING PROGRAM

## 2024-07-03 RX ORDER — LEVOCETIRIZINE DIHYDROCHLORIDE 5 MG/1
5 TABLET, FILM COATED ORAL NIGHTLY
Qty: 30 TABLET | Refills: 0 | Status: SHIPPED | OUTPATIENT
Start: 2024-07-03 | End: 2024-08-02

## 2024-07-03 RX ORDER — DULOXETIN HYDROCHLORIDE 30 MG/1
30 CAPSULE, DELAYED RELEASE ORAL DAILY
Qty: 30 CAPSULE | Refills: 0 | Status: SHIPPED | OUTPATIENT
Start: 2024-07-03 | End: 2024-08-02

## 2024-07-03 NOTE — PROGRESS NOTES
Chief Complaint   Patient presents with    Sciatica    Numbness     HPI: Salud Echevarria is a 74 y.o. female  with Pmhx listed below who presents to clinic for evaluation of back pain. I saw her 2 weeks back for the same reason. During the visit, she complained of having back pain which she attributed to  sleeping on the hospital benches few months back while looking after her ailing brother. She described the pain to be in her left lumbar region, sharp, shooting down her left leg on the posterior aspect. It  Got  aggravated with movement and no relieving factors were present.  So far she had tried gabapentin which made her hallucinate and hence she can not use it.  She has also tried meloxicam which did not help with her pain. She had tried physical therapy for 3 weeks but then stopped using it after that because of pain.  She  being followed by Dr. Sharif  has been managing her pain with tramadol in the meantime.  On her last visit, she reported that tramadol had stopped working and was prescribed percocet. She was sent to do CT spine as she claimed to have rods on her back which prevents her from getting MRI done. It revealed Multilevel degenerative changes with up to severe spinal canal stenosis at the L3-4 level.   We discussed about the imaging results and treatment option. She doesn't want to involve surgery at this time. She continues to have claudication however. Other issues include PVD. She has seen vascular surgery, last visit on 5/20/2024. She continues to smoke, discussed extensively regarding smoking cessation.        Problem List:  Patient Active Problem List   Diagnosis    Aortic atherosclerosis    Back pain    Coronary artery disease involving native coronary artery of native heart without angina pectoris    Gastroesophageal reflux disease with esophagitis    Hyperlipidemia    Osteopenia    Pulmonary nodule, right    Spinal stenosis    History of tobacco use    Left hip pain    Sciatica of left  side    BPPV (benign paroxysmal positional vertigo)    Fatigue    Other specified abdominal hernia without obstruction or gangrene    Post-nasal drip    Vertigo    Mild intermittent asthma without complication    Urge incontinence    Ventral hernia without obstruction or gangrene    Anxiety    Asymptomatic microscopic hematuria    Seasonal allergic rhinitis due to pollen    COVID-19 virus detected    Peripheral vascular disease, unspecified    Viral syndrome       ROS: Negative except as noted above.       Current Meds:  Current Outpatient Medications   Medication Sig Dispense Refill    albuterol (PROVENTIL/VENTOLIN HFA) 90 mcg/actuation inhaler Inhale 1-2 puffs into the lungs every 6 (six) hours as needed for Wheezing. Rescue 18 g 1    aspirin (ECOTRIN) 81 MG EC tablet Take 81 mg by mouth once daily.      fluticasone propionate (FLOVENT HFA) 220 mcg/actuation inhaler Inhale 1 puff into the lungs 2 (two) times a day. Controller 12 g 11    pravastatin (PRAVACHOL) 20 MG tablet Take 1 tablet (20 mg total) by mouth once daily. 90 tablet 3    propylene glycol (SYSTANE COMPLETE OPHT) Apply 1 drop to eye daily as needed.      rOPINIRole (REQUIP) 0.5 MG tablet Take 1 tablet (0.5 mg total) by mouth nightly as needed (restless legs). 90 tablet 3    traMADoL (ULTRAM) 50 mg tablet TAKE 1 TABLET BY MOUTH EVERY 6 HOURS AS NEEDED FOR PAIN 28 tablet 0    triamcinolone acetonide 0.1% (KENALOG) 0.1 % cream Apply topically nightly as needed (itching). 30 g 1    DULoxetine (CYMBALTA) 30 MG capsule Take 1 capsule (30 mg total) by mouth once daily. 30 capsule 0    levocetirizine (XYZAL) 5 MG tablet Take 1 tablet (5 mg total) by mouth every evening. 30 tablet 0     No current facility-administered medications for this visit.      PE:  BP: 122/70  Pulse: 94     Temp: 97 °F (36.1 °C)  Weight: 60.5 kg (133 lb 6.1 oz) Body mass index is 24.4 kg/m².    Wt Readings from Last 5 Encounters:   07/03/24 60.5 kg (133 lb 6.1 oz)   06/19/24 61.2 kg  (134 lb 14.7 oz)   05/07/24 60.6 kg (133 lb 9.6 oz)   04/29/24 64.3 kg (141 lb 12.1 oz)   04/22/24 64.3 kg (141 lb 12.1 oz)     General appearance: alert and cooperative, not in acute distress  Head: normocephalic, without obvious abnormality, atraumatic  Eyes: conjunctivae/corneas clear. PERRL, EOM's intact.  Ears: clear tympanic membranes   Neck: no adenopathy, supple, symmetrical, trachea midline and thyroid not enlarged, symmetric, no tenderness/mass/nodules, no JVD  Throat: lips, mucosa, and tongue normal; teeth and gums normal; no thrush  Chest: no reproducible chest pain   Heart: regular rate and rhythm, S1, S2 normal, no murmur, click, rub or gallop  Lungs: unlabored respiration, bilateral equal air entry, normal vesicular breath sound heard, no wheezing, rhonchi   Abdomen: soft, non-tender, non-distended; bowel sounds +; no masses,  no organomegaly, no ascites   Extremities: normal, atraumatic, no cyanosis or edema noted B/L upper and lower extremities.  Skin: skin color, texture, turgor normal. No rashes or lesions noted.  Neurologic: grossly intact      Lab:  Lab Results   Component Value Date    WBC 6.55 03/19/2024    HGB 13.4 03/19/2024    HCT 41.0 03/19/2024    MCV 91 03/19/2024     03/19/2024     03/19/2024    K 3.8 03/19/2024     03/19/2024    CO2 27 03/19/2024    BUN 14 03/19/2024     03/19/2024    CALCIUM 9.0 03/19/2024    AST 15 03/19/2024    ALT 11 03/19/2024    CHOL 171 03/19/2024    HDL 81 (H) 03/19/2024    LDLCALC 78.6 03/19/2024    TRIG 57 03/19/2024    TSH 2.258 05/13/2022       Impression:    ICD-10-CM ICD-9-CM    1. Spinal stenosis of lumbar region with neurogenic claudication  M48.062 724.03 DULoxetine (CYMBALTA) 30 MG capsule      2. Coronary artery disease involving native coronary artery of native heart without angina pectoris  I25.10 414.01       3. Aortic atherosclerosis  I70.0 440.0       4. Other hyperlipidemia  E78.49 272.4       5. History of tobacco use   Z87.891 V15.82 Ambulatory referral/consult to Smoking Cessation Program      6. Screening for osteoporosis  Z13.820 V82.81 DXA Bone Density Axial Skeleton 1 or more sites      7. Asymptomatic menopausal state  Z78.0 V49.81 DXA Bone Density Axial Skeleton 1 or more sites      8. Acute rhinitis  J00 460 levocetirizine (XYZAL) 5 MG tablet      9. Urge incontinence  N39.41 788.31 Urinalysis      1. Spinal stenosis of lumbar region with neurogenic claudication  Has appointment with pain management on 7/09/2024  Has tried gabapentin , made her hallucinate  Mobic didn't work  NSAID did not work as per the patient  Starting Cymbalta in the mean time  - DULoxetine (CYMBALTA) 30 MG capsule; Take 1 capsule (30 mg total) by mouth once daily.  Dispense: 30 capsule; Refill: 0    2. Coronary artery disease involving native coronary artery of native heart without angina pectoris  Following [facility] , last visit 09/2023  On aspirin and satin to be continued  Patient asymptomatic today.    3. Aortic atherosclerosis  On statin to be continued  Will increase the intensity to Crestor once she runs out of her current pravastatin     4. Other hyperlipidemia  will increase the intensity to Crestor once she runs out of her current pravastatin     5. History of tobacco use  Assistance with smoking cessation was offered, including:  []  Medications  [x]  Counseling  []  Printed Information on Smoking Cessation  [x]  Referral to a Smoking Cessation Program    Patient was counseled regarding smoking for 3-10 minutes.   - Ambulatory referral/consult to Smoking Cessation Program; Future    6. Screening for osteoporosis  - DXA Bone Density Axial Skeleton 1 or more sites; Future    7. Asymptomatic menopausal state  - DXA Bone Density Axial Skeleton 1 or more sites; Future    8. Acute rhinitis  - levocetirizine (XYZAL) 5 MG tablet; Take 1 tablet (5 mg total) by mouth every evening.  Dispense: 30 tablet; Refill: 0    9. Urge  incontinence  - Urinalysis; Future      Future Appointments   Date Time Provider Department Center   7/8/2024 10:10 AM [facility] [place]   7/8/2024 10:30 AM [facility] DEXA1 [facility] B[facility] [place]   7/9/2024  8:00 AM Amando Sy MD [facility] PAIN [facility] [place]   11/18/2024  2:00 PM [facility] CVT VASCULAR ULTRASOUND [facility] [place]   11/18/2024  2:30 PM Eliot Sadler MD [facility] VASS[facility] [place]       I spent a total of 32 minutes on the day of the visit.This includes face to face time and non-face to face time preparing to see the patient (eg, review of tests), obtaining and/or reviewing separately obtained history, documenting clinical information in the electronic or other health record, independently interpreting results and communicating results to the patient/family/caregiver, or care coordinator.  Visit today included increased complexity associated with the care of the episodic problem   addressed and managing the longitudinal care of the patient due to the serious and/or complex managed problem(s) .     Andrea Del Castillo MD

## 2024-07-08 ENCOUNTER — ANCILLARY ORDERS (OUTPATIENT)
Dept: INTERNAL MEDICINE | Facility: CLINIC | Age: 74
End: 2024-07-08
Payer: MEDICARE

## 2024-07-08 ENCOUNTER — HOSPITAL ENCOUNTER (OUTPATIENT)
Dept: RADIOLOGY | Facility: HOSPITAL | Age: 74
Discharge: HOME | End: 2024-07-08
Attending: STUDENT IN AN ORGANIZED HEALTH CARE EDUCATION/TRAINING PROGRAM
Payer: MEDICARE

## 2024-07-08 DIAGNOSIS — M48.062 SPINAL STENOSIS OF LUMBAR REGION WITH NEUROGENIC CLAUDICATION: Primary | ICD-10-CM

## 2024-07-08 DIAGNOSIS — I25.10 CORONARY ARTERY DISEASE INVOLVING NATIVE CORONARY ARTERY OF NATIVE HEART WITHOUT ANGINA PECTORIS: ICD-10-CM

## 2024-07-08 DIAGNOSIS — Z13.820 SCREENING FOR OSTEOPOROSIS: ICD-10-CM

## 2024-07-08 DIAGNOSIS — M81.0 AGE-RELATED OSTEOPOROSIS WITHOUT CURRENT PATHOLOGICAL FRACTURE: ICD-10-CM

## 2024-07-08 DIAGNOSIS — E78.49 OTHER HYPERLIPIDEMIA: ICD-10-CM

## 2024-07-08 DIAGNOSIS — J00 ACUTE RHINITIS: ICD-10-CM

## 2024-07-08 DIAGNOSIS — Z87.891 HISTORY OF TOBACCO USE: ICD-10-CM

## 2024-07-08 DIAGNOSIS — Z78.0 ASYMPTOMATIC MENOPAUSAL STATE: ICD-10-CM

## 2024-07-08 DIAGNOSIS — N39.41 URGE INCONTINENCE: ICD-10-CM

## 2024-07-08 DIAGNOSIS — I70.0 AORTIC ATHEROSCLEROSIS: ICD-10-CM

## 2024-07-08 PROCEDURE — 77092 TBS I&R FX RSK QHP: CPT | Mod: ,,, | Performed by: RADIOLOGY

## 2024-07-08 PROCEDURE — 77091 TBS TECHL CALCULATION ONLY: CPT | Mod: PO

## 2024-07-08 PROCEDURE — 77080 DXA BONE DENSITY AXIAL: CPT | Mod: 26,,, | Performed by: RADIOLOGY

## 2024-07-09 ENCOUNTER — OFFICE VISIT (OUTPATIENT)
Dept: PAIN MEDICINE | Facility: CLINIC | Age: 74
End: 2024-07-09
Payer: MEDICARE

## 2024-07-09 ENCOUNTER — PATIENT MESSAGE (OUTPATIENT)
Dept: PAIN MEDICINE | Facility: CLINIC | Age: 74
End: 2024-07-09

## 2024-07-09 VITALS
BODY MASS INDEX: 24.09 KG/M2 | HEART RATE: 66 BPM | DIASTOLIC BLOOD PRESSURE: 67 MMHG | WEIGHT: 130.94 LBS | SYSTOLIC BLOOD PRESSURE: 131 MMHG | HEIGHT: 62 IN

## 2024-07-09 DIAGNOSIS — M51.36 DDD (DEGENERATIVE DISC DISEASE), LUMBAR: ICD-10-CM

## 2024-07-09 DIAGNOSIS — M54.42 CHRONIC LEFT-SIDED LOW BACK PAIN WITH LEFT-SIDED SCIATICA: ICD-10-CM

## 2024-07-09 DIAGNOSIS — M54.41 ACUTE RIGHT-SIDED LOW BACK PAIN WITH RIGHT-SIDED SCIATICA: ICD-10-CM

## 2024-07-09 DIAGNOSIS — M54.16 LUMBAR RADICULOPATHY: Primary | ICD-10-CM

## 2024-07-09 DIAGNOSIS — M96.1 POSTLAMINECTOMY SYNDROME OF LUMBAR REGION: ICD-10-CM

## 2024-07-09 DIAGNOSIS — G89.29 CHRONIC LEFT-SIDED LOW BACK PAIN WITH LEFT-SIDED SCIATICA: ICD-10-CM

## 2024-07-09 PROCEDURE — 99999 PR PBB SHADOW E&M-EST. PATIENT-LVL III: CPT | Mod: PBBFAC,,, | Performed by: ANESTHESIOLOGY

## 2024-07-09 RX ORDER — PREGABALIN 25 MG/1
25 CAPSULE ORAL 2 TIMES DAILY
Qty: 60 CAPSULE | Refills: 1 | Status: SHIPPED | OUTPATIENT
Start: 2024-07-09

## 2024-07-09 RX ORDER — KETOROLAC TROMETHAMINE 30 MG/ML
15 INJECTION, SOLUTION INTRAMUSCULAR; INTRAVENOUS ONCE
Status: COMPLETED | OUTPATIENT
Start: 2024-07-09 | End: 2024-07-09

## 2024-07-09 RX ADMIN — KETOROLAC TROMETHAMINE 15 MG: 30 INJECTION, SOLUTION INTRAMUSCULAR; INTRAVENOUS at 08:07

## 2024-07-09 NOTE — PROGRESS NOTES
New Patient Interventional Pain Note (Initial Visit)    Referring Physician: Other    PCP: Andrea Del Castillo MD    Chief Complaint:     Chief Complaint   Patient presents with    Low-back Pain     Radiating to right leg        SUBJECTIVE:    Salud Echevarria is a 74 y.o. female who presents to the clinic for the evaluation of lower back pain.   Patient reports three-month history of increased lower back pain.  Patient reports having previous L4-5 posterior fusion in 2012.  Patient reports that pain increased after sleeping in a chair while visiting her family in the hospital.  Pain described as stabbing aching pain that starts in the right buttocks.  This pain then radiates down the posterior aspect of the right lower extremity to the plantar aspect of her foot primarily affecting the 1st and 2nd digit.  Patient denies any significant left lower extremity pain.  Pain is worse with flexion and prolonged sitting, better with lying supine and ice.  Pain is currently rated an 8/10. Denies any fevers, chills,  saddle anesthesia, or bowel and bladder incontinence          Non-Pharmacologic Treatments:  Physical Therapy/Home Exercise: yes  Ice/Heat:yes  TENS: no  Acupuncture: no  Massage: yes  Chiropractic: no        Previous Pain Medications:  NSAIDs, Tylenol, muscle relaxers, neuropathics, opioids, topicals       report:  Reviewed and consistent with medication use as prescribed.    Pain Procedures:   Previous epidural steroid injections before her surgery in 2012    Pain Disability Index Review:         7/9/2024     8:06 AM   Last 3 PDI Scores   Pain Disability Index (PDI) 59       Imaging:     Results for orders placed during the hospital encounter of 06/19/24    CT Lumbar Spine Without Contrast    Narrative  EXAMINATION:  CT LUMBAR SPINE WITHOUT CONTRAST    CLINICAL HISTORY:  Chronic lumbar radiculopathy    TECHNIQUE:  Axial CT images were obtained through the lumbar spine without contrast.  Coronal and sagittal  reconstructions submitted and interpreted.  Total DLP 1368.  Automated exposure control utilized.    COMPARISON:  MRI 12/28/2012    FINDINGS:  No acute fracture or traumatic subluxation.  Bilateral transpedicular screws are at L4 and L5 with an interbody spacer at the L4-5 level.  There is anterolisthesis L3 on L4.  Vertebral body heights are maintained.  Disc space narrowing and vacuum disc phenomenon most prominent at L3-4.    L1-2: Posterior disc osteophyte and facet hypertrophy results in moderate spinal canal stenosis with mild to moderate bilateral bony neural foraminal stenosis.    L2-3: Posterior disc osteophyte and facet hypertrophy results in mild bilateral bony neural foraminal stenosis with moderate spinal canal stenosis.    L3-4: Posterior disc osteophyte eccentric to the left results in moderate to severe left and mild-to-moderate right neural foraminal and lateral recess stenosis with severe spinal canal stenosis.    L4-5: No significant findings.    L5-S1: Posterior disc osteophyte and facet hypertrophy results in moderate to severe right and moderate left neural foraminal and lateral recess stenosis with mild spinal canal stenosis.    Paravertebral soft tissues are normal.    Impression  1. Multilevel degenerative changes with up to severe spinal canal stenosis at the L3-4 level.  2. Other levels as above.      Electronically signed by: Emmanuel Nassar MD  Date:    06/19/2024  Time:    14:25    Results for orders placed during the hospital encounter of 11/06/23    X-Ray Cervical Spine AP And Lateral    Narrative  EXAMINATION:  XR CERVICAL SPINE AP LATERAL    CLINICAL HISTORY:  Neck pain.    TECHNIQUE:  4 views.    COMPARISON:  None    FINDINGS:  Minimal retrolisthesis at C5 measuring 3 mm secondary to degenerative disc disease.  Moderate degenerative disc disease from C3-4 through C7-T1 with facet DJD.  No acute fracture or subluxation.  No soft tissue abnormality detected.    Impression  Degenerative  changes as above.      Electronically signed by: Parag Jamil MD  Date:    11/06/2023  Time:    12:54    Past Medical History:   Diagnosis Date    Asthma     childhood - seasonal as an adult    Hematuria 06/10/2019    Hyperlipidemia     Hypertension     pt denies    Restless leg     Spinal stenosis      Past Surgical History:   Procedure Laterality Date    BACK SURGERY      BREAST BIOPSY      CARDIAC CATHETERIZATION  2018, 11/2020    HYSTERECTOMY      LYMPH NODE BIOPSY      ROBOT-ASSISTED REPAIR OF VENTRAL HERNIA USING DA JIM XI N/A 12/10/2020    Procedure: XI ROBOTIC REPAIR, HERNIA, VENTRAL;  Surgeon: Brandyn Panchal MD;  Location: [facility];  Service: General;  Laterality: N/A;    TONSILLECTOMY       Social History     Socioeconomic History    Marital status:    Tobacco Use    Smoking status: Former     Current packs/day: 0.50     Average packs/day: 0.5 packs/day for 30.0 years (15.0 ttl pk-yrs)     Types: Cigarettes    Smokeless tobacco: Former    Tobacco comments:     none past MN before surgery   Substance and Sexual Activity    Alcohol use: Yes     Comment: 1-2 beers per month    Drug use: No    Sexual activity: Not Currently     Partners: Male     Social Determinants of Health     Financial Resource Strain: Medium Risk (5/20/2022)    Overall Financial Resource Strain (CARDIA)     Difficulty of Paying Living Expenses: Somewhat hard   Food Insecurity: No Food Insecurity (5/20/2022)    Hunger Vital Sign     Worried About Running Out of Food in the Last Year: Never true     Ran Out of Food in the Last Year: Never true   Transportation Needs: No Transportation Needs (5/20/2022)    PRAPARE - Transportation     Lack of Transportation (Medical): No     Lack of Transportation (Non-Medical): No   Physical Activity: Sufficiently Active (5/20/2022)    Exercise Vital Sign     Days of Exercise per Week: 7 days     Minutes of Exercise per Session: 30 min   Stress: Stress Concern Present (5/20/2022)    [employer] - Occupational Stress Questionnaire     Feeling of Stress : To some extent   Housing Stability: Low Risk  (5/20/2022)    Housing Stability Vital Sign     Unable to Pay for Housing in the Last Year: No     Number of Places Lived in the Last Year: 1     Unstable Housing in the Last Year: No     Family History   Problem Relation Name Age of Onset    Heart disease Mother      Esophageal cancer Brother  73        Stage 4    Cancer Maternal Uncle         Review of patient's allergies indicates:   Allergen Reactions    Dye Anaphylaxis     Contrast Dye    Iodine and iodide containing products Rash    Penicillins Shortness Of Breath     Shortness of breath^severe skin rash    Gabapentin     Lovastatin Other (See Comments)     Causes leg cramp    Mobic [meloxicam] Other (See Comments) and Hallucinations    Mucinex [guaifenesin]     Cephalexin Nausea And Vomiting    Diazepam Anxiety     Made pt overly anxious instead of relaxing    Naproxen Nausea And Vomiting       Current Outpatient Medications   Medication Sig    albuterol (PROVENTIL/VENTOLIN HFA) 90 mcg/actuation inhaler Inhale 1-2 puffs into the lungs every 6 (six) hours as needed for Wheezing. Rescue    aspirin (ECOTRIN) 81 MG EC tablet Take 81 mg by mouth once daily.    fluticasone propionate (FLOVENT HFA) 220 mcg/actuation inhaler Inhale 1 puff into the lungs 2 (two) times a day. Controller    levocetirizine (XYZAL) 5 MG tablet Take 1 tablet (5 mg total) by mouth every evening.    pravastatin (PRAVACHOL) 20 MG tablet Take 1 tablet (20 mg total) by mouth once daily.    propylene glycol (SYSTANE COMPLETE OPHT) Apply 1 drop to eye daily as needed.    rOPINIRole (REQUIP) 0.5 MG tablet Take 1 tablet (0.5 mg total) by mouth nightly as needed (restless legs).    traMADoL (ULTRAM) 50 mg tablet TAKE 1 TABLET BY MOUTH EVERY 6 HOURS AS NEEDED FOR PAIN    triamcinolone acetonide 0.1% (KENALOG) 0.1 % cream Apply topically nightly as needed (itching).     "pregabalin (LYRICA) 25 MG capsule Take 1 capsule (25 mg total) by mouth 2 (two) times daily.     No current facility-administered medications for this visit.         ROS  Review of Systems   Constitutional:  Negative for chills, diaphoresis, fatigue and fever.   HENT:  Negative for ear discharge, ear pain, rhinorrhea, trouble swallowing and voice change.    Eyes:  Negative for pain and redness.   Respiratory:  Negative for chest tightness, shortness of breath, wheezing and stridor.    Cardiovascular:  Positive for leg swelling. Negative for chest pain.   Gastrointestinal:  Negative for blood in stool, diarrhea, nausea and vomiting.   Endocrine: Negative for cold intolerance and heat intolerance.   Genitourinary:  Positive for urgency. Negative for dysuria and hematuria.   Musculoskeletal:  Positive for arthralgias, back pain, gait problem, joint swelling and myalgias. Negative for neck pain and neck stiffness.   Skin:  Negative for rash.   Neurological:  Positive for weakness, numbness and headaches. Negative for tremors, seizures, speech difficulty and light-headedness.   Hematological:  Does not bruise/bleed easily.   Psychiatric/Behavioral:  Negative for agitation, confusion and suicidal ideas.             OBJECTIVE:  /67 (BP Location: Left arm, Patient Position: Sitting)   Pulse 66   Ht 5' 2" (1.575 m)   Wt 59.4 kg (130 lb 15.3 oz)   BMI 23.95 kg/m²         Physical Exam  Constitutional:       General: She is not in acute distress.     Appearance: Normal appearance. She is not ill-appearing.   HENT:      Head: Normocephalic and atraumatic.      Nose: No congestion or rhinorrhea.   Eyes:      Extraocular Movements: Extraocular movements intact.      Pupils: Pupils are equal, round, and reactive to light.   Cardiovascular:      Pulses: Normal pulses.   Pulmonary:      Effort: Pulmonary effort is normal.      Breath sounds: Normal breath sounds.   Skin:     General: Skin is warm and dry.      Capillary " Refill: Capillary refill takes less than 2 seconds.   Neurological:      General: No focal deficit present.      Mental Status: She is alert and oriented to person, place, and time.      Sensory: No sensory deficit.      Motor: Weakness present. No abnormal muscle tone.      Gait: Gait abnormal.      Deep Tendon Reflexes:      Reflex Scores:       Patellar reflexes are 2+ on the right side and 2+ on the left side.       Achilles reflexes are 1+ on the right side and 1+ on the left side.     Comments: Antalgic gait   4/5 strength in right dorsiflexion   Psychiatric:         Mood and Affect: Mood normal.         Behavior: Behavior normal.         Thought Content: Thought content normal.           Musculoskeletal:      Lumbar Exam  Incision: yes  Pain with Flexion: yes, flexion worse than extension  Pain with Extension: yes  ROM:  Decreased  Paraspinous TTP:  Positive on the right  Facet TTP:  L5-S1  Facet Loading:  Negative bilaterally  SLR:  Positive on the right at 75°  SIJ TTP:  Positive on the right  YOLETTE:  Negative bilaterally      LABS:  Lab Results   Component Value Date    WBC 6.55 03/19/2024    HGB 13.4 03/19/2024    HCT 41.0 03/19/2024    MCV 91 03/19/2024     03/19/2024       CMP  Sodium   Date Value Ref Range Status   03/19/2024 145 136 - 145 mmol/L Final     Potassium   Date Value Ref Range Status   03/19/2024 3.8 3.5 - 5.1 mmol/L Final     Chloride   Date Value Ref Range Status   03/19/2024 107 95 - 110 mmol/L Final     CO2   Date Value Ref Range Status   03/19/2024 27 23 - 29 mmol/L Final     Glucose   Date Value Ref Range Status   03/19/2024 102 70 - 110 mg/dL Final     BUN   Date Value Ref Range Status   03/19/2024 14 8 - 23 mg/dL Final     Creatinine   Date Value Ref Range Status   03/19/2024 0.7 0.5 - 1.4 mg/dL Final     Calcium   Date Value Ref Range Status   03/19/2024 9.0 8.7 - 10.5 mg/dL Final     Total Protein   Date Value Ref Range Status   03/19/2024 6.5 6.0 - 8.4 g/dL Final      Albumin   Date Value Ref Range Status   03/19/2024 3.6 3.5 - 5.2 g/dL Final     Total Bilirubin   Date Value Ref Range Status   03/19/2024 0.4 0.1 - 1.0 mg/dL Final     Comment:     For infants and newborns, interpretation of results should be based  on gestational age, weight and in agreement with clinical  observations.    Premature Infant recommended reference ranges:  Up to 24 hours.............<8.0 mg/dL  Up to 48 hours............<12.0 mg/dL  3-5 days..................<15.0 mg/dL  6-29 days.................<15.0 mg/dL       Alkaline Phosphatase   Date Value Ref Range Status   03/19/2024 52 (L) 55 - 135 U/L Final     AST   Date Value Ref Range Status   03/19/2024 15 10 - 40 U/L Final     ALT   Date Value Ref Range Status   03/19/2024 11 10 - 44 U/L Final     Anion Gap   Date Value Ref Range Status   03/19/2024 11 8 - 16 mmol/L Final     eGFR if    Date Value Ref Range Status   07/24/2022 >60.0 >60 mL/min/1.73 m^2 Final     eGFR if non    Date Value Ref Range Status   07/24/2022 >60.0 >60 mL/min/1.73 m^2 Final     Comment:     Calculation used to obtain the estimated glomerular filtration  rate (eGFR) is the CKD-EPI equation.          Lab Results   Component Value Date    HGBA1C 5.5 03/19/2024             ASSESSMENT:       74 y.o. year old female with lower back pain, consistent with     1. Lumbar radiculopathy  IR Epidural Transforaminal Inj 1st Vert Lumbar Uni    IR Epidural Transfor Inj Ea Add Lumb Uni    Case Request-RAD/Other Procedure Area: Right L5/S1 + S1 TF PORSHA    pregabalin (LYRICA) 25 MG capsule    ketorolac injection 15 mg      2. Acute right-sided low back pain with right-sided sciatica  Ambulatory referral/consult to Pain Clinic    pregabalin (LYRICA) 25 MG capsule    ketorolac injection 15 mg      3. Chronic left-sided low back pain with left-sided sciatica  Ambulatory referral/consult to Pain Clinic    pregabalin (LYRICA) 25 MG capsule    ketorolac injection 15  mg      4. Postlaminectomy syndrome of lumbar region  IR Epidural Transforaminal Inj 1st Vert Lumbar Uni    IR Epidural Transfor Inj Ea Add Lumb Uni    Case Request-RAD/Other Procedure Area: Right L5/S1 + S1 TF PORSHA    pregabalin (LYRICA) 25 MG capsule    ketorolac injection 15 mg      5. DDD (degenerative disc disease), lumbar  pregabalin (LYRICA) 25 MG capsule    ketorolac injection 15 mg        Lumbar radiculopathy  -     IR Epidural Transforaminal Inj 1st Vert Lumbar Uni; Future; Expected date: 07/09/2024  -     IR Epidural Transfor Inj Ea Add Lumb Uni; Future; Expected date: 07/09/2024  -     Case Request-RAD/Other Procedure Area: Right L5/S1 + S1 TF PORSHA  -     pregabalin (LYRICA) 25 MG capsule; Take 1 capsule (25 mg total) by mouth 2 (two) times daily.  Dispense: 60 capsule; Refill: 1  -     ketorolac injection 15 mg    Acute right-sided low back pain with right-sided sciatica  -     Ambulatory referral/consult to Pain Clinic  -     pregabalin (LYRICA) 25 MG capsule; Take 1 capsule (25 mg total) by mouth 2 (two) times daily.  Dispense: 60 capsule; Refill: 1  -     ketorolac injection 15 mg    Chronic left-sided low back pain with left-sided sciatica  -     Ambulatory referral/consult to Pain Clinic  -     pregabalin (LYRICA) 25 MG capsule; Take 1 capsule (25 mg total) by mouth 2 (two) times daily.  Dispense: 60 capsule; Refill: 1  -     ketorolac injection 15 mg    Postlaminectomy syndrome of lumbar region  -     IR Epidural Transforaminal Inj 1st Vert Lumbar Uni; Future; Expected date: 07/09/2024  -     IR Epidural Transfor Inj Ea Add Lumb Uni; Future; Expected date: 07/09/2024  -     Case Request-RAD/Other Procedure Area: Right L5/S1 + S1 TF PORSHA  -     pregabalin (LYRICA) 25 MG capsule; Take 1 capsule (25 mg total) by mouth 2 (two) times daily.  Dispense: 60 capsule; Refill: 1  -     ketorolac injection 15 mg    DDD (degenerative disc disease), lumbar  -     pregabalin (LYRICA) 25 MG capsule; Take 1 capsule  (25 mg total) by mouth 2 (two) times daily.  Dispense: 60 capsule; Refill: 1  -     ketorolac injection 15 mg             PLAN:   - Interventions:   Schedule patient for right L5-S1 and S1 transforaminal epidural steroid injection for right lumbar radiculopathy.  Patient may continue aspirin  Trigger point injection today in clinic.  Note below.    - Anticoagulation use:   Yes aspirin    - Medications:   Start Lyrica 25 mg at night.  Patient may titrate up to 25 mg twice a day   Continue tramadol 50 mg 3 times a day p.r.n.    - Therapy:    Patient has completed 8 weeks of formal physical therapy with the last 3 months with no relief.  Continue home exercises    - Imaging/Diagnostic:   CT of lumbar spine reviewed and findings discussed with patient.  No significant fracture of L4-5 hardware.  There is grade 1 anterolisthesis of L3 upon L4.  There is no eccentric to the left disc osteophyte at L3-L4 resulting in left-greater-than-right foraminal stenosis and severe canal stenosis.  There is right eccentric disc osteophyte at L5-S1 resulting in severe right foraminal and right lateral recess stenosis with mild canal stenosis    - Consults:   Consider referral to Neurosurgery in the future for severe canal stenosis at L3-L4 patient with previous L4-5 posterior fusion    Procedure Note: Trigger point injection of right lumbar paraspinous x1  Pre-Procedure Diagnosis:  Myofascial pain  Post-Procedure Diagnosis: Same  : Amando Sy MD  Medication Mixture: 2ml 1% lidocaine and 1ml Toradol 30mg/ml    Description: After maximal tender points were identified at the noted areas above, the overlying skin was cleaned with etoh swabs.  Using a 25 G 1 inch hypodermic needle, the above medication mixture was distributed equally among all the injection sites  after negative aspiration. A stellate pattern was then used to needle the surrounding area. Needle was removed and areas cleaned and a bandage was applied    Blood  loss minimal.  Complications: None  Disposition: Pt left in good condition with no complaints.      - Patient Questions: Answered all of the patient's questions regarding diagnosis, therapy, and treatment     This condition does not require this patient to take time off of work, and the primary goal of our Pain Management services is to improve the patient's functional capacity.     - Follow up visit: return to clinic 4 weeks after procedure        The above plan and management options were discussed at length with patient. Patient is in agreement with the above and verbalized understanding.    I discussed the goals of interventional chronic pain management with the patient on today's visit.  I explained the utility of injections for diagnostic and therapeutic purposes.  We discussed a multimodal approach to pain including treating the patient's given worst pain at any given time.  We will use a systematic approach to addressing pain.  We will also adopt a multimodal approach that includes injections, adjuvant medications, physical therapy, at times psychiatry.  There may be a limited role for opioid use intermittently in the treatment of pain, more particularly for acute pain although no one approach can be used as a sole treatment modality.    I emphasized the importance of regular exercise, core strengthening and stretching, diet and weight loss as a cornerstone of long-term pain management.      Amando Sy MD  Interventional Pain Management  [facility]    I spent a total of 45 minutes on the day of the visit.This includes face to face time and non-face to face time preparing to see the patient (eg, review of tests), obtaining and/or reviewing separately obtained history, documenting clinical information in the electronic or other health record, independently interpreting results and communicating results to the patient/family/caregiver, or care coordinator.      Disclaimer:  This note was  prepared using voice recognition system and is likely to have sound alike errors that may have been overlooked even after proof reading.  Please call me with any questions

## 2024-07-09 NOTE — H&P (VIEW-ONLY)
New Patient Interventional Pain Note (Initial Visit)    Referring Physician: Other    PCP: Andrea Del Castillo MD    Chief Complaint:     Chief Complaint   Patient presents with    Low-back Pain     Radiating to right leg        SUBJECTIVE:    Salud Echevarria is a 74 y.o. female who presents to the clinic for the evaluation of lower back pain.   Patient reports three-month history of increased lower back pain.  Patient reports having previous L4-5 posterior fusion in 2012.  Patient reports that pain increased after sleeping in a chair while visiting her family in the hospital.  Pain described as stabbing aching pain that starts in the right buttocks.  This pain then radiates down the posterior aspect of the right lower extremity to the plantar aspect of her foot primarily affecting the 1st and 2nd digit.  Patient denies any significant left lower extremity pain.  Pain is worse with flexion and prolonged sitting, better with lying supine and ice.  Pain is currently rated an 8/10. Denies any fevers, chills,  saddle anesthesia, or bowel and bladder incontinence          Non-Pharmacologic Treatments:  Physical Therapy/Home Exercise: yes  Ice/Heat:yes  TENS: no  Acupuncture: no  Massage: yes  Chiropractic: no        Previous Pain Medications:  NSAIDs, Tylenol, muscle relaxers, neuropathics, opioids, topicals       report:  Reviewed and consistent with medication use as prescribed.    Pain Procedures:   Previous epidural steroid injections before her surgery in 2012    Pain Disability Index Review:         7/9/2024     8:06 AM   Last 3 PDI Scores   Pain Disability Index (PDI) 59       Imaging:     Results for orders placed during the hospital encounter of 06/19/24    CT Lumbar Spine Without Contrast    Narrative  EXAMINATION:  CT LUMBAR SPINE WITHOUT CONTRAST    CLINICAL HISTORY:  Chronic lumbar radiculopathy    TECHNIQUE:  Axial CT images were obtained through the lumbar spine without contrast.  Coronal and sagittal  reconstructions submitted and interpreted.  Total DLP 1368.  Automated exposure control utilized.    COMPARISON:  MRI 12/28/2012    FINDINGS:  No acute fracture or traumatic subluxation.  Bilateral transpedicular screws are at L4 and L5 with an interbody spacer at the L4-5 level.  There is anterolisthesis L3 on L4.  Vertebral body heights are maintained.  Disc space narrowing and vacuum disc phenomenon most prominent at L3-4.    L1-2: Posterior disc osteophyte and facet hypertrophy results in moderate spinal canal stenosis with mild to moderate bilateral bony neural foraminal stenosis.    L2-3: Posterior disc osteophyte and facet hypertrophy results in mild bilateral bony neural foraminal stenosis with moderate spinal canal stenosis.    L3-4: Posterior disc osteophyte eccentric to the left results in moderate to severe left and mild-to-moderate right neural foraminal and lateral recess stenosis with severe spinal canal stenosis.    L4-5: No significant findings.    L5-S1: Posterior disc osteophyte and facet hypertrophy results in moderate to severe right and moderate left neural foraminal and lateral recess stenosis with mild spinal canal stenosis.    Paravertebral soft tissues are normal.    Impression  1. Multilevel degenerative changes with up to severe spinal canal stenosis at the L3-4 level.  2. Other levels as above.      Electronically signed by: Emmanuel Nassar MD  Date:    06/19/2024  Time:    14:25    Results for orders placed during the hospital encounter of 11/06/23    X-Ray Cervical Spine AP And Lateral    Narrative  EXAMINATION:  XR CERVICAL SPINE AP LATERAL    CLINICAL HISTORY:  Neck pain.    TECHNIQUE:  4 views.    COMPARISON:  None    FINDINGS:  Minimal retrolisthesis at C5 measuring 3 mm secondary to degenerative disc disease.  Moderate degenerative disc disease from C3-4 through C7-T1 with facet DJD.  No acute fracture or subluxation.  No soft tissue abnormality detected.    Impression  Degenerative  changes as above.      Electronically signed by: Parag Jamil MD  Date:    11/06/2023  Time:    12:54    Past Medical History:   Diagnosis Date    Asthma     childhood - seasonal as an adult    Hematuria 06/10/2019    Hyperlipidemia     Hypertension     pt denies    Restless leg     Spinal stenosis      Past Surgical History:   Procedure Laterality Date    BACK SURGERY      BREAST BIOPSY      CARDIAC CATHETERIZATION  2018, 11/2020    HYSTERECTOMY      LYMPH NODE BIOPSY      ROBOT-ASSISTED REPAIR OF VENTRAL HERNIA USING DA JIM XI N/A 12/10/2020    Procedure: XI ROBOTIC REPAIR, HERNIA, VENTRAL;  Surgeon: Brandyn Panchal MD;  [facility]: [facility];  Service: General;  Laterality: N/A;    TONSILLECTOMY       Social History     Socioeconomic History    Marital status:    Tobacco Use    Smoking status: Former     Current packs/day: 0.50     Average packs/day: 0.5 packs/day for 30.0 years (15.0 ttl pk-yrs)     Types: Cigarettes    Smokeless tobacco: Former    Tobacco comments:     none past MN before surgery   Substance and Sexual Activity    Alcohol use: Yes     Comment: 1-2 beers per month    Drug use: No    Sexual activity: Not Currently     Partners: Male     Social Determinants of Health     Financial Resource Strain: Medium Risk (5/20/2022)    Overall Financial Resource Strain (CARDIA)     Difficulty of Paying Living Expenses: Somewhat hard   Food Insecurity: No Food Insecurity (5/20/2022)    Hunger Vital Sign     Worried About Running Out of Food in the Last Year: Never true     Ran Out of Food in the Last Year: Never true   Transportation Needs: No Transportation Needs (5/20/2022)    PRAPARE - Transportation     Lack of Transportation (Medical): No     Lack of Transportation (Non-Medical): No   Physical Activity: Sufficiently Active (5/20/2022)    Exercise Vital Sign     Days of Exercise per Week: 7 days     Minutes of Exercise per Session: 30 min   Stress: Stress Concern Present (5/20/2022)    [employer] [employer]  [employer] - Occupational Stress Questionnaire     Feeling of Stress : To some extent   Housing Stability: Low Risk  (5/20/2022)    Housing Stability Vital Sign     Unable to Pay for Housing in the Last Year: No     Number of Places Lived in the Last Year: 1     Unstable Housing in the Last Year: No     Family History   Problem Relation Name Age of Onset    Heart disease Mother      Esophageal cancer Brother  73        Stage 4    Cancer Maternal Uncle         Review of patient's allergies indicates:   Allergen Reactions    Dye Anaphylaxis     Contrast Dye    Iodine and iodide containing products Rash    Penicillins Shortness Of Breath     Shortness of breath^severe skin rash    Gabapentin     Lovastatin Other (See Comments)     Causes leg cramp    Mobic [meloxicam] Other (See Comments) and Hallucinations    Mucinex [guaifenesin]     Cephalexin Nausea And Vomiting    Diazepam Anxiety     Made pt overly anxious instead of relaxing    Naproxen Nausea And Vomiting       Current Outpatient Medications   Medication Sig    albuterol (PROVENTIL/VENTOLIN HFA) 90 mcg/actuation inhaler Inhale 1-2 puffs into the lungs every 6 (six) hours as needed for Wheezing. Rescue    aspirin (ECOTRIN) 81 MG EC tablet Take 81 mg by mouth once daily.    fluticasone propionate (FLOVENT HFA) 220 mcg/actuation inhaler Inhale 1 puff into the lungs 2 (two) times a day. Controller    levocetirizine (XYZAL) 5 MG tablet Take 1 tablet (5 mg total) by mouth every evening.    pravastatin (PRAVACHOL) 20 MG tablet Take 1 tablet (20 mg total) by mouth once daily.    propylene glycol (SYSTANE COMPLETE OPHT) Apply 1 drop to eye daily as needed.    rOPINIRole (REQUIP) 0.5 MG tablet Take 1 tablet (0.5 mg total) by mouth nightly as needed (restless legs).    traMADoL (ULTRAM) 50 mg tablet TAKE 1 TABLET BY MOUTH EVERY 6 HOURS AS NEEDED FOR PAIN    triamcinolone acetonide 0.1% (KENALOG) 0.1 % cream Apply topically nightly as needed (itching).     "pregabalin (LYRICA) 25 MG capsule Take 1 capsule (25 mg total) by mouth 2 (two) times daily.     No current facility-administered medications for this visit.         ROS  Review of Systems   Constitutional:  Negative for chills, diaphoresis, fatigue and fever.   HENT:  Negative for ear discharge, ear pain, rhinorrhea, trouble swallowing and voice change.    Eyes:  Negative for pain and redness.   Respiratory:  Negative for chest tightness, shortness of breath, wheezing and stridor.    Cardiovascular:  Positive for leg swelling. Negative for chest pain.   Gastrointestinal:  Negative for blood in stool, diarrhea, nausea and vomiting.   Endocrine: Negative for cold intolerance and heat intolerance.   Genitourinary:  Positive for urgency. Negative for dysuria and hematuria.   Musculoskeletal:  Positive for arthralgias, back pain, gait problem, joint swelling and myalgias. Negative for neck pain and neck stiffness.   Skin:  Negative for rash.   Neurological:  Positive for weakness, numbness and headaches. Negative for tremors, seizures, speech difficulty and light-headedness.   Hematological:  Does not bruise/bleed easily.   Psychiatric/Behavioral:  Negative for agitation, confusion and suicidal ideas.             OBJECTIVE:  /67 (BP Location: Left arm, Patient Position: Sitting)   Pulse 66   Ht 5' 2" (1.575 m)   Wt 59.4 kg (130 lb 15.3 oz)   BMI 23.95 kg/m²         Physical Exam  Constitutional:       General: She is not in acute distress.     Appearance: Normal appearance. She is not ill-appearing.   HENT:      Head: Normocephalic and atraumatic.      Nose: No congestion or rhinorrhea.   Eyes:      Extraocular Movements: Extraocular movements intact.      Pupils: Pupils are equal, round, and reactive to light.   Cardiovascular:      Pulses: Normal pulses.   Pulmonary:      Effort: Pulmonary effort is normal.      Breath sounds: Normal breath sounds.   Skin:     General: Skin is warm and dry.      Capillary " Refill: Capillary refill takes less than 2 seconds.   Neurological:      General: No focal deficit present.      Mental Status: She is alert and oriented to person, place, and time.      Sensory: No sensory deficit.      Motor: Weakness present. No abnormal muscle tone.      Gait: Gait abnormal.      Deep Tendon Reflexes:      Reflex Scores:       Patellar reflexes are 2+ on the right side and 2+ on the left side.       Achilles reflexes are 1+ on the right side and 1+ on the left side.     Comments: Antalgic gait   4/5 strength in right dorsiflexion   Psychiatric:         Mood and Affect: Mood normal.         Behavior: Behavior normal.         Thought Content: Thought content normal.           Musculoskeletal:      Lumbar Exam  Incision: yes  Pain with Flexion: yes, flexion worse than extension  Pain with Extension: yes  ROM:  Decreased  Paraspinous TTP:  Positive on the right  Facet TTP:  L5-S1  Facet Loading:  Negative bilaterally  SLR:  Positive on the right at 75°  SIJ TTP:  Positive on the right  YOLETTE:  Negative bilaterally      LABS:  Lab Results   Component Value Date    WBC 6.55 03/19/2024    HGB 13.4 03/19/2024    HCT 41.0 03/19/2024    MCV 91 03/19/2024     03/19/2024       CMP  Sodium   Date Value Ref Range Status   03/19/2024 145 136 - 145 mmol/L Final     Potassium   Date Value Ref Range Status   03/19/2024 3.8 3.5 - 5.1 mmol/L Final     Chloride   Date Value Ref Range Status   03/19/2024 107 95 - 110 mmol/L Final     CO2   Date Value Ref Range Status   03/19/2024 27 23 - 29 mmol/L Final     Glucose   Date Value Ref Range Status   03/19/2024 102 70 - 110 mg/dL Final     BUN   Date Value Ref Range Status   03/19/2024 14 8 - 23 mg/dL Final     Creatinine   Date Value Ref Range Status   03/19/2024 0.7 0.5 - 1.4 mg/dL Final     Calcium   Date Value Ref Range Status   03/19/2024 9.0 8.7 - 10.5 mg/dL Final     Total Protein   Date Value Ref Range Status   03/19/2024 6.5 6.0 - 8.4 g/dL Final      Albumin   Date Value Ref Range Status   03/19/2024 3.6 3.5 - 5.2 g/dL Final     Total Bilirubin   Date Value Ref Range Status   03/19/2024 0.4 0.1 - 1.0 mg/dL Final     Comment:     For infants and newborns, interpretation of results should be based  on gestational age, weight and in agreement with clinical  observations.    Premature Infant recommended reference ranges:  Up to 24 hours.............<8.0 mg/dL  Up to 48 hours............<12.0 mg/dL  3-5 days..................<15.0 mg/dL  6-29 days.................<15.0 mg/dL       Alkaline Phosphatase   Date Value Ref Range Status   03/19/2024 52 (L) 55 - 135 U/L Final     AST   Date Value Ref Range Status   03/19/2024 15 10 - 40 U/L Final     ALT   Date Value Ref Range Status   03/19/2024 11 10 - 44 U/L Final     Anion Gap   Date Value Ref Range Status   03/19/2024 11 8 - 16 mmol/L Final     eGFR if    Date Value Ref Range Status   07/24/2022 >60.0 >60 mL/min/1.73 m^2 Final     eGFR if non    Date Value Ref Range Status   07/24/2022 >60.0 >60 mL/min/1.73 m^2 Final     Comment:     Calculation used to obtain the estimated glomerular filtration  rate (eGFR) is the CKD-EPI equation.          Lab Results   Component Value Date    HGBA1C 5.5 03/19/2024             ASSESSMENT:       74 y.o. year old female with lower back pain, consistent with     1. Lumbar radiculopathy  IR Epidural Transforaminal Inj 1st Vert Lumbar Uni    IR Epidural Transfor Inj Ea Add Lumb Uni    Case Request-RAD/Other Procedure Area: Right L5/S1 + S1 TF PORSHA    pregabalin (LYRICA) 25 MG capsule    ketorolac injection 15 mg      2. Acute right-sided low back pain with right-sided sciatica  Ambulatory referral/consult to Pain Clinic    pregabalin (LYRICA) 25 MG capsule    ketorolac injection 15 mg      3. Chronic left-sided low back pain with left-sided sciatica  Ambulatory referral/consult to Pain Clinic    pregabalin (LYRICA) 25 MG capsule    ketorolac injection 15  mg      4. Postlaminectomy syndrome of lumbar region  IR Epidural Transforaminal Inj 1st Vert Lumbar Uni    IR Epidural Transfor Inj Ea Add Lumb Uni    Case Request-RAD/Other Procedure Area: Right L5/S1 + S1 TF PORSHA    pregabalin (LYRICA) 25 MG capsule    ketorolac injection 15 mg      5. DDD (degenerative disc disease), lumbar  pregabalin (LYRICA) 25 MG capsule    ketorolac injection 15 mg        Lumbar radiculopathy  -     IR Epidural Transforaminal Inj 1st Vert Lumbar Uni; Future; Expected date: 07/09/2024  -     IR Epidural Transfor Inj Ea Add Lumb Uni; Future; Expected date: 07/09/2024  -     Case Request-RAD/Other Procedure Area: Right L5/S1 + S1 TF PORSHA  -     pregabalin (LYRICA) 25 MG capsule; Take 1 capsule (25 mg total) by mouth 2 (two) times daily.  Dispense: 60 capsule; Refill: 1  -     ketorolac injection 15 mg    Acute right-sided low back pain with right-sided sciatica  -     Ambulatory referral/consult to Pain Clinic  -     pregabalin (LYRICA) 25 MG capsule; Take 1 capsule (25 mg total) by mouth 2 (two) times daily.  Dispense: 60 capsule; Refill: 1  -     ketorolac injection 15 mg    Chronic left-sided low back pain with left-sided sciatica  -     Ambulatory referral/consult to Pain Clinic  -     pregabalin (LYRICA) 25 MG capsule; Take 1 capsule (25 mg total) by mouth 2 (two) times daily.  Dispense: 60 capsule; Refill: 1  -     ketorolac injection 15 mg    Postlaminectomy syndrome of lumbar region  -     IR Epidural Transforaminal Inj 1st Vert Lumbar Uni; Future; Expected date: 07/09/2024  -     IR Epidural Transfor Inj Ea Add Lumb Uni; Future; Expected date: 07/09/2024  -     Case Request-RAD/Other Procedure Area: Right L5/S1 + S1 TF PORSHA  -     pregabalin (LYRICA) 25 MG capsule; Take 1 capsule (25 mg total) by mouth 2 (two) times daily.  Dispense: 60 capsule; Refill: 1  -     ketorolac injection 15 mg    DDD (degenerative disc disease), lumbar  -     pregabalin (LYRICA) 25 MG capsule; Take 1 capsule  (25 mg total) by mouth 2 (two) times daily.  Dispense: 60 capsule; Refill: 1  -     ketorolac injection 15 mg             PLAN:   - Interventions:   Schedule patient for right L5-S1 and S1 transforaminal epidural steroid injection for right lumbar radiculopathy.  Patient may continue aspirin  Trigger point injection today in clinic.  Note below.    - Anticoagulation use:   Yes aspirin    - Medications:   Start Lyrica 25 mg at night.  Patient may titrate up to 25 mg twice a day   Continue tramadol 50 mg 3 times a day p.r.n.    - Therapy:    Patient has completed 8 weeks of formal physical therapy with the last 3 months with no relief.  Continue home exercises    - Imaging/Diagnostic:   CT of lumbar spine reviewed and findings discussed with patient.  No significant fracture of L4-5 hardware.  There is grade 1 anterolisthesis of L3 upon L4.  There is no eccentric to the left disc osteophyte at L3-L4 resulting in left-greater-than-right foraminal stenosis and severe canal stenosis.  There is right eccentric disc osteophyte at L5-S1 resulting in severe right foraminal and right lateral recess stenosis with mild canal stenosis    - Consults:   Consider referral to Neurosurgery in the future for severe canal stenosis at L3-L4 patient with previous L4-5 posterior fusion    Procedure Note: Trigger point injection of right lumbar paraspinous x1  Pre-Procedure Diagnosis:  Myofascial pain  Post-Procedure Diagnosis: Same  : Amando Sy MD  Medication Mixture: 2ml 1% lidocaine and 1ml Toradol 30mg/ml    Description: After maximal tender points were identified at the noted areas above, the overlying skin was cleaned with etoh swabs.  Using a 25 G 1 inch hypodermic needle, the above medication mixture was distributed equally among all the injection sites  after negative aspiration. A stellate pattern was then used to needle the surrounding area. Needle was removed and areas cleaned and a bandage was applied    Blood  loss minimal.  Complications: None  Disposition: Pt left in good condition with no complaints.      - Patient Questions: Answered all of the patient's questions regarding diagnosis, therapy, and treatment     This condition does not require this patient to take time off of work, and the primary goal of our Pain Management services is to improve the patient's functional capacity.     - Follow up visit: return to clinic 4 weeks after procedure        The above plan and management options were discussed at length with patient. Patient is in agreement with the above and verbalized understanding.    I discussed the goals of interventional chronic pain management with the patient on today's visit.  I explained the utility of injections for diagnostic and therapeutic purposes.  We discussed a multimodal approach to pain including treating the patient's given worst pain at any given time.  We will use a systematic approach to addressing pain.  We will also adopt a multimodal approach that includes injections, adjuvant medications, physical therapy, at times psychiatry.  There may be a limited role for opioid use intermittently in the treatment of pain, more particularly for acute pain although no one approach can be used as a sole treatment modality.    I emphasized the importance of regular exercise, core strengthening and stretching, diet and weight loss as a cornerstone of long-term pain management.      Amando Sy MD  Interventional Pain Management  [facility]    I spent a total of 45 minutes on the day of the visit.This includes face to face time and non-face to face time preparing to see the patient (eg, review of tests), obtaining and/or reviewing separately obtained history, documenting clinical information in the electronic or other health record, independently interpreting results and communicating results to the patient/family/caregiver, or care coordinator.      Disclaimer:  This note was  prepared using voice recognition system and is likely to have sound alike errors that may have been overlooked even after proof reading.  Please call me with any questions

## 2024-07-10 ENCOUNTER — TELEPHONE (OUTPATIENT)
Dept: PAIN MEDICINE | Facility: CLINIC | Age: 74
End: 2024-07-10
Payer: MEDICARE

## 2024-07-10 NOTE — TELEPHONE ENCOUNTER
----- Message from Becka Bowden sent at 7/10/2024  8:57 AM CDT -----  Regarding: concerns  Name of who is calling:   Salud      What is the request in detail: Pt is requesting a call back in ref to fingers are tingling this morning      Can the clinic reply by [facility]:yes      What number to call back if not [facility]: [phone]

## 2024-07-10 NOTE — TELEPHONE ENCOUNTER
Called patient in regards to a call back. Patient stated she received Lyrica medication from Dr. Sy on yesterday and woke up today with tingling in fingers. Stated to patient that it is one of the side effects that it has. Patient wants to know if she should stop or continuing taking. Stated to patient I will make note of this occurrence and I will message the provider to see what his suggestions are. Patient voiced understanding.    Christiane Jama MA

## 2024-07-10 NOTE — PRE-PROCEDURE INSTRUCTIONS
Spoke with patient regarding procedure scheduled on 7.17     Arrival time 0715     Has patient been sick with fever or on antibiotics within the last 7 days? No     Does the patient have any open wounds, sores or rashes? No     Does the patient have any recent fractures? no     Has patient received a vaccination within the last 7 days? No     Received the COVID vaccination? yes     Has the patient stopped all medications as directed? na     Does patient have a pacemaker, defibrillator, or implantable stimulator? No     Does the patient have a ride to and from procedure and someone reliable to remain with patient?  daughter     Is the patient diabetic? no     Does the patient have sleep apnea? Or use O2 at home? no     Is the patient receiving sedation? local     Is the patient instructed to remain NPO beginning at midnight the night before their procedure? yes     Procedure location confirmed with patient? Yes     Covid- Denies signs/symptoms. Instructed to notify PAT/MD if any changes.

## 2024-07-17 ENCOUNTER — HOSPITAL ENCOUNTER (OUTPATIENT)
Facility: HOSPITAL | Age: 74
Discharge: HOME | End: 2024-07-17
Attending: ANESTHESIOLOGY | Admitting: ANESTHESIOLOGY
Payer: MEDICARE

## 2024-07-17 VITALS
BODY MASS INDEX: 23.99 KG/M2 | WEIGHT: 130.38 LBS | SYSTOLIC BLOOD PRESSURE: 144 MMHG | TEMPERATURE: 97 F | RESPIRATION RATE: 18 BRPM | DIASTOLIC BLOOD PRESSURE: 63 MMHG | HEART RATE: 80 BPM | OXYGEN SATURATION: 97 % | HEIGHT: 62 IN

## 2024-07-17 DIAGNOSIS — M54.16 LUMBAR RADICULOPATHY: Primary | ICD-10-CM

## 2024-07-17 PROCEDURE — 63600175 PHARM REV CODE 636 W HCPCS: Performed by: ANESTHESIOLOGY

## 2024-07-17 PROCEDURE — 64484 NJX AA&/STRD TFRM EPI L/S EA: CPT | Mod: RT | Performed by: ANESTHESIOLOGY

## 2024-07-17 PROCEDURE — 64483 NJX AA&/STRD TFRM EPI L/S 1: CPT | Mod: RT | Performed by: ANESTHESIOLOGY

## 2024-07-17 PROCEDURE — 64484 NJX AA&/STRD TFRM EPI L/S EA: CPT | Mod: RT,,, | Performed by: ANESTHESIOLOGY

## 2024-07-17 PROCEDURE — A9585 GADOBUTROL INJECTION: HCPCS | Performed by: ANESTHESIOLOGY

## 2024-07-17 PROCEDURE — 25500020 PHARM REV CODE 255: Performed by: ANESTHESIOLOGY

## 2024-07-17 PROCEDURE — 25000003 PHARM REV CODE 250: Performed by: ANESTHESIOLOGY

## 2024-07-17 PROCEDURE — 64483 NJX AA&/STRD TFRM EPI L/S 1: CPT | Mod: RT,,, | Performed by: ANESTHESIOLOGY

## 2024-07-17 RX ORDER — BETAMETHASONE SODIUM PHOSPHATE AND BETAMETHASONE ACETATE 3; 3 MG/ML; MG/ML
INJECTION, SUSPENSION INTRA-ARTICULAR; INTRALESIONAL; INTRAMUSCULAR; SOFT TISSUE
Status: DISCONTINUED | OUTPATIENT
Start: 2024-07-17 | End: 2024-07-17 | Stop reason: HOSPADM

## 2024-07-17 RX ORDER — GADOBUTROL 604.72 MG/ML
INJECTION INTRAVENOUS
Status: DISCONTINUED | OUTPATIENT
Start: 2024-07-17 | End: 2024-07-17 | Stop reason: HOSPADM

## 2024-07-17 RX ORDER — ONDANSETRON HYDROCHLORIDE 2 MG/ML
4 INJECTION, SOLUTION INTRAVENOUS ONCE AS NEEDED
Status: DISCONTINUED | OUTPATIENT
Start: 2024-07-17 | End: 2024-07-17 | Stop reason: HOSPADM

## 2024-07-17 RX ORDER — LIDOCAINE HYDROCHLORIDE 10 MG/ML
INJECTION, SOLUTION EPIDURAL; INFILTRATION; INTRACAUDAL; PERINEURAL
Status: DISCONTINUED | OUTPATIENT
Start: 2024-07-17 | End: 2024-07-17 | Stop reason: HOSPADM

## 2024-07-17 NOTE — OP NOTE
Transforaminal Lumbar Epidural Steroid Injection    INFORMED CONSENT: The procedure, risks, benefits and options were discussed with patient. There are no contraindications to the procedure. The patient expressed understanding and agreed to proceed. The personnel performing the procedure was discussed.    Date of procedure 07/17/2024    Time-out taken to identify patient and procedure side prior to starting the procedure.                     PROCEDURE:    1)  Right  L5/S1 TRANSFORAMINAL EPIDURAL STEROID INJECTION    2)  Right  S1 TRANSFORAMINAL EPIDURAL STEROID INJECTION    Pre Procedure diagnosis:    Postlaminectomy syndrome of lumbar region [M96.1]  Lumbar radiculopathy [M54.16]  1. Lumbar radiculopathy        Post-Procedure diagnosis:   same    Surgeon: Amando Sy MD    Assistants: None    Medication: 2ml Betamethasone PF 6mg/ml and 2ml Lidocaine PF 1%    Local: 3ml Lidocaine PF 1%        Complications: None    Specimens: None        TECHNIQUE: The patient was brought to the procedure room.  The patient was positioned prone on the fluoroscopy table. Continuous hemodynamic monitoring was initiated including blood pressure, EKG, and pulse oximetry. . The skin was prepped with chlorhexidine and draped in a sterile fashion.   Local Xylocaine was injected by raising a wheel and going down to the periosteum using a 27-gauge hypodermic needle.      The Right  L5/S1 and Right S1 transforaminal spaces were identified with fluoroscopy in the  AP, oblique, and lateral views.  A 22 gauge spinal quinke needle was then advanced into the area of the trans foraminal spaces at the above levels with confirmation of proper needle position using AP, oblique, and lateral fluoroscopic views. Once the needle tip was in the area of the transforaminal space, and there was no blood, CSF or paraesthesias,  2 mL of Omnipaque 300mg/ml was injected at each level for a total of 4 mL.  Fluoroscopic imaging in the AP and lateral views  revealed a clear outline of the spinal nerve with proximal spread of agent through the neural foramen into the epidural space. A total combination of 1 mL of Lidocaine PF 1% and 1ml of Betamethasone PF 6mg/ml was injected into each level for a total of 4mL of injected medications with displacement of the contrast dye confirming that the medication went into the area of the transforaminal spaces at each level. A sterile dressing was applied. Patient tolerated procedure well.        The patient was monitored for approximately 30 minutes after the procedure.  Patient was given post procedure and discharge instructions to follow at home.  The patient was discharged in a stable condition

## 2024-07-17 NOTE — DISCHARGE INSTRUCTIONS

## 2024-07-17 NOTE — DISCHARGE SUMMARY
Discharge Note  Short Stay      SUMMARY     Admit Date: 7/17/2024    Attending Physician: Amando Sy MD        Discharge Physician: Amando Sy MD        Discharge Date: 7/17/2024 7:32 AM    Procedure(s) (LRB):  Right L5/S1 + S1 TF PORSHA (Right)    Final Diagnosis: Postlaminectomy syndrome of lumbar region [M96.1]  Lumbar radiculopathy [M54.16]    Disposition: Home or self care    Patient Instructions:   Current Discharge Medication List        CONTINUE these medications which have NOT CHANGED    Details   aspirin (ECOTRIN) 81 MG EC tablet Take 81 mg by mouth once daily.      albuterol (PROVENTIL/VENTOLIN HFA) 90 mcg/actuation inhaler Inhale 1-2 puffs into the lungs every 6 (six) hours as needed for Wheezing. Rescue  Qty: 18 g, Refills: 1      fluticasone propionate (FLOVENT HFA) 220 mcg/actuation inhaler Inhale 1 puff into the lungs 2 (two) times a day. Controller  Qty: 12 g, Refills: 11    Associated Diagnoses: Simple chronic bronchitis      levocetirizine (XYZAL) 5 MG tablet Take 1 tablet (5 mg total) by mouth every evening.  Qty: 30 tablet, Refills: 0    Associated Diagnoses: Acute rhinitis      pravastatin (PRAVACHOL) 20 MG tablet Take 1 tablet (20 mg total) by mouth once daily.  Qty: 90 tablet, Refills: 3    Associated Diagnoses: Hyperlipidemia, unspecified hyperlipidemia type      pregabalin (LYRICA) 25 MG capsule Take 1 capsule (25 mg total) by mouth 2 (two) times daily.  Qty: 60 capsule, Refills: 1    Comments: Take 1 pill at night for 5 days.  Then take 1 pill at night and 1 pill in the morning.  Associated Diagnoses: Acute right-sided low back pain with right-sided sciatica; Chronic left-sided low back pain with left-sided sciatica; Postlaminectomy syndrome of lumbar region; Lumbar radiculopathy; DDD (degenerative disc disease), lumbar      propylene glycol (SYSTANE COMPLETE OPHT) Apply 1 drop to eye daily as needed.      rOPINIRole (REQUIP) 0.5 MG tablet Take 1 tablet (0.5 mg total) by mouth  nightly as needed (restless legs).  Qty: 90 tablet, Refills: 3    Associated Diagnoses: RLS (restless legs syndrome)      traMADoL (ULTRAM) 50 mg tablet TAKE 1 TABLET BY MOUTH EVERY 6 HOURS AS NEEDED FOR PAIN  Qty: 28 tablet, Refills: 0    Comments: Quantity prescribed more than 7 day supply? No  Associated Diagnoses: Chronic left-sided low back pain with left-sided sciatica      triamcinolone acetonide 0.1% (KENALOG) 0.1 % cream Apply topically nightly as needed (itching).  Qty: 30 g, Refills: 1    Associated Diagnoses: Itching                 Discharge Diagnosis: Postlaminectomy syndrome of lumbar region [M96.1]  Lumbar radiculopathy [M54.16]  Condition on Discharge: Stable with no complications to procedure   Diet on Discharge: Same as before.  Activity: as per instruction sheet.  Discharge to: Home with a responsible adult.  Follow up: 2-4 weeks       Please call the office at [phone] if you experience any weakness or loss of sensation, fever > 101.5, pain uncontrolled with oral medications, persistent nausea/vomiting/or diarrhea, redness or drainage from the incisions, or any other worrisome concerns. If physician on call was not reached or could not communicate with our office for any reason please go to the nearest emergency department

## 2024-07-18 ENCOUNTER — TELEPHONE (OUTPATIENT)
Dept: PAIN MEDICINE | Facility: CLINIC | Age: 74
End: 2024-07-18
Payer: MEDICARE

## 2024-07-18 NOTE — TELEPHONE ENCOUNTER
----- Message from Latanya Wheeler sent at 7/18/2024  8:23 AM CDT -----  Type:  Needs Medical Advice    Who Called: Pt   Symptoms (please be specific):  light headedness and dizziness   How long has patient had these symptoms:  Post procedure from yesterday   Would the patient rather a call back or a response via [facility]? Call back   Best Call Back Number: [phone]

## 2024-07-18 NOTE — TELEPHONE ENCOUNTER
Called patient and informed her that it should be okay that she is feeling lightheaded but just to make sure I would inform the provider about the situation. Pt verbalized understanding.    Candice CALZADA

## 2024-07-30 ENCOUNTER — CLINICAL SUPPORT (OUTPATIENT)
Dept: SMOKING CESSATION | Facility: CLINIC | Age: 74
End: 2024-07-30
Payer: COMMERCIAL

## 2024-07-30 DIAGNOSIS — M54.42 CHRONIC LEFT-SIDED LOW BACK PAIN WITH LEFT-SIDED SCIATICA: ICD-10-CM

## 2024-07-30 DIAGNOSIS — G89.29 CHRONIC LEFT-SIDED LOW BACK PAIN WITH LEFT-SIDED SCIATICA: ICD-10-CM

## 2024-07-30 DIAGNOSIS — F17.200 NICOTINE DEPENDENCE: Primary | ICD-10-CM

## 2024-07-30 PROCEDURE — 99999 PR PBB SHADOW E&M-EST. PATIENT-LVL II: CPT | Mod: PBBFAC,,,

## 2024-07-30 PROCEDURE — 99404 PREV MED CNSL INDIV APPRX 60: CPT | Mod: S$GLB,,, | Performed by: STUDENT IN AN ORGANIZED HEALTH CARE EDUCATION/TRAINING PROGRAM

## 2024-07-30 RX ORDER — IBUPROFEN 200 MG
1 TABLET ORAL DAILY
Qty: 28 PATCH | Refills: 0 | Status: SHIPPED | OUTPATIENT
Start: 2024-07-30

## 2024-07-30 RX ORDER — DM/P-EPHED/ACETAMINOPH/DOXYLAM 30-7.5/3
2 LIQUID (ML) ORAL
Qty: 200 LOZENGE | Refills: 0 | Status: SHIPPED | OUTPATIENT
Start: 2024-07-30

## 2024-07-30 RX ORDER — TRAMADOL HYDROCHLORIDE 50 MG/1
TABLET ORAL
Qty: 28 TABLET | Refills: 0 | Status: SHIPPED | OUTPATIENT
Start: 2024-07-30

## 2024-07-30 NOTE — TELEPHONE ENCOUNTER
Good Morning/Afternoon,     Pt is requesting for a refill of: Tramadol 50 mg   Last filed: 6/3/24-   Last encounter: 7/9/24  Up coming apt: 8/20/24  Pharmacy: [id number] - [place]  Is this something you can do?      Katerin Alcantara  Medical Assistant

## 2024-07-30 NOTE — PROGRESS NOTES
Intake clinic visit patient reports smoking 10 to 15 cpd. Assessed CO with patient reporting smoking 2 cigarettes this morning prior to visit. CO=8. Discussed the role of tobacco cessation program, role of NRT & behavioral changes to assist the patient to reach the goal of being tobacco free. Discussed strategies to apply daily, delaying first cigarette of the day, and giving up her last cigarette of the night.  Patient prescribed 14 mg patches and 2 mg lozenges for NRT to fight urges and cravings. The patient reports willing to utilize patches and lozenges & will return for a follow up visit.  Education & instruction on the role of the NRT, usage & proper placement of the patch and lozenge technique/dosage instructions.  The patient verbalized understanding & willingness to apply. Patient instructed to call [facility] anytime. Follow up visit set with the patient for 2 weeks. Patient reports a goal of 5-10 cpd by next appt.

## 2024-07-30 NOTE — TELEPHONE ENCOUNTER
----- Message from Maik Nagel sent at 7/30/2024 12:41 PM CDT -----  Regarding: pain  Type:  Needs Medical Advice    Who Called: PT    Symptoms (please be specific): pain coming back     How long has patient had these symptoms:       Pharmacy name and phone #:    [facility] - [place], [place] - [address]  [address] [place] [address]  Phone: [phone] Fax: [phone]      Would the patient rather a call back or a response via [facility]? Call back    Best Call Back Number: [phone]      Additional Information: Sts that the pain is coming back and wants to know if she can get something called in for the pain.   Please advise -- Thank you

## 2024-08-12 DIAGNOSIS — E78.5 HYPERLIPIDEMIA, UNSPECIFIED HYPERLIPIDEMIA TYPE: ICD-10-CM

## 2024-08-12 NOTE — TELEPHONE ENCOUNTER
No care due was identified.  [facility] Embedded Care Due Messages. Reference number: [id number].   8/12/2024 8:41:04 AM CDT

## 2024-08-12 NOTE — TELEPHONE ENCOUNTER
Refill Routing Note   Medication(s) are not appropriate for processing by [facility] for the following reason(s):        New or recently adjusted medication  Allergy or intolerance    [facility] action(s):  Defer      Medication Therapy Plan: Allergy/Contraindication: Lovastatin Reactions: Other (see Comments)/pcp never prescribed    Pharmacist review requested: Yes     Appointments  past 12m or future 3m with PCP    Date Provider   Last Visit   7/3/2024 Andrea Del Castillo MD   Next Visit   Visit date not found Andrea Del Castillo MD   ED visits in past 90 days: 0        Note composed:5:21 PM 08/12/2024

## 2024-08-13 ENCOUNTER — CLINICAL SUPPORT (OUTPATIENT)
Dept: SMOKING CESSATION | Facility: CLINIC | Age: 74
End: 2024-08-13
Payer: COMMERCIAL

## 2024-08-13 DIAGNOSIS — F17.200 NICOTINE DEPENDENCE: Primary | ICD-10-CM

## 2024-08-13 PROCEDURE — 99404 PREV MED CNSL INDIV APPRX 60: CPT | Mod: S$GLB,,, | Performed by: STUDENT IN AN ORGANIZED HEALTH CARE EDUCATION/TRAINING PROGRAM

## 2024-08-13 PROCEDURE — 99999 PR PBB SHADOW E&M-EST. PATIENT-LVL I: CPT | Mod: PBBFAC,,,

## 2024-08-13 RX ORDER — PRAVASTATIN SODIUM 20 MG/1
20 TABLET ORAL
Qty: 90 TABLET | Refills: 2 | Status: SHIPPED | OUTPATIENT
Start: 2024-08-13

## 2024-08-13 NOTE — TELEPHONE ENCOUNTER
Refill Routing Note   Medication(s) are not appropriate for processing by [facility] for the following reason(s):        New or recently adjusted medication  Allergy or intolerance    [facility] action(s):  Defer      Medication Therapy Plan: Allergy/Contraindication: Lovastatin Reactions: Other (see Comments)/pcp never prescribed    Pharmacist review requested: Yes     Appointments  past 12m or future 3m with PCP    Date Provider   Last Visit   7/3/2024 Andrea Del Castillo MD   Next Visit   Visit date not found Andrea Del Castillo MD   ED visits in past 90 days: 0        Note composed:7:54 PM 08/12/2024

## 2024-08-13 NOTE — PROGRESS NOTES
Individual Follow-Up Form    8/13/2024    Quit Date: 08/03/24    Clinical Status of Patient: Outpatient    Length of Service: 60 minutes    Continuing Medication: yes  Patches or Nicotine Lozenges    Other Medications: none     Target Symptoms: Withdrawal and medication side effects. The following were  rated moderate (3) to severe (4) on TCRS:  Moderate (3): urges and cravings  Severe (4): none    Comments: Clinic follow up visit, patient reports not smoking for 10 days.  Congratulated patient and told how proud of her I was.  Patient has utilized the 14 mg patches and the 2 mg lozenges to help become smoke free. Patient CO=4. with patient reporting not having smoked in 10 days.  She stated how much better she is breathing now that she hasn't had a cigarette. Patient stated she will continue to use NRT's in place of cigarettes. Patient instructed and given number to call [facility] anytime. Follow up visit set with the patient for 2 weeks. Patient reports a goal of staying smoke free.    Diagnosis: F17.200    Next Visit: 2 weeks

## 2024-08-20 ENCOUNTER — OFFICE VISIT (OUTPATIENT)
Dept: PAIN MEDICINE | Facility: CLINIC | Age: 74
End: 2024-08-20
Payer: MEDICARE

## 2024-08-20 VITALS
HEART RATE: 68 BPM | RESPIRATION RATE: 16 BRPM | WEIGHT: 138 LBS | SYSTOLIC BLOOD PRESSURE: 123 MMHG | HEIGHT: 62 IN | DIASTOLIC BLOOD PRESSURE: 71 MMHG | BODY MASS INDEX: 25.4 KG/M2

## 2024-08-20 DIAGNOSIS — M51.36 DDD (DEGENERATIVE DISC DISEASE), LUMBAR: ICD-10-CM

## 2024-08-20 DIAGNOSIS — M79.671 CHRONIC FOOT PAIN, RIGHT: ICD-10-CM

## 2024-08-20 DIAGNOSIS — M25.571 CHRONIC PAIN OF RIGHT ANKLE: ICD-10-CM

## 2024-08-20 DIAGNOSIS — G89.29 CHRONIC PAIN OF RIGHT ANKLE: ICD-10-CM

## 2024-08-20 DIAGNOSIS — M54.16 LUMBAR RADICULOPATHY: Primary | ICD-10-CM

## 2024-08-20 DIAGNOSIS — G89.29 CHRONIC FOOT PAIN, RIGHT: ICD-10-CM

## 2024-08-20 PROCEDURE — 99214 OFFICE O/P EST MOD 30 MIN: CPT | Mod: S$GLB,,, | Performed by: ANESTHESIOLOGY

## 2024-08-20 PROCEDURE — G2211 COMPLEX E/M VISIT ADD ON: HCPCS | Mod: S$GLB,,, | Performed by: ANESTHESIOLOGY

## 2024-08-20 PROCEDURE — 3078F DIAST BP <80 MM HG: CPT | Mod: CPTII,S$GLB,, | Performed by: ANESTHESIOLOGY

## 2024-08-20 PROCEDURE — 3074F SYST BP LT 130 MM HG: CPT | Mod: CPTII,S$GLB,, | Performed by: ANESTHESIOLOGY

## 2024-08-20 PROCEDURE — 3288F FALL RISK ASSESSMENT DOCD: CPT | Mod: CPTII,S$GLB,, | Performed by: ANESTHESIOLOGY

## 2024-08-20 PROCEDURE — 3044F HG A1C LEVEL LT 7.0%: CPT | Mod: CPTII,S$GLB,, | Performed by: ANESTHESIOLOGY

## 2024-08-20 PROCEDURE — 1159F MED LIST DOCD IN RCRD: CPT | Mod: CPTII,S$GLB,, | Performed by: ANESTHESIOLOGY

## 2024-08-20 PROCEDURE — 99999 PR PBB SHADOW E&M-EST. PATIENT-LVL IV: CPT | Mod: PBBFAC,,, | Performed by: ANESTHESIOLOGY

## 2024-08-20 PROCEDURE — 1125F AMNT PAIN NOTED PAIN PRSNT: CPT | Mod: CPTII,S$GLB,, | Performed by: ANESTHESIOLOGY

## 2024-08-20 PROCEDURE — 1101F PT FALLS ASSESS-DOCD LE1/YR: CPT | Mod: CPTII,S$GLB,, | Performed by: ANESTHESIOLOGY

## 2024-08-20 NOTE — PROGRESS NOTES
Interventional Pain Progress Note       Referring Physician: No ref. provider found    PCP: Andrea Del Castillo MD    Chief Complaint:   Lower back and bilateral foot pain     SUBJECTIVE:    Interval History (08/20/2024):     Patient returns to clinic after procedure.  Patient reports 90% relief after right L5-S1 and S1 transforaminal epidural steroid injection on 07/17/2024.  Patient reports resolution of lower back pain after his injection.  Primary pain today is a numbness burning pain in the bilateral lower extremities, right-greater-than-left.  Pain is primarily over the plantar aspects of the foot.  Patient also reports 2 week history of increased swelling of the right ankle.  Patient denies any erythema or significant pain associated with the swelling of the right ankle.  Pain is typically worse with wearing closed shoes and at night, better during the day and with open tissues.  Pain is currently a 2/10, but can increase to a 7/10 with exacerbating activity. Denies any fevers, chills, changes in gait, saddle anesthesia, or bowel and bladder incontinence        Initial HPI:     Salud Echevarria is a 74 y.o. female who presents to the clinic for the evaluation of lower back pain.   Patient reports three-month history of increased lower back pain.  Patient reports having previous L4-5 posterior fusion in 2012.  Patient reports that pain increased after sleeping in a chair while visiting her family in the hospital.  Pain described as stabbing aching pain that starts in the right buttocks.  This pain then radiates down the posterior aspect of the right lower extremity to the plantar aspect of her foot primarily affecting the 1st and 2nd digit.  Patient denies any significant left lower extremity pain.  Pain is worse with flexion and prolonged sitting, better with lying supine and ice.  Pain is currently rated an 8/10. Denies any fevers, chills,  saddle anesthesia, or bowel and bladder incontinence           Non-Pharmacologic Treatments:  Physical Therapy/Home Exercise: yes  Ice/Heat:yes  TENS: no  Acupuncture: no  Massage: yes  Chiropractic: no        Previous Pain Medications:  NSAIDs, Tylenol, muscle relaxers, neuropathics, opioids, topicals       report:  Reviewed and consistent with medication use as prescribed.    Pain Procedures:   - 07/17/2024: Right L5-S1 and S1 transforaminal epidural steroid injection, 90% relief  Previous epidural steroid injections before her surgery in 2012    Pain Disability Index Review:         7/9/2024     8:06 AM   Last 3 PDI Scores   Pain Disability Index (PDI) 59       Imaging:     Results for orders placed during the hospital encounter of 06/19/24    CT Lumbar Spine Without Contrast    Narrative  EXAMINATION:  CT LUMBAR SPINE WITHOUT CONTRAST    CLINICAL HISTORY:  Chronic lumbar radiculopathy    TECHNIQUE:  Axial CT images were obtained through the lumbar spine without contrast.  Coronal and sagittal reconstructions submitted and interpreted.  Total DLP 1368.  Automated exposure control utilized.    COMPARISON:  MRI 12/28/2012    FINDINGS:  No acute fracture or traumatic subluxation.  Bilateral transpedicular screws are at L4 and L5 with an interbody spacer at the L4-5 level.  There is anterolisthesis L3 on L4.  Vertebral body heights are maintained.  Disc space narrowing and vacuum disc phenomenon most prominent at L3-4.    L1-2: Posterior disc osteophyte and facet hypertrophy results in moderate spinal canal stenosis with mild to moderate bilateral bony neural foraminal stenosis.    L2-3: Posterior disc osteophyte and facet hypertrophy results in mild bilateral bony neural foraminal stenosis with moderate spinal canal stenosis.    L3-4: Posterior disc osteophyte eccentric to the left results in moderate to severe left and mild-to-moderate right neural foraminal and lateral recess stenosis with severe spinal canal stenosis.    L4-5: No significant findings.    L5-S1:  Posterior disc osteophyte and facet hypertrophy results in moderate to severe right and moderate left neural foraminal and lateral recess stenosis with mild spinal canal stenosis.    Paravertebral soft tissues are normal.    Impression  1. Multilevel degenerative changes with up to severe spinal canal stenosis at the L3-4 level.  2. Other levels as above.      Electronically signed by: Emmanuel Nassar MD  Date:    06/19/2024  Time:    14:25    Results for orders placed during the hospital encounter of 11/06/23    X-Ray Cervical Spine AP And Lateral    Narrative  EXAMINATION:  XR CERVICAL SPINE AP LATERAL    CLINICAL HISTORY:  Neck pain.    TECHNIQUE:  4 views.    COMPARISON:  None    FINDINGS:  Minimal retrolisthesis at C5 measuring 3 mm secondary to degenerative disc disease.  Moderate degenerative disc disease from C3-4 through C7-T1 with facet DJD.  No acute fracture or subluxation.  No soft tissue abnormality detected.    Impression  Degenerative changes as above.      Electronically signed by: Parag Jamil MD  Date:    11/06/2023  Time:    12:54    Past Medical History:   Diagnosis Date    Asthma     childhood - seasonal as an adult    Hematuria 06/10/2019    Hyperlipidemia     Hypertension     pt denies    Restless leg     Spinal stenosis      Past Surgical History:   Procedure Laterality Date    BACK SURGERY      BREAST BIOPSY      CARDIAC CATHETERIZATION  2018, 11/2020    HYSTERECTOMY      LYMPH NODE BIOPSY      ROBOT-ASSISTED REPAIR OF VENTRAL HERNIA USING DA JIM XI N/A 12/10/2020    Procedure: XI ROBOTIC REPAIR, HERNIA, VENTRAL;  Surgeon: Brandyn Panchal MD;  Location: [facility];  Service: General;  Laterality: N/A;    TONSILLECTOMY      TRANSFORAMINAL EPIDURAL INJECTION OF STEROID Right 7/17/2024    Procedure: Right L5/S1 + S1 TF PORSHA;  Surgeon: Amando Sy MD;  Location: [facility];  Service: Pain Management;  Laterality: Right;     Social History     Socioeconomic History    Marital status:     Tobacco Use    Smoking status: Former     Current packs/day: 0.50     Average packs/day: 0.5 packs/day for 30.0 years (15.0 ttl pk-yrs)     Types: Cigarettes    Smokeless tobacco: Former    Tobacco comments:     none past MN before surgery   Substance and Sexual Activity    Alcohol use: Yes     Comment: 1-2 beers per month    Drug use: No    Sexual activity: Not Currently     Partners: Male     Social Determinants of Health     Financial Resource Strain: Medium Risk (5/20/2022)    Overall Financial Resource Strain (CARDIA)     Difficulty of Paying Living Expenses: Somewhat hard   Food Insecurity: No Food Insecurity (5/20/2022)    Hunger Vital Sign     Worried About Running Out of Food in the Last Year: Never true     Ran Out of Food in the Last Year: Never true   Transportation Needs: No Transportation Needs (5/20/2022)    PRAPARE - Transportation     Lack of Transportation (Medical): No     Lack of Transportation (Non-Medical): No   Physical Activity: Sufficiently Active (5/20/2022)    Exercise Vital Sign     Days of Exercise per Week: 7 days     Minutes of Exercise per Session: 30 min   Stress: Stress Concern Present (5/20/2022)    [employer] - Occupational Stress Questionnaire     Feeling of Stress : To some extent   Housing Stability: Low Risk  (5/20/2022)    Housing Stability Vital Sign     Unable to Pay for Housing in the Last Year: No     Number of Places Lived in the Last Year: 1     Unstable Housing in the Last Year: No     Family History   Problem Relation Name Age of Onset    Heart disease Mother      Esophageal cancer Brother  73        Stage 4    Cancer Maternal Uncle         Review of patient's allergies indicates:   Allergen Reactions    Dye Anaphylaxis     Contrast Dye    Iodine and iodide containing products Rash    Penicillins Shortness Of Breath     Shortness of breath^severe skin rash    Gabapentin     Lovastatin Other (See Comments)     Causes leg cramp    Mobic  [meloxicam] Other (See Comments) and Hallucinations    Mucinex [guaifenesin]     Cephalexin Nausea And Vomiting    Diazepam Anxiety     Made pt overly anxious instead of relaxing    Naproxen Nausea And Vomiting       Current Outpatient Medications   Medication Sig    albuterol (PROVENTIL/VENTOLIN HFA) 90 mcg/actuation inhaler Inhale 1-2 puffs into the lungs every 6 (six) hours as needed for Wheezing. Rescue    aspirin (ECOTRIN) 81 MG EC tablet Take 81 mg by mouth once daily.    fluticasone propionate (FLOVENT HFA) 220 mcg/actuation inhaler Inhale 1 puff into the lungs 2 (two) times a day. Controller    nicotine (NICODERM CQ) 14 mg/24 hr Place 1 patch onto the skin once daily.    nicotine polacrilex 2 MG Lozg Take 1 lozenge (2 mg total) by mouth as needed.    pravastatin (PRAVACHOL) 20 MG tablet Take 1 tablet by mouth once daily    propylene glycol (SYSTANE COMPLETE OPHT) Apply 1 drop to eye daily as needed.    traMADoL (ULTRAM) 50 mg tablet TAKE 1 TABLET BY MOUTH EVERY 6 HOURS AS NEEDED FOR PAIN    triamcinolone acetonide 0.1% (KENALOG) 0.1 % cream Apply topically nightly as needed (itching).    levocetirizine (XYZAL) 5 MG tablet Take 1 tablet (5 mg total) by mouth every evening.    rOPINIRole (REQUIP) 0.5 MG tablet Take 1 tablet (0.5 mg total) by mouth nightly as needed (restless legs).     No current facility-administered medications for this visit.         ROS  Review of Systems   Constitutional:  Negative for chills, diaphoresis, fatigue and fever.   Respiratory:  Negative for chest tightness, shortness of breath, wheezing and stridor.    Cardiovascular:  Positive for leg swelling. Negative for chest pain.   Gastrointestinal:  Negative for blood in stool, diarrhea, nausea and vomiting.   Endocrine: Negative for cold intolerance and heat intolerance.   Genitourinary:  Positive for urgency. Negative for dysuria and hematuria.   Musculoskeletal:  Positive for arthralgias, gait problem, joint swelling and myalgias.  "Negative for back pain, neck pain and neck stiffness.   Skin:  Negative for rash.   Neurological:  Positive for weakness, numbness and headaches. Negative for tremors, seizures, speech difficulty and light-headedness.   Hematological:  Does not bruise/bleed easily.   Psychiatric/Behavioral:  Negative for agitation, confusion and suicidal ideas.             OBJECTIVE:  /71   Pulse 68   Resp 16   Ht 5' 2" (1.575 m)   Wt 62.6 kg (138 lb 0.1 oz)   BMI 25.24 kg/m²         Physical Exam  Constitutional:       General: She is not in acute distress.     Appearance: Normal appearance. She is not ill-appearing.   HENT:      Head: Normocephalic and atraumatic.      Nose: No congestion or rhinorrhea.   Eyes:      Extraocular Movements: Extraocular movements intact.      Pupils: Pupils are equal, round, and reactive to light.   Cardiovascular:      Pulses: Normal pulses.   Pulmonary:      Effort: Pulmonary effort is normal.   Abdominal:      General: Abdomen is flat.   Feet:      Comments: Swelling over the lateral malleolus with 1+ edema  Skin:     General: Skin is warm and dry.      Capillary Refill: Capillary refill takes less than 2 seconds.   Neurological:      General: No focal deficit present.      Mental Status: She is alert and oriented to person, place, and time.      Sensory: Sensory deficit present.      Motor: No weakness or abnormal muscle tone.      Gait: Gait normal.      Deep Tendon Reflexes:      Reflex Scores:       Patellar reflexes are 2+ on the right side and 2+ on the left side.       Achilles reflexes are 1+ on the right side and 1+ on the left side.     Comments: Decreased light touch sensation over the bilateral plantar aspects of the feet   Psychiatric:         Mood and Affect: Mood normal.         Behavior: Behavior normal.         Thought Content: Thought content normal.           Musculoskeletal:      Lumbar Exam  Incision: yes  Pain with Flexion: yes, flexion worse than extension  Pain " with Extension: yes  ROM:  Decreased  Paraspinous TTP:  Positive on the right  Facet TTP:  L5-S1  Facet Loading:  Negative bilaterally  SLR:  Positive on the right at 75°  SIJ TTP:  Positive on the right  YOLETTE:  Negative bilaterally      LABS:  Lab Results   Component Value Date    WBC 6.55 03/19/2024    HGB 13.4 03/19/2024    HCT 41.0 03/19/2024    MCV 91 03/19/2024     03/19/2024       CMP  Sodium   Date Value Ref Range Status   03/19/2024 145 136 - 145 mmol/L Final     Potassium   Date Value Ref Range Status   03/19/2024 3.8 3.5 - 5.1 mmol/L Final     Chloride   Date Value Ref Range Status   03/19/2024 107 95 - 110 mmol/L Final     CO2   Date Value Ref Range Status   03/19/2024 27 23 - 29 mmol/L Final     Glucose   Date Value Ref Range Status   03/19/2024 102 70 - 110 mg/dL Final     BUN   Date Value Ref Range Status   03/19/2024 14 8 - 23 mg/dL Final     Creatinine   Date Value Ref Range Status   03/19/2024 0.7 0.5 - 1.4 mg/dL Final     Calcium   Date Value Ref Range Status   03/19/2024 9.0 8.7 - 10.5 mg/dL Final     Total Protein   Date Value Ref Range Status   03/19/2024 6.5 6.0 - 8.4 g/dL Final     Albumin   Date Value Ref Range Status   03/19/2024 3.6 3.5 - 5.2 g/dL Final     Total Bilirubin   Date Value Ref Range Status   03/19/2024 0.4 0.1 - 1.0 mg/dL Final     Comment:     For infants and newborns, interpretation of results should be based  on gestational age, weight and in agreement with clinical  observations.    Premature Infant recommended reference ranges:  Up to 24 hours.............<8.0 mg/dL  Up to 48 hours............<12.0 mg/dL  3-5 days..................<15.0 mg/dL  6-29 days.................<15.0 mg/dL       Alkaline Phosphatase   Date Value Ref Range Status   03/19/2024 52 (L) 55 - 135 U/L Final     AST   Date Value Ref Range Status   03/19/2024 15 10 - 40 U/L Final     ALT   Date Value Ref Range Status   03/19/2024 11 10 - 44 U/L Final     Anion Gap   Date Value Ref Range Status    03/19/2024 11 8 - 16 mmol/L Final     eGFR if    Date Value Ref Range Status   07/24/2022 >60.0 >60 mL/min/1.73 m^2 Final     eGFR if non    Date Value Ref Range Status   07/24/2022 >60.0 >60 mL/min/1.73 m^2 Final     Comment:     Calculation used to obtain the estimated glomerular filtration  rate (eGFR) is the CKD-EPI equation.          Lab Results   Component Value Date    HGBA1C 5.5 03/19/2024             ASSESSMENT:       74 y.o. year old female with lower back pain, consistent with     1. Lumbar radiculopathy        2. DDD (degenerative disc disease), lumbar        3. Chronic pain of right ankle  X-Ray Foot Complete Right      4. Chronic foot pain, right  X-Ray Foot Complete Right        Lumbar radiculopathy    DDD (degenerative disc disease), lumbar    Chronic pain of right ankle  -     X-Ray Foot Complete Right; Future; Expected date: 08/20/2024    Chronic foot pain, right  -     X-Ray Foot Complete Right; Future; Expected date: 08/20/2024             PLAN:   - Interventions:   Patient reports neuropathic pain of the bilateral plantar aspects of her feet.  Unclear if this pain is secondary to continued lumbar radiculopathy versus peripheral neuropathy.  Patient also reports swelling of right ankle.  We will discontinue Lyrica medication to see if swelling of the ankle resolves.  We will also see if neuropathic pain increases after stopping this medication.    - 07/17/2024: Right L5-S1 and S1 transforaminal epidural steroid injection, 90% relief  - Anticoagulation use:   Yes aspirin    - Medications:   - stopped Lyrica due to right ankle swelling.  We will also see if neuropathic pain increases when stopping this medication   Continue tramadol 50 mg 3 times a day p.r.n.    - Therapy:    Patient has completed 8 weeks of formal physical therapy with the last 3 months with no relief.  Continue home exercises    - Imaging/Diagnostic:   CT of lumbar spine reviewed.  No significant  fracture of L4-5 hardware.  There is grade 1 anterolisthesis of L3 upon L4.  There is no eccentric to the left disc osteophyte at L3-L4 resulting in left-greater-than-right foraminal stenosis and severe canal stenosis.  There is right eccentric disc osteophyte at L5-S1 resulting in severe right foraminal and right lateral recess stenosis with mild canal stenosis    - Consults:   Consider referral to Neurosurgery in the future for severe canal stenosis at L3-L4 patient with previous L4-5 posterior fusion      - Patient Questions: Answered all of the patient's questions regarding diagnosis, therapy, and treatment     This condition does not require this patient to take time off of work, and the primary goal of our Pain Management services is to improve the patient's functional capacity.     - Follow up visit: return to clinic in 4 weeks      Visit today included increased complexity associated with the care of the episodic problem of chronic pain which was addressed and continue to manage the longitudinal care of the patient due to the serious and/or complex managed problem(s) listed above.    The above plan and management options were discussed at length with patient. Patient is in agreement with the above and verbalized understanding.    I discussed the goals of interventional chronic pain management with the patient on today's visit.  I explained the utility of injections for diagnostic and therapeutic purposes.  We discussed a multimodal approach to pain including treating the patient's given worst pain at any given time.  We will use a systematic approach to addressing pain.  We will also adopt a multimodal approach that includes injections, adjuvant medications, physical therapy, at times psychiatry.  There may be a limited role for opioid use intermittently in the treatment of pain, more particularly for acute pain although no one approach can be used as a sole treatment modality.    I emphasized the importance  of regular exercise, core strengthening and stretching, diet and weight loss as a cornerstone of long-term pain management.      Amando Sy MD  Interventional Pain Management  [facility]        Disclaimer:  This note was prepared using voice recognition system and is likely to have sound alike errors that may have been overlooked even after proof reading.  Please call me with any questions    I spent a total of 30 minutes on the day of the visit.  This includes face to face time and non-face to face time preparing to see the patient (eg, review of tests), obtaining and/or reviewing separately obtained history, documenting clinical information in the electronic or other health record, independently interpreting results and communicating results to the patient/family/caregiver, or care coordinator.

## 2024-08-26 ENCOUNTER — HOSPITAL ENCOUNTER (OUTPATIENT)
Dept: RADIOLOGY | Facility: HOSPITAL | Age: 74
Discharge: HOME | End: 2024-08-26
Attending: ANESTHESIOLOGY
Payer: MEDICARE

## 2024-08-26 DIAGNOSIS — G89.29 CHRONIC FOOT PAIN, RIGHT: ICD-10-CM

## 2024-08-26 DIAGNOSIS — M25.571 CHRONIC PAIN OF RIGHT ANKLE: ICD-10-CM

## 2024-08-26 DIAGNOSIS — G89.29 CHRONIC PAIN OF RIGHT ANKLE: ICD-10-CM

## 2024-08-26 DIAGNOSIS — M79.671 CHRONIC FOOT PAIN, RIGHT: ICD-10-CM

## 2024-08-26 PROCEDURE — 73630 X-RAY EXAM OF FOOT: CPT | Mod: 26,RT,, | Performed by: RADIOLOGY

## 2024-08-26 PROCEDURE — 73630 X-RAY EXAM OF FOOT: CPT | Mod: TC,PN,RT

## 2024-09-05 ENCOUNTER — TELEPHONE (OUTPATIENT)
Dept: PAIN MEDICINE | Facility: CLINIC | Age: 74
End: 2024-09-05
Payer: MEDICARE

## 2024-09-05 NOTE — TELEPHONE ENCOUNTER
----- Message from Verena Johnson sent at 9/5/2024  9:30 AM CDT -----  Contact: GENET JEWELL [[id number]]  ..Type:  Patient Requesting Call    Who Called: GENET JEWELL [[id number]]  Does the patient know what this is regarding?:pt reports taking xray last week and wanting to know if a follow up visit is needed   Would the patient rather a call back or a response via [facility]?  call  Best Call Back Number: .[phone] (home)   Additional Information:

## 2024-09-05 NOTE — TELEPHONE ENCOUNTER
Called patient in regards to scheduling X-Ray Follow Up. Patient has be scheduled on 09/17/24 with Dr. Sy at the [facility]. Patient verbalized understanding.    Christiane Jama MA

## 2024-09-10 ENCOUNTER — TELEPHONE (OUTPATIENT)
Dept: SMOKING CESSATION | Facility: CLINIC | Age: 74
End: 2024-09-10
Payer: MEDICARE

## 2024-09-10 NOTE — TELEPHONE ENCOUNTER
Attempted to return call to patient to reschedule appointment. Left return contact scheduling information [phone].

## 2024-09-17 ENCOUNTER — PATIENT OUTREACH (OUTPATIENT)
Dept: ADMINISTRATIVE | Facility: HOSPITAL | Age: 74
End: 2024-09-17
Payer: MEDICARE

## 2024-09-17 ENCOUNTER — OFFICE VISIT (OUTPATIENT)
Dept: PAIN MEDICINE | Facility: CLINIC | Age: 74
End: 2024-09-17
Payer: MEDICARE

## 2024-09-17 VITALS
HEART RATE: 60 BPM | HEIGHT: 62 IN | RESPIRATION RATE: 16 BRPM | WEIGHT: 139.56 LBS | BODY MASS INDEX: 25.68 KG/M2 | DIASTOLIC BLOOD PRESSURE: 71 MMHG | SYSTOLIC BLOOD PRESSURE: 127 MMHG

## 2024-09-17 DIAGNOSIS — G89.29 CHRONIC FOOT PAIN, RIGHT: ICD-10-CM

## 2024-09-17 DIAGNOSIS — M79.671 CHRONIC FOOT PAIN, RIGHT: ICD-10-CM

## 2024-09-17 DIAGNOSIS — M51.36 DDD (DEGENERATIVE DISC DISEASE), LUMBAR: ICD-10-CM

## 2024-09-17 DIAGNOSIS — M47.816 LUMBAR SPONDYLOSIS: Primary | ICD-10-CM

## 2024-09-17 PROCEDURE — 3288F FALL RISK ASSESSMENT DOCD: CPT | Mod: CPTII,S$GLB,, | Performed by: ANESTHESIOLOGY

## 2024-09-17 PROCEDURE — 1101F PT FALLS ASSESS-DOCD LE1/YR: CPT | Mod: CPTII,S$GLB,, | Performed by: ANESTHESIOLOGY

## 2024-09-17 PROCEDURE — 1125F AMNT PAIN NOTED PAIN PRSNT: CPT | Mod: CPTII,S$GLB,, | Performed by: ANESTHESIOLOGY

## 2024-09-17 PROCEDURE — 3044F HG A1C LEVEL LT 7.0%: CPT | Mod: CPTII,S$GLB,, | Performed by: ANESTHESIOLOGY

## 2024-09-17 PROCEDURE — G2211 COMPLEX E/M VISIT ADD ON: HCPCS | Mod: S$GLB,,, | Performed by: ANESTHESIOLOGY

## 2024-09-17 PROCEDURE — 99999 PR PBB SHADOW E&M-EST. PATIENT-LVL IV: CPT | Mod: PBBFAC,,, | Performed by: ANESTHESIOLOGY

## 2024-09-17 PROCEDURE — 1159F MED LIST DOCD IN RCRD: CPT | Mod: CPTII,S$GLB,, | Performed by: ANESTHESIOLOGY

## 2024-09-17 PROCEDURE — 3078F DIAST BP <80 MM HG: CPT | Mod: CPTII,S$GLB,, | Performed by: ANESTHESIOLOGY

## 2024-09-17 PROCEDURE — 99214 OFFICE O/P EST MOD 30 MIN: CPT | Mod: S$GLB,,, | Performed by: ANESTHESIOLOGY

## 2024-09-17 PROCEDURE — 3074F SYST BP LT 130 MM HG: CPT | Mod: CPTII,S$GLB,, | Performed by: ANESTHESIOLOGY

## 2024-09-17 RX ORDER — PREGABALIN 25 MG/1
25 CAPSULE ORAL NIGHTLY
Qty: 30 CAPSULE | Refills: 2 | Status: SHIPPED | OUTPATIENT
Start: 2024-09-17

## 2024-09-17 NOTE — PROGRESS NOTES
Interventional Pain Progress Note       Referring Physician: No ref. provider found    PCP: Andrea Del Castillo MD    Chief Complaint:   Lower back and right foot pain     SUBJECTIVE:    Interval History (09/17/2024):      Patient returns to clinic to review x-rays of right foot.  Since last being seen, patient reports that she discontinued Lyrica medication for 1 week and noticed no changes in her right foot swelling.  Patient reports that right foot pain has not improved.  Primarily pain described as a throbbing aching pain in the midline.  Patient reports associated muscle cramps occasionally over her right lower extremity as well.  Pain is worse with prolonged sitting and driving, better with stretching and anti-inflammatories.  Pain is currently rated a 3 out 10, but can increase to a 7/10 with exacerbating activities. Denies any fevers, chills, changes in gait, saddle anesthesia, or bowel and bladder incontinence        Interval History (08/20/2024):     Patient returns to clinic after procedure.  Patient reports 90% relief after right L5-S1 and S1 transforaminal epidural steroid injection on 07/17/2024.  Patient reports resolution of lower back pain after his injection.  Primary pain today is a numbness burning pain in the bilateral lower extremities, right-greater-than-left.  Pain is primarily over the plantar aspects of the foot.  Patient also reports 2 week history of increased swelling of the right ankle.  Patient denies any erythema or significant pain associated with the swelling of the right ankle.  Pain is typically worse with wearing closed shoes and at night, better during the day and with open tissues.  Pain is currently a 2/10, but can increase to a 7/10 with exacerbating activity. Denies any fevers, chills, changes in gait, saddle anesthesia, or bowel and bladder incontinence        Initial HPI:     Salud Echevarria is a 74 y.o. female who presents to the clinic for the evaluation of lower back  pain.   Patient reports three-month history of increased lower back pain.  Patient reports having previous L4-5 posterior fusion in 2012.  Patient reports that pain increased after sleeping in a chair while visiting her family in the hospital.  Pain described as stabbing aching pain that starts in the right buttocks.  This pain then radiates down the posterior aspect of the right lower extremity to the plantar aspect of her foot primarily affecting the 1st and 2nd digit.  Patient denies any significant left lower extremity pain.  Pain is worse with flexion and prolonged sitting, better with lying supine and ice.  Pain is currently rated an 8/10. Denies any fevers, chills,  saddle anesthesia, or bowel and bladder incontinence          Non-Pharmacologic Treatments:  Physical Therapy/Home Exercise: yes  Ice/Heat:yes  TENS: no  Acupuncture: no  Massage: yes  Chiropractic: no        Previous Pain Medications:  NSAIDs, Tylenol, muscle relaxers, neuropathics, opioids, topicals       report:  Reviewed and consistent with medication use as prescribed.    Pain Procedures:   - 07/17/2024: Right L5-S1 and S1 transforaminal epidural steroid injection, 90% relief  Previous epidural steroid injections before her surgery in 2012    Pain Disability Index Review:         9/17/2024     8:58 AM 7/9/2024     8:06 AM   Last 3 PDI Scores   Pain Disability Index (PDI) 28 59       Imaging:     XR FOOT COMPLETE 3 VIEW RIGHT 08/26/2024     CLINICAL HISTORY:  XR FOOT COMPLETE 3 VIEW RIGHTPain in right ankle and joints of right foot     COMPARISON:  None     FINDINGS:  Three views of the right foot were obtained.     No evidence of acute fracture or dislocation.  Diffuse osteopenia and mild soft tissue swelling.  Mild pes planus.     Impression:     No acute fracture or dislocation.    Results for orders placed during the hospital encounter of 06/19/24    CT Lumbar Spine Without Contrast    Narrative  EXAMINATION:  CT LUMBAR SPINE WITHOUT  CONTRAST    CLINICAL HISTORY:  Chronic lumbar radiculopathy    TECHNIQUE:  Axial CT images were obtained through the lumbar spine without contrast.  Coronal and sagittal reconstructions submitted and interpreted.  Total DLP 1368.  Automated exposure control utilized.    COMPARISON:  MRI 12/28/2012    FINDINGS:  No acute fracture or traumatic subluxation.  Bilateral transpedicular screws are at L4 and L5 with an interbody spacer at the L4-5 level.  There is anterolisthesis L3 on L4.  Vertebral body heights are maintained.  Disc space narrowing and vacuum disc phenomenon most prominent at L3-4.    L1-2: Posterior disc osteophyte and facet hypertrophy results in moderate spinal canal stenosis with mild to moderate bilateral bony neural foraminal stenosis.    L2-3: Posterior disc osteophyte and facet hypertrophy results in mild bilateral bony neural foraminal stenosis with moderate spinal canal stenosis.    L3-4: Posterior disc osteophyte eccentric to the left results in moderate to severe left and mild-to-moderate right neural foraminal and lateral recess stenosis with severe spinal canal stenosis.    L4-5: No significant findings.    L5-S1: Posterior disc osteophyte and facet hypertrophy results in moderate to severe right and moderate left neural foraminal and lateral recess stenosis with mild spinal canal stenosis.    Paravertebral soft tissues are normal.    Impression  1. Multilevel degenerative changes with up to severe spinal canal stenosis at the L3-4 level.  2. Other levels as above.      Electronically signed by: Emmanuel Nassar MD  Date:    06/19/2024  Time:    14:25    Results for orders placed during the hospital encounter of 11/06/23    X-Ray Cervical Spine AP And Lateral    Narrative  EXAMINATION:  XR CERVICAL SPINE AP LATERAL    CLINICAL HISTORY:  Neck pain.    TECHNIQUE:  4 views.    COMPARISON:  None    FINDINGS:  Minimal retrolisthesis at C5 measuring 3 mm secondary to degenerative disc disease.   Moderate degenerative disc disease from C3-4 through C7-T1 with facet DJD.  No acute fracture or subluxation.  No soft tissue abnormality detected.    Impression  Degenerative changes as above.      Electronically signed by: Parag Jamil MD  Date:    2023  Time:    12:54    Past Medical History:   Diagnosis Date    Asthma     childhood - seasonal as an adult    Hematuria 06/10/2019    Hyperlipidemia     Hypertension     pt denies    Restless leg     Spinal stenosis      Past Surgical History:   Procedure Laterality Date    BACK SURGERY      BREAST BIOPSY      CARDIAC CATHETERIZATION  2020    HYSTERECTOMY      LYMPH NODE BIOPSY      ROBOT-ASSISTED REPAIR OF VENTRAL HERNIA USING DA JIM XI N/A 12/10/2020    Procedure: XI ROBOTIC REPAIR, HERNIA, VENTRAL;  Surgeon: Brandyn Panchal MD;  Location: [facility] OR;  Service: General;  Laterality: N/A;    TONSILLECTOMY      TRANSFORAMINAL EPIDURAL INJECTION OF STEROID Right 2024    Procedure: Right L5/S1 + S1 TF PORSHA;  Surgeon: Amando Sy MD;  Location: [facility] PAIN T;  Service: Pain Management;  Laterality: Right;     Social History     Socioeconomic History    Marital status:    Tobacco Use    Smoking status: Former     Current packs/day: 0.00     Average packs/day: 1 pack/day for 56.6 years (56.6 ttl pk-yrs)     Types: Cigarettes     Start date:      Quit date: 8/3/2024     Years since quittin.1    Smokeless tobacco: Former    Tobacco comments:     none past MN before surgery   Substance and Sexual Activity    Alcohol use: Yes     Comment: 1-2 beers per month    Drug use: No    Sexual activity: Not Currently     Partners: Male     Social Determinants of Health     Financial Resource Strain: Medium Risk (2022)    Overall Financial Resource Strain (CARDIA)     Difficulty of Paying Living Expenses: Somewhat hard   Food Insecurity: No Food Insecurity (2022)    Hunger Vital Sign     Worried About Running Out of Food in the Last Year:  Never true     Ran Out of Food in the Last Year: Never true   Transportation Needs: No Transportation Needs (5/20/2022)    PRAPARE - Transportation     Lack of Transportation (Medical): No     Lack of Transportation (Non-Medical): No   Physical Activity: Sufficiently Active (5/20/2022)    Exercise Vital Sign     Days of Exercise per Week: 7 days     Minutes of Exercise per Session: 30 min   Stress: Stress Concern Present (5/20/2022)    [employer] - Occupational Stress Questionnaire     Feeling of Stress : To some extent   Housing Stability: Low Risk  (5/20/2022)    Housing Stability Vital Sign     Unable to Pay for Housing in the Last Year: No     Number of Places Lived in the Last Year: 1     Unstable Housing in the Last Year: No     Family History   Problem Relation Name Age of Onset    Heart disease Mother      Esophageal cancer Brother  73        Stage 4    Cancer Maternal Uncle         Review of patient's allergies indicates:   Allergen Reactions    Dye Anaphylaxis     Contrast Dye    Iodine and iodide containing products Rash    Penicillins Shortness Of Breath     Shortness of breath^severe skin rash    Gabapentin     Lovastatin Other (See Comments)     Causes leg cramp    Mobic [meloxicam] Other (See Comments) and Hallucinations    Mucinex [guaifenesin]     Cephalexin Nausea And Vomiting    Diazepam Anxiety     Made pt overly anxious instead of relaxing    Naproxen Nausea And Vomiting       Current Outpatient Medications   Medication Sig    albuterol (PROVENTIL/VENTOLIN HFA) 90 mcg/actuation inhaler Inhale 1-2 puffs into the lungs every 6 (six) hours as needed for Wheezing. Rescue    aspirin (ECOTRIN) 81 MG EC tablet Take 81 mg by mouth once daily.    fluticasone propionate (FLOVENT HFA) 220 mcg/actuation inhaler Inhale 1 puff into the lungs 2 (two) times a day. Controller    nicotine (NICODERM CQ) 14 mg/24 hr Place 1 patch onto the skin once daily.    nicotine polacrilex 2 MG  "Lozg Take 1 lozenge (2 mg total) by mouth as needed.    pravastatin (PRAVACHOL) 20 MG tablet Take 1 tablet by mouth once daily    propylene glycol (SYSTANE COMPLETE OPHT) Apply 1 drop to eye daily as needed.    traMADoL (ULTRAM) 50 mg tablet TAKE 1 TABLET BY MOUTH EVERY 6 HOURS AS NEEDED FOR PAIN    triamcinolone acetonide 0.1% (KENALOG) 0.1 % cream Apply topically nightly as needed (itching).    levocetirizine (XYZAL) 5 MG tablet Take 1 tablet (5 mg total) by mouth every evening.    pregabalin (LYRICA) 25 MG capsule Take 1 capsule (25 mg total) by mouth every evening.    rOPINIRole (REQUIP) 0.5 MG tablet Take 1 tablet (0.5 mg total) by mouth nightly as needed (restless legs).     No current facility-administered medications for this visit.         ROS  Review of Systems   Constitutional:  Negative for chills, diaphoresis, fatigue and fever.   Respiratory:  Negative for chest tightness, shortness of breath, wheezing and stridor.    Cardiovascular:  Positive for leg swelling. Negative for chest pain.   Gastrointestinal:  Negative for blood in stool, diarrhea, nausea and vomiting.   Endocrine: Negative for cold intolerance and heat intolerance.   Genitourinary:  Positive for urgency. Negative for dysuria and hematuria.   Musculoskeletal:  Positive for arthralgias, back pain, gait problem, joint swelling and myalgias. Negative for neck pain and neck stiffness.   Skin:  Negative for rash.   Neurological:  Positive for weakness and headaches. Negative for tremors, seizures, speech difficulty, light-headedness and numbness.   Hematological:  Does not bruise/bleed easily.   Psychiatric/Behavioral:  Negative for agitation, confusion and suicidal ideas.             OBJECTIVE:  /71   Pulse 60   Resp 16   Ht 5' 2" (1.575 m)   Wt 63.3 kg (139 lb 8.8 oz)   BMI 25.52 kg/m²         Physical Exam  Constitutional:       General: She is not in acute distress.     Appearance: Normal appearance. She is not ill-appearing. "   HENT:      Head: Normocephalic and atraumatic.      Nose: No congestion or rhinorrhea.   Eyes:      Extraocular Movements: Extraocular movements intact.      Pupils: Pupils are equal, round, and reactive to light.   Cardiovascular:      Pulses: Normal pulses.   Pulmonary:      Effort: Pulmonary effort is normal.   Abdominal:      General: Abdomen is flat.   Feet:      Comments: 1+ edema over the lateral malleolus  Skin:     General: Skin is warm and dry.      Capillary Refill: Capillary refill takes less than 2 seconds.   Neurological:      General: No focal deficit present.      Mental Status: She is alert and oriented to person, place, and time.      Sensory: Sensory deficit present.      Motor: No weakness or abnormal muscle tone.      Gait: Gait normal.      Deep Tendon Reflexes:      Reflex Scores:       Patellar reflexes are 2+ on the right side and 2+ on the left side.       Achilles reflexes are 1+ on the right side and 1+ on the left side.     Comments: Decreased light touch sensation over the bilateral plantar aspects of the feet   Psychiatric:         Mood and Affect: Mood normal.         Behavior: Behavior normal.         Thought Content: Thought content normal.           Musculoskeletal:      Lumbar Exam  Incision: yes  Pain with Flexion: yes  Pain with Extension: yes  ROM:  Decreased  Paraspinous TTP:  Positive bilaterally  Facet TTP:  L5-S1  Facet Loading:  Negative bilaterally  SLR:  Negative bilaterally  SIJ TTP:  Negative bilaterally  YOLETTE:  Negative bilaterally      LABS:  Lab Results   Component Value Date    WBC 6.55 03/19/2024    HGB 13.4 03/19/2024    HCT 41.0 03/19/2024    MCV 91 03/19/2024     03/19/2024       CMP  Sodium   Date Value Ref Range Status   03/19/2024 145 136 - 145 mmol/L Final     Potassium   Date Value Ref Range Status   03/19/2024 3.8 3.5 - 5.1 mmol/L Final     Chloride   Date Value Ref Range Status   03/19/2024 107 95 - 110 mmol/L Final     CO2   Date Value Ref  Range Status   03/19/2024 27 23 - 29 mmol/L Final     Glucose   Date Value Ref Range Status   03/19/2024 102 70 - 110 mg/dL Final     BUN   Date Value Ref Range Status   03/19/2024 14 8 - 23 mg/dL Final     Creatinine   Date Value Ref Range Status   03/19/2024 0.7 0.5 - 1.4 mg/dL Final     Calcium   Date Value Ref Range Status   03/19/2024 9.0 8.7 - 10.5 mg/dL Final     Total Protein   Date Value Ref Range Status   03/19/2024 6.5 6.0 - 8.4 g/dL Final     Albumin   Date Value Ref Range Status   03/19/2024 3.6 3.5 - 5.2 g/dL Final     Total Bilirubin   Date Value Ref Range Status   03/19/2024 0.4 0.1 - 1.0 mg/dL Final     Comment:     For infants and newborns, interpretation of results should be based  on gestational age, weight and in agreement with clinical  observations.    Premature Infant recommended reference ranges:  Up to 24 hours.............<8.0 mg/dL  Up to 48 hours............<12.0 mg/dL  3-5 days..................<15.0 mg/dL  6-29 days.................<15.0 mg/dL       Alkaline Phosphatase   Date Value Ref Range Status   03/19/2024 52 (L) 55 - 135 U/L Final     AST   Date Value Ref Range Status   03/19/2024 15 10 - 40 U/L Final     ALT   Date Value Ref Range Status   03/19/2024 11 10 - 44 U/L Final     Anion Gap   Date Value Ref Range Status   03/19/2024 11 8 - 16 mmol/L Final     eGFR if    Date Value Ref Range Status   07/24/2022 >60.0 >60 mL/min/1.73 m^2 Final     eGFR if non    Date Value Ref Range Status   07/24/2022 >60.0 >60 mL/min/1.73 m^2 Final     Comment:     Calculation used to obtain the estimated glomerular filtration  rate (eGFR) is the CKD-EPI equation.          Lab Results   Component Value Date    HGBA1C 5.5 03/19/2024             ASSESSMENT:       74 y.o. year old female with lower back pain, consistent with     1. Lumbar spondylosis  pregabalin (LYRICA) 25 MG capsule      2. DDD (degenerative disc disease), lumbar  pregabalin (LYRICA) 25 MG capsule       3. Chronic foot pain, right  pregabalin (LYRICA) 25 MG capsule        Lumbar spondylosis  -     pregabalin (LYRICA) 25 MG capsule; Take 1 capsule (25 mg total) by mouth every evening.  Dispense: 30 capsule; Refill: 2    DDD (degenerative disc disease), lumbar  -     pregabalin (LYRICA) 25 MG capsule; Take 1 capsule (25 mg total) by mouth every evening.  Dispense: 30 capsule; Refill: 2    Chronic foot pain, right  -     pregabalin (LYRICA) 25 MG capsule; Take 1 capsule (25 mg total) by mouth every evening.  Dispense: 30 capsule; Refill: 2             PLAN:   - Interventions:   Discussed bilateral L4-5 and L5-S1 medial branch block.  Patient will call our clinic if she is interested in proceeding.  Discussed x-rays of right foot as well as diagnosis of right foot swelling.  Discuss referral to podiatry.  Patient defers at this time.    - 07/17/2024: Right L5-S1 and S1 transforaminal epidural steroid injection, 90% relief  - Anticoagulation use:   Yes aspirin    - Medications:   - refill Lyrica 25 mg at night.  Patient discontinue medication for 1 week with no changes in her right foot swelling.   Continue tramadol 50 mg 3 times a day p.r.n.    - Therapy:    Patient has completed 8 weeks of formal physical therapy with the last 3 months with no relief.  Continue home exercises    - Imaging/Diagnostic:   X-rays of right foot reviewed and findings discussed with patient.  There is mild soft tissue swelling with pes planus   CT of lumbar spine reviewed.  No significant fracture of L4-5 hardware.  There is grade 1 anterolisthesis of L3 upon L4.  There is no eccentric to the left disc osteophyte at L3-L4 resulting in left-greater-than-right foraminal stenosis and severe canal stenosis.  There is right eccentric disc osteophyte at L5-S1 resulting in severe right foraminal and right lateral recess stenosis with mild canal stenosis    - Consults:    Discussed referral to Podiatry for right foot swelling with pes planus noted  on x-ray.  Patient defers at this time.  Consider referral to Neurosurgery in the future for severe canal stenosis at L3-L4 patient with previous L4-5 posterior fusion      - Patient Questions: Answered all of the patient's questions regarding diagnosis, therapy, and treatment     This condition does not require this patient to take time off of work, and the primary goal of our Pain Management services is to improve the patient's functional capacity.     - Follow up visit: return to clinic p.r.n.      Visit today included increased complexity associated with the care of the episodic problem of chronic pain which was addressed and continue to manage the longitudinal care of the patient due to the serious and/or complex managed problem(s) listed above.    The above plan and management options were discussed at length with patient. Patient is in agreement with the above and verbalized understanding.    I discussed the goals of interventional chronic pain management with the patient on today's visit.  I explained the utility of injections for diagnostic and therapeutic purposes.  We discussed a multimodal approach to pain including treating the patient's given worst pain at any given time.  We will use a systematic approach to addressing pain.  We will also adopt a multimodal approach that includes injections, adjuvant medications, physical therapy, at times psychiatry.  There may be a limited role for opioid use intermittently in the treatment of pain, more particularly for acute pain although no one approach can be used as a sole treatment modality.    I emphasized the importance of regular exercise, core strengthening and stretching, diet and weight loss as a cornerstone of long-term pain management.      Amando Sy MD  Interventional Pain Management  [facility]        Disclaimer:  This note was prepared using voice recognition system and is likely to have sound alike errors that may have been  overlooked even after proof reading.  Please call me with any questions    I spent a total of 30 minutes on the day of the visit.  This includes face to face time and non-face to face time preparing to see the patient (eg, review of tests), obtaining and/or reviewing separately obtained history, documenting clinical information in the electronic or other health record, independently interpreting results and communicating results to the patient/family/caregiver, or care coordinator.

## 2024-09-24 ENCOUNTER — CLINICAL SUPPORT (OUTPATIENT)
Dept: SMOKING CESSATION | Facility: CLINIC | Age: 74
End: 2024-09-24
Payer: COMMERCIAL

## 2024-09-24 DIAGNOSIS — F17.200 NICOTINE DEPENDENCE: Primary | ICD-10-CM

## 2024-09-24 PROCEDURE — 99999 PR PBB SHADOW E&M-EST. PATIENT-LVL II: CPT | Mod: PBBFAC,,,

## 2024-09-24 PROCEDURE — 99404 PREV MED CNSL INDIV APPRX 60: CPT | Mod: S$GLB,,, | Performed by: GENERAL PRACTICE

## 2024-09-24 RX ORDER — DM/P-EPHED/ACETAMINOPH/DOXYLAM 30-7.5/3
2 LIQUID (ML) ORAL
Qty: 270 LOZENGE | Refills: 0 | Status: SHIPPED | OUTPATIENT
Start: 2024-09-24

## 2024-09-24 NOTE — PROGRESS NOTES
Individual Follow-Up Form    9/24/2024    Quit Date: 8/3/24    Clinical Status of Patient: Outpatient    Length of Service: 60 minutes    Continuing Medication: yes  Nicotine Lozenges     Target Symptoms: Withdrawal and medication side effects. The following were  rated moderate (3) to severe (4) on TCRS:  Moderate (3): none  Severe (4): none    Comments: Patient was seen in the clinic today for a smoking cessation progress update. She remains tobacco free at this time. Congratulated patient on her hard work and success in quitting tobacco use. Patient states she had a few slip ups over the past 9 weeks mainly when she felt frustrated or was around other smokers. Exhaled CO 3 ppm. Discussed planning for these high risk situations. Completion of TCRS (Tobacco Cessation Rating Scale) reviewed strategies, cues, and triggers. Introduced the negative impact of tobacco on health, the health advantages of discontinuing the use of tobacco, time line improved health changes after a quit, withdrawal issues to expect from nicotine and habit, and ways to achieve the goal of a quit. She is no longer using nicotine patch. The patient remains on the prescribed tobacco cessation medication regimen of 2 mg nicotine lozenges as needed without any negative side effects at this time.  The patient denies any abnormal behavioral or mental changes at this time.  Will continue to encourage and monitor her progress.     Diagnosis: F17.200    Next Visit: 3 weeks

## 2024-10-08 DIAGNOSIS — Z12.11 COLON CANCER SCREENING: Primary | ICD-10-CM

## 2024-10-10 ENCOUNTER — CLINICAL SUPPORT (OUTPATIENT)
Dept: SMOKING CESSATION | Facility: CLINIC | Age: 74
End: 2024-10-10
Payer: COMMERCIAL

## 2024-10-10 DIAGNOSIS — F17.200 NICOTINE DEPENDENCE: Primary | ICD-10-CM

## 2024-10-10 NOTE — PROGRESS NOTES
Called pt to f/u on her 3 month smoking cessation quit status. Pt stated she remains tobacco free for about 12 weeks. Congratulated her on her hard work and success. Pt is still actively enrolled in program. Informed her of benefit period, phone follow ups, and contact information. Will complete smart form and will continue to follow up on quit #2 episode. Resolved quit #1 per protocol.

## 2024-10-15 ENCOUNTER — TELEPHONE (OUTPATIENT)
Dept: SMOKING CESSATION | Facility: CLINIC | Age: 74
End: 2024-10-15
Payer: MEDICARE

## 2024-10-15 NOTE — TELEPHONE ENCOUNTER
Attempted to follow up with patient in regard to her smoking cessation progress. Left voicemail with return contact information.

## 2024-10-16 ENCOUNTER — PATIENT MESSAGE (OUTPATIENT)
Dept: RHEUMATOLOGY | Facility: CLINIC | Age: 74
End: 2024-10-16
Payer: MEDICARE

## 2024-10-19 DIAGNOSIS — G89.29 CHRONIC LEFT-SIDED LOW BACK PAIN WITH LEFT-SIDED SCIATICA: ICD-10-CM

## 2024-10-19 DIAGNOSIS — M54.42 CHRONIC LEFT-SIDED LOW BACK PAIN WITH LEFT-SIDED SCIATICA: ICD-10-CM

## 2024-10-20 RX ORDER — TRAMADOL HYDROCHLORIDE 50 MG/1
TABLET ORAL
Qty: 28 TABLET | Refills: 0 | Status: SHIPPED | OUTPATIENT
Start: 2024-10-20

## 2024-10-22 ENCOUNTER — TELEPHONE (OUTPATIENT)
Dept: INTERNAL MEDICINE | Facility: CLINIC | Age: 74
End: 2024-10-22
Payer: MEDICARE

## 2024-10-22 NOTE — TELEPHONE ENCOUNTER
Cologuard was positive on 5/7/24, resulted on 10/8/24  Called and spoke with pt scheduled appt on 10/29 at 1120a to discuss additional testing, pt had originally declined colonoscopy.

## 2024-11-05 ENCOUNTER — LAB VISIT (OUTPATIENT)
Dept: LAB | Facility: HOSPITAL | Age: 74
End: 2024-11-05
Attending: STUDENT IN AN ORGANIZED HEALTH CARE EDUCATION/TRAINING PROGRAM
Payer: MEDICARE

## 2024-11-05 ENCOUNTER — OFFICE VISIT (OUTPATIENT)
Dept: INTERNAL MEDICINE | Facility: CLINIC | Age: 74
End: 2024-11-05
Payer: MEDICARE

## 2024-11-05 VITALS
HEART RATE: 73 BPM | DIASTOLIC BLOOD PRESSURE: 60 MMHG | BODY MASS INDEX: 26.41 KG/M2 | OXYGEN SATURATION: 96 % | SYSTOLIC BLOOD PRESSURE: 110 MMHG | HEIGHT: 62 IN | TEMPERATURE: 98 F | WEIGHT: 143.5 LBS

## 2024-11-05 DIAGNOSIS — M79.89 LEG SWELLING: ICD-10-CM

## 2024-11-05 DIAGNOSIS — I70.0 AORTIC ATHEROSCLEROSIS: ICD-10-CM

## 2024-11-05 DIAGNOSIS — R91.1 SOLITARY PULMONARY NODULE: ICD-10-CM

## 2024-11-05 DIAGNOSIS — E78.49 OTHER HYPERLIPIDEMIA: ICD-10-CM

## 2024-11-05 DIAGNOSIS — I25.10 CORONARY ARTERY DISEASE INVOLVING NATIVE CORONARY ARTERY OF NATIVE HEART WITHOUT ANGINA PECTORIS: ICD-10-CM

## 2024-11-05 DIAGNOSIS — R91.1 PULMONARY NODULE, RIGHT: Primary | ICD-10-CM

## 2024-11-05 DIAGNOSIS — Z87.891 HISTORY OF TOBACCO USE: ICD-10-CM

## 2024-11-05 LAB
ALBUMIN SERPL BCP-MCNC: 3.7 G/DL (ref 3.5–5.2)
ALP SERPL-CCNC: 51 U/L (ref 40–150)
ALT SERPL W/O P-5'-P-CCNC: 12 U/L (ref 10–44)
ANION GAP SERPL CALC-SCNC: 10 MMOL/L (ref 8–16)
AST SERPL-CCNC: 13 U/L (ref 10–40)
BILIRUB SERPL-MCNC: 0.3 MG/DL (ref 0.1–1)
BNP SERPL-MCNC: 29 PG/ML (ref 0–99)
BUN SERPL-MCNC: 14 MG/DL (ref 8–23)
CALCIUM SERPL-MCNC: 9.1 MG/DL (ref 8.7–10.5)
CHLORIDE SERPL-SCNC: 106 MMOL/L (ref 95–110)
CO2 SERPL-SCNC: 27 MMOL/L (ref 23–29)
CREAT SERPL-MCNC: 0.8 MG/DL (ref 0.5–1.4)
EST. GFR  (NO RACE VARIABLE): >60 ML/MIN/1.73 M^2
GLUCOSE SERPL-MCNC: 97 MG/DL (ref 70–110)
POTASSIUM SERPL-SCNC: 3.9 MMOL/L (ref 3.5–5.1)
PROT SERPL-MCNC: 6.6 G/DL (ref 6–8.4)
SODIUM SERPL-SCNC: 143 MMOL/L (ref 136–145)

## 2024-11-05 PROCEDURE — 83880 ASSAY OF NATRIURETIC PEPTIDE: CPT | Mod: PO | Performed by: STUDENT IN AN ORGANIZED HEALTH CARE EDUCATION/TRAINING PROGRAM

## 2024-11-05 PROCEDURE — G2211 COMPLEX E/M VISIT ADD ON: HCPCS | Mod: S$GLB,,, | Performed by: STUDENT IN AN ORGANIZED HEALTH CARE EDUCATION/TRAINING PROGRAM

## 2024-11-05 PROCEDURE — 3074F SYST BP LT 130 MM HG: CPT | Mod: CPTII,S$GLB,, | Performed by: STUDENT IN AN ORGANIZED HEALTH CARE EDUCATION/TRAINING PROGRAM

## 2024-11-05 PROCEDURE — 1126F AMNT PAIN NOTED NONE PRSNT: CPT | Mod: CPTII,S$GLB,, | Performed by: STUDENT IN AN ORGANIZED HEALTH CARE EDUCATION/TRAINING PROGRAM

## 2024-11-05 PROCEDURE — 1101F PT FALLS ASSESS-DOCD LE1/YR: CPT | Mod: CPTII,S$GLB,, | Performed by: STUDENT IN AN ORGANIZED HEALTH CARE EDUCATION/TRAINING PROGRAM

## 2024-11-05 PROCEDURE — 3044F HG A1C LEVEL LT 7.0%: CPT | Mod: CPTII,S$GLB,, | Performed by: STUDENT IN AN ORGANIZED HEALTH CARE EDUCATION/TRAINING PROGRAM

## 2024-11-05 PROCEDURE — 99999 PR PBB SHADOW E&M-EST. PATIENT-LVL IV: CPT | Mod: PBBFAC,,, | Performed by: STUDENT IN AN ORGANIZED HEALTH CARE EDUCATION/TRAINING PROGRAM

## 2024-11-05 PROCEDURE — 99214 OFFICE O/P EST MOD 30 MIN: CPT | Mod: S$GLB,,, | Performed by: STUDENT IN AN ORGANIZED HEALTH CARE EDUCATION/TRAINING PROGRAM

## 2024-11-05 PROCEDURE — 3078F DIAST BP <80 MM HG: CPT | Mod: CPTII,S$GLB,, | Performed by: STUDENT IN AN ORGANIZED HEALTH CARE EDUCATION/TRAINING PROGRAM

## 2024-11-05 PROCEDURE — 3288F FALL RISK ASSESSMENT DOCD: CPT | Mod: CPTII,S$GLB,, | Performed by: STUDENT IN AN ORGANIZED HEALTH CARE EDUCATION/TRAINING PROGRAM

## 2024-11-05 PROCEDURE — 80053 COMPREHEN METABOLIC PANEL: CPT | Mod: PO | Performed by: STUDENT IN AN ORGANIZED HEALTH CARE EDUCATION/TRAINING PROGRAM

## 2024-11-05 PROCEDURE — 1160F RVW MEDS BY RX/DR IN RCRD: CPT | Mod: CPTII,S$GLB,, | Performed by: STUDENT IN AN ORGANIZED HEALTH CARE EDUCATION/TRAINING PROGRAM

## 2024-11-05 PROCEDURE — 36415 COLL VENOUS BLD VENIPUNCTURE: CPT | Mod: PO | Performed by: STUDENT IN AN ORGANIZED HEALTH CARE EDUCATION/TRAINING PROGRAM

## 2024-11-05 PROCEDURE — 1159F MED LIST DOCD IN RCRD: CPT | Mod: CPTII,S$GLB,, | Performed by: STUDENT IN AN ORGANIZED HEALTH CARE EDUCATION/TRAINING PROGRAM

## 2024-11-05 NOTE — PROGRESS NOTES
Chief Complaint   Patient presents with    Results     Positive colongaurd    Foot Swelling     Feet burning      HPI: Salud Echevarria is a 74 y.o. female  with Pmhx listed below who presents to clinic for    History of Present Illness    CHIEF COMPLAINT:  - Ms. Echevarria presents with concerns about leg swelling and numbness in her feet, as well as to discuss a positive colorectal cancer screening test result.    HPI:  Ms. Echevarria reports intermittent leg swelling, which worsened on Saturday after prolonged standing on cement at a [employer] event. The right leg is more affected than the left. She has burning sensation in her feet when swollen and is unable to wear closed shoes due to the swelling. The swelling worsens with inactivity and improves with movement.    Ms. Echevarria reports numbness in her feet and toes for about 6 months. The left foot was affected first, with the right foot becoming numb after a procedure by Dr. Mehta in July. She is currently taking Lyrica for this condition, prescribed by Dr. Mehta from pain management.    Ms. Echevarria mentions occasional shortness of breath during exertion, such as when moving chairs, but denies consistent shortness of breath during regular activities. She also denies constant shortness of breath, coughing, and needing to elevate her head at night due to breathing difficulties. Ms. Echevarria denies any known heart problems.    Ms. Echevarria tested positive for colorectal cancer screening in April but has not undergone a follow-up colonoscopy. She reports successful smoking cessation and is attending smoking cessation programs.           Problem List:  Patient Active Problem List   Diagnosis    Aortic atherosclerosis    Back pain    Coronary artery disease involving native coronary artery of native heart without angina pectoris    Gastroesophageal reflux disease with esophagitis    Hyperlipidemia    Osteopenia    Pulmonary nodule, right    Spinal stenosis    History of  tobacco use    Left hip pain    Sciatica of left side    BPPV (benign paroxysmal positional vertigo)    Fatigue    Other specified abdominal hernia without obstruction or gangrene    Post-nasal drip    Vertigo    Mild intermittent asthma without complication    Urge incontinence    Ventral hernia without obstruction or gangrene    Anxiety    Asymptomatic microscopic hematuria    Seasonal allergic rhinitis due to pollen    COVID-19 virus detected    Peripheral vascular disease, unspecified    Viral syndrome       ROS: Negative except as noted above.       Current Meds:  Current Outpatient Medications   Medication Sig Dispense Refill    aspirin (ECOTRIN) 81 MG EC tablet Take 81 mg by mouth once daily.      levocetirizine (XYZAL) 5 MG tablet Take 1 tablet (5 mg total) by mouth every evening. 30 tablet 0    nicotine polacrilex 2 MG Lozg Take 1 lozenge (2 mg total) by mouth as needed (Use 8-10 lozenges per day in the place of cigarettes). 270 lozenge 0    pravastatin (PRAVACHOL) 20 MG tablet Take 1 tablet by mouth once daily 90 tablet 2    pregabalin (LYRICA) 25 MG capsule Take 1 capsule (25 mg total) by mouth every evening. 30 capsule 2    propylene glycol (SYSTANE COMPLETE OPHT) Apply 1 drop to eye daily as needed.      traMADoL (ULTRAM) 50 mg tablet TAKE 1 TABLET BY MOUTH EVERY 6 HOURS AS NEEDED FOR PAIN 28 tablet 0    albuterol (PROVENTIL/VENTOLIN HFA) 90 mcg/actuation inhaler Inhale 1-2 puffs into the lungs every 6 (six) hours as needed for Wheezing. Rescue 18 g 1    fluticasone propionate (FLOVENT HFA) 220 mcg/actuation inhaler Inhale 1 puff into the lungs 2 (two) times a day. Controller (Patient not taking: Reported on 11/5/2024) 12 g 11    rOPINIRole (REQUIP) 0.5 MG tablet Take 1 tablet (0.5 mg total) by mouth nightly as needed (restless legs). 90 tablet 3     No current facility-administered medications for this visit.      PE:  BP: 110/60  Pulse: 73     Temp: 97.6 °F (36.4 °C)  Weight: 65.1 kg (143 lb 8.3 oz)  Body mass index is 26.25 kg/m².    Wt Readings from Last 5 Encounters:   11/05/24 65.1 kg (143 lb 8.3 oz)   09/17/24 63.3 kg (139 lb 8.8 oz)   08/20/24 62.6 kg (138 lb 0.1 oz)   07/17/24 59.1 kg (130 lb 6.4 oz)   07/09/24 59.4 kg (130 lb 15.3 oz)     General appearance: alert and cooperative, not in acute distress  Head: normocephalic, without obvious abnormality, atraumatic  Eyes: conjunctivae/corneas clear. PERRL, EOM's intact.  Ears: clear tympanic membranes   Neck: no adenopathy, supple, symmetrical, trachea midline and thyroid not enlarged, symmetric, no tenderness/mass/nodules, no JVD  Throat: lips, mucosa, and tongue normal; teeth and gums normal; no thrush  Chest: no reproducible chest pain   Heart: regular rate and rhythm, S1, S2 normal, no murmur, click, rub or gallop  Lungs: unlabored respiration, bilateral equal air entry, normal vesicular breath sound heard, no wheezing, rhonchi   Abdomen: soft, non-tender, non-distended; bowel sounds +; no masses,  no organomegaly, no ascites   Extremities: normal, atraumatic, no cyanosis or edema noted B/L upper and lower extremities.  Skin: skin color, texture, turgor normal. No rashes or lesions noted.  Neurologic: grossly intact      Lab:  Lab Results   Component Value Date    WBC 6.55 03/19/2024    HGB 13.4 03/19/2024    HCT 41.0 03/19/2024    MCV 91 03/19/2024     03/19/2024     03/19/2024    K 3.8 03/19/2024     03/19/2024    CO2 27 03/19/2024    BUN 14 03/19/2024     03/19/2024    CALCIUM 9.0 03/19/2024    AST 15 03/19/2024    ALT 11 03/19/2024    CHOL 171 03/19/2024    HDL 81 (H) 03/19/2024    LDLCALC 78.6 03/19/2024    TRIG 57 03/19/2024    TSH 2.258 05/13/2022       Impression:    ICD-10-CM ICD-9-CM    1. Pulmonary nodule, right  R91.1 793.11       2. Aortic atherosclerosis  I70.0 440.0       3. Coronary artery disease involving native coronary artery of native heart without angina pectoris  I25.10 414.01       4. Other  hyperlipidemia  E78.49 272.4       5. History of tobacco use  Z87.891 V15.82       6. Leg swelling  M79.89 729.81 COMPREHENSIVE METABOLIC PANEL      B-TYPE NATRIURETIC PEPTIDE      Echo      7. Solitary pulmonary nodule  R91.1 793.11 CT Chest Without Contrast          Assessment & Plan    LEG SWELLING:  - Evaluated leg swelling, considering potential causes including kidney, liver, and heart issues.  - Ruled out major organ involvement based on normal lab results and absence of significant symptoms.  - Suspected medication-induced edema, likely due to Lyrica (pregabalin).  - Explained potential causes of leg swelling, including medication side effects.  - Ms. Echevarria to use compression stockings.  - Ms. Echevarria to elevate legs.  - Recommend following low sodium diet.    CARDIAC EVALUATION:  - Considered need for cardiac evaluation via echocardiogram.  - Discussed the difference between EKG and echocardiogram in assessing heart function.  - Informed patient about the role of BNP in evaluating heart failure.  - Echocardiogram ordered.    PULMONARY NODULES:  - Reviewed previous CT chest findings from 2016 showing benign nodules in left lower lobe.  - Will order new CT chest WO Contrast due to patient's contrast allergy.  - CT chest WO Contrast ordered.    NEUROPATHIC SYMPTOMS:  - Weighed benefits of continuing Lyrica for neuropathic symptoms against side effect of edema.  - Continued Lyrica (pregabalin) at current dose for persistent neuropathic symptoms, despite potential association with leg swelling.    LABS:  - Lab work ordered today.    FOLLOW UP:  - Follow up to schedule CT chest and echocardiogram for the same day, preferably on a Monday or Tuesday.  - Contact the office for appointment scheduling.          1. Pulmonary nodule, right (Primary)  Repeat CT today  Quit smoking few months back    2. Aortic atherosclerosis  Asymptomatic   Stable  On aspirin and statin to be continued.    3. Coronary artery disease  involving native coronary artery of native heart without angina pectoris  Asymptomatic   Stable  On aspirin and statin to be continued.    4. Other hyperlipidemia  On pravastatin to be continued    5. History of tobacco use  Quit smoking few months back  Congratulated on that  Following smoking cessation program    6. Leg swelling  Evaluated leg swelling, considering potential causes including kidney, liver, and heart issues.  - Ruled out major organ involvement based on normal lab results and absence of significant symptoms.  - Suspected medication-induced edema, likely due to Lyrica (pregabalin).  - Explained potential causes of leg swelling, including medication side effects.  - Ms. Wale to use compression stockings.  - Ms. Wale to elevate legs.  - Recommend following low sodium diet.  - COMPREHENSIVE METABOLIC PANEL; Future  - B-TYPE NATRIURETIC PEPTIDE; Future  - Echo; Future    7. Solitary pulmonary nodule  - CT Chest Without Contrast; Future      Future Appointments   Date Time Provider Department Center   11/5/2024  1:30 PM [facility]   11/11/2024 10:00 AM Lorena Vo, RRT [facility] SMOKE [facility]   11/18/2024  2:00 PM [facility] VASCULAR ULTRASOUND [facility] [place]   11/18/2024  2:30 PM Eliot Sadler MD [facility] VASSGY [facility]   11/21/2024 10:00 AM CARDIOVASCULAR, [facility] SPECCPR [facility]   11/21/2024 10:45 AM [facility] CT1 LIMIT 500 LBS IBV CT SCAN [facility]   2/5/2025  9:45 AM Yariel Ceballos MD [facility] RHEUM [facility]   2/25/2025 10:40 AM Andrea Del Castillo MD [facility] IM [facility]   Repeat lab   Ct scan chest   Will call her with results    I spent a total of 32 minutes on the day of the visit.This includes face to face time and non-face to face time preparing to see the patient (eg, review of tests), obtaining and/or reviewing separately obtained history, documenting clinical information in the electronic or other health record, independently interpreting results  and communicating results to the patient/family/caregiver, or care coordinator.  Visit today included increased complexity associated with the care of the episodic problem   addressed and managing the longitudinal care of the patient due to the serious and/or complex managed problem(s) .     Andrea Del Castillo MD    This note was generated with the assistance of ambient listening technology. Verbal consent was obtained by the patient and accompanying visitor(s) for the recording of patient appointment to facilitate this note. I attest to having reviewed and edited the generated note for accuracy, though some syntax or spelling errors may persist. Please contact the author of this note for any clarification.

## 2024-11-11 ENCOUNTER — CLINICAL SUPPORT (OUTPATIENT)
Dept: SMOKING CESSATION | Facility: CLINIC | Age: 74
End: 2024-11-11
Payer: COMMERCIAL

## 2024-11-11 DIAGNOSIS — F17.200 NICOTINE DEPENDENCE: Primary | ICD-10-CM

## 2024-11-11 PROCEDURE — 99404 PREV MED CNSL INDIV APPRX 60: CPT | Mod: S$GLB,,, | Performed by: GENERAL PRACTICE

## 2024-11-11 PROCEDURE — 99999 PR PBB SHADOW E&M-EST. PATIENT-LVL I: CPT | Mod: PBBFAC,,,

## 2024-11-11 NOTE — PROGRESS NOTES
Individual Follow-Up Form    11/11/2024    Quit Date: 8/3/24    Clinical Status of Patient: Outpatient    Length of Service: 60 minutes    Continuing Medication: yes  Nicotine Lozenges     Target Symptoms: Withdrawal and medication side effects. The following were  rated moderate (3) to severe (4) on TCRS:  Moderate (3): none  Severe (4): none    Comments: Patient was seen in the clinic today for a smoking cessation follow up visit. She is smoke free at this time and was smoking 20 cpd. Patient states she had a slip over the weekend and smoked 10 cigarettes due to being around other smokers and watching football. She is feeling guilty this morning about smoking. Encouraged patient to think about her quitting smoking for 3 months. She is no longer buying cigarettes and is not having urges and cravings to smoke. Commended patient on her progress and discussed the lapse and planning for times when she will be around smokers. Exhaled CO is 8 ppm. Completion of TCRS (Tobacco Cessation Rating Scale) reviewed strategies, controlling environment, cues, triggers, new goals set. Introduced high risk situations with preparation interventions, caffeine similarities with withdrawal issues of habit and nicotine, alcohol, understanding urges, cravings, stress and relaxation. Open discussion with intervention discussion. The patient remains on the prescribed tobacco cessation medication regimen of  2 mg nicotine lozenges as needed without any negative side effects at this time.  The patient denies any abnormal behavioral or mental changes at this time.  Will continue to encourage and monitor her progress.     Diagnosis: F17.200    Next Visit: 1 week

## 2024-11-26 ENCOUNTER — TELEPHONE (OUTPATIENT)
Dept: SMOKING CESSATION | Facility: CLINIC | Age: 74
End: 2024-11-26
Payer: MEDICARE

## 2024-12-13 ENCOUNTER — PATIENT MESSAGE (OUTPATIENT)
Dept: CARDIOLOGY | Facility: HOSPITAL | Age: 74
End: 2024-12-13
Payer: MEDICARE

## 2024-12-16 ENCOUNTER — CLINICAL SUPPORT (OUTPATIENT)
Dept: SMOKING CESSATION | Facility: CLINIC | Age: 74
End: 2024-12-16
Payer: COMMERCIAL

## 2024-12-16 DIAGNOSIS — F17.200 NICOTINE DEPENDENCE: Primary | ICD-10-CM

## 2024-12-16 PROCEDURE — 99999 PR PBB SHADOW E&M-EST. PATIENT-LVL I: CPT | Mod: PBBFAC,,,

## 2024-12-16 PROCEDURE — 99402 PREV MED CNSL INDIV APPRX 30: CPT | Mod: S$GLB,,, | Performed by: GENERAL PRACTICE

## 2024-12-16 NOTE — PROGRESS NOTES
Individual Follow-Up Form    12/16/2024    Quit Date: 8/3/24    Clinical Status of Patient: Outpatient    Length of Service: 30 minutes    Continuing Medication: yes  Nicotine Lozenges     Target Symptoms: Withdrawal and medication side effects. The following were  rated moderate (3) to severe (4) on TCRS:  Moderate (3): none  Severe (4): none    Comments: Spoke to patient over the phone in regard to her smoking cessation progress. She is smoke free at this time. Patient states that she has puffed on cigarettes at times, but she does not enjoy them like she used to. Congratulated patient on her hard work and success. She continues to use 2 mg nicotine lozenges as needed with no side effects. She has completed the smoking cessation program. Discussed with patient that she can call back at any time if she feels she needs support. Patient has return contact information.     Diagnosis: F17.200

## 2024-12-17 ENCOUNTER — PATIENT MESSAGE (OUTPATIENT)
Dept: CARDIOLOGY | Facility: HOSPITAL | Age: 74
End: 2024-12-17
Payer: MEDICARE

## 2024-12-18 DIAGNOSIS — M79.671 CHRONIC FOOT PAIN, RIGHT: ICD-10-CM

## 2024-12-18 DIAGNOSIS — G89.29 CHRONIC FOOT PAIN, RIGHT: ICD-10-CM

## 2024-12-18 DIAGNOSIS — M51.369 DDD (DEGENERATIVE DISC DISEASE), LUMBAR: ICD-10-CM

## 2024-12-18 DIAGNOSIS — M47.816 LUMBAR SPONDYLOSIS: ICD-10-CM

## 2024-12-18 RX ORDER — PREGABALIN 25 MG/1
25 CAPSULE ORAL
Qty: 30 CAPSULE | Refills: 0 | Status: SHIPPED | OUTPATIENT
Start: 2024-12-18

## 2025-01-18 DIAGNOSIS — J00 ACUTE RHINITIS: ICD-10-CM

## 2025-01-18 NOTE — TELEPHONE ENCOUNTER
Care Due:                  Date            Visit Type   Department     Provider  --------------------------------------------------------------------------------                                EP -                              PRIMARY      [facility] INTERNAL  Last Visit: 11-      CARE ([facility])   MEDICINE       Andreastanley Del Castillo                              EP -                              PRIMARY      [facility] INTERNAL  Next Visit: 02-      CARE ([facility])   MEDICINE       [facility]  Magdalene                                                            Last  Test          Frequency    Reason                     Performed    Due Date  --------------------------------------------------------------------------------    Lipid Panel.  12 months..  pravastatin..............  03-   03-    [employer] Embedded Care Due Messages. Reference number: [id number].   1/18/2025 10:18:01 AM CST

## 2025-01-19 RX ORDER — LEVOCETIRIZINE DIHYDROCHLORIDE 5 MG/1
5 TABLET, FILM COATED ORAL NIGHTLY
Qty: 30 TABLET | Refills: 0 | Status: SHIPPED | OUTPATIENT
Start: 2025-01-19

## 2025-01-20 NOTE — TELEPHONE ENCOUNTER
Provider Staff:  Action required for this patient     Please see care gap opportunities below in Care Due Message.    Thanks!  [facility]     Appointments      Date Provider   Last Visit   11/5/2024 Andrea Del Castillo MD   Next Visit   2/25/2025 Andrea Del Castillo MD      Refill Decision Note   Salud Echevarria  is requesting a refill authorization.  Brief Assessment and Rationale for Refill:  Approve     Medication Therapy Plan:         Comments:     Note composed:8:29 PM 01/19/2025

## 2025-01-23 DIAGNOSIS — G89.29 CHRONIC LEFT-SIDED LOW BACK PAIN WITH LEFT-SIDED SCIATICA: ICD-10-CM

## 2025-01-23 DIAGNOSIS — M54.42 CHRONIC LEFT-SIDED LOW BACK PAIN WITH LEFT-SIDED SCIATICA: ICD-10-CM

## 2025-01-24 DIAGNOSIS — G89.29 CHRONIC FOOT PAIN, RIGHT: ICD-10-CM

## 2025-01-24 DIAGNOSIS — M79.671 CHRONIC FOOT PAIN, RIGHT: ICD-10-CM

## 2025-01-24 DIAGNOSIS — M51.369 DDD (DEGENERATIVE DISC DISEASE), LUMBAR: ICD-10-CM

## 2025-01-24 DIAGNOSIS — M47.816 LUMBAR SPONDYLOSIS: ICD-10-CM

## 2025-01-24 RX ORDER — PREGABALIN 25 MG/1
25 CAPSULE ORAL
Qty: 30 CAPSULE | Refills: 0 | Status: SHIPPED | OUTPATIENT
Start: 2025-01-24

## 2025-01-24 RX ORDER — TRAMADOL HYDROCHLORIDE 50 MG/1
TABLET ORAL
Qty: 28 TABLET | Refills: 0 | Status: SHIPPED | OUTPATIENT
Start: 2025-01-24

## 2025-01-29 ENCOUNTER — CLINICAL SUPPORT (OUTPATIENT)
Dept: SMOKING CESSATION | Facility: CLINIC | Age: 75
End: 2025-01-29
Payer: COMMERCIAL

## 2025-01-29 DIAGNOSIS — F17.200 NICOTINE DEPENDENCE: Primary | ICD-10-CM

## 2025-01-29 PROCEDURE — 99999 PR PBB SHADOW E&M-EST. PATIENT-LVL I: CPT | Mod: PBBFAC,,,

## 2025-01-29 PROCEDURE — 99407 BEHAV CHNG SMOKING > 10 MIN: CPT | Mod: S$GLB,,, | Performed by: GENERAL PRACTICE

## 2025-01-29 NOTE — PROGRESS NOTES
Spoke with patient today in regard to smoking cessation progress for 6 month telephone follow up. Patient states that she is tobacco free. She used the nicotine lozenges to aid her in her quit. Congratulated patient on quitting smoking. Informed patient of benefit period, future follow ups, and contact information if any further help or support is needed. Will complete smart form for 6 month follow up for Quit attempt #2.

## 2025-02-04 ENCOUNTER — TELEPHONE (OUTPATIENT)
Dept: RHEUMATOLOGY | Facility: CLINIC | Age: 75
End: 2025-02-04
Payer: MEDICARE

## 2025-02-04 NOTE — TELEPHONE ENCOUNTER
----- Message from Deidre sent at 2/4/2025  9:51 AM CST -----  Contact: self  Patient is requesting a call back regarding rescheduling appt on 2/5. Please call back at [phone]

## 2025-02-05 PROBLEM — M81.0 POSTMENOPAUSAL OSTEOPOROSIS: Status: ACTIVE | Noted: 2025-02-05

## 2025-02-25 ENCOUNTER — OFFICE VISIT (OUTPATIENT)
Dept: INTERNAL MEDICINE | Facility: CLINIC | Age: 75
End: 2025-02-25
Payer: MEDICARE

## 2025-02-25 ENCOUNTER — RESULTS FOLLOW-UP (OUTPATIENT)
Dept: INTERNAL MEDICINE | Facility: CLINIC | Age: 75
End: 2025-02-25

## 2025-02-25 VITALS
RESPIRATION RATE: 18 BRPM | HEART RATE: 79 BPM | BODY MASS INDEX: 26.21 KG/M2 | TEMPERATURE: 98 F | SYSTOLIC BLOOD PRESSURE: 104 MMHG | WEIGHT: 142.44 LBS | DIASTOLIC BLOOD PRESSURE: 62 MMHG | HEIGHT: 62 IN | OXYGEN SATURATION: 94 %

## 2025-02-25 DIAGNOSIS — I70.0 AORTIC ATHEROSCLEROSIS: ICD-10-CM

## 2025-02-25 DIAGNOSIS — M47.816 LUMBAR SPONDYLOSIS: ICD-10-CM

## 2025-02-25 DIAGNOSIS — J45.20 MILD INTERMITTENT ASTHMA WITHOUT COMPLICATION: ICD-10-CM

## 2025-02-25 DIAGNOSIS — I25.10 CORONARY ARTERY DISEASE INVOLVING NATIVE CORONARY ARTERY OF NATIVE HEART WITHOUT ANGINA PECTORIS: ICD-10-CM

## 2025-02-25 DIAGNOSIS — J00 ACUTE RHINITIS: ICD-10-CM

## 2025-02-25 DIAGNOSIS — E78.49 OTHER HYPERLIPIDEMIA: Primary | ICD-10-CM

## 2025-02-25 DIAGNOSIS — J41.0 SIMPLE CHRONIC BRONCHITIS: ICD-10-CM

## 2025-02-25 DIAGNOSIS — I73.9 PERIPHERAL VASCULAR DISEASE, UNSPECIFIED: ICD-10-CM

## 2025-02-25 DIAGNOSIS — N39.41 URGE INCONTINENCE: ICD-10-CM

## 2025-02-25 DIAGNOSIS — M79.89 LEG SWELLING: ICD-10-CM

## 2025-02-25 LAB
BACTERIA #/AREA URNS AUTO: ABNORMAL /HPF
BILIRUB UR QL STRIP: NEGATIVE
CLARITY UR REFRACT.AUTO: CLEAR
COLOR UR AUTO: YELLOW
GLUCOSE UR QL STRIP: NEGATIVE
HGB UR QL STRIP: ABNORMAL
KETONES UR QL STRIP: NEGATIVE
LEUKOCYTE ESTERASE UR QL STRIP: NEGATIVE
MICROSCOPIC COMMENT: ABNORMAL
NITRITE UR QL STRIP: NEGATIVE
PH UR STRIP: 6 [PH] (ref 5–8)
PROT UR QL STRIP: NEGATIVE
RBC #/AREA URNS AUTO: 5 /HPF (ref 0–4)
SP GR UR STRIP: 1.02 (ref 1–1.03)
URN SPEC COLLECT METH UR: ABNORMAL
UROBILINOGEN UR STRIP-ACNC: NEGATIVE EU/DL

## 2025-02-25 PROCEDURE — G2211 COMPLEX E/M VISIT ADD ON: HCPCS | Mod: S$GLB,,, | Performed by: NURSE PRACTITIONER

## 2025-02-25 PROCEDURE — 1126F AMNT PAIN NOTED NONE PRSNT: CPT | Mod: CPTII,S$GLB,, | Performed by: NURSE PRACTITIONER

## 2025-02-25 PROCEDURE — 99214 OFFICE O/P EST MOD 30 MIN: CPT | Mod: S$GLB,,, | Performed by: NURSE PRACTITIONER

## 2025-02-25 PROCEDURE — 81000 URINALYSIS NONAUTO W/SCOPE: CPT | Mod: PO | Performed by: NURSE PRACTITIONER

## 2025-02-25 PROCEDURE — 1101F PT FALLS ASSESS-DOCD LE1/YR: CPT | Mod: CPTII,S$GLB,, | Performed by: NURSE PRACTITIONER

## 2025-02-25 PROCEDURE — 3074F SYST BP LT 130 MM HG: CPT | Mod: CPTII,S$GLB,, | Performed by: NURSE PRACTITIONER

## 2025-02-25 PROCEDURE — 3288F FALL RISK ASSESSMENT DOCD: CPT | Mod: CPTII,S$GLB,, | Performed by: NURSE PRACTITIONER

## 2025-02-25 PROCEDURE — 99999 PR PBB SHADOW E&M-EST. PATIENT-LVL IV: CPT | Mod: PBBFAC,,, | Performed by: NURSE PRACTITIONER

## 2025-02-25 PROCEDURE — 1160F RVW MEDS BY RX/DR IN RCRD: CPT | Mod: CPTII,S$GLB,, | Performed by: NURSE PRACTITIONER

## 2025-02-25 PROCEDURE — 3078F DIAST BP <80 MM HG: CPT | Mod: CPTII,S$GLB,, | Performed by: NURSE PRACTITIONER

## 2025-02-25 PROCEDURE — 1159F MED LIST DOCD IN RCRD: CPT | Mod: CPTII,S$GLB,, | Performed by: NURSE PRACTITIONER

## 2025-02-25 RX ORDER — PREGABALIN 25 MG/1
25 CAPSULE ORAL NIGHTLY
Qty: 30 CAPSULE | Refills: 5 | Status: SHIPPED | OUTPATIENT
Start: 2025-02-25 | End: 2025-08-24

## 2025-02-25 RX ORDER — LEVOCETIRIZINE DIHYDROCHLORIDE 5 MG/1
5 TABLET, FILM COATED ORAL NIGHTLY
Qty: 30 TABLET | Refills: 5 | Status: SHIPPED | OUTPATIENT
Start: 2025-02-25 | End: 2025-08-24

## 2025-02-25 RX ORDER — TRAMADOL HYDROCHLORIDE 50 MG/1
TABLET ORAL
Qty: 28 TABLET | Refills: 0 | Status: CANCELLED | OUTPATIENT
Start: 2025-02-25

## 2025-02-25 RX ORDER — PRAVASTATIN SODIUM 20 MG/1
20 TABLET ORAL DAILY
Qty: 90 TABLET | Refills: 1 | Status: SHIPPED | OUTPATIENT
Start: 2025-02-25 | End: 2025-08-24

## 2025-02-25 RX ORDER — MIRABEGRON 25 MG/1
25 TABLET, FILM COATED, EXTENDED RELEASE ORAL DAILY
Qty: 30 TABLET | Refills: 11 | Status: SHIPPED | OUTPATIENT
Start: 2025-02-25 | End: 2026-02-25

## 2025-02-25 RX ORDER — FLUTICASONE PROPIONATE 220 UG/1
1 AEROSOL, METERED RESPIRATORY (INHALATION) 2 TIMES DAILY
Qty: 12 G | Refills: 11 | Status: SHIPPED | OUTPATIENT
Start: 2025-02-25 | End: 2026-02-25

## 2025-02-25 NOTE — PROGRESS NOTES
Subjective:       Patient ID: Salud Echevarria is a 75 y.o. female.    Chief Complaint: Hyperlipidemia    History of Present Illness    HPI:  Ms. Echevarria presents to clinic for routine follow up. She reports urinary frequency and urgency incontinence for approximately 3-4 months, possibly longer. She describes frequent urination and a sensation of incomplete bladder emptying, requiring straining to fully void. She also mentions urinary incontinence, necessitating the use of panty liners. She denies any pain associated with these urinary symptoms.    Ms. Echevarria has a history of hematuria, which was investigated by Dr. Flaco Greer. A cystoscopy was performed, but the source of the bleeding was not identified. She was prescribed Vesicare, but does not remember if it was effective. She discontinued follow-up for this issue as no cause was found.    Ms. Echevarria mentions a history of sciatica that lasted for about 6 months, which she attributes to prolonged sitting on a hard chair while staying with her brother in the hospital. She underwent back surgery in the past and had a flare-up that required a procedure in July. She reports neuropathy, primarily affecting her right foot, which her doctor suggested might be related to the sciatica. She was prescribed Lyrica for this condition but states it is not effective. She is followed by pain management.    Ms. Echevarria mentions waking up with a headache today. She denies pain associated with urination.    TEST RESULTS:  - Urinalysis: Past, hematuria detected, no cause found  - Cystoscopy: Past, performed by Dr. Flaco Greer, no cause for hematuria found    IMAGING:  - LDCT Chest: , patient declined  - Echocardiogram: Ordered on November 2024, not completed, needs to be rescheduled          Hyperlipidemia  Associated symptoms include myalgias. Pertinent negatives include no chest pain or shortness of breath.       Problem List[1]    Family History   Problem Relation Name  "Age of Onset    Heart disease Mother      Esophageal cancer Brother  73        Stage 4    Cancer Maternal Uncle       Past Surgical History:   Procedure Laterality Date    BACK SURGERY      BREAST BIOPSY      CARDIAC CATHETERIZATION  2018, 11/2020    HYSTERECTOMY      LYMPH NODE BIOPSY      ROBOT-ASSISTED REPAIR OF VENTRAL HERNIA USING DA JIM XI N/A 12/10/2020    Procedure: XI ROBOTIC REPAIR, HERNIA, VENTRAL;  Surgeon: Brandyn Panchal MD;  Location: [facility] OR;  Service: General;  Laterality: N/A;    TONSILLECTOMY      TRANSFORAMINAL EPIDURAL INJECTION OF STEROID Right 7/17/2024    Procedure: Right L5/S1 + S1 TF PORSHA;  Surgeon: Amando Sy MD;  [facility];  Service: Pain Management;  Laterality: Right;       Current Medications[2]    Review of Systems   Constitutional:  Negative for chills, fatigue and fever.   Respiratory:  Negative for shortness of breath.    Cardiovascular:  Negative for chest pain.   Gastrointestinal:  Negative for abdominal pain.   Genitourinary:  Positive for frequency, hematuria and urgency. Negative for decreased urine volume, difficulty urinating, dysuria and flank pain.   Musculoskeletal:  Positive for back pain and myalgias.       Objective:   /62 (BP Location: Left arm, Patient Position: Sitting)   Pulse 79   Temp 98.3 °F (36.8 °C) (Oral)   Resp 18   Ht 5' 2" (1.575 m)   Wt 64.6 kg (142 lb 6.7 oz)   SpO2 (!) 94%   BMI 26.05 kg/m²      Physical Exam  Vitals reviewed.   Constitutional:       General: She is not in acute distress.     Appearance: Normal appearance. She is not ill-appearing, toxic-appearing or diaphoretic.   Eyes:      Conjunctiva/sclera: Conjunctivae normal.   Cardiovascular:      Rate and Rhythm: Normal rate.   Pulmonary:      Effort: Pulmonary effort is normal. No respiratory distress.   Neurological:      Mental Status: She is alert and oriented to person, place, and time.      Gait: Gait normal.   Psychiatric:         Mood and Affect: Mood " normal.         Behavior: Behavior normal.         Assessment & Plan     Assessment & Plan    URGENCY INCONTINENCE:  - Assessed urinary symptoms, including frequency, urgency, and incontinence.  - Noted the patient has had symptoms for 3-4 months, including frequent urination, inability to hold urine, and wearing panty liners.  - Confirmed diagnosis of urgency incontinence based on patient's symptoms and previous diagnosis from 2021.  - Reviewed previous treatment with Vesicare, which was ineffective.  - Prescribed mirabegron 25 mg daily in the morning for bladder spasms and urge incontinence.    URINARY TRACT INFECTION (SUSPECTED):  - Ordered urinalysis to rule out bladder infection.    SCIATICA:  - Noted patient's history of sciatica lasting almost 6 months.  - Acknowledged patient's history of back surgery and recent procedure for sciatica.  - Refilled Lyrica prescription for sciatica-related symptoms.  - Followed by pain management.    HEMATURIA:  - Noted patient's history of hematuria, previously investigated by Dr. Greer with cystoscopy.  - Previous investigations did not identify the source of hematuria.    SMOKING HISTORY:  - Noted previous order for CT chest due to history of smoking.    PAIN MANAGEMENT:  - Noted patient's current use of tramadol prescribed by pain management.  - Instructed patient to follow up with pain management for tramadol prescription.      LABS:  - Cholesterol check ordered.    CARDIAC EVALUATION:  - Cardiac echo ordered.         1. Other hyperlipidemia  Assessment & Plan:  Repeat lipid panel. Continue pravastatin.    Orders:  -     COMPREHENSIVE METABOLIC PANEL; Future; Expected date: 02/25/2025  -     LIPID PANEL; Future; Expected date: 02/25/2025  -     pravastatin (PRAVACHOL) 20 MG tablet; Take 1 tablet (20 mg total) by mouth once daily.  Dispense: 90 tablet; Refill: 1    2. Peripheral vascular disease, unspecified  Assessment & Plan:  Continue asa and statin. Continue smoking  cessation. Followed by vascular.      3. Coronary artery disease involving native coronary artery of native heart without angina pectoris  Assessment & Plan:  Asymptomatic. Continue asa and statin. BP cotnrolled      4. Aortic atherosclerosis  Overview:  -on asa and statin    Assessment & Plan:  Asymptomatic. Continue asa and statin. BP controlled      5. Simple chronic bronchitis  -     fluticasone propionate (FLOVENT HFA) 220 mcg/actuation inhaler; Inhale 1 puff into the lungs 2 (two) times a day. Controller  Dispense: 12 g; Refill: 11    6. Mild intermittent asthma without complication  Assessment & Plan:  Stable. Continue Flovent.       7. Lumbar spondylosis  -     pregabalin (LYRICA) 25 MG capsule; Take 1 capsule (25 mg total) by mouth every evening.  Dispense: 30 capsule; Refill: 5    8. Urge incontinence  -     mirabegron (MYRBETRIQ) 25 mg Tb24 ER tablet; Take 1 tablet (25 mg total) by mouth once daily.  Dispense: 30 tablet; Refill: 11  -     Urinalysis, Reflex to Urine Culture Urine, Clean Catch    9. Acute rhinitis  -     levocetirizine (XYZAL) 5 MG tablet; Take 1 tablet (5 mg total) by mouth every evening.  Dispense: 30 tablet; Refill: 5    10. Leg swelling  -     Echo; Future    Other orders  -     Urinalysis Microscopic        Visit today included increased complexity associated with the care of the episodic problem addressed and managing the longitudinal care of the patient due to the serious and/or complex managed problem(s).      NENA Cedeño    This note was generated with the assistance of ambient listening technology. Verbal consent was obtained by the patient and accompanying visitor(s) for the recording of patient appointment to facilitate this note. I attest to having reviewed and edited the generated note for accuracy, though some syntax or spelling errors may persist. Please contact the author of this note for any clarification.       Portions of this note may have been created  "with voice recognition software. Occasional "wrong-word" or "sound-a-like" substitutions may have occurred due to the inherent limitations of voice recognition software. Please, read the note carefully and recognize, using context, where substitutions have occurred.             [1]   Patient Active Problem List  Diagnosis    Aortic atherosclerosis    Back pain    Coronary artery disease involving native coronary artery of native heart without angina pectoris    Gastroesophageal reflux disease with esophagitis    Hyperlipidemia    Osteopenia    Pulmonary nodule, right    Spinal stenosis    History of tobacco use    Left hip pain    Sciatica of left side    BPPV (benign paroxysmal positional vertigo)    Fatigue    Other specified abdominal hernia without obstruction or gangrene    Post-nasal drip    Vertigo    Mild intermittent asthma without complication    Urge incontinence    Ventral hernia without obstruction or gangrene    Anxiety    Asymptomatic microscopic hematuria    Seasonal allergic rhinitis due to pollen    Peripheral vascular disease, unspecified    Postmenopausal osteoporosis    Lumbar spondylosis   [2]   Current Outpatient Medications:     albuterol (PROVENTIL/VENTOLIN HFA) 90 mcg/actuation inhaler, Inhale 1-2 puffs into the lungs every 6 (six) hours as needed for Wheezing. Rescue, Disp: 18 g, Rfl: 1    aspirin (ECOTRIN) 81 MG EC tablet, Take 81 mg by mouth once daily., Disp: , Rfl:     nicotine polacrilex 2 MG Lozg, Take 1 lozenge (2 mg total) by mouth as needed (Use 8-10 lozenges per day in the place of cigarettes)., Disp: 270 lozenge, Rfl: 0    propylene glycol (SYSTANE COMPLETE OPHT), Apply 1 drop to eye daily as needed., Disp: , Rfl:     rOPINIRole (REQUIP) 0.5 MG tablet, Take 1 tablet (0.5 mg total) by mouth nightly as needed (restless legs)., Disp: 90 tablet, Rfl: 3    fluticasone propionate (FLOVENT HFA) 220 mcg/actuation inhaler, Inhale 1 puff into the lungs 2 (two) times a day. Controller, " Disp: 12 g, Rfl: 11    levocetirizine (XYZAL) 5 MG tablet, Take 1 tablet (5 mg total) by mouth every evening., Disp: 30 tablet, Rfl: 5    mirabegron (MYRBETRIQ) 25 mg Tb24 ER tablet, Take 1 tablet (25 mg total) by mouth once daily., Disp: 30 tablet, Rfl: 11    pravastatin (PRAVACHOL) 20 MG tablet, Take 1 tablet (20 mg total) by mouth once daily., Disp: 90 tablet, Rfl: 1    pregabalin (LYRICA) 25 MG capsule, Take 1 capsule (25 mg total) by mouth every evening., Disp: 30 capsule, Rfl: 5

## 2025-02-27 PROBLEM — B34.9 VIRAL SYNDROME: Status: RESOLVED | Noted: 2022-07-24 | Resolved: 2025-02-27

## 2025-02-27 PROBLEM — M47.816 LUMBAR SPONDYLOSIS: Status: ACTIVE | Noted: 2025-02-27

## 2025-02-27 PROBLEM — U07.1 COVID-19 VIRUS DETECTED: Status: RESOLVED | Noted: 2022-02-08 | Resolved: 2025-02-27

## 2025-03-14 ENCOUNTER — TELEPHONE (OUTPATIENT)
Dept: INTERNAL MEDICINE | Facility: CLINIC | Age: 75
End: 2025-03-14
Payer: MEDICARE

## 2025-03-14 NOTE — TELEPHONE ENCOUNTER
----- Message from Tyrone sent at 3/14/2025 10:15 AM CDT -----  Contact: Abelino  Type:  Pharmacy Calling to Clarify an RXName of Caller:Abelino [facility]:fluticasone propionate (FLOVENT HFA) 220 mcg/actuation inhalerWhat do they need to clarify?: he states additional information is neededBest Call Back Number:[phone] Information: (ref# [id number])

## 2025-03-20 ENCOUNTER — DOCUMENTATION ONLY (OUTPATIENT)
Dept: CARDIOLOGY | Facility: HOSPITAL | Age: 75
End: 2025-03-20
Payer: MEDICARE

## 2025-03-20 ENCOUNTER — HOSPITAL ENCOUNTER (OUTPATIENT)
Dept: CARDIOLOGY | Facility: HOSPITAL | Age: 75
Discharge: HOME | End: 2025-03-20
Attending: NURSE PRACTITIONER
Payer: MEDICARE

## 2025-03-20 DIAGNOSIS — M79.89 LEG SWELLING: ICD-10-CM

## 2025-03-20 NOTE — PROGRESS NOTES
Patient left without being seen. Went to get patient at 10:40 for her 10:45 echo appointment and the  stated she left due to having to go to work. Patient also stated to them she was waiting for a long time. She had labs at 10 and echo for 10:45.

## 2025-04-03 ENCOUNTER — HOSPITAL ENCOUNTER (OUTPATIENT)
Dept: CARDIOLOGY | Facility: HOSPITAL | Age: 75
Discharge: HOME | End: 2025-04-03
Attending: NURSE PRACTITIONER
Payer: MEDICARE

## 2025-04-03 VITALS
HEIGHT: 62 IN | WEIGHT: 142 LBS | BODY MASS INDEX: 26.13 KG/M2 | HEART RATE: 80 BPM | DIASTOLIC BLOOD PRESSURE: 60 MMHG | SYSTOLIC BLOOD PRESSURE: 104 MMHG

## 2025-04-03 DIAGNOSIS — G89.29 CHRONIC LEFT-SIDED LOW BACK PAIN WITH LEFT-SIDED SCIATICA: ICD-10-CM

## 2025-04-03 DIAGNOSIS — M54.42 CHRONIC LEFT-SIDED LOW BACK PAIN WITH LEFT-SIDED SCIATICA: ICD-10-CM

## 2025-04-03 PROCEDURE — 93306 TTE W/DOPPLER COMPLETE: CPT | Mod: 26,,, | Performed by: INTERNAL MEDICINE

## 2025-04-03 PROCEDURE — 93306 TTE W/DOPPLER COMPLETE: CPT | Mod: PO

## 2025-04-03 RX ORDER — TRAMADOL HYDROCHLORIDE 50 MG/1
50 TABLET ORAL EVERY 6 HOURS PRN
Qty: 28 TABLET | Refills: 0 | Status: SHIPPED | OUTPATIENT
Start: 2025-04-03

## 2025-04-15 ENCOUNTER — PATIENT MESSAGE (OUTPATIENT)
Dept: NEUROLOGY | Facility: CLINIC | Age: 75
End: 2025-04-15
Payer: MEDICARE

## 2025-04-22 ENCOUNTER — OFFICE VISIT (OUTPATIENT)
Dept: INTERNAL MEDICINE | Facility: CLINIC | Age: 75
End: 2025-04-22
Payer: MEDICARE

## 2025-04-22 VITALS
TEMPERATURE: 97 F | OXYGEN SATURATION: 94 % | HEIGHT: 62 IN | BODY MASS INDEX: 26.41 KG/M2 | HEART RATE: 83 BPM | WEIGHT: 143.5 LBS | SYSTOLIC BLOOD PRESSURE: 100 MMHG | DIASTOLIC BLOOD PRESSURE: 60 MMHG

## 2025-04-22 DIAGNOSIS — I73.9 PERIPHERAL VASCULAR DISEASE, UNSPECIFIED: ICD-10-CM

## 2025-04-22 DIAGNOSIS — J41.0 SIMPLE CHRONIC BRONCHITIS: ICD-10-CM

## 2025-04-22 DIAGNOSIS — I25.10 CORONARY ARTERY DISEASE INVOLVING NATIVE CORONARY ARTERY OF NATIVE HEART WITHOUT ANGINA PECTORIS: ICD-10-CM

## 2025-04-22 DIAGNOSIS — I70.0 AORTIC ATHEROSCLEROSIS: Primary | ICD-10-CM

## 2025-04-22 DIAGNOSIS — N39.41 URGE INCONTINENCE: ICD-10-CM

## 2025-04-22 DIAGNOSIS — M48.062 SPINAL STENOSIS OF LUMBAR REGION WITH NEUROGENIC CLAUDICATION: ICD-10-CM

## 2025-04-22 PROCEDURE — 99214 OFFICE O/P EST MOD 30 MIN: CPT | Mod: S$GLB,,, | Performed by: STUDENT IN AN ORGANIZED HEALTH CARE EDUCATION/TRAINING PROGRAM

## 2025-04-22 PROCEDURE — 1101F PT FALLS ASSESS-DOCD LE1/YR: CPT | Mod: CPTII,S$GLB,, | Performed by: STUDENT IN AN ORGANIZED HEALTH CARE EDUCATION/TRAINING PROGRAM

## 2025-04-22 PROCEDURE — 1159F MED LIST DOCD IN RCRD: CPT | Mod: CPTII,S$GLB,, | Performed by: STUDENT IN AN ORGANIZED HEALTH CARE EDUCATION/TRAINING PROGRAM

## 2025-04-22 PROCEDURE — 3074F SYST BP LT 130 MM HG: CPT | Mod: CPTII,S$GLB,, | Performed by: STUDENT IN AN ORGANIZED HEALTH CARE EDUCATION/TRAINING PROGRAM

## 2025-04-22 PROCEDURE — G2211 COMPLEX E/M VISIT ADD ON: HCPCS | Mod: S$GLB,,, | Performed by: STUDENT IN AN ORGANIZED HEALTH CARE EDUCATION/TRAINING PROGRAM

## 2025-04-22 PROCEDURE — 3288F FALL RISK ASSESSMENT DOCD: CPT | Mod: CPTII,S$GLB,, | Performed by: STUDENT IN AN ORGANIZED HEALTH CARE EDUCATION/TRAINING PROGRAM

## 2025-04-22 PROCEDURE — 1126F AMNT PAIN NOTED NONE PRSNT: CPT | Mod: CPTII,S$GLB,, | Performed by: STUDENT IN AN ORGANIZED HEALTH CARE EDUCATION/TRAINING PROGRAM

## 2025-04-22 PROCEDURE — 99999 PR PBB SHADOW E&M-EST. PATIENT-LVL IV: CPT | Mod: PBBFAC,,, | Performed by: STUDENT IN AN ORGANIZED HEALTH CARE EDUCATION/TRAINING PROGRAM

## 2025-04-22 PROCEDURE — 3078F DIAST BP <80 MM HG: CPT | Mod: CPTII,S$GLB,, | Performed by: STUDENT IN AN ORGANIZED HEALTH CARE EDUCATION/TRAINING PROGRAM

## 2025-04-22 RX ORDER — FLUTICASONE PROPIONATE 220 UG/1
1 AEROSOL, METERED RESPIRATORY (INHALATION) 2 TIMES DAILY
Qty: 12 G | Refills: 3 | Status: SHIPPED | OUTPATIENT
Start: 2025-04-22 | End: 2025-07-21

## 2025-04-22 NOTE — PROGRESS NOTES
Chief Complaint   Patient presents with    Foot Swelling     HPI: Salud Echevarria is a 75 y.o. female  with Pmhx listed below who presents to clinic for    History of Present Illness    CHIEF COMPLAINT:  - Ms. Echevarria presents with bilateral foot swelling that has been occurring intermittently for several months.    HPI:  Ms. Echevarria reports bilateral foot swelling occurring intermittently for several months. The swelling is significant when it occurs. She took pictures of her swollen feet to show the doctor, as she knew the swelling might not be present during the visit. The swelling tends to occur when she is idle, such as when sitting and watching TV at home, or during long drives. 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 The swelling is more pronounced towards the end of the day, consistent with positional swelling. On the morning of the previous day, there was still some swelling present, though less severe.    She has been unable to identify any changes in her routine causing the swelling. She expresses concern about her kidney function in relation to the swelling and mentions recent labs. She also had an echocardiogram earlier this month, which was reported as normal with normal systolic and diastolic function.    She is currently under the care of pain management for sciatica and was prescribed Zolica (likely referring to Lyrica or pregabalin) for this condition. She is scheduled to see the pain management doctor on May 13th. She was evaluated by Dr. Flaco Greer, a urologist, for bladder issues, including frequent urination and incomplete bladder emptying. She tried a medication prescribed by Lynn (likely a healthcare provider) for her bladder issues for 2 months but discontinued it due to lack of efficacy.    She denies any issues with her kidneys or liver based on recent lab work.           Problem List:  Problem List[1]    ROS: Negative except as noted above.       Current  Meds:  Current Medications[2]   PE:  BP: 100/60  Pulse: 83     Temp: 97 °F (36.1 °C)  Weight: 65.1 kg (143 lb 8.3 oz) Body mass index is 26.25 kg/m².    Wt Readings from Last 5 Encounters:   04/22/25 65.1 kg (143 lb 8.3 oz)   04/03/25 64.4 kg (142 lb)   02/25/25 64.6 kg (142 lb 6.7 oz)   11/05/24 65.1 kg (143 lb 8.3 oz)   09/17/24 63.3 kg (139 lb 8.8 oz)     General appearance: alert and cooperative, not in acute distress  Head: normocephalic, without obvious abnormality, atraumatic  Eyes: conjunctivae/corneas clear. PERRL, EOM's intact.  Ears: clear tympanic membranes   Neck: no adenopathy, supple, symmetrical, trachea midline and thyroid not enlarged, symmetric, no tenderness/mass/nodules, no JVD  Throat: lips, mucosa, and tongue normal; teeth and gums normal; no thrush  Chest: no reproducible chest pain   Heart: regular rate and rhythm, S1, S2 normal, no murmur, click, rub or gallop  Lungs: unlabored respiration, bilateral equal air entry, normal vesicular breath sound heard, no wheezing, rhonchi   Abdomen: soft, non-tender, non-distended; bowel sounds +; no masses,  no organomegaly, no ascites   Extremities: normal, atraumatic, no cyanosis or edema noted B/L upper and lower extremities.  Skin: skin color, texture, turgor normal. No rashes or lesions noted.  Neurologic: grossly intact      Lab:  Lab Results   Component Value Date    WBC 6.55 03/19/2024    HGB 13.4 03/19/2024    HCT 41.0 03/19/2024    MCV 91 03/19/2024     03/19/2024     03/20/2025    K 4.4 03/20/2025     03/20/2025    CO2 28 03/20/2025    BUN 10 03/20/2025    GLU 93 03/20/2025    CALCIUM 8.7 03/20/2025    AST 15 03/20/2025    ALT 14 03/20/2025    CHOL 172 03/20/2025    HDL 89 (H) 03/20/2025    LDLCALC 73.0 03/20/2025    TRIG 50 03/20/2025    TSH 2.258 05/13/2022       Impression:    ICD-10-CM ICD-9-CM    1. Aortic atherosclerosis  I70.0 440.0       2. Simple chronic bronchitis  J41.0 491.0 fluticasone propionate (FLOVENT HFA)  220 mcg/actuation inhaler      3. Coronary artery disease involving native coronary artery of native heart without angina pectoris  I25.10 414.01       4. Peripheral vascular disease, unspecified  I73.9 443.9       5. Spinal stenosis of lumbar region with neurogenic claudication  M48.062 724.03       6. Urge incontinence  N39.41 788.31 Ambulatory referral/consult to Urology          Assessment & Plan    BILATERAL LOWER LIMB SWELLING:  - Evaluated bilateral foot swelling occurring during prolonged sitting or driving, persisting for months.  - Ruled out kidney, liver, and heart issues as potential causes based on recent labs and echocardiogram results.  - Assessed swelling as likely physiological, related to prolonged inactivity and effects of gravity.  - Considered medication side effects, particularly from Zolica, as potential contributing factor.  - Decided against prescribing diuretics due to lack of cardiac indication and potential risks.  - Explained mechanism of venous return and how muscle contractions aid in pumping blood back to the heart.  - Discussed effects of gravity on fluid accumulation in legs, especially during prolonged sitting or driving.  - Recommend elevating legs when lying down and using compression stockings.  - Advised moving around during long trips to improve circulation.  - MsBrian Echevarria to elevate legs when lying down.  - Ms. Echevarria to apply compression stockings.  - Recommend moving around periodically, especially during long periods of sitting or travel.    LEFT-SIDED SCIATICA:  - Noted patient's history of sciatica, which led to prescription of Zolica by pain management physician.  - Confirmed patient is currently taking Zolica for sciatica management.  - Scheduled follow-up visit for May 13th with pain management physician.    URINARY ISSUES:  - Noted patient's report of frequent urination.  - Suggested referral to a urologist for evaluation of frequent micturition.  - Reviewed previous  treatment: patient was prescribed medication for frequent urination by Lynn for a 30-day trial, extended to 2 months but discontinued due to lack of efficacy.  - Noted patient's report of incomplete bladder emptying.  - Suggested referral to a urologist for evaluation of incomplete bladder emptying.  - Reviewed previous treatment: patient was prescribed medication for bladder issues by Lynn for a 30-day trial, extended to 2 months but discontinued due to lack of efficacy.  - Suggested referral to a urologist for evaluation of urinary urgency.  - Reviewed previous treatment: patient was prescribed medication for bladder issues by Lynn for a 30-day trial, extended to 2 months but discontinued due to lack of efficacy.    FAMILY HISTORY:  - Noted family history: patient's brother passed away from stage 4 esophageal cancer last year, surviving only 7 weeks post-diagnosis.        1. Simple chronic bronchitis  Needs to quit smoking  Discussed about smoking cessation  Assistance with smoking cessation was offered, including:  []  Medications  [x]  Counseling  []  Printed Information on Smoking Cessation  [x]  Referral to a Smoking Cessation Program    Patient was counseled regarding smoking for 3-10 minutes.   - fluticasone propionate (FLOVENT HFA) 220 mcg/actuation inhaler; Inhale 1 puff into the lungs 2 (two) times a day. Controller  Dispense: 12 g; Refill: 3    2. Aortic atherosclerosis (Primary)  Asymptomatic   Stable  On aspirin and statin to be continued.    3. Coronary artery disease involving native coronary artery of native heart without angina pectoris  Asymptomatic   Stable  On aspirin and statin to be continued.    4. Peripheral vascular disease, unspecified  On aspirin and statin to be continued.     5. Spinal stenosis of lumbar region with neurogenic claudication  Stable on lyrica  Following Pain management.    6. Urge incontinence  Tried mirabegron X 2 months, no relief.  - Ambulatory referral/consult  to Urology; Future      Future Appointments   Date Time Provider Department Center   4/22/2025 10:00 AM IB CT1 LIMIT 500 LBS IB CT SCAN [place]   5/2/2025 10:30 AM Buster Wise MD [facility] UROLOGY [place]   5/13/2025  4:00 PM Amando Sy MD [facility] PAIN [place]       I spent a total of 32 minutes on the day of the visit.This includes face to face time and non-face to face time preparing to see the patient (eg, review of tests), obtaining and/or reviewing separately obtained history, documenting clinical information in the electronic or other health record, independently interpreting results and communicating results to the patient/family/caregiver, or care coordinator.  Visit today included increased complexity associated with the care of the episodic problem   addressed and managing the longitudinal care of the patient due to the serious and/or complex managed problem(s) .     Andrea Del Castillo MD    This note was generated with the assistance of ambient listening technology. Verbal consent was obtained by the patient and accompanying visitor(s) for the recording of patient appointment to facilitate this note. I attest to having reviewed and edited the generated note for accuracy, though some syntax or spelling errors may persist. Please contact the author of this note for any clarification.            [1]   Patient Active Problem List  Diagnosis    Aortic atherosclerosis    Back pain    Coronary artery disease involving native coronary artery of native heart without angina pectoris    Gastroesophageal reflux disease with esophagitis    Hyperlipidemia    Osteopenia    Pulmonary nodule, right    Spinal stenosis    History of tobacco use    Left hip pain    Sciatica of left side    BPPV (benign paroxysmal positional vertigo)    Fatigue    Other specified abdominal hernia without obstruction or gangrene    Post-nasal drip    Vertigo    Mild intermittent asthma without complication    Urge  incontinence    Ventral hernia without obstruction or gangrene    Anxiety    Asymptomatic microscopic hematuria    Seasonal allergic rhinitis due to pollen    Peripheral vascular disease, unspecified    Postmenopausal osteoporosis    Lumbar spondylosis   [2]   Current Outpatient Medications   Medication Sig Dispense Refill    albuterol (PROVENTIL/VENTOLIN HFA) 90 mcg/actuation inhaler Inhale 1-2 puffs into the lungs every 6 (six) hours as needed for Wheezing. Rescue 18 g 1    aspirin (ECOTRIN) 81 MG EC tablet Take 81 mg by mouth once daily.      levocetirizine (XYZAL) 5 MG tablet Take 1 tablet (5 mg total) by mouth every evening. 30 tablet 5    mirabegron (MYRBETRIQ) 25 mg Tb24 ER tablet Take 1 tablet (25 mg total) by mouth once daily. 30 tablet 11    nicotine polacrilex 2 MG Lozg Take 1 lozenge (2 mg total) by mouth as needed (Use 8-10 lozenges per day in the place of cigarettes). 270 lozenge 0    pravastatin (PRAVACHOL) 20 MG tablet Take 1 tablet (20 mg total) by mouth once daily. 90 tablet 1    pregabalin (LYRICA) 25 MG capsule Take 1 capsule (25 mg total) by mouth every evening. 30 capsule 5    propylene glycol (SYSTANE COMPLETE OPHT) Apply 1 drop to eye daily as needed.      rOPINIRole (REQUIP) 0.5 MG tablet Take 1 tablet (0.5 mg total) by mouth nightly as needed (restless legs). 90 tablet 3    traMADoL (ULTRAM) 50 mg tablet Take 1 tablet (50 mg total) by mouth every 6 (six) hours as needed. 28 tablet 0    fluticasone propionate (FLOVENT HFA) 220 mcg/actuation inhaler Inhale 1 puff into the lungs 2 (two) times a day. Controller 12 g 3     No current facility-administered medications for this visit.

## 2025-06-02 ENCOUNTER — OFFICE VISIT (OUTPATIENT)
Dept: UROLOGY | Facility: CLINIC | Age: 75
End: 2025-06-02
Payer: MEDICARE

## 2025-06-02 VITALS — BODY MASS INDEX: 26.37 KG/M2 | WEIGHT: 143.31 LBS | HEIGHT: 62 IN

## 2025-06-02 DIAGNOSIS — N39.41 URGE INCONTINENCE: Primary | ICD-10-CM

## 2025-06-02 DIAGNOSIS — R31.21 ASYMPTOMATIC MICROSCOPIC HEMATURIA: ICD-10-CM

## 2025-06-02 LAB
BILIRUBIN, UA POC OHS: NEGATIVE
BLOOD, UA POC OHS: ABNORMAL
CLARITY, UA POC OHS: CLEAR
COLOR, UA POC OHS: YELLOW
GLUCOSE, UA POC OHS: NEGATIVE
KETONES, UA POC OHS: NEGATIVE
LEUKOCYTES, UA POC OHS: ABNORMAL
NITRITE, UA POC OHS: NEGATIVE
PH, UA POC OHS: 7
POC RESIDUAL URINE VOLUME: 18 ML (ref 0–100)
PROTEIN, UA POC OHS: NEGATIVE
SPECIFIC GRAVITY, UA POC OHS: 1.01
UROBILINOGEN, UA POC OHS: 0.2

## 2025-06-02 PROCEDURE — 99214 OFFICE O/P EST MOD 30 MIN: CPT | Mod: S$GLB,,, | Performed by: UROLOGY

## 2025-06-02 PROCEDURE — 99999 PR PBB SHADOW E&M-EST. PATIENT-LVL III: CPT | Mod: PBBFAC,,, | Performed by: UROLOGY

## 2025-06-02 PROCEDURE — 51798 US URINE CAPACITY MEASURE: CPT | Mod: S$GLB,,, | Performed by: UROLOGY

## 2025-06-02 PROCEDURE — 1126F AMNT PAIN NOTED NONE PRSNT: CPT | Mod: CPTII,S$GLB,, | Performed by: UROLOGY

## 2025-06-02 PROCEDURE — 1159F MED LIST DOCD IN RCRD: CPT | Mod: CPTII,S$GLB,, | Performed by: UROLOGY

## 2025-06-02 PROCEDURE — 1101F PT FALLS ASSESS-DOCD LE1/YR: CPT | Mod: CPTII,S$GLB,, | Performed by: UROLOGY

## 2025-06-02 PROCEDURE — 3288F FALL RISK ASSESSMENT DOCD: CPT | Mod: CPTII,S$GLB,, | Performed by: UROLOGY

## 2025-06-02 PROCEDURE — 88112 CYTOPATH CELL ENHANCE TECH: CPT | Mod: TC | Performed by: UROLOGY

## 2025-06-02 PROCEDURE — 1160F RVW MEDS BY RX/DR IN RCRD: CPT | Mod: CPTII,S$GLB,, | Performed by: UROLOGY

## 2025-06-02 PROCEDURE — 81003 URINALYSIS AUTO W/O SCOPE: CPT | Mod: QW,S$GLB,, | Performed by: UROLOGY

## 2025-06-02 RX ORDER — VIBEGRON 75 MG/1
75 TABLET, FILM COATED ORAL DAILY
Qty: 90 TABLET | Refills: 3 | Status: SHIPPED | OUTPATIENT
Start: 2025-06-02

## 2025-06-03 LAB
ESTROGEN SERPL-MCNC: NORMAL PG/ML
INSULIN SERPL-ACNC: NORMAL U[IU]/ML
LAB AP GROSS DESCRIPTION: NORMAL
LAB AP PERFORMING LOCATION(S): NORMAL
LAB AP URINE CYTOLOGY INTERPRETATION SPECIMEN 1: NORMAL

## (undated) DEVICE — SUPPORT ULNA NERVE PROTECTOR

## (undated) DEVICE — Device

## (undated) DEVICE — CLOSURE SKIN STERI STRIP 1/2X4

## (undated) DEVICE — SUT V-LOC 180 V-20 3-0 12IN

## (undated) DEVICE — SEE MEDLINE ITEM 152740

## (undated) DEVICE — APPLICATOR CHLORAPREP ORN 26ML

## (undated) DEVICE — SYR 30CC LUER LOCK

## (undated) DEVICE — NDL SPINAL 25GX3.5 SPINOCAN

## (undated) DEVICE — SEAL UNIVERSAL 5MM-8MM XI

## (undated) DEVICE — POSITIONER HEAD DONUT 9IN FOAM

## (undated) DEVICE — COVER TIP CURVED SCISSORS XI

## (undated) DEVICE — SUT BLU GS-22 0 3.5M 23CM 9IN

## (undated) DEVICE — ADHESIVE MASTISOL VIAL 48/BX

## (undated) DEVICE — ELECTRODE REM PLYHSV RETURN 9

## (undated) DEVICE — SUT VICRYL 0 27 CT-2

## (undated) DEVICE — KIT ANTIFOG

## (undated) DEVICE — SUT VICRYL 0 CT-2 27 DYE

## (undated) DEVICE — DEVICE CLOSURE DISP 14G

## (undated) DEVICE — OBTURATOR BLADELESS 8MM XI CLR

## (undated) DEVICE — DRAPE ARM DAVINCI XI

## (undated) DEVICE — CANNULA REDUCER 12-8MM

## (undated) DEVICE — SUT STRATAFIX 1 SPIRAL .5

## (undated) DEVICE — COVER OVERHEAD SURG LT BLUE

## (undated) DEVICE — CLIPPER BLADE MOD 4406 (CAREF)

## (undated) DEVICE — NDL PNEUMO INSUFFLATI 120MM

## (undated) DEVICE — CANNULA SEAL 12MM

## (undated) DEVICE — DRAPE COLUMN DAVINCI XI

## (undated) DEVICE — SUT VICRYL PLUS 4-0 P3 18IN

## (undated) DEVICE — SEE MEDLINE ITEM 157027

## (undated) DEVICE — UNDERGLOVES BIOGEL PI SZ 7 LF

## (undated) DEVICE — NDL SAFETY 25G X 1.5 ECLIPSE

## (undated) DEVICE — SOL NS 1000CC

## (undated) DEVICE — SEE MEDLINE ITEM 157117

## (undated) DEVICE — GLOVE SURGICAL LATEX SZ 7

## (undated) DEVICE — MANIFOLD 4 PORT

## (undated) DEVICE — DECANTER VIAL ASEPTIC TRANSFER

## (undated) DEVICE — DRAPE ABDOMINAL TIBURON 14X11

## (undated) DEVICE — KIT WING PAD POSITIONING

## (undated) DEVICE — TUBING HEATED INSUFFLATOR

## (undated) DEVICE — SOL 9P NACL IRR PIC IL

## (undated) DEVICE — SEE MEDLINE ITEM 152622